# Patient Record
Sex: MALE | Race: WHITE | Employment: FULL TIME | URBAN - METROPOLITAN AREA
[De-identification: names, ages, dates, MRNs, and addresses within clinical notes are randomized per-mention and may not be internally consistent; named-entity substitution may affect disease eponyms.]

---

## 2018-02-11 PROBLEM — R19.7 DIARRHEA: Status: ACTIVE | Noted: 2018-02-11

## 2018-02-11 PROBLEM — E86.0 DEHYDRATION: Status: ACTIVE | Noted: 2018-02-11

## 2018-02-11 PROBLEM — K29.70 VIRAL GASTRITIS: Status: ACTIVE | Noted: 2018-02-11

## 2018-02-11 PROBLEM — R11.10 VOMITING: Status: ACTIVE | Noted: 2018-02-11

## 2018-02-11 PROBLEM — N17.9 ACUTE KIDNEY INJURY (HCC): Status: ACTIVE | Noted: 2018-02-11

## 2018-06-27 ENCOUNTER — HOSPITAL ENCOUNTER (INPATIENT)
Age: 58
LOS: 12 days | Discharge: HOME | DRG: 215 | End: 2018-07-09
Attending: INTERNAL MEDICINE | Admitting: INTERNAL MEDICINE
Payer: COMMERCIAL

## 2018-06-27 ENCOUNTER — APPOINTMENT (OUTPATIENT)
Dept: GENERAL RADIOLOGY | Age: 58
DRG: 215 | End: 2018-06-27
Attending: INTERNAL MEDICINE
Payer: COMMERCIAL

## 2018-06-27 DIAGNOSIS — R19.7 DIARRHEA, UNSPECIFIED TYPE: ICD-10-CM

## 2018-06-27 PROBLEM — I21.3 ST ELEVATION (STEMI) MYOCARDIAL INFARCTION (HCC): Status: ACTIVE | Noted: 2018-06-27

## 2018-06-27 PROBLEM — I21.21 STEMI INVOLVING LEFT CIRCUMFLEX CORONARY ARTERY (HCC): Status: ACTIVE | Noted: 2018-06-27

## 2018-06-27 LAB
ACT BLD: 175 SECS (ref 79–138)
ACT BLD: 208 SECS (ref 79–138)
ARTERIAL PATENCY WRIST A: ABNORMAL
BASE DEFICIT BLDA-SCNC: 4.1 MMOL/L
BDY SITE: ABNORMAL
EPAP/CPAP/PEEP, PAPEEP: 14
FIO2 ON VENT: 100 %
GAS FLOW.O2 SETTING OXYMISER: 18 L/MIN
GLUCOSE BLD STRIP.AUTO-MCNC: 333 MG/DL (ref 65–100)
HCO3 BLDA-SCNC: 23 MMOL/L (ref 22–26)
PCO2 BLDA: 47 MMHG (ref 35–45)
PH BLDA: 7.3 [PH] (ref 7.35–7.45)
PO2 BLDA: 198 MMHG (ref 80–100)
SAO2 % BLD: 99 % (ref 92–97)
SAO2% DEVICE SAO2% SENSOR NAME: ABNORMAL
SERVICE CMNT-IMP: ABNORMAL
SPECIMEN SITE: ABNORMAL
VENTILATION MODE VENT: ABNORMAL
VT SETTING VENT: 500 ML

## 2018-06-27 PROCEDURE — B4101ZZ FLUOROSCOPY OF ABDOMINAL AORTA USING LOW OSMOLAR CONTRAST: ICD-10-PCS | Performed by: INTERNAL MEDICINE

## 2018-06-27 PROCEDURE — 71045 X-RAY EXAM CHEST 1 VIEW: CPT

## 2018-06-27 PROCEDURE — C1757 CATH, THROMBECTOMY/EMBOLECT: HCPCS

## 2018-06-27 PROCEDURE — 0BH17EZ INSERTION OF ENDOTRACHEAL AIRWAY INTO TRACHEA, VIA NATURAL OR ARTIFICIAL OPENING: ICD-10-PCS | Performed by: ANESTHESIOLOGY

## 2018-06-27 PROCEDURE — C1769 GUIDE WIRE: HCPCS

## 2018-06-27 PROCEDURE — 77030002996 HC SUT SLK J&J -A

## 2018-06-27 PROCEDURE — 4A023N7 MEASUREMENT OF CARDIAC SAMPLING AND PRESSURE, LEFT HEART, PERCUTANEOUS APPROACH: ICD-10-PCS | Performed by: INTERNAL MEDICINE

## 2018-06-27 PROCEDURE — 74011000250 HC RX REV CODE- 250: Performed by: INTERNAL MEDICINE

## 2018-06-27 PROCEDURE — 74011000250 HC RX REV CODE- 250

## 2018-06-27 PROCEDURE — 94003 VENT MGMT INPAT SUBQ DAY: CPT

## 2018-06-27 PROCEDURE — 85347 COAGULATION TIME ACTIVATED: CPT

## 2018-06-27 PROCEDURE — 77030028837 HC SYR ANGI PWR INJ COEU -A

## 2018-06-27 PROCEDURE — 94002 VENT MGMT INPAT INIT DAY: CPT

## 2018-06-27 PROCEDURE — 5A1945Z RESPIRATORY VENTILATION, 24-96 CONSECUTIVE HOURS: ICD-10-PCS | Performed by: ANESTHESIOLOGY

## 2018-06-27 PROCEDURE — B2111ZZ FLUOROSCOPY OF MULTIPLE CORONARY ARTERIES USING LOW OSMOLAR CONTRAST: ICD-10-PCS | Performed by: INTERNAL MEDICINE

## 2018-06-27 PROCEDURE — C1887 CATHETER, GUIDING: HCPCS

## 2018-06-27 PROCEDURE — 82375 ASSAY CARBOXYHB QUANT: CPT | Performed by: INTERNAL MEDICINE

## 2018-06-27 PROCEDURE — 77030019697 HC SYR ANGI INFL MRTM -B

## 2018-06-27 PROCEDURE — C1894 INTRO/SHEATH, NON-LASER: HCPCS

## 2018-06-27 PROCEDURE — 75810000275 HC EMERGENCY DEPT VISIT NO LEVEL OF CARE

## 2018-06-27 PROCEDURE — 74011636637 HC RX REV CODE- 636/637: Performed by: INTERNAL MEDICINE

## 2018-06-27 PROCEDURE — 74011250636 HC RX REV CODE- 250/636

## 2018-06-27 PROCEDURE — 77030029641 HC PMP CARD PERC IMPELLA CP ABIM -L

## 2018-06-27 PROCEDURE — 3E03317 INTRODUCTION OF OTHER THROMBOLYTIC INTO PERIPHERAL VEIN, PERCUTANEOUS APPROACH: ICD-10-PCS | Performed by: INTERNAL MEDICINE

## 2018-06-27 PROCEDURE — 65620000000 HC RM CCU GENERAL

## 2018-06-27 PROCEDURE — 93458 L HRT ARTERY/VENTRICLE ANGIO: CPT

## 2018-06-27 PROCEDURE — 82962 GLUCOSE BLOOD TEST: CPT

## 2018-06-27 PROCEDURE — 74011250636 HC RX REV CODE- 250/636: Performed by: INTERNAL MEDICINE

## 2018-06-27 PROCEDURE — 82803 BLOOD GASES ANY COMBINATION: CPT | Performed by: INTERNAL MEDICINE

## 2018-06-27 PROCEDURE — C1725 CATH, TRANSLUMIN NON-LASER: HCPCS

## 2018-06-27 PROCEDURE — 74011636320 HC RX REV CODE- 636/320

## 2018-06-27 PROCEDURE — 74011000258 HC RX REV CODE- 258: Performed by: INTERNAL MEDICINE

## 2018-06-27 PROCEDURE — 77030034850

## 2018-06-27 PROCEDURE — B2151ZZ FLUOROSCOPY OF LEFT HEART USING LOW OSMOLAR CONTRAST: ICD-10-PCS | Performed by: INTERNAL MEDICINE

## 2018-06-27 PROCEDURE — 5A0221D ASSISTANCE WITH CARDIAC OUTPUT USING IMPELLER PUMP, CONTINUOUS: ICD-10-PCS | Performed by: INTERNAL MEDICINE

## 2018-06-27 PROCEDURE — 94640 AIRWAY INHALATION TREATMENT: CPT

## 2018-06-27 PROCEDURE — 74018 RADEX ABDOMEN 1 VIEW: CPT

## 2018-06-27 PROCEDURE — 0272366 DILATION OF CORONARY ARTERY, THREE ARTERIES, BIFURCATION, WITH THREE DRUG-ELUTING INTRALUMINAL DEVICES, PERCUTANEOUS APPROACH: ICD-10-PCS | Performed by: INTERNAL MEDICINE

## 2018-06-27 PROCEDURE — 74011250636 HC RX REV CODE- 250/636: Performed by: ANESTHESIOLOGY

## 2018-06-27 PROCEDURE — 02HA3RZ INSERTION OF SHORT-TERM EXTERNAL HEART ASSIST SYSTEM INTO HEART, PERCUTANEOUS APPROACH: ICD-10-PCS | Performed by: INTERNAL MEDICINE

## 2018-06-27 PROCEDURE — 74011250637 HC RX REV CODE- 250/637: Performed by: INTERNAL MEDICINE

## 2018-06-27 PROCEDURE — 74011636320 HC RX REV CODE- 636/320: Performed by: INTERNAL MEDICINE

## 2018-06-27 PROCEDURE — C1874 STENT, COATED/COV W/DEL SYS: HCPCS

## 2018-06-27 PROCEDURE — 77030004550 HC CATH ANGI DX PRF MRTM -B

## 2018-06-27 RX ORDER — FENTANYL CITRATE 50 UG/ML
INJECTION, SOLUTION INTRAMUSCULAR; INTRAVENOUS
Status: DISCONTINUED
Start: 2018-06-27 | End: 2018-06-28 | Stop reason: ALTCHOICE

## 2018-06-27 RX ORDER — FUROSEMIDE 10 MG/ML
20 INJECTION INTRAMUSCULAR; INTRAVENOUS ONCE
Status: COMPLETED | OUTPATIENT
Start: 2018-06-27 | End: 2018-06-27

## 2018-06-27 RX ORDER — IODIXANOL 320 MG/ML
0-100 INJECTION, SOLUTION INTRAVASCULAR
Status: DISCONTINUED | OUTPATIENT
Start: 2018-06-27 | End: 2018-06-29

## 2018-06-27 RX ORDER — IODIXANOL 320 MG/ML
INJECTION, SOLUTION INTRAVASCULAR
Status: DISCONTINUED
Start: 2018-06-27 | End: 2018-06-28 | Stop reason: ALTCHOICE

## 2018-06-27 RX ORDER — LIDOCAINE HYDROCHLORIDE 10 MG/ML
1-30 INJECTION, SOLUTION EPIDURAL; INFILTRATION; INTRACAUDAL; PERINEURAL
Status: DISCONTINUED | OUTPATIENT
Start: 2018-06-27 | End: 2018-06-27 | Stop reason: HOSPADM

## 2018-06-27 RX ORDER — FUROSEMIDE 10 MG/ML
INJECTION INTRAMUSCULAR; INTRAVENOUS
Status: COMPLETED
Start: 2018-06-27 | End: 2018-06-27

## 2018-06-27 RX ORDER — IPRATROPIUM BROMIDE AND ALBUTEROL SULFATE 2.5; .5 MG/3ML; MG/3ML
SOLUTION RESPIRATORY (INHALATION)
Status: COMPLETED
Start: 2018-06-27 | End: 2018-06-27

## 2018-06-27 RX ORDER — NOREPINEPHRINE BITARTRATE/D5W 8 MG/250ML
2-30 PLASTIC BAG, INJECTION (ML) INTRAVENOUS
Status: DISCONTINUED | OUTPATIENT
Start: 2018-06-27 | End: 2018-06-29

## 2018-06-27 RX ORDER — LIDOCAINE HYDROCHLORIDE 10 MG/ML
INJECTION, SOLUTION EPIDURAL; INFILTRATION; INTRACAUDAL; PERINEURAL
Status: COMPLETED
Start: 2018-06-27 | End: 2018-06-27

## 2018-06-27 RX ORDER — FENTANYL CITRATE 50 UG/ML
25 INJECTION, SOLUTION INTRAMUSCULAR; INTRAVENOUS
Status: DISPENSED | OUTPATIENT
Start: 2018-06-27 | End: 2018-06-28

## 2018-06-27 RX ORDER — EPTIFIBATIDE 0.75 MG/ML
2 INJECTION, SOLUTION INTRAVENOUS CONTINUOUS
Status: DISCONTINUED | OUTPATIENT
Start: 2018-06-27 | End: 2018-06-27

## 2018-06-27 RX ORDER — SODIUM CHLORIDE 900 MG/100ML
INJECTION INTRAVENOUS
Status: DISCONTINUED
Start: 2018-06-27 | End: 2018-06-28 | Stop reason: ALTCHOICE

## 2018-06-27 RX ORDER — MIDAZOLAM HYDROCHLORIDE 1 MG/ML
1 INJECTION, SOLUTION INTRAMUSCULAR; INTRAVENOUS
Status: DISCONTINUED | OUTPATIENT
Start: 2018-06-27 | End: 2018-06-28

## 2018-06-27 RX ORDER — FUROSEMIDE 10 MG/ML
40 INJECTION INTRAMUSCULAR; INTRAVENOUS DAILY
Status: DISCONTINUED | OUTPATIENT
Start: 2018-06-28 | End: 2018-06-29

## 2018-06-27 RX ORDER — BIVALIRUDIN 250 MG/5ML
INJECTION, POWDER, LYOPHILIZED, FOR SOLUTION INTRAVENOUS
Status: DISCONTINUED
Start: 2018-06-27 | End: 2018-06-28 | Stop reason: ALTCHOICE

## 2018-06-27 RX ORDER — PROPOFOL 10 MG/ML
200 INJECTION, EMULSION INTRAVENOUS
Status: COMPLETED | OUTPATIENT
Start: 2018-06-27 | End: 2018-06-27

## 2018-06-27 RX ORDER — MIDAZOLAM HYDROCHLORIDE 1 MG/ML
.5-2 INJECTION, SOLUTION INTRAMUSCULAR; INTRAVENOUS
Status: DISCONTINUED | OUTPATIENT
Start: 2018-06-27 | End: 2018-06-27 | Stop reason: HOSPADM

## 2018-06-27 RX ORDER — NOREPINEPHRINE BITARTRATE/D5W 4MG/250ML
2-30 PLASTIC BAG, INJECTION (ML) INTRAVENOUS
Status: DISCONTINUED | OUTPATIENT
Start: 2018-06-27 | End: 2018-06-27

## 2018-06-27 RX ORDER — CHLORHEXIDINE GLUCONATE 1.2 MG/ML
15 RINSE ORAL EVERY 12 HOURS
Status: DISCONTINUED | OUTPATIENT
Start: 2018-06-27 | End: 2018-07-09

## 2018-06-27 RX ORDER — HEPARIN SODIUM 10000 [USP'U]/100ML
500-2000 INJECTION, SOLUTION INTRAVENOUS
Status: DISCONTINUED | OUTPATIENT
Start: 2018-06-27 | End: 2018-06-28

## 2018-06-27 RX ORDER — HEPARIN SODIUM 200 [USP'U]/100ML
500 INJECTION, SOLUTION INTRAVENOUS ONCE
Status: COMPLETED | OUTPATIENT
Start: 2018-06-27 | End: 2018-06-27

## 2018-06-27 RX ORDER — MAGNESIUM SULFATE 100 %
4 CRYSTALS MISCELLANEOUS AS NEEDED
Status: DISCONTINUED | OUTPATIENT
Start: 2018-06-27 | End: 2018-07-09 | Stop reason: HOSPADM

## 2018-06-27 RX ORDER — PROPOFOL 10 MG/ML
INJECTION, EMULSION INTRAVENOUS
Status: DISPENSED
Start: 2018-06-27 | End: 2018-06-28

## 2018-06-27 RX ORDER — MIDAZOLAM HYDROCHLORIDE 1 MG/ML
INJECTION, SOLUTION INTRAMUSCULAR; INTRAVENOUS
Status: DISCONTINUED
Start: 2018-06-27 | End: 2018-06-28 | Stop reason: ALTCHOICE

## 2018-06-27 RX ORDER — HEPARIN SODIUM 200 [USP'U]/100ML
INJECTION, SOLUTION INTRAVENOUS
Status: COMPLETED
Start: 2018-06-27 | End: 2018-06-27

## 2018-06-27 RX ORDER — FENTANYL CITRATE 50 UG/ML
25-50 INJECTION, SOLUTION INTRAMUSCULAR; INTRAVENOUS
Status: DISCONTINUED | OUTPATIENT
Start: 2018-06-27 | End: 2018-06-27 | Stop reason: HOSPADM

## 2018-06-27 RX ORDER — PROPOFOL 10 MG/ML
0-50 VIAL (ML) INTRAVENOUS
Status: DISCONTINUED | OUTPATIENT
Start: 2018-06-27 | End: 2018-06-27 | Stop reason: HOSPADM

## 2018-06-27 RX ORDER — FUROSEMIDE 10 MG/ML
40 INJECTION INTRAMUSCULAR; INTRAVENOUS ONCE
Status: COMPLETED | OUTPATIENT
Start: 2018-06-27 | End: 2018-06-27

## 2018-06-27 RX ORDER — MUPIROCIN 20 MG/G
OINTMENT TOPICAL 2 TIMES DAILY
Status: COMPLETED | OUTPATIENT
Start: 2018-06-27 | End: 2018-07-02

## 2018-06-27 RX ORDER — SUCCINYLCHOLINE CHLORIDE 20 MG/ML
0-200 INJECTION INTRAMUSCULAR; INTRAVENOUS
Status: COMPLETED | OUTPATIENT
Start: 2018-06-27 | End: 2018-06-27

## 2018-06-27 RX ORDER — EPTIFIBATIDE 0.75 MG/ML
INJECTION, SOLUTION INTRAVENOUS
Status: DISCONTINUED
Start: 2018-06-27 | End: 2018-06-28 | Stop reason: ALTCHOICE

## 2018-06-27 RX ORDER — INSULIN LISPRO 100 [IU]/ML
INJECTION, SOLUTION INTRAVENOUS; SUBCUTANEOUS EVERY 6 HOURS
Status: DISCONTINUED | OUTPATIENT
Start: 2018-06-27 | End: 2018-07-01

## 2018-06-27 RX ORDER — EPTIFIBATIDE 0.75 MG/ML
INJECTION, SOLUTION INTRAVENOUS
Status: COMPLETED
Start: 2018-06-27 | End: 2018-06-27

## 2018-06-27 RX ORDER — ASPIRIN 300 MG/1
300 SUPPOSITORY RECTAL DAILY
Status: DISCONTINUED | OUTPATIENT
Start: 2018-06-27 | End: 2018-06-29

## 2018-06-27 RX ORDER — IPRATROPIUM BROMIDE AND ALBUTEROL SULFATE 2.5; .5 MG/3ML; MG/3ML
3 SOLUTION RESPIRATORY (INHALATION)
Status: DISCONTINUED | OUTPATIENT
Start: 2018-06-27 | End: 2018-07-02

## 2018-06-27 RX ORDER — DEXTROSE 50 % IN WATER (D50W) INTRAVENOUS SYRINGE
12.5-25 AS NEEDED
Status: DISCONTINUED | OUTPATIENT
Start: 2018-06-27 | End: 2018-07-09 | Stop reason: HOSPADM

## 2018-06-27 RX ORDER — SODIUM CHLORIDE 9 MG/ML
25 INJECTION, SOLUTION INTRAVENOUS CONTINUOUS
Status: DISCONTINUED | OUTPATIENT
Start: 2018-06-27 | End: 2018-06-29

## 2018-06-27 RX ADMIN — TICAGRELOR 180 MG: 90 TABLET ORAL at 20:29

## 2018-06-27 RX ADMIN — PROPOFOL 50 MCG/KG/MIN: 10 INJECTION, EMULSION INTRAVENOUS at 19:26

## 2018-06-27 RX ADMIN — HEPARIN SODIUM 500 UNITS/HR: 10000 INJECTION, SOLUTION INTRAVENOUS at 23:10

## 2018-06-27 RX ADMIN — FENTANYL CITRATE 50 MCG: 50 INJECTION, SOLUTION INTRAMUSCULAR; INTRAVENOUS at 17:40

## 2018-06-27 RX ADMIN — SODIUM CHLORIDE 0.4 MCG/KG/HR: 900 INJECTION, SOLUTION INTRAVENOUS at 22:18

## 2018-06-27 RX ADMIN — PROPOFOL 25 MCG/KG/MIN: 10 INJECTION, EMULSION INTRAVENOUS at 17:30

## 2018-06-27 RX ADMIN — BIVALIRUDIN 1.75 MG/KG/HR: 250 INJECTION, POWDER, LYOPHILIZED, FOR SOLUTION INTRAVENOUS at 18:04

## 2018-06-27 RX ADMIN — MIDAZOLAM HYDROCHLORIDE 1 MG: 1 INJECTION, SOLUTION INTRAMUSCULAR; INTRAVENOUS at 19:57

## 2018-06-27 RX ADMIN — BIVALIRUDIN 1.75 MG/KG/HR: 250 INJECTION, POWDER, LYOPHILIZED, FOR SOLUTION INTRAVENOUS at 17:04

## 2018-06-27 RX ADMIN — Medication 15 MCG/MIN: at 16:00

## 2018-06-27 RX ADMIN — PROPOFOL 200 MG: 10 INJECTION, EMULSION INTRAVENOUS at 17:25

## 2018-06-27 RX ADMIN — MIDAZOLAM HYDROCHLORIDE 1 MG: 1 INJECTION, SOLUTION INTRAMUSCULAR; INTRAVENOUS at 20:32

## 2018-06-27 RX ADMIN — LIDOCAINE HYDROCHLORIDE 5 ML: 10 INJECTION, SOLUTION EPIDURAL; INFILTRATION; INTRACAUDAL; PERINEURAL at 16:25

## 2018-06-27 RX ADMIN — IODIXANOL 80 ML: 320 INJECTION, SOLUTION INTRAVASCULAR at 18:39

## 2018-06-27 RX ADMIN — ASPIRIN 300 MG: 300 SUPPOSITORY RECTAL at 21:11

## 2018-06-27 RX ADMIN — HEPARIN SODIUM 7 ML/HR: 5000 INJECTION, SOLUTION INTRAVENOUS; SUBCUTANEOUS at 21:43

## 2018-06-27 RX ADMIN — INSULIN LISPRO 7 UNITS: 100 INJECTION, SOLUTION INTRAVENOUS; SUBCUTANEOUS at 21:21

## 2018-06-27 RX ADMIN — FENTANYL CITRATE 50 MCG: 50 INJECTION, SOLUTION INTRAMUSCULAR; INTRAVENOUS at 17:36

## 2018-06-27 RX ADMIN — SUCCINYLCHOLINE CHLORIDE 180 MG: 20 INJECTION, SOLUTION INTRAMUSCULAR; INTRAVENOUS at 17:25

## 2018-06-27 RX ADMIN — MUPIROCIN: 20 OINTMENT TOPICAL at 22:15

## 2018-06-27 RX ADMIN — SODIUM CHLORIDE 0.2 MCG/KG/HR: 900 INJECTION, SOLUTION INTRAVENOUS at 21:18

## 2018-06-27 RX ADMIN — CHLORHEXIDINE GLUCONATE 15 ML: 1.2 RINSE ORAL at 22:15

## 2018-06-27 RX ADMIN — IODIXANOL 20 ML: 320 INJECTION, SOLUTION INTRAVASCULAR at 18:14

## 2018-06-27 RX ADMIN — HEPARIN SODIUM 1000 UNITS: 200 INJECTION, SOLUTION INTRAVENOUS at 16:25

## 2018-06-27 RX ADMIN — IODIXANOL 100 ML: 320 INJECTION, SOLUTION INTRAVASCULAR at 17:44

## 2018-06-27 RX ADMIN — EPTIFIBATIDE 2 MCG/KG/MIN: 0.75 INJECTION, SOLUTION INTRAVENOUS at 18:05

## 2018-06-27 RX ADMIN — BIVALIRUDIN 1.75 MG/KG/HR: 250 INJECTION, POWDER, LYOPHILIZED, FOR SOLUTION INTRAVENOUS at 16:41

## 2018-06-27 RX ADMIN — FUROSEMIDE 20 MG: 10 INJECTION INTRAVENOUS at 16:46

## 2018-06-27 RX ADMIN — HEPARIN SODIUM 1000 UNITS: 200 INJECTION, SOLUTION INTRAVENOUS at 16:23

## 2018-06-27 RX ADMIN — EPTIFIBATIDE 19.35 MG: 0.75 INJECTION, SOLUTION INTRAVENOUS at 16:56

## 2018-06-27 RX ADMIN — NITROGLYCERIN 200 MCG: 5 INJECTION, SOLUTION INTRAVENOUS at 17:08

## 2018-06-27 RX ADMIN — IODIXANOL 24 ML: 320 INJECTION, SOLUTION INTRAVASCULAR at 18:12

## 2018-06-27 RX ADMIN — MIDAZOLAM HYDROCHLORIDE 1 MG: 1 INJECTION, SOLUTION INTRAMUSCULAR; INTRAVENOUS at 17:35

## 2018-06-27 RX ADMIN — FENTANYL CITRATE 25 MCG: 50 INJECTION, SOLUTION INTRAMUSCULAR; INTRAVENOUS at 23:05

## 2018-06-27 RX ADMIN — PROPOFOL 40 MCG/KG/MIN: 10 INJECTION, EMULSION INTRAVENOUS at 17:35

## 2018-06-27 RX ADMIN — FUROSEMIDE 20 MG: 10 INJECTION INTRAMUSCULAR; INTRAVENOUS at 16:46

## 2018-06-27 RX ADMIN — FUROSEMIDE 40 MG: 10 INJECTION, SOLUTION INTRAVENOUS at 17:14

## 2018-06-27 RX ADMIN — IOPAMIDOL 115 ML: 755 INJECTION, SOLUTION INTRAVENOUS at 17:17

## 2018-06-27 RX ADMIN — IPRATROPIUM BROMIDE AND ALBUTEROL SULFATE 3 ML: .5; 3 SOLUTION RESPIRATORY (INHALATION) at 19:40

## 2018-06-27 RX ADMIN — EPTIFIBATIDE 2 MCG/KG/MIN: 0.75 INJECTION, SOLUTION INTRAVENOUS at 17:10

## 2018-06-27 RX ADMIN — Medication 8 MCG/MIN: at 16:24

## 2018-06-27 RX ADMIN — FAMOTIDINE 20 MG: 10 INJECTION, SOLUTION INTRAVENOUS at 20:57

## 2018-06-27 NOTE — ED PROVIDER NOTES
HPI     Past Medical History:   Diagnosis Date    Actinic keratosis     Asthma     Cellulitis     Diabetes ([address]     Endocrine disease     Essential hypertension     GERD (gastroesophageal reflux disease)     Hypertension     Sun-damaged skin     Sunburn, blistering        Past Surgical History:   Procedure Laterality Date    ABDOMEN SURGERY PROC UNLISTED      HX HERNIA REPAIR      HX TONSILLECTOMY           Family History:   Problem Relation Age of Onset    Cancer Mother     Cancer Father        Social History     Social History    Marital status:      Spouse name: N/A    Number of children: 2    Years of education: N/A     Occupational History     fish boat      Social History Main Topics    Smoking status: Former Smoker     Packs/day: 2.00     Quit date: 5/17/1994    Smokeless tobacco: Never Used    Alcohol use Yes      Comment: occassionally    Drug use: No    Sexual activity: Yes     Other Topics Concern    Not on file     Social History Narrative         ALLERGIES: Pcn [penicillins]    Review of Systems    There were no vitals filed for this visit.          Physical Exam     MDM      ED Course       Procedures

## 2018-06-27 NOTE — H&P
Admission    NAME: Kenrick Murphy   :  1960   MRN:  [id number]     Date/Time:  2018 4:08 PM    Patient PCP: Toyin Hua MD  ________________________________________________________________________     Assessment:     1. Chest pain initially NSTEMI, while at ER at [facility] developed complete heart block, hypotension, with repeat ECG showing inferior STEMI, cardiogenic shock, acute systolic chf  2. Cath 2018 with 90% prox LAD, 100% prox dominant LCx, Small disease non-dominant RCA. PCI with stent to OM, Bifurcation stent to prox LCx and OM, PCI with stent to prox LAD  3. Ischemic cardiomyopathy EF 35%, inferior hypokinesis  4. Sinus with transient complete heart block  5. HTN  6. DM  7. Dyslipidemia  8. Recent colitis with GI bleed s/p endoscopy with Dr. Alverto Hua  9. Hx of tonsillectomy  10. Hx of hernia repair  11. Hx of basal cell ca removal  12. Quit tobacco in   13. , wife is  for medical practice in [address] [place] [address] neck, he works on boat, active          Plan:     Not feeling well for a few months. Chest pain two days ago lasting several hours. Today with more significant chest pain and came to ER at [facility]. There ECG with ST depression anteriorly with cardiac enzymes showing NSTEMI. While awaiting transportation, became hypotensive and nauseated. ECG with complete heart block and now inferior ST elevation. Transferred to [facility] by helicopter. Placed on levophed for hypotension. Here urgent cath, all risk/benefits/alternatives discussed with patient. Cath with occluded dominant LCx and high grade LAD disease. LCx opened with PTCA. Mcgraw placed and initiated on lasix. Despite this progressive acute systolic chf. Intubated for hypoxemic resp failure. OM stented, bifurcation LCx stented, LAD stented. Impella left ventricular assist device placed for cardiogenic shock. Angiomax and integrelin until brilinta can be given.     1. Heparin while impella in  2. Add aspirin  3. Add brilinta  4. Holding further beta blocker due to hypotension  5. Holding losartan HCT for now  6. Diurese with IV Lasix  7. Eventually add statin  8. Insulin sliding scale    Goal will be to diurese tonight and improve oxygenation. Then will start to wean impella. Then will move toward extubation. Critically ill, guarded prognosis discussed with wife and her family. [x]        High complexity decision making was performed        Subjective:   CHIEF COMPLAINT: Chest pain    HISTORY OF PRESENT ILLNESS:       Pt with upper chest pains that started 2 days ago. Lasted for 3-4 hours. Dull, no nausea, no SOB, b  When he had some belching the pain resolved. The pain returned yestereday at 8 or 9 pm.  He took some Pepto Bismol and the pain resolved. No heart trouble in the past. This am at 0400 the pain returned, and it persisted. The pepto bismol did not help this am.      Patient is a 59 y.o. male presenting with chest pain. The history is provided by the patient. Chest Pain (Angina)    Associated symptoms include nausea. Pertinent negatives include no abdominal pain, no back pain, no cough, no dizziness, no fever, no headaches, no shortness of breath, no vomiting and no weakness. Arrived in cath lab, dyspnic, still with chest pain, edema on exam.    We were asked to admit for work up and evaluation of the above problems.      Past Medical History:   Diagnosis Date    Actinic keratosis     Asthma     Cellulitis     Diabetes ([facility] [address]     Endocrine disease     Essential hypertension     GERD (gastroesophageal reflux disease)     Hypertension     Sun-damaged skin     Sunburn, blistering       Past Surgical History:   Procedure Laterality Date    ABDOMEN SURGERY PROC UNLISTED      HX HERNIA REPAIR      HX TONSILLECTOMY       Allergies   Allergen Reactions    Pcn [Penicillins] Nausea and Vomiting      Meds:  See below  Social History   Substance Use Topics    Smoking status: Former Smoker     Packs/day: 2.00     Quit date: 5/17/1994    Smokeless tobacco: Never Used    Alcohol use Yes      Comment: occassionally      Family History   Problem Relation Age of Onset    Cancer Mother     Cancer Father        REVIEW OF SYSTEMS:     []         Unable to obtain  ROS due to ---   [x]         Total of 12 systems reviewed as follows: Total of 12 systems reviewed as follows:       POSITIVE= Bold text  Negative = normal text  General:  fever, chills, sweats, generalized weakness, weight loss/gain,      loss of appetite   Eyes:    blurred vision, eye pain, loss of vision, double vision  ENT:    rhinorrhea, pharyngitis   Respiratory:   cough, sputum production, SOB, MAGALLANES, wheezing, pleuritic pain   Cardiology:   chest pain, palpitations, orthopnea, PND, edema, syncope   Gastrointestinal:  abdominal pain , N/V, diarrhea, dysphagia, constipation, bleeding   Genitourinary:  frequency, urgency, dysuria, hematuria, incontinence   Muskuloskeletal :  arthralgia, myalgia, back pain  Hematology:  easy bruising, nose or gum bleeding, lymphadenopathy   Dermatological: rash, ulceration, pruritis, color change / jaundice  Endocrine:   hot flashes or polydipsia   Neurological:  headache, dizziness, confusion, focal weakness, paresthesia,     Speech difficulties, memory loss, gait difficulty  Psychological: Feelings of anxiety, depression, agitation    Objective:      Physical Exam:    Last 24hrs VS reviewed since prior progress note. Most recent are: There were no vitals taken for this visit. No intake or output data in the 24 hours ending 06/27/18 1608     General Appearance: Well developed, well nourished, alert & oriented x 3,    In severe distress  Ears/Nose/Mouth/Throat: Pupils equal and round, Hearing grossly normal.  Neck: Supple. JVP elevated. Carotids good upstrokes, with no bruit. Chest: Lungs ronchi to auscultation bilaterally.   Cardiovascular: Regular rate and rhythm, S1S2 normal, no murmur, rubs, gallops. Abdomen: Soft, non-tender, bowel sounds are active. No organomegaly. Extremities: +1 edema bilaterally. Femoral pulses +2, Distal Pulses +1. Skin: Cool and dry. Neuro: CN II-XII grossly intact, Strength and sensation grossly intact. Data:      Prior to Admission medications    Medication Sig Start Date End Date Taking? Authorizing Provider   cholestyramine-aspartame (QUESTRAN LIGHT) 4 gram packet Take 1 Packet by mouth three (3) times daily (with meals). Patient taking differently: Take 4 g by mouth daily. 2/16/18   Auberry Ariane., NP   lactobacillus rhamnosus gg 10 billion cell (CULTURELLE) 10 billion cell capsule Take 1 Cap by mouth daily. 2/17/18   Auberry Ariane., NP   potassium chloride (K-DUR, KLOR-CON) 20 mEq tablet Take 1 Tab by mouth two (2) times a day. 2/16/18   Auberry Ariane., NP   glimepiride (AMARYL) 1 mg tablet Take 1 mg by mouth two (2) times a day. Historical Provider   metFORMIN (GLUCOPHAGE) 500 mg tablet Take  by mouth daily. Historical Provider   losartan-hydroCHLOROthiazide (HYZAAR) 100-12.5 mg per tablet Take 1 Tab by mouth daily. Historical Provider   fish oil-omega-3 fatty acids 340-1,000 mg capsule Take 1 Cap by mouth daily. Historical Provider   cyanocobalamin (VITAMIN B-12) 500 mcg tablet Take 500 mcg by mouth daily. Historical Provider   aspirin delayed-release 81 mg tablet Take  by mouth daily. Historical Provider   dulaglutide (TRULICITY) 1.5 NG/7.1 mL sub-q pen 1.5 mg by SubCUTAneous route every seven (7) days.     Historical Provider       Recent Results (from the past 24 hour(s))   EKG, 12 LEAD, INITIAL    Collection Time: 06/27/18 11:42 AM   Result Value Ref Range    Ventricular Rate 95 BPM    Atrial Rate 95 BPM    P-R Interval 146 ms    QRS Duration 102 ms    Q-T Interval 348 ms    QTC Calculation (Bezet) 437 ms    Calculated P Axis 65 degrees    Calculated R Axis -51 degrees Calculated T Axis 89 degrees    Diagnosis       Normal sinus rhythm  Left axis deviation  Inferior-posterior infarct , age undetermined  Abnormal ECG  When compared with ECG of 11-FEB-2018 13:47,  premature atrial complexes are no longer present  ST now depressed in Anterior leads  Nonspecific T wave abnormality no longer evident in Inferior leads     CBC WITH AUTOMATED DIFF    Collection Time: 06/27/18 12:15 PM   Result Value Ref Range    WBC 10.6 4.1 - 11.1 K/uL    RBC 5.56 4.10 - 5.70 M/uL    HGB 15.9 12.1 - 17.0 g/dL    HCT 46.3 36.6 - 50.3 %    MCV 83.3 80.0 - 99.0 FL    MCH 28.6 26.0 - 34.0 PG    MCHC 34.3 30.0 - 36.5 g/dL    RDW 12.6 11.5 - 14.5 %    PLATELET 074 810 - 083 K/uL    MPV 9.8 8.9 - 12.9 FL    NRBC 0.0 0  WBC    ABSOLUTE NRBC 0.00 0.00 - 0.01 K/uL    NEUTROPHILS 72 32 - 75 %    LYMPHOCYTES 18 12 - 49 %    MONOCYTES 8 5 - 13 %    EOSINOPHILS 2 0 - 7 %    BASOPHILS 1 0 - 1 %    IMMATURE GRANULOCYTES 0 0.0 - 0.5 %    ABS. NEUTROPHILS 7.6 1.8 - 8.0 K/UL    ABS. LYMPHOCYTES 1.9 0.8 - 3.5 K/UL    ABS. MONOCYTES 0.9 0.0 - 1.0 K/UL    ABS. EOSINOPHILS 0.2 0.0 - 0.4 K/UL    ABS. BASOPHILS 0.1 0.0 - 0.1 K/UL    ABS. IMM. GRANS. 0.0 0.00 - 0.04 K/UL    DF AUTOMATED     METABOLIC PANEL, COMPREHENSIVE    Collection Time: 06/27/18 12:15 PM   Result Value Ref Range    Sodium 135 (L) 136 - 145 mmol/L    Potassium 3.5 3.5 - 5.1 mmol/L    Chloride 99 97 - 108 mmol/L    CO2 26 21 - 32 mmol/L    Anion gap 10 5 - 15 mmol/L    Glucose 250 (H) 65 - 100 mg/dL    BUN 20 6 - 20 MG/DL    Creatinine 1.09 0.70 - 1.30 MG/DL    BUN/Creatinine ratio 18 12 - 20      GFR est AA >60 >60 ml/min/1.73m2    GFR est non-AA >60 >60 ml/min/1.73m2    Calcium 9.3 8.5 - 10.1 MG/DL    Bilirubin, total 0.7 0.2 - 1.0 MG/DL    ALT (SGPT) 49 12 - 78 U/L    AST (SGOT) 95 (H) 15 - 37 U/L    Alk.  phosphatase 76 45 - 117 U/L    Protein, total 7.3 6.4 - 8.2 g/dL    Albumin 3.8 3.5 - 5.0 g/dL    Globulin 3.5 2.0 - 4.0 g/dL    A-G Ratio 1.1 1.1 - 2.2     CK W/ CKMB & INDEX    Collection Time: 06/27/18 12:15 PM   Result Value Ref Range     (H) 39 - 308 U/L    CK - MB 44.6 (H) <3.6 NG/ML    CK-MB Index 5.8 (H) 0.0 - 2.5     TROPONIN I    Collection Time: 06/27/18 12:15 PM   Result Value Ref Range    Troponin-I, Qt. 5.86 (H) <0.05 ng/mL   PROTHROMBIN TIME + INR    Collection Time: 06/27/18 12:15 PM   Result Value Ref Range    INR 1.2 (H) 0.9 - 1.1      Prothrombin time 11.7 (H) 9.0 - 11.1 sec   PTT    Collection Time: 06/27/18 12:15 PM   Result Value Ref Range    aPTT 25.6 22.1 - 32.0 sec    aPTT, therapeutic range     58.0 - 77.0 SECS   EKG, 12 LEAD, SUBSEQUENT    Collection Time: 06/27/18  3:06 PM   Result Value Ref Range    Ventricular Rate 52 BPM    Atrial Rate 52 BPM    P-R Interval 146 ms    QRS Duration 86 ms    Q-T Interval 500 ms    QTC Calculation (Bezet) 465 ms    Calculated R Axis -132 degrees    Calculated T Axis 130 degrees    Diagnosis       Sinus bradycardia  Right superior axis deviation  Inferior-posterior infarct , possibly acute  Marked ST abnormality, possible lateral subendocardial injury  ** ** ACUTE MI / STEMI ** **  Consider right ventricular involvement in acute inferior infarct  Abnormal ECG  When compared with ECG of 27-JUN-2018 14:38,  MANUAL COMPARISON REQUIRED, DATA IS UNCONFIRMED

## 2018-06-27 NOTE — PROCEDURES
BRIEF OPERATIVE NOTE    Date of Procedure: 6/27/2018   Procedure: Cardiac catheterization    Preoperative Diagnosis: Coronary artery disease  Postoperative Diagnosis: Coronary artery disease     Surgeon/assistant: Whitley Grant MD    Anesthesia: Conscious sedation  Estimated Blood Loss: <50cc  Specimens: None    Findings:   LVEDP: 22   Left ventricular angiography: 35%, severe inferior HK   Coronary angiography: 90% pLAD, 100 % prox LCx (dominant), 70 % non dominant RCA   Intervention: PCI of OM with SHAGUFTA, PCI of LCX/OM bifurcation with SHAGUFTA, PCI of pLaD with SHAGUFTA    Complications: None  Non-coronary Implants: None    Placement of Impella

## 2018-06-27 NOTE — IP AVS SNAPSHOT
[address] [facility] 
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 Please do a BMP that day and fax results to Dr. Brittany Armenta, Cardiologist. [place] 
[phone] Sathish Hurley MD On 8/8/2018 at 1:30 PM with your NP cardiologist, Helio Gallagher,  for post hospital follow up appointment. Please arrive at 1 PM to complete paperwork. [id number] [facility] 
[phone] Discharge Orders None A check benjamín indicates which time of day the medication should be taken. My Medications START taking these medications Instructions Each Dose to Equal  
 Morning Noon Evening Bedtime  
 amiodarone 200 mg tablet Commonly known as:  CORDARONE Your next dose is:  7/10 Take 1 Tab by mouth daily. 200 mg  
    
   
   
   
  
 apixaban 2.5 mg tablet Commonly known as:  Jodelle Patient Your next dose is:  7/9 PM  
   
 Take 1 Tab by mouth two (2) times a day. 2.5 mg  
    
   
   
   
  
 atorvastatin 40 mg tablet Commonly known as:  LIPITOR Your next dose is:  7/10 Take 1 Tab by mouth daily. 40 mg  
    
   
   
   
  
 carvedilol 3.125 mg tablet Commonly known as:  Roscoe Matt Your next dose is:  7/9 PM  
   
 Take 1 Tab by mouth two (2) times daily (with meals). 3.125 mg  
    
   
   
   
  
 clopidogrel 75 mg Tab Commonly known as:  PLAVIX Your next dose is:  7/10 Take 1 Tab by mouth daily. 75 mg  
    
   
   
   
  
 furosemide 40 mg tablet Commonly known as:  LASIX Your next dose is:  7/9 PM  
   
 Take 2 Tabs by mouth two (2) times a day. 80 mg  
    
   
   
   
  
 pantoprazole 40 mg tablet Commonly known as:  PROTONIX Your next dose is:  7/10 Take 1 Tab by mouth Daily (before breakfast). 40 mg  
    
   
   
   
  
 spironolactone 25 mg tablet Commonly known as:  ALDACTONE Your next dose is:  7/10 Take 1 Tab by mouth daily. 25 mg CHANGE how you take these medications Instructions Each Dose to Equal  
 Morning Noon Evening Bedtime  
 potassium chloride 10 mEq tablet Commonly known as:  KLOR-CON What changed:   
- medication strength 
- how much to take Your next dose is:  7/9 PM  
   
 Take 1 Tab by mouth two (2) times a day. 10 mEq CONTINUE taking these medications Instructions Each Dose to Equal  
 Morning Noon Evening Bedtime  
 aspirin delayed-release 81 mg tablet Your next dose is:  7/10 Take  by mouth daily. dulaglutide 1.5 mg/0.5 mL sub-q pen Commonly known as:  TRULICITY Your next dose is:  Every 7 days 1.5 mg by SubCUTAneous route every seven (7) days. 1.5 mg  
    
   
   
   
  
 fish oil-omega-3 fatty acids 340-1,000 mg capsule Your next dose is:  7/10 Take 1 Cap by mouth daily. 1 Cap  
    
   
   
   
  
 glimepiride 1 mg tablet Commonly known as:  AMARYL Your next dose is:  7/9 PM  
   
 Take 1 mg by mouth two (2) times a day. 1 mg  
    
   
   
   
  
 lactobacillus rhamnosus gg 10 billion cell 10 billion cell capsule Commonly known as:  Ruiz Hoot Your next dose is:  7/10 Take 1 Cap by mouth daily. 1 Cap VITAMIN B-12 500 mcg tablet Generic drug:  cyanocobalamin Your next dose is:  7/10 Take 500 mcg by mouth daily. 500 mcg STOP taking these medications   
 cholestyramine-aspartame 4 gram packet Commonly known as:  QUESTRAN LIGHT  
   
  
 losartan-hydroCHLOROthiazide 100-12.5 mg per tablet Commonly known as:  HYZAAR  
   
  
 metFORMIN 500 mg tablet Commonly known as:  GLUCOPHAGE Where to Get Your Medications Information on where to get these meds will be given to you by the nurse or doctor. ! Ask your nurse or doctor about these medications  
  amiodarone 200 mg tablet  
 apixaban 2.5 mg tablet  
 atorvastatin 40 mg tablet  
 carvedilol 3.125 mg tablet  
 clopidogrel 75 mg Tab  
 furosemide 40 mg tablet  
 pantoprazole 40 mg tablet  
 potassium chloride 10 mEq tablet  
 spironolactone 25 mg tablet Discharge Instructions [address] [facility], [address]    [phone] [address]    www.[email or web address] Patient Discharge Instructions Sulma Montgomery / [id number] : 1960 Admitted 2018 Discharged: 2018 · It is important that you take the medication exactly as they are prescribed. · Keep your medication in the bottles provided by the pharmacist and keep a list of the medication names, dosages, and times to be taken in your wallet. · Do not take other medications without consulting your doctor. BRING ALL OF YOUR MEDICINES TO YOUR OFFICE VISIT. Follow-up with William Gutierrez MD in 2 weeks. Follow-up with your primary care physician in one week. Heart Attack: After Your Hospitalization Your Care Instructions A heart attack (myocardial infarction, or MI) occurs when one or more of the coronary arteries, which supply the heart with oxygen-rich blood, is blocked. A blockage usually occurs when plaque inside the artery breaks open and a blood clot forms in the artery. After a heart attack, you may be worried about your future. Over the next several weeks, your heart will start to heal. Although it is sometimes hard to break old habits, you can prevent another heart attack by making some lifestyle changes and by taking medicines. You may use the following information for ideas about what to do at home to speed your recovery. Follow-up care is a key part of your treatment and safety. Be sure to make and go to all appointments, and call your doctor if you are having problems. It is also a good idea to keep a list of the medicines you take, including dose. How can you care for yourself at home? Activity Increase your activities slowly. Take short rest breaks when you get tired. As you are able, get more exercise. Walking is a good choice. Bit by bit, increase the amount you walk every day. Try for at least 30 minutes on most days of the week. You also may want to swim, bike, or do other activities. Cardiac rehabilitation (rehab) program is strongly recommended. Cardiac rehab includes supervised exercise, help with diet and lifestyle changes, and emotional support. It may reduce your risk of future heart problems. Do not drive until your doctor says you can. You can have sex when you are strong enough. This usually means when you can easily walk around or climb stairs. Talk with your doctor if you have any concerns. Do not take sildenafil citrate (Viagra), tadalafil (Cialis), or vardenafil (Levitra) if you are taking nitroglycerin. Lifestyle changes Do not smoke. Smoking increases your risk of another heart attack. If you need help quitting, talk to your doctor about stop-smoking programs and medicines. These can increase your chances of quitting for good. Eat a heart-healthy diet that is low in cholesterol, saturated fat, and salt, and is full of fruits, vegetables and whole-grains.  Eat at least two servings of fish each week. You may get more details about how to eat healthy, but these tips can help you get started. Our website has more information:  www.[employer]. [employer] Avoid colds and flu. Get a pneumococcal vaccine shot. If you have had one before, ask your primary care doctor whether you need a second dose. Get a flu shot every fall. If you must be around people with colds or flu, wash your hands often. Medicines Take your medicines exactly as prescribed. Call your doctor if you think you are having a problem with your medicine. You may need several medicines. Angiotensin-converting enzyme (ACE) inhibitors, beta-blockers, and statins can help prevent another heart attack. ACE inhibitors control your blood pressure, and statins help lower cholesterol. Aspirin and other blood thinners help prevent blood clots. Blood clots can cause a stroke or heart attack. If Plavix is prescribed, you must take it to prevent your stent from clotting. You will likely need the plavix for at least 1 to 2 years. If your doctor has given you nitroglycerin, keep it with you at all times. If you have chest pain, sit down and rest, and take the first dose of nitroglycerin if the discomfort does not resolve. If chest pain gets worse or is not getting better within 5 minutes, call 911 immediately. Stay on the phone with the emergency ; he or she will give you further instructions. Be sure to tell your doctor about any chest pain you have had, even if it went away. Do not take any over-the-counter medicines, vitamins, or herbal products without talking to your doctor first. 
 
Mental health Talk to your family, friends, or a counselor about your feelings. It is normal to feel frightened, angry, hopeless, helpless, and even guilty. Talking openly about bad feelings can help you cope. If the blues last, talk to your primary care doctor. When should you call for help? Call 911 anytime you think you may need emergency care. For example, call if: 
You have signs of a heart attack. These may include: 
Chest pain or pressure. Sweating. Shortness of breath. Nausea or vomiting. Pain that spreads from the chest to the neck, jaw, or one or both shoulders or arms. Dizziness or lightheadedness. A fast or irregular pulse. After calling 911, chew 1 adult-strength aspirin. Wait for an ambulance. Do not try to drive yourself. You passed out (lost consciousness). You feel like you are having another heart attack. Call your doctor now or seek immediate medical care if: 
You have had any chest pain, even if it has gone away. You have new or increased shortness of breath. You are dizzy or lightheaded, or you feel like you may faint. Watch closely for changes in your health, and be sure to contact your doctor if you have any problems, including gaining 5 pounds or more. Cardiac Catheterization  Discharge Instructions ? You may take a shower. Be sure to get the dressing wet and then remove it; gently wash the area with warm soapy water. Pat dry and leave open to air. To help prevent infections, be sure to keep the cath site clean and dry. No lotions, creams, powders, ointments, etc. in the cath site for approximately 1 week. ? Do not take a tub bath, get in a hot tub or swimming pool for approximately 5 days or until the cath site is completely healed. ? No strenuous activity or heavy lifting over 10 lbs. for 7 days. ? After your cath, some bruising or discomfort is common during the healing process. Tylenol, 1-2 tablets every 6 hours as needed, is recommended if you experience any discomfort.   If you experience any signs or symptoms of infection such as fever, chills, or poorly healing incision, persistent tenderness or swelling in the groin, redness and/or warmth to the touch, numbness, significant tingling or pain at the groin site or affected extremity, rash, drainage from the cath site, or if the leg feels tight or swollen, call your physician right away. ? If bleeding at the cath site occurs, take a clean gauze pad and apply direct pressure to the groin just above the puncture site. Call 911 immediately, and continue to apply direct pressure until an ambulance gets to your location. Information obtained by : 
I understand that if any problems occur once I am at home I am to contact my physician. I understand and acknowledge receipt of the instructions indicated above. R.N.'s Signature                                                                  Date/Time Patient or Representative Signature                                                          Date/Time Luis E Lewis MD 
 
 
Where can you learn more? Go to [email or web address] [address] [facility], [address] [phone] [address]    www.[employer] Announcement We are excited to announce that we are making your provider's discharge notes available to you in WOWash. You will see these notes when they are completed and signed by the physician that discharged you from your recent hospital stay. If you have any questions or concerns about any information you see in WOWash, please call the Health Information Department where you were seen or reach out to your Primary Care Provider for more information about your plan of care. Introducing [facility]! Dear Vitaliy Ortiz: 
Thank you for requesting a WOWash account. Our records indicate that you already have an active WOWash account.   You can access your account anytime at [email or web address] Did you know that you can access your hospital and ER discharge instructions at any time in SurePeak? You can also review all of your test results from your hospital stay or ER visit. Additional Information If you have questions, please visit the Frequently Asked Questions section of the SurePeak website at [email or web address] Remember, SurePeak is NOT to be used for urgent needs. For medical emergencies, dial 911. Now available from your iPhone and Android! Introducing Quentin Doan As a [facility] patient, I wanted to make you aware of our electronic visit tool called Quentin Doan. [employer] allows you to connect within minutes with a medical provider 24 hours a day, seven days a week via a mobile device or tablet or logging into a secure website from your computer. You can access Quentin Doan from anywhere in the [place]. A virtual visit might be right for you when you have a simple condition and feel like you just dont want to get out of bed, or cant get away from work for an appointment, when your regular [facility] provider is not available (evenings, weekends or holidays), or when youre out of town and need minor care. Electronic visits cost only $49 and if the cinvolve/SimplyCast provider determines a prescription is needed to treat your condition, one can be electronically transmitted to a nearby pharmacy*. Please take a moment to enroll today if you have not already done so. The enrollment process is free and takes just a few minutes. To enroll, please download the cinvolve/SimplyCast janell to your tablet or phone, or visit [email or web address] to enroll on your computer.    
And, as an [address] [place] [address] patient with a Fosubo account, the results of your visits will be scanned into your electronic medical record and your primary care provider will be able to view the scanned results. We urge you to continue to see your regular [facility] provider for your ongoing medical care. And while your primary care provider may not be the one available when you seek a ProtoGeo virtual visit, the peace of mind you get from getting a real diagnosis real time can be priceless. For more information on ProtoGeo, view our Frequently Asked Questions (FAQs) at [email or web address]. Sincerely, 
 
Kayli Carrasquillo MD 
Chief Medical Officer [address] *:  certain medications cannot be prescribed via ProtoGeo Unresulted Labs-Please follow up with your PCP about these lab tests Order Current Status CARBOXY HEMOGLOBIN In process Providers Seen During Your Hospitalization Provider Specialty Primary office phone Maryuri Hernandez MD Cardiology [phone] Your Primary Care Physician (PCP) Primary Care Physician Office Phone Office Fax [address] [phone] You are allergic to the following Allergen Reactions Pcn (Penicillins) Nausea and Vomiting Recent Documentation Height Weight BMI Smoking Status 1.778 m 106 kg 33.53 kg/m2 Former Smoker Emergency Contacts Name Discharge Info Relation Home Work Mobile Grace [place] DISCHARGE CAREGIVER [3] Spouse [3] [phone] Patient Belongings The following personal items are in your possession at time of discharge: 
  Dental Appliances: With patient  Visual Aid: None      Home Medications: None   Jewelry: None  Clothing: Shorts    Other Valuables: None Please provide this summary of care documentation to your next provider. Signatures-by signing, you are acknowledging that this After Visit Summary has been reviewed with you and you have received a copy.   
  
 
  
    
    
 Patient Signature: ____________________________________________________________ Date:  ____________________________________________________________  
  
Aloma Snellen Provider Signature:  ____________________________________________________________ Date:  ____________________________________________________________

## 2018-06-27 NOTE — IP AVS SNAPSHOT
Summary of Care Report The Summary of Care report has been created to help improve care coordination. Users with access to [employer] or [address] (Web-based application) may access additional patient information including the Discharge Summary. If you are not currently a [address] user and need more information, please call the number listed below in the [place] [address] section and ask to be connected with Medical Records. Facility Information Name Address Phone [address] [address] [phone] Patient Information Patient Name Sex ANYI Fenton ([id number]) Male 1960 Discharge Information Admitting Provider Service Area Unit Shivam Hartman MD / [phone] [address] [facility] / [phone] Discharge Provider Discharge Date/Time Discharge Disposition Destination (none) 2018 Morning (Pending) AHR (none) Patient Language Language ENGLISH [13] Hospital Problems as of 2018  Reviewed: 2018  8:14 AM by Noemi Coffey MD  
  
  
  
 Class Noted - Resolved Last Modified POA Active Problems ST elevation (STEMI) myocardial infarction ([facility] [address]  2018 - Present 2018 by Shivam Hartman MD Unknown Entered by Shivam Hartman MD  
  STEMI involving left circumflex coronary artery ([facility] [address]  2018 - Present 2018 by Shivam Hartman MD Unknown Entered by Shivam Hartman MD  
  
Non-Hospital Problems as of 2018  Reviewed: 2018  8:14 AM by Zaki Grady MD  
  
  
  
 Class Noted - Resolved Last Modified Active Problems Dehydration  2018 - Present 2018 by Griffin Rubi, NP   Entered by Zaki Grady MD  
  Diarrhea  2018 - Present 2018 by Zaki Grady MD  
  Entered by Zaki Grady MD  
 Vomiting  2/11/2018 - Present 2/11/2018 by Lizbeth Cantu MD  
  Entered by Noemi Coffey MD  
  Viral gastritis  2/11/2018 - Present 2/11/2018 by Noemi Coffey MD  
  Entered by Lizbeth Cantu MD  
  Acute kidney injury [facility])  2/11/2018 - Present 2/11/2018 by Lizbeth Cantu MD  
  Entered by Noemi Smalls MD  
  
You are allergic to the following Allergen Reactions Pcn (Penicillins) Nausea and Vomiting Current Discharge Medication List  
  
START taking these medications Dose & Instructions Dispensing Information Comments  
 amiodarone 200 mg tablet Commonly known as:  CORDARONE Dose:  200 mg Take 1 Tab by mouth daily. Quantity:  90 Tab Refills:  0  
   
 apixaban 2.5 mg tablet Commonly known as:  Ariana Loll Dose:  2.5 mg Take 1 Tab by mouth two (2) times a day. Quantity:  60 Tab Refills:  0  
   
 atorvastatin 40 mg tablet Commonly known as:  LIPITOR Dose:  40 mg Take 1 Tab by mouth daily. Quantity:  90 Tab Refills:  3  
   
 carvedilol 3.125 mg tablet Commonly known as:  Cori [place] Dose:  3.125 mg Take 1 Tab by mouth two (2) times daily (with meals). Quantity:  180 Tab Refills:  3  
   
 clopidogrel 75 mg Tab Commonly known as:  PLAVIX Dose:  75 mg Take 1 Tab by mouth daily. Quantity:  90 Tab Refills:  3  
   
 furosemide 40 mg tablet Commonly known as:  LASIX Dose:  80 mg Take 2 Tabs by mouth two (2) times a day. Quantity:  360 Tab Refills:  1  
   
 pantoprazole 40 mg tablet Commonly known as:  PROTONIX Dose:  40 mg Take 1 Tab by mouth Daily (before breakfast). Quantity:  90 Tab Refills:  4  
   
 spironolactone 25 mg tablet Commonly known as:  ALDACTONE Dose:  25 mg Take 1 Tab by mouth daily. Quantity:  90 Tab Refills:  3 CONTINUE these medications which have CHANGED Dose & Instructions Dispensing Information Comments  
 potassium chloride 10 mEq tablet Commonly known as:  KLOR-CON What changed:   
- medication strength 
- how much to take Dose:  10 mEq Take 1 Tab by mouth two (2) times a day. Quantity:  180 Tab Refills:  3 CONTINUE these medications which have NOT CHANGED Dose & Instructions Dispensing Information Comments  
 aspirin delayed-release 81 mg tablet Take  by mouth daily. Refills:  0  
   
 dulaglutide 1.5 mg/0.5 mL sub-q pen Commonly known as:  TRULICITY Dose:  1.5 mg  
1.5 mg by SubCUTAneous route every seven (7) days. Refills:  0  
   
 fish oil-omega-3 fatty acids 340-1,000 mg capsule Dose:  1 Cap Take 1 Cap by mouth daily. Refills:  0  
   
 glimepiride 1 mg tablet Commonly known as:  AMARYL Dose:  1 mg Take 1 mg by mouth two (2) times a day. Refills:  0  
   
 lactobacillus rhamnosus gg 10 billion cell 10 billion cell capsule Commonly known as:  Erlene [place] Dose:  1 Cap Take 1 Cap by mouth daily. Quantity:  30 Cap Refills:  0  
   
 VITAMIN B-12 500 mcg tablet Generic drug:  cyanocobalamin Dose:  500 mcg Take 500 mcg by mouth daily. Refills:  0 STOP taking these medications Comments  
 cholestyramine-aspartame 4 gram packet Commonly known as:  QUESTRAN LIGHT  
   
   
 losartan-hydroCHLOROthiazide 100-12.5 mg per tablet Commonly known as:  HYZAAR  
   
   
 metFORMIN 500 mg tablet Commonly known as:  GLUCOPHAGE Follow-up Information Follow up With Details Comments Contact Info Jessica Winkler MD On 7/23/2018 at 3 PM for a post hospital follow up appointment with your PCP. Please do a BMP that day and fax results to Dr. Xiomara Ortega, Cardiologist. [phone] Sherry Amaya MD On 8/8/2018 at 1:30 PM with your NP cardiologist, Malcolm Ahuja,  for post hospital follow up appointment. Please arrive at 1 PM to complete paperwork. [id number] J[facility]ie [facility]. 
[phone] Discharge Instructions [address] [facility], [address]    [phone] [place], [address] [facility]    [email or web address] Patient Discharge Instructions Gabi Stuart / [id number] : 1960 Admitted 2018 Discharged: 2018 · It is important that you take the medication exactly as they are prescribed. · Keep your medication in the bottles provided by the pharmacist and keep a list of the medication names, dosages, and times to be taken in your wallet. · Do not take other medications without consulting your doctor. BRING ALL OF YOUR MEDICINES TO YOUR OFFICE VISIT. Follow-up with Baron Perez MD in 2 weeks. Follow-up with your primary care physician in one week. Heart Attack: After Your Hospitalization Your Care Instructions A heart attack (myocardial infarction, or MI) occurs when one or more of the coronary arteries, which supply the heart with oxygen-rich blood, is blocked. A blockage usually occurs when plaque inside the artery breaks open and a blood clot forms in the artery. After a heart attack, you may be worried about your future. Over the next several weeks, your heart will start to heal. Although it is sometimes hard to break old habits, you can prevent another heart attack by making some lifestyle changes and by taking medicines. You may use the following information for ideas about what to do at home to speed your recovery. Follow-up care is a key part of your treatment and safety. Be sure to make and go to all appointments, and call your doctor if you are having problems. It is also a good idea to keep a list of the medicines you take, including dose. How can you care for yourself at home? Activity Increase your activities slowly. Take short rest breaks when you get tired. As you are able, get more exercise. Walking is a good choice.  Bit by bit, increase the amount you walk every day. Try for at least 30 minutes on most days of the week. You also may want to swim, bike, or do other activities. Cardiac rehabilitation (rehab) program is strongly recommended. Cardiac rehab includes supervised exercise, help with diet and lifestyle changes, and emotional support. It may reduce your risk of future heart problems. Do not drive until your doctor says you can. You can have sex when you are strong enough. This usually means when you can easily walk around or climb stairs. Talk with your doctor if you have any concerns. Do not take sildenafil citrate (Viagra), tadalafil (Cialis), or vardenafil (Levitra) if you are taking nitroglycerin. Lifestyle changes Do not smoke. Smoking increases your risk of another heart attack. If you need help quitting, talk to your doctor about stop-smoking programs and medicines. These can increase your chances of quitting for good. Eat a heart-healthy diet that is low in cholesterol, saturated fat, and salt, and is full of fruits, vegetables and whole-grains. Eat at least two servings of fish each week. You may get more details about how to eat healthy, but these tips can help you get started. Our website has more information:  www.[employer]. [employer] Avoid colds and flu. Get a pneumococcal vaccine shot. If you have had one before, ask your primary care doctor whether you need a second dose. Get a flu shot every fall. If you must be around people with colds or flu, wash your hands often. Medicines Take your medicines exactly as prescribed. Call your doctor if you think you are having a problem with your medicine. You may need several medicines. Angiotensin-converting enzyme (ACE) inhibitors, beta-blockers, and statins can help prevent another heart attack. ACE inhibitors control your blood pressure, and statins help lower cholesterol. Aspirin and other blood thinners help prevent blood clots.  Blood clots can cause a stroke or heart attack. If Plavix is prescribed, you must take it to prevent your stent from clotting. You will likely need the plavix for at least 1 to 2 years. If your doctor has given you nitroglycerin, keep it with you at all times. If you have chest pain, sit down and rest, and take the first dose of nitroglycerin if the discomfort does not resolve. If chest pain gets worse or is not getting better within 5 minutes, call 911 immediately. Stay on the phone with the emergency ; he or she will give you further instructions. Be sure to tell your doctor about any chest pain you have had, even if it went away. Do not take any over-the-counter medicines, vitamins, or herbal products without talking to your doctor first. 
 
Mental health Talk to your family, friends, or a counselor about your feelings. It is normal to feel frightened, angry, hopeless, helpless, and even guilty. Talking openly about bad feelings can help you cope. If the blues last, talk to your primary care doctor. When should you call for help? Call 911 anytime you think you may need emergency care. For example, call if: 
You have signs of a heart attack. These may include: 
Chest pain or pressure. Sweating. Shortness of breath. Nausea or vomiting. Pain that spreads from the chest to the neck, jaw, or one or both shoulders or arms. Dizziness or lightheadedness. A fast or irregular pulse. After calling 911, chew 1 adult-strength aspirin. Wait for an ambulance. Do not try to drive yourself. You passed out (lost consciousness). You feel like you are having another heart attack. Call your doctor now or seek immediate medical care if: 
You have had any chest pain, even if it has gone away. You have new or increased shortness of breath. You are dizzy or lightheaded, or you feel like you may faint.  
 
Watch closely for changes in your health, and be sure to contact your doctor if you have any problems, including gaining 5 pounds or more. Cardiac Catheterization  Discharge Instructions ? You may take a shower. Be sure to get the dressing wet and then remove it; gently wash the area with warm soapy water. Pat dry and leave open to air. To help prevent infections, be sure to keep the cath site clean and dry. No lotions, creams, powders, ointments, etc. in the cath site for approximately 1 week. ? Do not take a tub bath, get in a hot tub or swimming pool for approximately 5 days or until the cath site is completely healed. ? No strenuous activity or heavy lifting over 10 lbs. for 7 days. ? After your cath, some bruising or discomfort is common during the healing process. Tylenol, 1-2 tablets every 6 hours as needed, is recommended if you experience any discomfort. If you experience any signs or symptoms of infection such as fever, chills, or poorly healing incision, persistent tenderness or swelling in the groin, redness and/or warmth to the touch, numbness, significant tingling or pain at the groin site or affected extremity, rash, drainage from the cath site, or if the leg feels tight or swollen, call your physician right away. ? If bleeding at the cath site occurs, take a clean gauze pad and apply direct pressure to the groin just above the puncture site. Call 911 immediately, and continue to apply direct pressure until an ambulance gets to your location. Information obtained by : 
I understand that if any problems occur once I am at home I am to contact my physician. I understand and acknowledge receipt of the instructions indicated above. R.N.'s Signature                                                                  Date/Time Patient or Representative Signature                                                          Date/Time Wendy Gaines MD 
 
 
Where can you learn more? Go to [email or web address] [address] [facility], [address]   [phone] [address]    [email or web address] Chart Review Routing History Recipient Method Report Sent By Teri Wood MD  
Fax: [phone] Phone: [phone] Fax Leanne Martinez MD NOTES AUTO ROUTING REPORT Matthew Caruso MD [[id number]] 2/11/2018  8:51 AM 02/11/2018 Yoko Wood MD  
Fax: [phone] Phone: [phone] Fax Leanne Martinez MD NOTES AUTO ROUTING REPORT Hollie Baron MD [[id number]] 2/19/2018  2:05 PM 02/19/2018 Yoko Wood MD  
Fax: [phone] Phone: [phone] Fax Leanne Martinez MD NOTES AUTO ROUTING REPORT Wendy Gaines MD [[id number]] 6/27/2018  9:50 PM 06/27/2018 Yoko Wood MD  
Fax: [phone] Phone: [phone] Fax Leanne Martinez MD NOTES AUTO ROUTING REPORT Wendy Gaines MD [[id number]] 7/9/2018  8:36 AM 07/09/2018 Yoko Wood MD  
Fax: [phone] Phone: [phone] Fax Leanne Martinez MD NOTES AUTO ROUTING REPORT Wendy Gaines MD [[id number]] 7/9/2018  8:36 AM 07/09/2018

## 2018-06-27 NOTE — IP AVS SNAPSHOT
[address] [facility] 
[phone] Patient: Jaime Jean MRN: [id number] QWQ:[id number]/3309 A check benjamín indicates which time of day the medication should be taken. My Medications START taking these medications Instructions Each Dose to Equal  
 Morning Noon Evening Bedtime  
 amiodarone 200 mg tablet Commonly known as:  CORDARONE Your next dose is:  7/10 Take 1 Tab by mouth daily. 200 mg  
    
   
   
   
  
 apixaban 2.5 mg tablet Commonly known as:  [place] Your next dose is:  7/9 PM  
   
 Take 1 Tab by mouth two (2) times a day. 2.5 mg  
    
   
   
   
  
 atorvastatin 40 mg tablet Commonly known as:  LIPITOR Your next dose is:  7/10 Take 1 Tab by mouth daily. 40 mg  
    
   
   
   
  
 carvedilol 3.125 mg tablet Commonly known as:  Vergia Stefan Your next dose is:  7/9 PM  
   
 Take 1 Tab by mouth two (2) times daily (with meals). 3.125 mg  
    
   
   
   
  
 clopidogrel 75 mg Tab Commonly known as:  PLAVIX Your next dose is:  7/10 Take 1 Tab by mouth daily. 75 mg  
    
   
   
   
  
 furosemide 40 mg tablet Commonly known as:  LASIX Your next dose is:  7/9 PM  
   
 Take 2 Tabs by mouth two (2) times a day. 80 mg  
    
   
   
   
  
 pantoprazole 40 mg tablet Commonly known as:  PROTONIX Your next dose is:  7/10 Take 1 Tab by mouth Daily (before breakfast). 40 mg  
    
   
   
   
  
 spironolactone 25 mg tablet Commonly known as:  ALDACTONE Your next dose is:  7/10 Take 1 Tab by mouth daily. 25 mg CHANGE how you take these medications Instructions Each Dose to Equal  
 Morning Noon Evening Bedtime  
 potassium chloride 10 mEq tablet Commonly known as:  KLOR-CON What changed:   
- medication strength 
- how much to take Your next dose is:  7/9 PM  
   
 Take 1 Tab by mouth two (2) times a day. 10 mEq CONTINUE taking these medications Instructions Each Dose to Equal  
 Morning Noon Evening Bedtime  
 aspirin delayed-release 81 mg tablet Your next dose is:  7/10 Take  by mouth daily. dulaglutide 1.5 mg/0.5 mL sub-q pen Commonly known as:  TRULICITY Your next dose is:  Every 7 days 1.5 mg by SubCUTAneous route every seven (7) days. 1.5 mg  
    
   
   
   
  
 fish oil-omega-3 fatty acids 340-1,000 mg capsule Your next dose is:  7/10 Take 1 Cap by mouth daily. 1 Cap  
    
   
   
   
  
 glimepiride 1 mg tablet Commonly known as:  AMARYL Your next dose is:  7/9 PM  
   
 Take 1 mg by mouth two (2) times a day. 1 mg  
    
   
   
   
  
 lactobacillus rhamnosus gg 10 billion cell 10 billion cell capsule Commonly known as:  Hope Poser Your next dose is:  7/10 Take 1 Cap by mouth daily. 1 Cap VITAMIN B-12 500 mcg tablet Generic drug:  cyanocobalamin Your next dose is:  7/10 Take 500 mcg by mouth daily. 500 mcg STOP taking these medications   
 cholestyramine-aspartame 4 gram packet Commonly known as:  QUESTRAN LIGHT  
   
  
 losartan-hydroCHLOROthiazide 100-12.5 mg per tablet Commonly known as:  HYZAAR  
   
  
 metFORMIN 500 mg tablet Commonly known as:  GLUCOPHAGE Where to Get Your Medications Information on where to get these meds will be given to you by the nurse or doctor. ! Ask your nurse or doctor about these medications  
  amiodarone 200 mg tablet  
 apixaban 2.5 mg tablet  
 atorvastatin 40 mg tablet  
 carvedilol 3.125 mg tablet  
 clopidogrel 75 mg Tab  
 furosemide 40 mg tablet  
 pantoprazole 40 mg tablet  
 potassium chloride 10 mEq tablet  
 spironolactone 25 mg tablet

## 2018-06-27 NOTE — PROGRESS NOTES
[facility]  Pulmonary, Critical Care, and Sleep Medicine    Name: Latrell Shields MRN: [id number]   : 1960 Hospital: [address]   Date: 2018        IMPRESSION:   · Acute hypoxic/hypercapnic respiratory failure (ABG not yet in EMR)  · Acute inferior STEMI  · Acute pulmonary edema  · Cardiogenic shock  · DM  · Asthma, no details available, without exacerbation  · H/O tobacco use      PLAN:   · Ventilator support - adjust for ABG  · Follow up post-intubation chest X-ray   · Bronchodilators  · Efforts at revascularization ongoing  · Pressors/Impella  · Diuresis   · Insulin/glycemic control  · Sedation/pain control  · DVT/GI prophylaxis  · Critically ill      Subjective/Interval History:   I have reviewed the flowsheet and previous days notes. The patient is unable to give any meaningful history or review of systems because the patient is: Intubated/sedated - transferred from [facility] with inferior STEMI - he has developed cardiogenic shock and hypoxia requiring intubation - exam limited as he is being actively managed in the cath lab     The patient is critically ill on:      Mechanical ventilation/pressors, probably going to start Impella     Review of Systems   Unable to perform ROS: Intubated     Objective:   Vital Signs: There were no vitals taken for this visit. Temp (24hrs), Av.2 °F (36.8 °C), Min:98.2 °F (36.8 °C), Max:98.2 °F (36.8 °C)       Intake/Output:   Last shift:         Last 3 shifts:    No intake or output data in the 24 hours ending 18 1750  Hemodynamics:   PAP:   CO:     Wedge:   CI:     CVP:    SVR:       PVR:       Ventilator Settings:  Mode Rate Tidal Volume Pressure FiO2 PEEP                    Peak airway pressure:      Minute ventilation:         Physical Exam   Constitutional: He is sedated and intubated. HENT:   Head: Normocephalic and atraumatic. Mouth/Throat: No oropharyngeal exudate. Eyes: No scleral icterus. Cardiovascular: Regular rhythm. Tachycardia present. No murmur heard. Pulmonary/Chest: He is intubated. He has no wheezes. He has rales. Abdominal: Soft. Bowel sounds are normal. He exhibits no distension. There is no tenderness. Musculoskeletal: He exhibits no edema. Skin: Skin is warm and dry. Data:     Current Facility-Administered Medications   Medication Dose Route Frequency    midazolam (VERSED) 1 mg/mL injection        bivalirudin (ANGIOMAX) 250 mg injection        fentaNYL citrate (PF) 50 mcg/mL injection        0.9% sodium chloride (MBP/ADV) infusion        ADDaptor        iopamidol (ISOVUE-370) 76 % injection        NOREPINephrine (LEVOPHED) 8 mg in 5% dextrose 250mL infusion  2-30 mcg/min IntraVENous TITRATE    bivalirudin (ANGIOMAX) 250 mg in 0.9% sodium chloride (MBP/ADV) 50 mL infusion  1.75 mg/kg/hr IntraVENous CONTINUOUS    bivalirudin (ANGIOMAX) 250 mg injection        0.9% sodium chloride (MBP/ADV) infusion        ADDaptor        iodixanol (VISIPAQUE) 320 mg iodine/mL contrast injection        eptifibatide (INTEGRILIN) 0.75 mg/mL infusion  2 mcg/kg/min IntraVENous CONTINUOUS    eptifibatide (INTEGRILIN) 0.75 mg/mL infusion        propofol (DIPRIVAN) infusion  0-50 mcg/kg/min IntraVENous TITRATE    propofol (DIPRIVAN) 10 mg/mL injection                    Labs:  Recent Labs      06/27/18   1215   WBC  10.6   HGB  15.9   HCT  46.3   PLT  187     Recent Labs      06/27/18   1215   NA  135*   K  3.5   CL  99   CO2  26   GLU  250*   BUN  20   CREA  1.09   CA  9.3   ALB  3.8   TBILI  0.7   SGOT  95*   ALT  49   INR  1.2*     No results for input(s): PH, PCO2, PO2, HCO3, FIO2 in the last 72 hours.     Imaging:  I have personally reviewed the patients radiographs and have reviewed the reports:  No acute process (at \[facility]\""), post-intubation pending        Total critical care time exclusive of procedures: 25 minutes  Rossy Somers MD

## 2018-06-28 ENCOUNTER — APPOINTMENT (OUTPATIENT)
Dept: GENERAL RADIOLOGY | Age: 58
DRG: 215 | End: 2018-06-28
Attending: INTERNAL MEDICINE
Payer: COMMERCIAL

## 2018-06-28 LAB
ABO + RH BLD: NORMAL
ACT BLD: 147 SECS (ref 79–138)
ACT BLD: 153 SECS (ref 79–138)
ACT BLD: 158 SECS (ref 79–138)
ACT BLD: 158 SECS (ref 79–138)
ACT BLD: 164 SECS (ref 79–138)
ACT BLD: 169 SECS (ref 79–138)
ACT BLD: 334 SECS (ref 79–138)
ALBUMIN SERPL-MCNC: 3.2 G/DL (ref 3.5–5)
ALBUMIN/GLOB SERPL: 1 {RATIO} (ref 1.1–2.2)
ALP SERPL-CCNC: 65 U/L (ref 45–117)
ALT SERPL-CCNC: 96 U/L (ref 12–78)
ANION GAP SERPL CALC-SCNC: 12 MMOL/L (ref 5–15)
ARTERIAL PATENCY WRIST A: ABNORMAL
AST SERPL-CCNC: 1152 U/L (ref 15–37)
ATRIAL RATE: 88 BPM
BASE DEFICIT BLDA-SCNC: 3.9 MMOL/L
BASOPHILS # BLD: 0 K/UL (ref 0–0.1)
BASOPHILS NFR BLD: 0 % (ref 0–1)
BDY SITE: ABNORMAL
BILIRUB SERPL-MCNC: 1.1 MG/DL (ref 0.2–1)
BLOOD GROUP ANTIBODIES SERPL: NORMAL
BUN SERPL-MCNC: 24 MG/DL (ref 6–20)
BUN/CREAT SERPL: 17 (ref 12–20)
CALCIUM SERPL-MCNC: 8 MG/DL (ref 8.5–10.1)
CALCULATED P AXIS, ECG09: 67 DEGREES
CALCULATED R AXIS, ECG10: -111 DEGREES
CALCULATED T AXIS, ECG11: 85 DEGREES
CHLORIDE SERPL-SCNC: 104 MMOL/L (ref 97–108)
CHOLEST SERPL-MCNC: 135 MG/DL
CK SERPL-CCNC: 6533 U/L (ref 39–308)
CO2 SERPL-SCNC: 23 MMOL/L (ref 21–32)
CREAT SERPL-MCNC: 1.44 MG/DL (ref 0.7–1.3)
DIAGNOSIS, 93000: NORMAL
DIFFERENTIAL METHOD BLD: ABNORMAL
EOSINOPHIL # BLD: 0 K/UL (ref 0–0.4)
EOSINOPHIL NFR BLD: 0 % (ref 0–7)
EPAP/CPAP/PEEP, PAPEEP: 12
ERYTHROCYTE [DISTWIDTH] IN BLOOD BY AUTOMATED COUNT: 13.2 % (ref 11.5–14.5)
EST. AVERAGE GLUCOSE BLD GHB EST-MCNC: 194 MG/DL
FIO2 ON VENT: 80 %
GAS FLOW.O2 SETTING OXYMISER: 20 L/MIN
GLOBULIN SER CALC-MCNC: 3.3 G/DL (ref 2–4)
GLUCOSE BLD STRIP.AUTO-MCNC: 242 MG/DL (ref 65–100)
GLUCOSE BLD STRIP.AUTO-MCNC: 306 MG/DL (ref 65–100)
GLUCOSE BLD STRIP.AUTO-MCNC: 311 MG/DL (ref 65–100)
GLUCOSE BLD STRIP.AUTO-MCNC: 314 MG/DL (ref 65–100)
GLUCOSE SERPL-MCNC: 313 MG/DL (ref 65–100)
HBA1C MFR BLD: 8.4 % (ref 4.2–6.3)
HCO3 BLDA-SCNC: 19 MMOL/L (ref 22–26)
HCT VFR BLD AUTO: 42.2 % (ref 36.6–50.3)
HDLC SERPL-MCNC: 34 MG/DL
HDLC SERPL: 4 {RATIO} (ref 0–5)
HGB BLD-MCNC: 14.3 G/DL (ref 12.1–17)
IMM GRANULOCYTES # BLD: 0.1 K/UL (ref 0–0.04)
IMM GRANULOCYTES NFR BLD AUTO: 0 % (ref 0–0.5)
LDLC SERPL CALC-MCNC: 64.4 MG/DL (ref 0–100)
LIPID PROFILE,FLP: ABNORMAL
LYMPHOCYTES # BLD: 1.3 K/UL (ref 0.8–3.5)
LYMPHOCYTES NFR BLD: 7 % (ref 12–49)
MAGNESIUM SERPL-MCNC: 1.7 MG/DL (ref 1.6–2.4)
MCH RBC QN AUTO: 28.7 PG (ref 26–34)
MCHC RBC AUTO-ENTMCNC: 33.9 G/DL (ref 30–36.5)
MCV RBC AUTO: 84.7 FL (ref 80–99)
MONOCYTES # BLD: 1.8 K/UL (ref 0–1)
MONOCYTES NFR BLD: 10 % (ref 5–13)
NEUTS SEG # BLD: 15.7 K/UL (ref 1.8–8)
NEUTS SEG NFR BLD: 83 % (ref 32–75)
NRBC # BLD: 0 K/UL (ref 0–0.01)
NRBC BLD-RTO: 0 PER 100 WBC
P-R INTERVAL, ECG05: 154 MS
PCO2 BLDA: 30 MMHG (ref 35–45)
PH BLDA: 7.42 [PH] (ref 7.35–7.45)
PHOSPHATE SERPL-MCNC: 3.9 MG/DL (ref 2.6–4.7)
PLATELET # BLD AUTO: 201 K/UL (ref 150–400)
PMV BLD AUTO: 10 FL (ref 8.9–12.9)
PO2 BLDA: 233 MMHG (ref 80–100)
POTASSIUM SERPL-SCNC: 3.9 MMOL/L (ref 3.5–5.1)
PROT SERPL-MCNC: 6.5 G/DL (ref 6.4–8.2)
Q-T INTERVAL, ECG07: 358 MS
QRS DURATION, ECG06: 98 MS
QTC CALCULATION (BEZET), ECG08: 433 MS
RBC # BLD AUTO: 4.98 M/UL (ref 4.1–5.7)
SAO2 % BLD: 100 % (ref 92–97)
SAO2% DEVICE SAO2% SENSOR NAME: ABNORMAL
SERVICE CMNT-IMP: ABNORMAL
SODIUM SERPL-SCNC: 139 MMOL/L (ref 136–145)
SPECIMEN EXP DATE BLD: NORMAL
SPECIMEN SITE: ABNORMAL
TRIGL SERPL-MCNC: 183 MG/DL (ref ?–150)
TROPONIN I SERPL-MCNC: >100 NG/ML
TSH SERPL DL<=0.05 MIU/L-ACNC: 1.94 UIU/ML (ref 0.36–3.74)
VENTILATION MODE VENT: ABNORMAL
VENTRICULAR RATE, ECG03: 88 BPM
VLDLC SERPL CALC-MCNC: 36.6 MG/DL
VT SETTING VENT: 500 ML
WBC # BLD AUTO: 18.9 K/UL (ref 4.1–11.1)

## 2018-06-28 PROCEDURE — 74011250637 HC RX REV CODE- 250/637: Performed by: INTERNAL MEDICINE

## 2018-06-28 PROCEDURE — 85347 COAGULATION TIME ACTIVATED: CPT

## 2018-06-28 PROCEDURE — 82962 GLUCOSE BLOOD TEST: CPT

## 2018-06-28 PROCEDURE — 74011000258 HC RX REV CODE- 258: Performed by: INTERNAL MEDICINE

## 2018-06-28 PROCEDURE — 94640 AIRWAY INHALATION TREATMENT: CPT

## 2018-06-28 PROCEDURE — 82550 ASSAY OF CK (CPK): CPT | Performed by: INTERNAL MEDICINE

## 2018-06-28 PROCEDURE — 94003 VENT MGMT INPAT SUBQ DAY: CPT

## 2018-06-28 PROCEDURE — 80053 COMPREHEN METABOLIC PANEL: CPT | Performed by: INTERNAL MEDICINE

## 2018-06-28 PROCEDURE — 74011250636 HC RX REV CODE- 250/636: Performed by: INTERNAL MEDICINE

## 2018-06-28 PROCEDURE — 36415 COLL VENOUS BLD VENIPUNCTURE: CPT | Performed by: INTERNAL MEDICINE

## 2018-06-28 PROCEDURE — 82803 BLOOD GASES ANY COMBINATION: CPT | Performed by: INTERNAL MEDICINE

## 2018-06-28 PROCEDURE — 85025 COMPLETE CBC W/AUTO DIFF WBC: CPT | Performed by: INTERNAL MEDICINE

## 2018-06-28 PROCEDURE — 65620000000 HC RM CCU GENERAL

## 2018-06-28 PROCEDURE — 83735 ASSAY OF MAGNESIUM: CPT | Performed by: INTERNAL MEDICINE

## 2018-06-28 PROCEDURE — 74011636637 HC RX REV CODE- 636/637: Performed by: INTERNAL MEDICINE

## 2018-06-28 PROCEDURE — 71045 X-RAY EXAM CHEST 1 VIEW: CPT

## 2018-06-28 PROCEDURE — 80061 LIPID PANEL: CPT | Performed by: INTERNAL MEDICINE

## 2018-06-28 PROCEDURE — 74011000250 HC RX REV CODE- 250: Performed by: INTERNAL MEDICINE

## 2018-06-28 PROCEDURE — 84443 ASSAY THYROID STIM HORMONE: CPT | Performed by: INTERNAL MEDICINE

## 2018-06-28 PROCEDURE — 83036 HEMOGLOBIN GLYCOSYLATED A1C: CPT | Performed by: INTERNAL MEDICINE

## 2018-06-28 PROCEDURE — 84100 ASSAY OF PHOSPHORUS: CPT | Performed by: INTERNAL MEDICINE

## 2018-06-28 PROCEDURE — 86900 BLOOD TYPING SEROLOGIC ABO: CPT | Performed by: INTERNAL MEDICINE

## 2018-06-28 PROCEDURE — 77030018719 HC DRSG PTCH ANTIMIC J&J -A

## 2018-06-28 PROCEDURE — 84484 ASSAY OF TROPONIN QUANT: CPT | Performed by: INTERNAL MEDICINE

## 2018-06-28 PROCEDURE — 93005 ELECTROCARDIOGRAM TRACING: CPT

## 2018-06-28 RX ORDER — FENTANYL CITRATE 50 UG/ML
50 INJECTION, SOLUTION INTRAMUSCULAR; INTRAVENOUS ONCE
Status: COMPLETED | OUTPATIENT
Start: 2018-06-28 | End: 2018-06-28

## 2018-06-28 RX ORDER — PROPOFOL 10 MG/ML
0-50 VIAL (ML) INTRAVENOUS
Status: DISCONTINUED | OUTPATIENT
Start: 2018-06-28 | End: 2018-06-29

## 2018-06-28 RX ORDER — SODIUM CHLORIDE 0.9 % (FLUSH) 0.9 %
10 SYRINGE (ML) INJECTION 3 TIMES DAILY
Status: DISCONTINUED | OUTPATIENT
Start: 2018-06-28 | End: 2018-07-09 | Stop reason: HOSPADM

## 2018-06-28 RX ORDER — INSULIN GLARGINE 100 [IU]/ML
32 INJECTION, SOLUTION SUBCUTANEOUS
Status: DISCONTINUED | OUTPATIENT
Start: 2018-06-28 | End: 2018-06-29

## 2018-06-28 RX ORDER — MAGNESIUM SULFATE HEPTAHYDRATE 40 MG/ML
2 INJECTION, SOLUTION INTRAVENOUS ONCE
Status: COMPLETED | OUTPATIENT
Start: 2018-06-28 | End: 2018-06-28

## 2018-06-28 RX ORDER — MIDAZOLAM HYDROCHLORIDE 1 MG/ML
1 INJECTION, SOLUTION INTRAMUSCULAR; INTRAVENOUS ONCE
Status: COMPLETED | OUTPATIENT
Start: 2018-06-28 | End: 2018-06-28

## 2018-06-28 RX ORDER — SODIUM CHLORIDE 0.9 % (FLUSH) 0.9 %
5 SYRINGE (ML) INJECTION AS NEEDED
Status: DISCONTINUED | OUTPATIENT
Start: 2018-06-28 | End: 2018-07-09 | Stop reason: HOSPADM

## 2018-06-28 RX ADMIN — CHLORHEXIDINE GLUCONATE 15 ML: 1.2 RINSE ORAL at 21:55

## 2018-06-28 RX ADMIN — FENTANYL CITRATE 50 MCG: 50 INJECTION, SOLUTION INTRAMUSCULAR; INTRAVENOUS at 14:26

## 2018-06-28 RX ADMIN — CHLORHEXIDINE GLUCONATE 15 ML: 1.2 RINSE ORAL at 08:33

## 2018-06-28 RX ADMIN — HEPARIN SODIUM 900 UNITS/HR: 10000 INJECTION, SOLUTION INTRAVENOUS at 06:56

## 2018-06-28 RX ADMIN — Medication 25 MCG/HR: at 09:50

## 2018-06-28 RX ADMIN — IPRATROPIUM BROMIDE AND ALBUTEROL SULFATE 3 ML: .5; 3 SOLUTION RESPIRATORY (INHALATION) at 01:06

## 2018-06-28 RX ADMIN — SODIUM CHLORIDE 25 ML/HR: 900 INJECTION, SOLUTION INTRAVENOUS at 11:15

## 2018-06-28 RX ADMIN — MAGNESIUM SULFATE HEPTAHYDRATE 2 G: 40 INJECTION, SOLUTION INTRAVENOUS at 08:46

## 2018-06-28 RX ADMIN — SODIUM CHLORIDE 0.7 MCG/KG/HR: 900 INJECTION, SOLUTION INTRAVENOUS at 02:17

## 2018-06-28 RX ADMIN — INSULIN LISPRO 3 UNITS: 100 INJECTION, SOLUTION INTRAVENOUS; SUBCUTANEOUS at 18:17

## 2018-06-28 RX ADMIN — FAMOTIDINE 20 MG: 10 INJECTION, SOLUTION INTRAVENOUS at 08:15

## 2018-06-28 RX ADMIN — INSULIN LISPRO 7 UNITS: 100 INJECTION, SOLUTION INTRAVENOUS; SUBCUTANEOUS at 01:20

## 2018-06-28 RX ADMIN — Medication 10 ML: at 21:55

## 2018-06-28 RX ADMIN — Medication 4 MCG/MIN: at 05:38

## 2018-06-28 RX ADMIN — FENTANYL CITRATE 25 MCG: 50 INJECTION, SOLUTION INTRAMUSCULAR; INTRAVENOUS at 00:31

## 2018-06-28 RX ADMIN — Medication 75 MCG/HR: at 22:54

## 2018-06-28 RX ADMIN — TICAGRELOR 90 MG: 90 TABLET ORAL at 18:21

## 2018-06-28 RX ADMIN — Medication 10 ML: at 08:40

## 2018-06-28 RX ADMIN — INSULIN LISPRO 7 UNITS: 100 INJECTION, SOLUTION INTRAVENOUS; SUBCUTANEOUS at 12:37

## 2018-06-28 RX ADMIN — TICAGRELOR 90 MG: 90 TABLET ORAL at 08:36

## 2018-06-28 RX ADMIN — ASPIRIN 300 MG: 300 SUPPOSITORY RECTAL at 08:22

## 2018-06-28 RX ADMIN — IPRATROPIUM BROMIDE AND ALBUTEROL SULFATE 3 ML: .5; 3 SOLUTION RESPIRATORY (INHALATION) at 14:51

## 2018-06-28 RX ADMIN — MUPIROCIN: 20 OINTMENT TOPICAL at 08:35

## 2018-06-28 RX ADMIN — MIDAZOLAM HYDROCHLORIDE 1 MG: 1 INJECTION, SOLUTION INTRAMUSCULAR; INTRAVENOUS at 01:59

## 2018-06-28 RX ADMIN — Medication 10 ML: at 15:55

## 2018-06-28 RX ADMIN — MUPIROCIN: 20 OINTMENT TOPICAL at 17:10

## 2018-06-28 RX ADMIN — FUROSEMIDE 40 MG: 10 INJECTION, SOLUTION INTRAMUSCULAR; INTRAVENOUS at 08:15

## 2018-06-28 RX ADMIN — IPRATROPIUM BROMIDE AND ALBUTEROL SULFATE 3 ML: .5; 3 SOLUTION RESPIRATORY (INHALATION) at 20:20

## 2018-06-28 RX ADMIN — INSULIN GLARGINE 32 UNITS: 100 INJECTION, SOLUTION SUBCUTANEOUS at 11:23

## 2018-06-28 RX ADMIN — IPRATROPIUM BROMIDE AND ALBUTEROL SULFATE 3 ML: .5; 3 SOLUTION RESPIRATORY (INHALATION) at 07:59

## 2018-06-28 RX ADMIN — MIDAZOLAM HYDROCHLORIDE 1 MG: 1 INJECTION, SOLUTION INTRAMUSCULAR; INTRAVENOUS at 14:27

## 2018-06-28 RX ADMIN — FAMOTIDINE 20 MG: 10 INJECTION, SOLUTION INTRAVENOUS at 21:55

## 2018-06-28 RX ADMIN — INSULIN LISPRO 7 UNITS: 100 INJECTION, SOLUTION INTRAVENOUS; SUBCUTANEOUS at 05:50

## 2018-06-28 NOTE — PROGRESS NOTES
2000, Bedside and Verbal admission report given to PARTH Castillo (oncoming nurse) by PARTH Zaragoza (offgoing nurse). Report included the following information SBAR, Kardex, Intake/Output, MAR, Accordion, Recent Results, Med Rec Status and Cardiac Rhythm NSR. Temp;  Afebrile > patient feel hot asked for fan   Neuro; confused and slightly agitated, slight drowsy, follow command,  eyes contact, GCS;  Eye; 4, verbal; 1 for ETT, motor; 6. Sedated on Propofol 50 melonie/kg/min > changed to Precedex 0.7 melonie/kg/hr with Versed & Fentanyl PRN  Reassessment;  Patient more awake and cooperative, Precedex at 0.3 melonie/kg/hr at am, 2 dose fentanyl of 25 melonie, total of 3 mg Versed. Soft restraints for both hands for patient safety for 24 hours,   Respiratory; Sat 98% on Ventilator A/C mode, , RR 18, Peep 14, FiO2 100% changed ti AC 20 500 12 80% according to ABGs results, ETT 23 cm at lips, secretion; nil, coarse diminished lung sounds. Reassessment;  Vent setting changed to AC 16,  500, peep 8, FiO2 60%, no secretion, still coarse diminished  Cardiac; NSR, 84 B/min, NIBP 123/85 mmHg, on Levophed at 15 melonie/min. Impella P-7 via right Fem slight hematoma felt as patient been restless,   Reassessment;  BP been fluctuated around 90s, Levophed titrated down to 3 melonie/min, telephonic order by Dr Rajendra Mcclure to Reduce to P-6, site soft with discoloration small to right > new dressing applied,   GI; NPO with OG tube ( 65 cm at lips) connected to suction, bloody ( seems due to oral blood been swallowed), obese Abd semi soft with active bowel sound, on NS at 25 ml/hr. Reassessment; OGT drainage clear at am,   Renal; phillips cath with adequate urine out put. Reassessment; More james and concentrated, may be occult blood, UOP about 30 ml/hr  Skin; blanchable redness at all back, right groin site as above. .   Endo; SSI every 6 hours, Glucose 333 > 314 > 311 > MD updated > insulin according to scale  Lines; ETT, PIV X 3, OGT, Phillips.   Code; Full.  Lab; Trop I > 100, KFT & LFT increased   DVT; Heparin according to ACT to maintain 160-180> heparin to be started after angioMax and Integrilin infusion completed at 2040  Plan; ventilator management, titrate sedation as tolerated, titrate Levophed at tolerated, Impella management, monitor for bleeding, heparin infusion according to -180, pain and agitation management, follow lab tomorrow,    0130, patient complained from chest pain 6/10 sweating > fentanyl 25 melonie PRN IV given > EKG done. 0200, patient not complain from any pain to follow up  0615, Dr Kari Miller updated, asked to reduced Impella to P-6,   0700, Bedside and Verbal shift change report given to Luciana Carranza RN (oncoming nurse) by Alan Land RN (offgoing nurse). Report included the following information SBAR, Kardex, Intake/Output, MAR, Accordion, Recent Results, Med Rec Status and Cardiac Rhythm NSR.

## 2018-06-28 NOTE — DIABETES MGMT
DTC Progress Note    Recommendations/ Comments: Pt discussed with rounding team and Dr. Bradley Huston. Plan to begin lantus 32 units daily per weight based calc using 0.3 unit/kg. BG currently 300s and pt has received 21 units correction since admission. Pt is on amaryl, metformin and trulicity at home. Trulicity is non-formulary and unavailable unless pt's family brings from home. Chart reviewed on Dustin Cano during Multidisciplinary Rounds. A1c:   Lab Results   Component Value Date/Time    Hemoglobin A1c 8.4 (H) 06/28/2018 03:23 AM           Recent Glucose Results:   Lab Results   Component Value Date/Time     (H) 06/28/2018 03:23 AM    GLUCPOC 311 (H) 06/28/2018 05:43 AM    GLUCPOC 314 (H) 06/28/2018 12:27 AM    GLUCPOC 333 (H) 06/27/2018 09:12 PM        Lab Results   Component Value Date/Time    Creatinine 1.44 (H) 06/28/2018 03:23 AM     Estimated Creatinine Clearance: 67 mL/min (based on Cr of 1.44). Active Orders   There are no active orders of the following type(s): Diet. PO intake: No data found. Will continue to follow as needed. Thank you.   VIRGIL ChenN, RN, [address]

## 2018-06-28 NOTE — PROGRESS NOTES
0700: bedside and verbal report received from Agapito Esqueda RN  0800: assessment completed, patient alert, follows commands, GONZALEZ, diaphoretic with fan and cold washcloths. 0815: spoke with Dr. Ricky Johnson, will wean impella to P4.  1000: Dr. Ricky Johnson in to see patient, impella on P2, levo increased to 4mcg. Sedation changed from Precedex to Fentanyl.   1040: Fentanyl increased to 50mcg per patient request.  1100: bedside and verbal report given to Lissett Young

## 2018-06-28 NOTE — PROGRESS NOTES
Progress Note      6/28/2018 8:20 AM  NAME: Issac Moritz   MRN:  [id number]   Admit Diagnosis: ST elevation (STEMI) myocardial infarction [facility])  STEMI involving left circumflex coronary artery [facility])                          Assessment:                 1. Chest pain initially NSTEMI, while at ER at [facility] developed complete heart block, hypotension, with repeat ECG showing inferior STEMI, cardiogenic shock, acute systolic chf, acute kidney injury, acute liver failure  2. Cath 6/2018 with 90% prox LAD, 100% prox dominant LCx, Small disease non-dominant RCA. PCI with stent to OM, Bifurcation stent to prox LCx and OM, PCI with stent to prox LAD  3. Ischemic cardiomyopathy EF 35%, inferior hypokinesis  4. Sinus with transient complete heart block  5. HTN  6. DM  7. Dyslipidemia  8. Recent colitis with GI bleed s/p endoscopy with Dr. Bernardo Cedeño  9. Hx of tonsillectomy  10. Hx of hernia repair  11. Hx of basal cell ca removal  12. Quit tobacco in '94  13. , wife is  for medical practice in [address] [place] [address], he works on boat, active                               Plan:                  Inferior STEMI secondary to LCx. OM, LCx bifurcation, LAD stented SHAGUFTA. Acute systolic chf EF 83%, improving with diuresis  Transient complete heart block, currently in sinus. Cardiogenic shock, impela being weaned, still on low dose levophed  Hypoxemic resp failure, will keep intubated today. Acute kidney injury will follow cr  Acute liver failure will follow lft     1. Heparin while impella in  2. Cont Added aspirin  3. Cont Added brilinta  4. No beta blocker until off pressors   5. Holding losartan HCT for now  6. Cont diurese with IV Lasix  7. Eventually add statin, once LFT start to improve  8. Insulin sliding scale     Goal for impella removal today. Keep intubated today. Gentle diuresis. Supportive care.   Appreciate ICU help.     Critically ill, guarded prognosis discussed with wife and her family.       [x] High complexity decision making was performed         Subjective:     Hans Crawford intubed, does note chest pain, sweaty. Discussed with RN events overnight. Review of Systems:    Symptom Y/N Comments  Symptom Y/N Comments   Fever/Chills N   Chest Pain     Poor Appetite N   Edema N    Cough N   Abdominal Pain N    Sputum N   Joint Pain N    SOB/MAGALLANES N   Pruritis/Rash N    Nausea/vomit N   Tolerating PT/OT     Diarrhea N   Tolerating Diet     Constipation N   Other       Could NOT obtain due to:      Objective:      Physical Exam:    Last 24hrs VS reviewed since prior progress note. Most recent are:    Visit Vitals    /74 (BP 1 Location: Left arm, BP Patient Position: At rest)    Pulse 81    Temp 99.7 °F (37.6 °C)    Resp 10    SpO2 98%       Intake/Output Summary (Last 24 hours) at 06/28/18 0820  Last data filed at 06/28/18 0800   Gross per 24 hour   Intake          1128.69 ml   Output             2765 ml   Net         -1636.31 ml        General Appearance: Well developed, well nourished, able to follow commands,    no acute distress. Ears/Nose/Mouth/Throat: Hearing grossly normal.  Neck: Supple. Chest: Lungs clear to auscultation bilaterally. Cardiovascular: Regular rate and rhythm, S1S2 normal, no murmur. Abdomen: Soft, non-tender, bowel sounds are active. Extremities: No edema bilaterally. Skin: Warm and dry.     PMH/SH reviewed - no change compared to H&P    Data Review    Telemetry: normal sinus rhythm     Lab Data Personally Reviewed:    Recent Labs      06/28/18   0323  06/27/18   1215   WBC  18.9*  10.6   HGB  14.3  15.9   HCT  42.2  46.3   PLT  201  187     Recent Labs      06/27/18   1215   INR  1.2*   PTP  11.7*   APTT  25.6      Recent Labs      06/28/18   0323  06/27/18   1215   NA  139  135*   K  3.9  3.5   CL  104  99   CO2  23  26   BUN  24*  20   CREA  1.44*  1.09   GLU  313*  250*   CA  8.0*  9.3   MG  1.7   --      Recent Labs      06/28/18   0323  06/27/18   1215   CPK --   773*   CKNDX   --   5.8*   TROIQ  >100.00*  5.86*     Lab Results   Component Value Date/Time    Cholesterol, total 135 06/28/2018 03:23 AM    HDL Cholesterol 34 06/28/2018 03:23 AM    LDL, calculated 64.4 06/28/2018 03:23 AM    Triglyceride 183 (H) 06/28/2018 03:23 AM    CHOL/HDL Ratio 4.0 06/28/2018 03:23 AM       Recent Labs      06/28/18   0323  06/27/18   1215   SGOT  1152*  95*   AP  65  76   TP  6.5  7.3   ALB  3.2*  3.8   GLOB  3.3  3.5     Recent Labs      06/28/18   0525  06/27/18   1946   PH  7.42  7.30*   PCO2  30*  47*   PO2  233*  198*       Medications Personally Reviewed:    Current Facility-Administered Medications   Medication Dose Route Frequency    saline peripheral flush soln 10 mL  10 mL InterCATHeter TID    saline peripheral flush soln 5 mL  5 mL InterCATHeter PRN    fentaNYL (PF) 900 mcg/30 ml infusion soln  0-200 mcg/hr IntraVENous TITRATE    propofol (DIPRIVAN) infusion  0-50 mcg/kg/min IntraVENous TITRATE    magnesium sulfate 2 g/50 ml IVPB (premix or compounded)  2 g IntraVENous ONCE    midazolam (VERSED) 1 mg/mL injection        bivalirudin (ANGIOMAX) 250 mg injection        fentaNYL citrate (PF) 50 mcg/mL injection        0.9% sodium chloride (MBP/ADV) infusion        ADDaptor        iopamidol (ISOVUE-370) 76 % injection        NOREPINephrine (LEVOPHED) 8 mg in 5% dextrose 250mL infusion  2-30 mcg/min IntraVENous TITRATE    bivalirudin (ANGIOMAX) 250 mg injection        0.9% sodium chloride (MBP/ADV) infusion        iodixanol (VISIPAQUE) 320 mg iodine/mL contrast injection 0-100 mL  0-100 mL IntraarTERial Multiple    iodixanol (VISIPAQUE) 320 mg iodine/mL contrast injection        eptifibatide (INTEGRILIN) 0.75 mg/mL infusion        albuterol-ipratropium (DUO-NEB) 2.5 MG-0.5 MG/3 ML  3 mL Nebulization Q6H RT    famotidine (PF) (PEPCID) 20 mg in sodium chloride 0.9% 10 mL injection  20 mg IntraVENous Q12H    insulin lispro (HUMALOG) injection   SubCUTAneous Q6H  glucose chewable tablet 16 g  4 Tab Oral PRN    dextrose (D50W) injection syrg 12.5-25 g  12.5-25 g IntraVENous PRN    glucagon (GLUCAGEN) injection 1 mg  1 mg IntraMUSCular PRN    bivalirudin (ANGIOMAX) 250 mg injection        0.9% sodium chloride (MBP/ADV) infusion        ADDaptor        0.9% sodium chloride infusion  25 mL/hr IntraVENous CONTINUOUS    heparin (porcine) 25,000 Units in dextrose 5% 500 mL infusion  4-20 mL/hr Other TITRATE    heparin 25,000 units in D5W 250 ml infusion  500-2,000 Units/hr IntraVENous TITRATE    aspirin (ASA) suppository 300 mg  300 mg Rectal DAILY    furosemide (LASIX) injection 40 mg  40 mg IntraVENous DAILY    ticagrelor (BRILINTA) 90 mg tablet        fentaNYL citrate (PF) injection 25 mcg  25 mcg IntraVENous Q1H PRN    ticagrelor (BRILINTA) tablet 90 mg  90 mg Oral BID    chlorhexidine (PERIDEX) 0.12 % mouthwash 15 mL  15 mL Oral Q12H    mupirocin (BACTROBAN) 2 % ointment   Both Nostrils BID         Paolo Walker MD

## 2018-06-28 NOTE — PROGRESS NOTES
Interdisciplinary team rounds were held 6/28/2018 with the following team members:Care Management, Diabetes Treatment Specialist, Nursing, Nutrition, Pharmacy, Physical Therapy, Physician and Respiratory Therapy. Plan of care discussed. Goal: Adjust medications, continue to monitor and support. See MD orders and progress notes for further  interventions and desired outcomes.

## 2018-06-28 NOTE — PROGRESS NOTES
Order per Dr. Mike Bright to turn off heparin drip and to check ACT in 45 minutes. Heparin drip stopped at this time. 1350 . Dejuan Waterman from cath lab notified and Dr. Mike Bright paged. 1 Dr. Mike Bright and Dejuan Waterman from cath lab in to discontinue impella. Fentanyl 50mcg IV given at this time. 1427 Versed 1mg IV given. 1440 Impella and sheath discontinued from right groin. Hand pressure being held by Steph Norwood from cath lab.   1505 Hand pressure to right groin site done at this time and fem-stop placed on right groin site at 40mm HG. DP and PT present with doppler in right foot. Patient tolerating well. VSS. Patient sleepy, easy to arouse. 1 Dr. Mike Bright back in to check on patient. 1600 Femstop removed. Clotting patch and opsite over right groin site. Site without bleeding. Ecchymosis noted, no hematoma. 1900 No changes. Right groin site clean, dry and intact, no hematoma noted, site with ecchymosis. Pulses doppler R PT and R DP. Patient resting and family at bedside. 1910 Bedside report to [facility] RN.

## 2018-06-28 NOTE — PROGRESS NOTES
06/28/18 0603   ABCDEF Bundle   SBT Safety Screen Passed No   SBT Screen Reason for Failure FiO2 > 50%;PEEP > 7.5

## 2018-06-28 NOTE — PROGRESS NOTES
14fr impella removed from the right femoral artery,manual pressure and quickclot held for 30 min. Upon release no oozing or hematoma noted. femostop on at 40mmhg. groin inspected and care taken over by DAVID Brenner R.N,BSN

## 2018-06-28 NOTE — PROCEDURES
[id number] [facility]    Andrew Espinoza  MR#: [id number]  : 1960  ACCOUNT #: [de-identified]   DATE OF SERVICE: 2018    STUDY NUMBER:      PROCEDURES PERFORMED:  1. Left heart cardiac catheterization. 2.  Left ventricular angiography. 3.  Abdominal aortography with pelvic runoff. 4.  Selective coronary angiography. 5.  Percutaneous transluminal coronary angioplasty and stenting of the obtuse marginal with a drug-eluting stent. 6.  Percutaneous transluminal coronary angioplasty and stenting of the proximal left circumflex OM bifurcation with a drug-eluting stent. 7.  Percutaneous transluminal coronary angioplasty and stenting of the proximal left anterior descending with a drug-eluting stent. 8.  Insertion of an Impella left ventricular assist device. 9.  Sedation. Sedation was administered by cath lab staff and I supervised them as they monitored the patient for the duration of the procedure. DURATION:  134 minutes. SEDATION:  Versed and fentanyl. INDICATIONS:  Inferior ST elevation myocardial infarction with cardiogenic shock and acute systolic congestive heart failure. ESTIMATED BLOOD LOSS:  Less than 50 mL. SPECIMENS REMOVED:  None. COMPLICATIONS:  None. TECHNIQUE:  6-[place] right common femoral artery. RESULTS:  1. Left heart cardiac catheterization:  Arterial pressure of 96/66 with a mean of 80. Left ventricular end diastolic pressure of 20. COMMENTS:  Measurements were taken on Levophed pressor support. Systolic pressures are low. Left ventricular end diastolic filling pressure is elevated. 2.  Left ventricular angiography in the FLOOD projection:  There is moderately decreased left ventricular function with an estimated ejection fraction of 35%. There is no significant mitral regurgitation. There is severe inferior wall hypokinesis. 3.  Abdominal aortography with pelvic runoff:   The abdominal aorta and iliac vessels appeared to be within normal limits with no significant aneurysmal dilatation, dissection or severe stenoses. 4.  Selective coronary angiography:      Left main:  The left main is normal in caliber with mild luminal irregularities, but no significant obstruction. Left anterior descending: The left anterior descending is a normal caliber vessel. There is a focal proximal left anterior descending stenosis of 90%. Left circumflex: The left circumflex is dominant. The left circumflex is 100% occluded proximally. After PTCA, there is a large obtuse marginal at the site of the occlusion. The left circumflex then goes on to supply left posterolateral branches as well as a left PDA. Right coronary artery:  The right coronary artery is nondominant. The right coronary artery is a small vessel that supplies some acute marginals. The proximal portion of the right coronary artery has a 70% stenosis. 5.  PTCA and stenting of the left circumflex and OM bifurcation as well as the obtuse marginal itself: The patient was anticoagulated with intravenous bivalirudin. A 6-[place] EBU 3.5 guiding catheter was advanced to the ostium of the left main. A 0.014 inch [place] Avi was advanced distally. The lesion was initially predilated with a 2.0 balloon. After predilatation, revealed that the left circumflex stenosis involved a bifurcation with a large obtuse marginal.  There were large amounts of visible thrombus. An [place] catheter as well as a [place] catheter were attempted to be delivered; however, with 2 wires, one in the obtuse marginal and one in the circumflex, the thrombectomy catheters would not fit through a 6-[place] sheath. Predilatation was then performed with a 2.5 mm balloon. At this point, the patient was having progressive hypotension and dyspnea with evidence of acute systolic congestive heart failure. A Mcgraw catheter was placed and the patient was given intravenous Lasix. Despite this, the patient had progressive symptoms and was therefore electively intubated by Anesthesia. Once the patient was stabilized, further angiography revealed significant slow flow and thrombus. The patient had already been on Angiomax as well as Integrilin for some time. Further predilatation of the obtuse marginal was performed with a 3-0 balloon. Decision at this point was made to stent the obtuse marginal with a 2.75 x 38 [place] Synergy drug-eluting stent. The left circumflex was then stented with a 3.5 x 20 going into the distal vessel, crossing the obtuse marginal.  The obtuse marginal was rewired and the sidehole of this stent was dilated to 3.0 mm. At this point, there was good flow into the distal left circumflex and left PDA. There was CHAO 2 flow in the obtuse marginal.      6.  PTCA and stenting of the proximal left anterior descending with a drug-eluting stent:  The OM wire was redirected down the left anterior descending. The lesion was predilated with a 2.5 mm balloon. The lesion was then stented with a 3.5 x 16 [place] Synergy drug-eluting stent. At this point, there was 0% residual stenosis with prompt CHAO 3 opacification of the distal vessel. 7.  Due to cardiogenic shock, an Impella was placed in the left ventricular apex with good flow. The Impella was securely sutured in place and the patient was transferred to the intensive care unit for further management. CONCLUSION:  1. Cardiogenic shock. Found to have an occluded left dominant circumflex as well as a high-grade LAD stenosis. The patient was intubated in the cath lab. PTCA and stenting of the obtuse marginal of the left circumflex bifurcation and proximal LAD were performed and an Impella was placed for further pressure support. 2.  Elevated left ventricular filling pressures. No aortic stenosis. Systemic arterial pressures are decreased.   3.  Moderate to severely decreased left ventricular function with an estimated ejection fraction of 35% and no significant mitral regurgitation. 4.  Abdominal aorta and pelvic vessels are within normal limits. 5.  Left dominant coronary artery system with 100% occluded proximal left circumflex and 90% proximal LAD stenosis. A nondominant right coronary artery had a 70% proximal stenosis. 6.  Successful PTCA and stenting of the obtuse marginal from initially 100% to 0% utilizing a 2.75 x 38 [place] Synergy drug-eluting stent. Initial CHAO flow was zero. Final CHAO flow was 2. There was some slow flow down the distal vessel, which will hopefully improve with further anticoagulation. 7.  Successful PTCA and stenting of the proximal left circumflex OM bifurcation with a 3.5 x 20 [place] Synergy drug-eluting stent. The sidehole into the obtuse marginal was dilated to 3.0 mm.  8.  Successful PTCA and stenting of the proximal left anterior descending from 90% to 0% utilizing a 3.5 x 16 [place] Synergy drug-eluting stent. 9.  Successful placement of an Impella. 10.  Bivalirudin and Integrilin were used during the time of the procedure. Heparin will be initiated for Impella therapy. The patient will continue aspirin therapy indefinitely, Brilinta for a minimum of 1 year.       Jamie Lindsey MD SR / KAMLA  D: 06/27/2018 22:23     T: 06/28/2018 08:50  JOB #: [id number]

## 2018-06-28 NOTE — PROGRESS NOTES
[facility]  Pulmonary, Critical Care, and Sleep Medicine    Name: Hayden Thakkar MRN: [id number]   : 1960 Hospital: [facility]   Date: 2018        IMPRESSION:   · Acute hypoxic/hypercapnic respiratory failure (ABG not yet in EMR)  · Acute inferior STEMI  · Acute pulmonary edema  · Cardiogenic shock  · DM  · Asthma, no details available, without exacerbation  · H/O tobacco use  · Acute renal failure      PLAN:   · Ventilator support - adjust for ABG   · Bronchodilators  · Pressors/Impella  · Diuresis   · Creatinine worse  · Insulin/glycemic control  · Sedation/pain control  · DVT/GI prophylaxis  · Critically ill      Subjective/Interval History:   I have reviewed the flowsheet and previous days notes. The patient is unable to give any meaningful history or review of systems because the patient is:   Intubated/sedated      The patient is critically ill on:      Mechanical ventilation/pressors, Impella     Review of Systems   Unable to perform ROS: Intubated     Objective:   Vital Signs:    Visit Vitals    BP 97/77    Pulse 83    Temp 99.4 °F (37.4 °C)    Resp 19    SpO2 97%       O2 Device: Ventilator       Temp (24hrs), Av.2 °F (36.8 °C), Min:96.4 °F (35.8 °C), Max:99.4 °F (37.4 °C)       Intake/Output:   Last shift:         Last 3 shifts: 1901 -  07  In: 1099.9 [I.V.:949.9]  Out: 3830 [Urine:2440]    Intake/Output Summary (Last 24 hours) at 18 0743  Last data filed at 18 0700   Gross per 24 hour   Intake          1099.89 ml   Output             2690 ml   Net         -1590.11 ml     Hemodynamics:   PAP:   CO:     Wedge:   CI:     CVP:    SVR:       PVR:       Ventilator Settings:  Mode Rate Tidal Volume Pressure FiO2 PEEP   Assist control   500 ml    60 % (FIO2 lowered to 60%) 8 cm H20 (Peep lowered to 8)     Peak airway pressure: 29 cm H2O    Minute ventilation: 15.4 l/min       Physical Exam   Constitutional: He is sedated and intubated. HENT:   Head: Normocephalic and atraumatic. Mouth/Throat: No oropharyngeal exudate. Eyes: No scleral icterus. Cardiovascular: Regular rhythm. Tachycardia present. No murmur heard. Pulmonary/Chest: He is intubated. He has no wheezes. He has rales. Abdominal: Soft. Bowel sounds are normal. He exhibits no distension. There is no tenderness. Musculoskeletal: He exhibits no edema. Skin: Skin is warm and dry.      Data:     Current Facility-Administered Medications   Medication Dose Route Frequency    saline peripheral flush soln 10 mL  10 mL InterCATHeter TID    midazolam (VERSED) 1 mg/mL injection        bivalirudin (ANGIOMAX) 250 mg injection        fentaNYL citrate (PF) 50 mcg/mL injection        0.9% sodium chloride (MBP/ADV) infusion        ADDaptor        iopamidol (ISOVUE-370) 76 % injection        NOREPINephrine (LEVOPHED) 8 mg in 5% dextrose 250mL infusion  2-30 mcg/min IntraVENous TITRATE    bivalirudin (ANGIOMAX) 250 mg injection        0.9% sodium chloride (MBP/ADV) infusion        iodixanol (VISIPAQUE) 320 mg iodine/mL contrast injection        eptifibatide (INTEGRILIN) 0.75 mg/mL infusion        albuterol-ipratropium (DUO-NEB) 2.5 MG-0.5 MG/3 ML  3 mL Nebulization Q6H RT    famotidine (PF) (PEPCID) 20 mg in sodium chloride 0.9% 10 mL injection  20 mg IntraVENous Q12H    insulin lispro (HUMALOG) injection   SubCUTAneous Q6H    bivalirudin (ANGIOMAX) 250 mg injection        0.9% sodium chloride (MBP/ADV) infusion        ADDaptor        0.9% sodium chloride infusion  25 mL/hr IntraVENous CONTINUOUS    heparin (porcine) 25,000 Units in dextrose 5% 500 mL infusion  4-20 mL/hr Other TITRATE    heparin 25,000 units in D5W 250 ml infusion  500-2,000 Units/hr IntraVENous TITRATE    aspirin (ASA) suppository 300 mg  300 mg Rectal DAILY    furosemide (LASIX) injection 40 mg  40 mg IntraVENous DAILY    ticagrelor (BRILINTA) 90 mg tablet        dexmedeTOMidine (PRECEDEX) 400 mcg in 0.9% sodium chloride 100 mL infusion  0.2-0.7 mcg/kg/hr IntraVENous TITRATE    ticagrelor (BRILINTA) tablet 90 mg  90 mg Oral BID    chlorhexidine (PERIDEX) 0.12 % mouthwash 15 mL  15 mL Oral Q12H    mupirocin (BACTROBAN) 2 % ointment   Both Nostrils BID                Labs:  Recent Labs      06/28/18   0323  06/27/18   1215   WBC  18.9*  10.6   HGB  14.3  15.9   HCT  42.2  46.3   PLT  201  187     Recent Labs      06/28/18   0323  06/27/18   1215   NA  139  135*   K  3.9  3.5   CL  104  99   CO2  23  26   GLU  313*  250*   BUN  24*  20   CREA  1.44*  1.09   CA  8.0*  9.3   MG  1.7   --    PHOS  3.9   --    ALB  3.2*  3.8   TBILI  1.1*  0.7   SGOT  1152*  95*   ALT  96*  49   INR   --   1.2*     Recent Labs      06/28/18   0525  06/27/18   1946   PH  7.42  7.30*   PCO2  30*  47*   PO2  233*  198*   HCO3  19*  23   FIO2  80  100       Imaging:  I have personally reviewed the patients radiographs and have reviewed the reports:  CXR: chronic elevation right hemidiaphragm, edema better        Total critical care time exclusive of procedures: 35 minutes  Hermelinda Trinidad MD

## 2018-06-29 ENCOUNTER — APPOINTMENT (OUTPATIENT)
Dept: GENERAL RADIOLOGY | Age: 58
DRG: 215 | End: 2018-06-29
Attending: INTERNAL MEDICINE
Payer: COMMERCIAL

## 2018-06-29 LAB
ALBUMIN SERPL-MCNC: 3.1 G/DL (ref 3.5–5)
ALBUMIN/GLOB SERPL: 0.8 {RATIO} (ref 1.1–2.2)
ALP SERPL-CCNC: 64 U/L (ref 45–117)
ALT SERPL-CCNC: 68 U/L (ref 12–78)
ANION GAP SERPL CALC-SCNC: 12 MMOL/L (ref 5–15)
ARTERIAL PATENCY WRIST A: YES
AST SERPL-CCNC: 247 U/L (ref 15–37)
BASE EXCESS BLDA CALC-SCNC: 0.3 MMOL/L
BDY SITE: ABNORMAL
BILIRUB SERPL-MCNC: 0.8 MG/DL (ref 0.2–1)
BUN SERPL-MCNC: 27 MG/DL (ref 6–20)
BUN/CREAT SERPL: 20 (ref 12–20)
CALCIUM SERPL-MCNC: 8.9 MG/DL (ref 8.5–10.1)
CHLORIDE SERPL-SCNC: 102 MMOL/L (ref 97–108)
CO2 SERPL-SCNC: 27 MMOL/L (ref 21–32)
CREAT SERPL-MCNC: 1.36 MG/DL (ref 0.7–1.3)
EPAP/CPAP/PEEP, PAPEEP: 6
ERYTHROCYTE [DISTWIDTH] IN BLOOD BY AUTOMATED COUNT: 13.5 % (ref 11.5–14.5)
FIO2 ON VENT: 50 %
GAS FLOW.O2 SETTING OXYMISER: 16 L/MIN
GLOBULIN SER CALC-MCNC: 3.9 G/DL (ref 2–4)
GLUCOSE BLD STRIP.AUTO-MCNC: 245 MG/DL (ref 65–100)
GLUCOSE BLD STRIP.AUTO-MCNC: 265 MG/DL (ref 65–100)
GLUCOSE BLD STRIP.AUTO-MCNC: 266 MG/DL (ref 65–100)
GLUCOSE BLD STRIP.AUTO-MCNC: 304 MG/DL (ref 65–100)
GLUCOSE BLD STRIP.AUTO-MCNC: 323 MG/DL (ref 65–100)
GLUCOSE SERPL-MCNC: 276 MG/DL (ref 65–100)
HCO3 BLDA-SCNC: 25 MMOL/L (ref 22–26)
HCT VFR BLD AUTO: 40.9 % (ref 36.6–50.3)
HGB BLD-MCNC: 13.3 G/DL (ref 12.1–17)
MCH RBC QN AUTO: 28.7 PG (ref 26–34)
MCHC RBC AUTO-ENTMCNC: 32.5 G/DL (ref 30–36.5)
MCV RBC AUTO: 88.3 FL (ref 80–99)
NRBC # BLD: 0 K/UL (ref 0–0.01)
NRBC BLD-RTO: 0 PER 100 WBC
PCO2 BLDA: 39 MMHG (ref 35–45)
PH BLDA: 7.42 [PH] (ref 7.35–7.45)
PLATELET # BLD AUTO: 157 K/UL (ref 150–400)
PMV BLD AUTO: 9.9 FL (ref 8.9–12.9)
PO2 BLDA: 60 MMHG (ref 80–100)
POTASSIUM SERPL-SCNC: 3.7 MMOL/L (ref 3.5–5.1)
PROT SERPL-MCNC: 7 G/DL (ref 6.4–8.2)
RBC # BLD AUTO: 4.63 M/UL (ref 4.1–5.7)
SAO2 % BLD: 92 % (ref 92–97)
SAO2% DEVICE SAO2% SENSOR NAME: ABNORMAL
SERVICE CMNT-IMP: ABNORMAL
SODIUM SERPL-SCNC: 141 MMOL/L (ref 136–145)
SPECIMEN SITE: ABNORMAL
VENTILATION MODE VENT: ABNORMAL
VT SETTING VENT: 500 ML
WBC # BLD AUTO: 16.1 K/UL (ref 4.1–11.1)

## 2018-06-29 PROCEDURE — 74011000250 HC RX REV CODE- 250: Performed by: INTERNAL MEDICINE

## 2018-06-29 PROCEDURE — 74011250636 HC RX REV CODE- 250/636: Performed by: INTERNAL MEDICINE

## 2018-06-29 PROCEDURE — 36415 COLL VENOUS BLD VENIPUNCTURE: CPT | Performed by: INTERNAL MEDICINE

## 2018-06-29 PROCEDURE — G8978 MOBILITY CURRENT STATUS: HCPCS

## 2018-06-29 PROCEDURE — 36600 WITHDRAWAL OF ARTERIAL BLOOD: CPT

## 2018-06-29 PROCEDURE — 74011636637 HC RX REV CODE- 636/637: Performed by: INTERNAL MEDICINE

## 2018-06-29 PROCEDURE — 74011250637 HC RX REV CODE- 250/637: Performed by: INTERNAL MEDICINE

## 2018-06-29 PROCEDURE — 97165 OT EVAL LOW COMPLEX 30 MIN: CPT | Performed by: OCCUPATIONAL THERAPIST

## 2018-06-29 PROCEDURE — 85027 COMPLETE CBC AUTOMATED: CPT | Performed by: INTERNAL MEDICINE

## 2018-06-29 PROCEDURE — 94640 AIRWAY INHALATION TREATMENT: CPT

## 2018-06-29 PROCEDURE — 97162 PT EVAL MOD COMPLEX 30 MIN: CPT

## 2018-06-29 PROCEDURE — 82803 BLOOD GASES ANY COMBINATION: CPT | Performed by: INTERNAL MEDICINE

## 2018-06-29 PROCEDURE — 97116 GAIT TRAINING THERAPY: CPT

## 2018-06-29 PROCEDURE — 80053 COMPREHEN METABOLIC PANEL: CPT | Performed by: INTERNAL MEDICINE

## 2018-06-29 PROCEDURE — G8988 SELF CARE GOAL STATUS: HCPCS | Performed by: OCCUPATIONAL THERAPIST

## 2018-06-29 PROCEDURE — 71045 X-RAY EXAM CHEST 1 VIEW: CPT

## 2018-06-29 PROCEDURE — 82962 GLUCOSE BLOOD TEST: CPT

## 2018-06-29 PROCEDURE — 74011000258 HC RX REV CODE- 258: Performed by: INTERNAL MEDICINE

## 2018-06-29 PROCEDURE — G8979 MOBILITY GOAL STATUS: HCPCS

## 2018-06-29 PROCEDURE — G8987 SELF CARE CURRENT STATUS: HCPCS | Performed by: OCCUPATIONAL THERAPIST

## 2018-06-29 PROCEDURE — 94003 VENT MGMT INPAT SUBQ DAY: CPT

## 2018-06-29 PROCEDURE — 97535 SELF CARE MNGMENT TRAINING: CPT | Performed by: OCCUPATIONAL THERAPIST

## 2018-06-29 PROCEDURE — 65620000000 HC RM CCU GENERAL

## 2018-06-29 PROCEDURE — 36600 WITHDRAWAL OF ARTERIAL BLOOD: CPT | Performed by: INTERNAL MEDICINE

## 2018-06-29 RX ORDER — DILTIAZEM HYDROCHLORIDE 5 MG/ML
10 INJECTION INTRAVENOUS ONCE
Status: COMPLETED | OUTPATIENT
Start: 2018-06-29 | End: 2018-06-29

## 2018-06-29 RX ORDER — INSULIN GLARGINE 100 [IU]/ML
45 INJECTION, SOLUTION SUBCUTANEOUS
Status: DISCONTINUED | OUTPATIENT
Start: 2018-06-29 | End: 2018-06-29

## 2018-06-29 RX ORDER — GUAIFENESIN 100 MG/5ML
81 LIQUID (ML) ORAL DAILY
Status: DISCONTINUED | OUTPATIENT
Start: 2018-06-29 | End: 2018-07-09 | Stop reason: HOSPADM

## 2018-06-29 RX ORDER — OXYCODONE HYDROCHLORIDE 5 MG/1
5 TABLET ORAL
Status: DISCONTINUED | OUTPATIENT
Start: 2018-06-29 | End: 2018-07-09 | Stop reason: HOSPADM

## 2018-06-29 RX ORDER — FUROSEMIDE 40 MG/1
40 TABLET ORAL DAILY
Status: DISCONTINUED | OUTPATIENT
Start: 2018-06-30 | End: 2018-06-30

## 2018-06-29 RX ORDER — ENOXAPARIN SODIUM 100 MG/ML
40 INJECTION SUBCUTANEOUS EVERY 24 HOURS
Status: DISCONTINUED | OUTPATIENT
Start: 2018-06-29 | End: 2018-07-04

## 2018-06-29 RX ORDER — ATORVASTATIN CALCIUM 40 MG/1
40 TABLET, FILM COATED ORAL DAILY
Status: DISCONTINUED | OUTPATIENT
Start: 2018-06-29 | End: 2018-07-09 | Stop reason: HOSPADM

## 2018-06-29 RX ORDER — INSULIN GLARGINE 100 [IU]/ML
45 INJECTION, SOLUTION SUBCUTANEOUS
Status: DISCONTINUED | OUTPATIENT
Start: 2018-06-29 | End: 2018-07-02

## 2018-06-29 RX ADMIN — MUPIROCIN: 20 OINTMENT TOPICAL at 17:16

## 2018-06-29 RX ADMIN — ASPIRIN 81 MG 81 MG: 81 TABLET ORAL at 09:08

## 2018-06-29 RX ADMIN — IPRATROPIUM BROMIDE AND ALBUTEROL SULFATE 3 ML: .5; 3 SOLUTION RESPIRATORY (INHALATION) at 07:34

## 2018-06-29 RX ADMIN — Medication 10 ML: at 09:12

## 2018-06-29 RX ADMIN — ATORVASTATIN CALCIUM 40 MG: 40 TABLET, FILM COATED ORAL at 10:17

## 2018-06-29 RX ADMIN — INSULIN LISPRO 3 UNITS: 100 INJECTION, SOLUTION INTRAVENOUS; SUBCUTANEOUS at 23:37

## 2018-06-29 RX ADMIN — TICAGRELOR 90 MG: 90 TABLET ORAL at 17:16

## 2018-06-29 RX ADMIN — ENOXAPARIN SODIUM 40 MG: 100 INJECTION SUBCUTANEOUS at 10:17

## 2018-06-29 RX ADMIN — IPRATROPIUM BROMIDE AND ALBUTEROL SULFATE 3 ML: .5; 3 SOLUTION RESPIRATORY (INHALATION) at 19:37

## 2018-06-29 RX ADMIN — MUPIROCIN: 20 OINTMENT TOPICAL at 09:12

## 2018-06-29 RX ADMIN — OXYCODONE HYDROCHLORIDE 5 MG: 5 TABLET ORAL at 23:31

## 2018-06-29 RX ADMIN — INSULIN LISPRO 3 UNITS: 100 INJECTION, SOLUTION INTRAVENOUS; SUBCUTANEOUS at 17:15

## 2018-06-29 RX ADMIN — INSULIN LISPRO 7 UNITS: 100 INJECTION, SOLUTION INTRAVENOUS; SUBCUTANEOUS at 05:48

## 2018-06-29 RX ADMIN — INSULIN LISPRO 4 UNITS: 100 INJECTION, SOLUTION INTRAVENOUS; SUBCUTANEOUS at 11:18

## 2018-06-29 RX ADMIN — IPRATROPIUM BROMIDE AND ALBUTEROL SULFATE 3 ML: .5; 3 SOLUTION RESPIRATORY (INHALATION) at 01:11

## 2018-06-29 RX ADMIN — OXYCODONE HYDROCHLORIDE 5 MG: 5 TABLET ORAL at 12:56

## 2018-06-29 RX ADMIN — CHLORHEXIDINE GLUCONATE 15 ML: 1.2 RINSE ORAL at 09:08

## 2018-06-29 RX ADMIN — DILTIAZEM HYDROCHLORIDE 10 MG: 5 INJECTION INTRAVENOUS at 21:27

## 2018-06-29 RX ADMIN — INSULIN LISPRO 5 UNITS: 100 INJECTION, SOLUTION INTRAVENOUS; SUBCUTANEOUS at 00:02

## 2018-06-29 RX ADMIN — SODIUM CHLORIDE 10 MG/HR: 900 INJECTION, SOLUTION INTRAVENOUS at 21:27

## 2018-06-29 RX ADMIN — Medication 10 ML: at 15:05

## 2018-06-29 RX ADMIN — TICAGRELOR 90 MG: 90 TABLET ORAL at 09:08

## 2018-06-29 RX ADMIN — OXYCODONE HYDROCHLORIDE 5 MG: 5 TABLET ORAL at 17:16

## 2018-06-29 RX ADMIN — FUROSEMIDE 40 MG: 10 INJECTION, SOLUTION INTRAMUSCULAR; INTRAVENOUS at 07:23

## 2018-06-29 RX ADMIN — INSULIN GLARGINE 45 UNITS: 100 INJECTION, SOLUTION SUBCUTANEOUS at 11:13

## 2018-06-29 RX ADMIN — CHLORHEXIDINE GLUCONATE 15 ML: 1.2 RINSE ORAL at 20:32

## 2018-06-29 RX ADMIN — Medication 10 ML: at 22:35

## 2018-06-29 NOTE — PROGRESS NOTES
0700: Change of shift report received from Collins Diallo RN. Vent settings: AC 12, Vt 500, FiO2 50%, and PEEP 6.0. Drips verified: fentanyl at 75cc/hr, levo off since MN per night nurse. Patient heart rhythm: NSR. Mcgraw present, reasons: accurate measurement of urinary output. 0800: Initial assessment completed; patient alert, follows commands, nods appropriately, and attempts to mouth words. Lung sounds coarse upper, diminished bilaterally in both lung bases; abdomen rotund, semi-soft, intact, with active bowel sounds; pedal and post-tibial pulses     0815: Patient great adequate tidal volumes (>500) and MD Mariposa Obrien would like patient extubated     0825: Patient extubated by RRT and placed on 4L NC. Patient O2 saturation after extubation %. OGT also removed with extubation - will need to adjust morning medications that are PO if patient unable to swallow    0850: Nurse gives patients sips of water, no coughing or difficulty noted. MD Mariposa Obrien notified - orders that patient's morning IV medications may be PO.     0900: STAND tool performed to assess swallowing; patient has no difficulty     0934: IDR completed with MD Gunn, orders received to d/c fentanyl drip; dietary orders also received (diabetic cardiac diet); MD also orders PT/OT for patient. 1020: Mcgraw removed per MD Grove's order. Patient voids 25cc after removal     1200: Reassessment performed; no changes noted at this time    1256: Pain c/o chest pain, PRN roxicodone given; EKG done, read by MD Ria Hauser in the ER - no orders received; EKG showed no changes. Attempted to call MD [facility], but phone lines at office down. 1445: PT/OT in with patient, walk patient to the door and return. VSS during session. 32 61 16: Patient assisted by nurse (standby assist) to in-room bathroom. Patient voids 250cc of james urine; passing flatus but no bowel movement.      1600: Reassessment performed; no changes noted at this time; patient in recliner; no c/o chest pain. 1900: Change of shift report given to Von Villanueva RN.  Patient on 3L NC.

## 2018-06-29 NOTE — PROGRESS NOTES
Problem: Self Care Deficits Care Plan (Adult)  Goal: *Acute Goals and Plan of Care (Insert Text)  Occupational Therapy Goals:  Initiated 6/29/2018  1. Patient will perform grooming standing with independence within 7 days. 2. Patient will perform toileting with independence within 7 days. 3. Patient will perform dressing with independence within 7 days. 4. Patient will independent with energy conservation techniques within 7 days. 5. Patient will transfer from toilet with independence within 7 days. 6. Patient will perform one IADL task in standing with independence within 7 days. Occupational Therapy EVALUATION  Patient: Kenrick Murphy (61 y.o. male)  Date: 6/29/2018  Primary Diagnosis: ST elevation (STEMI) myocardial infarction [facility])  STEMI involving left circumflex coronary artery ([facility] [address]        Precautions: none       ASSESSMENT :  Based on the objective data described below, the patient presents with decreased endurance, decreased dynamic standing balance which limits his independence with ADLS. Pt was seen in PM after being extubated. Pt was educated on the importance of pacing himself due to significant MI. Pt was able to perform bed mobility with supervision and had good dynamic seated balance. HR at rest was 115-120 and with activity (donning socks seated crossed leg) up to 138. O2 sat was 90-94% on 3 liters NC. Pt is CGA to min assist for balance with standing tasks this session. Pt was able to don socks seated crossed leg with CGA. Two losses of balance during ADL mobility standing with min assist to correct. This improved over time. Pt is performing lower body ADLS at a min assist level and UB ADLS at a CGA level. Patient will benefit from skilled intervention to address the above impairments.   Patients rehabilitation potential is considered to be Good  Factors which may influence rehabilitation potential include:   [x]             None noted  []             Mental ability/status  []             Medical condition  []             Home/family situation and support systems  []             Safety awareness  []             Pain tolerance/management  []             Other:      PLAN :  Recommendations and Planned Interventions:  [x]               Self Care Training                  [x]        Therapeutic Activities  [x]               Functional Mobility Training    []        Cognitive Retraining  [x]               Therapeutic Exercises           []        Endurance Activities  [x]               Balance Training                   []        Neuromuscular Re-Education  []               Visual/Perceptual Training     [x]   Home Safety Training  [x]               Patient Education                 [x]        Family Training/Education  []               Other (comment):    Frequency/Duration: Patient will be followed by occupational therapy 4 times a week to address goals. Discharge Recommendations: Outpatient Cardiac Rehab vs. Home health with transition to cardiac rehab  Further Equipment Recommendations for Discharge: none     SUBJECTIVE:   Patient stated I feel a little wobbly.     OBJECTIVE DATA SUMMARY:   HISTORY:   Past Medical History:   Diagnosis Date    Actinic keratosis     Asthma     Cellulitis     Diabetes ([facility] [address]     Endocrine disease     Essential hypertension     GERD (gastroesophageal reflux disease)     Hypertension     Sun-damaged skin     Sunburn, blistering      Past Surgical History:   Procedure Laterality Date    ABDOMEN SURGERY PROC UNLISTED      HX HERNIA REPAIR      HX TONSILLECTOMY         Prior Level of Function/Environment/Context: works as a catfish fisherman on a boat; performed ADLs and IADLs without assist  Expanded or extensive additional review of patient history:     Home Situation  Home Environment: Private residence  # Steps to Enter: 5  Rails to Enter: No  One/Two Story Residence: One story  Living Alone: No  Support Systems: Spouse/Significant Other/Partner, Family member(s)  Patient Expects to be Discharged to[de-identified] Private residence  Current DME Used/Available at Home: Walker, rolling, Glucometer  Tub or Shower Type: Tub/Shower combination    Hand dominance: Right    EXAMINATION OF PERFORMANCE DEFICITS:  Cognitive/Behavioral Status:  Neurologic State: Alert  Orientation Level: Oriented X4  Cognition: Follows commands  Perception: Appears intact  Perseveration: No perseveration noted  Safety/Judgement: Awareness of environment; Fall prevention;Home safety    Hearing: Auditory  Auditory Impairment: None    Vision/Perceptual:                           Acuity: Within Defined Limits         Range of Motion:  AROM: Within functional limits  PROM: Within functional limits                      Strength:    Strength: Generally decreased, functional                Coordination:  Coordination: Within functional limits  Fine Motor Skills-Upper: Left Intact; Right Intact    Gross Motor Skills-Upper: Left Intact; Right Intact    Tone & Sensation:    Tone: Normal  Sensation: Intact                      Balance:  Sitting: Intact  Standing: Impaired  Standing - Static: Fair  Standing - Dynamic : Fair    Functional Mobility and Transfers for ADLs:  Bed Mobility:  Rolling: Supervision  Supine to Sit: Supervision  Scooting: Supervision    Transfers:  Sit to Stand: Contact guard assistance  Stand to Sit: Contact guard assistance  Bed to Chair: Minimum assistance  Toilet Transfer : Minimum assistance    ADL Assessment:  Feeding: Independent (once provided)    Oral Facial Hygiene/Grooming: Contact guard assistance (occasional min for balance standign)    Bathing: Minimum assistance    Upper Body Dressing: Setup    Lower Body Dressing: Minimum assistance (balance)    Toileting: Minimum assistance (balance)                ADL Intervention and task modifications:  Educated on pacing, PLB and energy conservation.     Donned socks seated EOB crossed leg technique with SBA for left sock;  with this task  Cognitive Retraining  Safety/Judgement: Awareness of environment; Fall prevention;Home safety       Functional Measure:  Barthel Index:    Bathin  Bladder: 10  Bowels: 10  Groomin  Dressin  Feeding: 10  Mobility: 10  Stairs: 0  Toilet Use: 5  Transfer (Bed to Chair and Back): 10  Total: 65       Barthel and G-code impairment scale:  Percentage of impairment CH  0% CI  1-19% CJ  20-39% CK  40-59% CL  60-79% CM  80-99% CN  100%   Barthel Score 0-100 100 99-80 79-60 59-40 20-39 1-19   0   Barthel Score 0-20 20 17-19 13-16 9-12 5-8 1-4 0      The Barthel ADL Index: Guidelines  1. The index should be used as a record of what a patient does, not as a record of what a patient could do. 2. The main aim is to establish degree of independence from any help, physical or verbal, however minor and for whatever reason. 3. The need for supervision renders the patient not independent. 4. A patient's performance should be established using the best available evidence. Asking the patient, friends/relatives and nurses are the usual sources, but direct observation and common sense are also important. However direct testing is not needed. 5. Usually the patient's performance over the preceding 24-48 hours is important, but occasionally longer periods will be relevant. 6. Middle categories imply that the patient supplies over 50 per cent of the effort. 7. Use of aids to be independent is allowed. Nel Ren., Barthel, D.W. (8809). Functional evaluation: the Barthel Index. [address] [facility] (14)2. JOSE Jon, Shirley Macias, Victoriano Galarza.[facility], [address] [facility] (). Measuring the change indisability after inpatient rehabilitation; comparison of the responsiveness of the Barthel Index and Functional [place] Measure. Journal of Neurology, Neurosurgery, and Psychiatry, 66(4), 309-400.   MARLENY Myers, GISELA Head, Thom Rhodes, M.A. (2004.) Assessment of post-stroke quality of life in cost-effectiveness studies: The usefulness of the Barthel Index and the EuroQoL-5D. Quality of Life Research, 13, 180-90         G codes: In compliance with CMSs Claims Based Outcome Reporting, the following G-code set was chosen for this patient based on their primary functional limitation being treated: The outcome measure chosen to determine the severity of the functional limitation was the barthel with a score of 65/100 which was correlated with the impairment scale. ? Self Care:     - CURRENT STATUS: CJ - 20%-39% impaired, limited or restricted    - GOAL STATUS: CI - 1%-19% impaired, limited or restricted    - D/C STATUS:  ---------------To be determined---------------     Occupational Therapy Evaluation Charge Determination   History Examination Decision-Making   LOW Complexity : Brief history review  LOW Complexity : 1-3 performance deficits relating to physical, cognitive , or psychosocial skils that result in activity limitations and / or participation restrictions  LOW Complexity : No comorbidities that affect functional and no verbal or physical assistance needed to complete eval tasks       Based on the above components, the patient evaluation is determined to be of the following complexity level: LOW   Pain:  Pain Scale 1: Numeric (0 - 10)  Pain Intensity 1: 2  Pain Location 1: Chest           Activity Tolerance:     Please refer to the flowsheet for vital signs taken during this treatment. After treatment:   [x] Patient left in no apparent distress sitting up in recliner chair  [] Patient left in no apparent distress in bed  [x] Call bell left within reach  [x] Nursing notified  [] Caregiver present  [] Bed alarm activated    COMMUNICATION/EDUCATION:   The patients plan of care was discussed with: Physical Therapist, Registered Nurse and patient. [x] Home safety education was provided and the patient/caregiver indicated understanding.   [x] Patient/family have participated as able in goal setting and plan of care. [x] Patient/family agree to work toward stated goals and plan of care. [] Patient understands intent and goals of therapy, but is neutral about his/her participation. [] Patient is unable to participate in goal setting and plan of care. This patients plan of care is appropriate for delegation to MAURICIO.     Thank you for this referral.  Abdirizak Desai OTR/L  Time Calculation: 16 mins

## 2018-06-29 NOTE — PROGRESS NOTES
[facility] [place]  Pulmonary, Critical Care, and Sleep Medicine    Name: Rodger Hillman MRN: [id number]   : 1960 Hospital: [facility]   Date: 2018        IMPRESSION:   · Acute hypoxic/hypercapnic respiratory failure (ABG not yet in EMR)  · Acute inferior STEMI  · Acute pulmonary edema  · Cardiogenic shock  · DM  · Asthma, no details available, without exacerbation  · H/O tobacco use  · Acute renal failure      PLAN:   · Ventilator support - wean and extubate this am   · Bronchodilators  · Off Pressors/Impella DC'd  · Diuresis   · Creatinine better  · Insulin/glycemic control  · DC Sedation  · Pain control  · DVT/GI prophylaxis      Subjective/Interval History:   I have reviewed the flowsheet and previous days notes. The patient is unable to give any meaningful history or review of systems because the patient is: Intubated     The patient is critically ill on:      Mechanical ventilation     Review of Systems   Unable to perform ROS: Intubated     Objective:   Vital Signs:    Visit Vitals    /70    Pulse 86    Temp 99.4 °F (37.4 °C)    Resp 12    Ht 5' 10\" (1.778 m)    SpO2 97%    BMI 34.01 kg/m2       O2 Device: Ventilator       Temp (24hrs), Av.9 °F (37.2 °C), Min:97.6 °F (36.4 °C), Max:99.8 °F (37.7 °C)       Intake/Output:   Last shift:         Last 3 shifts:  1901 -  0700  In: 1899.1 [I.V.:1689.1]  Out: 4665 [Urine:4365]    Intake/Output Summary (Last 24 hours) at 18 0734  Last data filed at 18 0600   Gross per 24 hour   Intake           799.16 ml   Output             1975 ml   Net         -1175.84 ml     Hemodynamics:   PAP:   CO:     Wedge:   CI:     CVP:    SVR:       PVR:       Ventilator Settings:  Mode Rate Tidal Volume Pressure FiO2 PEEP   Assist control   500 ml    50 % 6 cm H20     Peak airway pressure: 20 cm H2O    Minute ventilation: 8.35 l/min       Physical Exam   Constitutional: He is intubated.    HENT: Head: Normocephalic and atraumatic. Mouth/Throat: No oropharyngeal exudate. Eyes: No scleral icterus. Cardiovascular: Regular rhythm. Tachycardia present. No murmur heard. Pulmonary/Chest: He is intubated. He has no wheezes. He has rales. Abdominal: Soft. Bowel sounds are normal. He exhibits no distension. There is no tenderness. Musculoskeletal: He exhibits no edema. Neurological: He is alert. Skin: Skin is warm and dry.      Data:     Current Facility-Administered Medications   Medication Dose Route Frequency    saline peripheral flush soln 10 mL  10 mL InterCATHeter TID    fentaNYL (PF) 900 mcg/30 ml infusion soln  0-200 mcg/hr IntraVENous TITRATE    propofol (DIPRIVAN) infusion  0-50 mcg/kg/min IntraVENous TITRATE    insulin glargine (LANTUS) injection 32 Units  32 Units SubCUTAneous QHS    NOREPINephrine (LEVOPHED) 8 mg in 5% dextrose 250mL infusion  2-30 mcg/min IntraVENous TITRATE    albuterol-ipratropium (DUO-NEB) 2.5 MG-0.5 MG/3 ML  3 mL Nebulization Q6H RT    famotidine (PF) (PEPCID) 20 mg in sodium chloride 0.9% 10 mL injection  20 mg IntraVENous Q12H    insulin lispro (HUMALOG) injection   SubCUTAneous Q6H    0.9% sodium chloride infusion  25 mL/hr IntraVENous CONTINUOUS    aspirin (ASA) suppository 300 mg  300 mg Rectal DAILY    furosemide (LASIX) injection 40 mg  40 mg IntraVENous DAILY    ticagrelor (BRILINTA) tablet 90 mg  90 mg Oral BID    chlorhexidine (PERIDEX) 0.12 % mouthwash 15 mL  15 mL Oral Q12H    mupirocin (BACTROBAN) 2 % ointment   Both Nostrils BID                Labs:  Recent Labs      06/29/18   0413  06/28/18   0323  06/27/18   1215   WBC  16.1*  18.9*  10.6   HGB  13.3  14.3  15.9   HCT  40.9  42.2  46.3   PLT  157  201  187     Recent Labs      06/29/18   0413  06/28/18   0323  06/27/18   1215   NA  141  139  135*   K  3.7  3.9  3.5   CL  102  104  99   CO2  27  23  26   GLU  276*  313*  250*   BUN  27*  24*  20   CREA  1.36*  1.44*  1.09   CA  8.9 8. 0*  9.3   MG   --   1.7   --    PHOS   --   3.9   --    ALB  3.1*  3.2*  3.8   TBILI  0.8  1.1*  0.7   SGOT  247*  1152*  95*   ALT  68  96*  49   INR   --    --   1.2*     Recent Labs      06/29/18   0505  06/28/18   0525  06/27/18   1946   PH  7.42  7.42  7.30*   PCO2  39  30*  47*   PO2  60*  233*  198*   HCO3  25  19*  23   FIO2  50  80  100       Imaging:  I have personally reviewed the patients radiographs and have reviewed the reports:  CXR: chronic elevation right hemidiaphragm, edema better        Total critical care time exclusive of procedures:  minutes  John Wolfe MD

## 2018-06-29 NOTE — PROGRESS NOTES
0430, sat drop to 83s during morning care, after rest and set the head of the bed up sat 90s, site still soft,   0700, Bedside and Verbal shift change report given to Mariam Lee RN (oncoming nurse) by Jane Miller RN (offgoing nurse). Report included the following information SBAR, Kardex, Intake/Output, MAR, Accordion, Recent Results, Med Rec Status and Cardiac Rhythm NSR.

## 2018-06-29 NOTE — CARDIO/PULMONARY
CP Rehab Note: chart review    Admit:  1. Chest pain initially NSTEMI, while at ER at [facility] [place] developed complete heart block, hypotension, with repeat ECG showing inferior STEMI, cardiogenic shock, acute systolic chf, acute kidney injury, acute liver failure  2. Cath 6/2018 with 90% prox LAD, 100% prox dominant LCx, Small disease non-dominant RCA.   PCI with stent to OM, Bifurcation stent to prox LCx and OM, PCI with stent to prox LAD      Patient working with PT/OT at this time. Provided print materials to (son) for patient's wife to review when she returns. CP Rehab will follow up.

## 2018-06-29 NOTE — DIABETES MGMT
DTC Progress Note    Recommendations/ Comments: Pt discussed with rounding team and Dr. Sophia Montgomery. Plan to increase lantus to 45 units daily. Pt is on amaryl, metformin and trulicity at home. Trulicity is non-formulary and unavailable unless pt's family brings from home. Chart reviewed on [facility] during Multidisciplinary Rounds. A1c:   Lab Results   Component Value Date/Time    Hemoglobin A1c 8.4 (H) 06/28/2018 03:23 AM           Recent Glucose Results:   Lab Results   Component Value Date/Time     (H) 06/29/2018 04:13 AM    GLUCPOC 323 (H) 06/29/2018 05:41 AM    GLUCPOC 266 (H) 06/28/2018 11:56 PM    GLUCPOC 242 (H) 06/28/2018 06:12 PM        Lab Results   Component Value Date/Time    Creatinine 1.36 (H) 06/29/2018 04:13 AM     Estimated Creatinine Clearance: 70.9 mL/min (based on Cr of 1.36). Active Orders   Diet    DIET DIABETIC CONSISTENT CARB Regular; AHA-LOW-CHOL FAT        PO intake: No data found. Will continue to follow as needed. Thank you.   KAMRAN Lala, RN, [address]

## 2018-06-29 NOTE — PROGRESS NOTES
Attended Interdisciplinary rounds in Critical Care Unit, where patient care was discussed. Visit by: Kimmie Marte. Heather Mir.  Jose Miguel Angel MA, [address]

## 2018-06-29 NOTE — PROGRESS NOTES
1900 Bedside and Verbal shift change report given to PARTH Roach and Von Villanueva RN (oncoming nurses) by PARTH Martin (offgoing nurse). Report included the following information SBAR, Procedure summary, MAR, ED Summary, Cardiac Rhythm Sinus Tach. 2045 Patient converted from Sinus Tach to Afib with RVR. HR 150s. Patient denies chest pain. Stat EKG performed. 2050 Dr. Gonzalez Never notified. Orders received for Cardizem bolus 10 mg, followed by Cardizem drip.     0000 Reassessment complete. Patient's Oxygen saturation 89% 3L NC. Oxygen saturation increased to 5L NC.     0130 Patient's Oxygen saturation high 80s. Increased to 6L NC.     0400 Reassessment performed. No changes noted. 0700 Shift report given to Daxa Thornton RN (oncoming nurse) by He Lock RN and Von Villanueva RN (offgoing nurses).

## 2018-06-29 NOTE — PROGRESS NOTES
Physical Therapy Goals  Initiated 6/29/2018  1. Patient will move from supine to sit and sit to supine , scoot up and down and roll side to side in bed with independence within 7 day(s). 2.  Patient will transfer from bed to chair and chair to bed with independence using the least restrictive device within 7 day(s). 3.  Patient will perform sit to stand with independence within 7 day(s). 4.  Patient will ambulate with independence for 200 feet with the least restrictive device within 7 day(s). 5.  Patient will ascend/descend 5 stairs with no handrail(s) with independence within 7 day(s). physical Therapy EVALUATION  Patient: Manohar Vazquez (74 y.o. male)  Date: 6/29/2018  Primary Diagnosis: ST elevation (STEMI) myocardial infarction [facility])  STEMI involving left circumflex coronary artery [facility])        Precautions:        ASSESSMENT :  Based on the objective data described below, the patient presents with decreased strength, decreased functional mobility, decreased endurance, and unsteady gait following admission for STEMI. PTA patient lives with his wife and family. He is independent with all aspects of functional mobility and ADLS. 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 Currently, patient received on 3L O2 and cleared for mobility. Patient required supervision-independent with bed mobility. Patient required CGA for sit <> stand without AD. Patient initially with fair standing balance, with increased trunk sway. VSS throughout session on 3L O2, HR elevated to 130 with activity. Patient ambulated 50 feet with CGA and cardiac cart. Patient with improved balance and gait with holding onto cart, mildly unsteady gait without AD. Patient left sitting in the chair at the conclusion of PT evaluation. [facility] with cardiac rehab when medically cleared.      Encouraged patient to be up in the chair for all meals and ambulate with nursing staff to maintain strength during hospital stay. Patient will benefit from skilled intervention to address the above impairments. Patients rehabilitation potential is considered to be Good  Factors which may influence rehabilitation potential include:   []         None noted  []         Mental ability/status  [x]         Medical condition  []         Home/family situation and support systems  []         Safety awareness  []         Pain tolerance/management  []         Other:      PLAN :  Recommendations and Planned Interventions:  [x]           Bed Mobility Training             []    Neuromuscular Re-Education  [x]           Transfer Training                   []    Orthotic/Prosthetic Training  [x]           Gait Training                         []    Modalities  [x]           Therapeutic Exercises           []    Edema Management/Control  [x]           Therapeutic Activities            [x]    Patient and Family Training/Education  []           Other (comment):    Frequency/Duration: Patient will be followed by physical therapy  5 times a week to address goals. Discharge Recommendations: Home Health  Further Equipment Recommendations for Discharge: none     SUBJECTIVE:   Patient stated I feel wobbly.     OBJECTIVE DATA SUMMARY:   HISTORY:    Past Medical History:   Diagnosis Date    Actinic keratosis     Asthma     Cellulitis     Diabetes ([address]     Endocrine disease     Essential hypertension     GERD (gastroesophageal reflux disease)     Hypertension     Sun-damaged skin     Sunburn, blistering      Past Surgical History:   Procedure Laterality Date    ABDOMEN SURGERY PROC UNLISTED      HX HERNIA REPAIR      HX TONSILLECTOMY       Prior Level of Function/Home Situation:  patient lives with his wife and family. He is independent with all aspects of functional mobility and ADLS. 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    Personal factors and/or comorbidities impacting plan of care: STEMI, recent extubation and impella    Home Situation  Home Environment: Private residence  # Steps to Enter: 5  Rails to Enter: No  One/Two Story Residence: One story  Living Alone: No  Support Systems: Spouse/Significant Other/Partner, Family member(s)  Patient Expects to be Discharged to[de-identified] Private residence  Current DME Used/Available at Home: Walker, rolling, Glucometer  Tub or Shower Type: Tub/Shower combination    EXAMINATION/PRESENTATION/DECISION MAKING:   Critical Behavior:  Neurologic State: Alert  Orientation Level: Oriented X4  Cognition: Follows commands  Safety/Judgement: Awareness of environment, Fall prevention, Home safety  Hearing: Auditory  Auditory Impairment: None  Skin:    Edema:   Range Of Motion:  AROM: Within functional limits           PROM: Within functional limits           Strength:    Strength: Generally decreased, functional                    Tone & Sensation:   Tone: Normal              Sensation: Intact               Coordination:  Coordination: Within functional limits  Vision:   Acuity: Within Defined Limits  Functional Mobility:  Bed Mobility:  Rolling: Supervision  Supine to Sit: Supervision     Scooting: Supervision  Transfers:  Sit to Stand: Contact guard assistance  Stand to Sit: Contact guard assistance        Bed to Chair: Minimum assistance              Balance:   Sitting: Intact  Standing: Impaired  Standing - Static: Fair  Standing - Dynamic : Fair  Ambulation/Gait Training:  Distance (ft): 50 Feet (ft)  Assistive Device: Gait belt (cardiac cart)  Ambulation - Level of Assistance: Contact guard assistance     Gait Description (WDL): Exceptions to WDL  Gait Abnormalities: Decreased step clearance;Trunk sway increased; Shuffling gait        Base of Support: Widened     Speed/Maria Antonia: Pace decreased (<100 feet/min)  Step Length: Right shortened;Left shortened                     Stairs:               Therapeutic Exercises:       Functional Measure:  Barthel Index:    Bathin  Bladder: 10  Bowels: 10  Groomin  Dressin  Feeding: 10  Mobility: 10  Stairs: 0  Toilet Use: 5  Transfer (Bed to Chair and Back): 10  Total: 65       Barthel and G-code impairment scale:  Percentage of impairment CH  0% CI  1-19% CJ  20-39% CK  40-59% CL  60-79% CM  80-99% CN  100%   Barthel Score 0-100 100 99-80 79-60 59-40 20-39 1-19   0   Barthel Score 0-20 20 17-19 13-16 9-12 5-8 1-4 0      The Barthel ADL Index: Guidelines  1. The index should be used as a record of what a patient does, not as a record of what a patient could do. 2. The main aim is to establish degree of independence from any help, physical or verbal, however minor and for whatever reason. 3. The need for supervision renders the patient not independent. 4. A patient's performance should be established using the best available evidence. Asking the patient, friends/relatives and nurses are the usual sources, but direct observation and common sense are also important. However direct testing is not needed. 5. Usually the patient's performance over the preceding 24-48 hours is important, but occasionally longer periods will be relevant. 6. Middle categories imply that the patient supplies over 50 per cent of the effort. 7. Use of aids to be independent is allowed. Giovanni Yun., Barthel, D.W. (4603). Functional evaluation: the Barthel Index. [address] [facility] (14)2. Ursula Mcconnell tata Jonathan, LUCINDAF, Neal Muniz., Grady Aguilar., [place], [address] [place] [address] (). Measuring the change indisability after inpatient rehabilitation; comparison of the responsiveness of the Barthel Index and Functional [place] Measure. Journal of Neurology, Neurosurgery, and Psychiatry, 66(4), 340-001. Casa Bennett, N.J.A, Germaine Torres,  W.J.M, & Sanna López M.A. (2004.) Assessment of post-stroke quality of life in cost-effectiveness studies: The usefulness of the Barthel Index and the EuroQoL-5D. Quality of Life Research, 13, 191-53         G codes:   In compliance with CMSs Claims Based Outcome Reporting, the following G-code set was chosen for this patient based on their primary functional limitation being treated: The outcome measure chosen to determine the severity of the functional limitation was the Barthel with a score of 65/100 which was correlated with the impairment scale. ? Mobility - Walking and Moving Around:     - CURRENT STATUS: CJ - 20%-39% impaired, limited or restricted    - GOAL STATUS: CI - 1%-19% impaired, limited or restricted    - D/C STATUS:  ---------------To be determined---------------      Physical Therapy Evaluation Charge Determination   History Examination Presentation Decision-Making   MEDIUM  Complexity : 1-2 comorbidities / personal factors will impact the outcome/ POC  MEDIUM Complexity : 3 Standardized tests and measures addressing body structure, function, activity limitation and / or participation in recreation  MEDIUM Complexity : Evolving with changing characteristics  Other outcome measures Barthel   MEDIUM      Based on the above components, the patient evaluation is determined to be of the following complexity level: MEDIUM    Pain:  Pain Scale 1: Numeric (0 - 10)  Pain Intensity 1: 2  Pain Location 1: Chest           Activity Tolerance:   Fair-good, HR elevated to 130 with activity. On 3L O2. Please refer to the flowsheet for vital signs taken during this treatment. After treatment:   [x]         Patient left in no apparent distress sitting up in chair  []         Patient left in no apparent distress in bed  [x]         Call bell left within reach  [x]         Nursing notified  [x]         Caregiver present  []         Bed alarm activated    COMMUNICATION/EDUCATION:   The patients plan of care was discussed with: Occupational Therapist, Registered Nurse and . [x]         Fall prevention education was provided and the patient/caregiver indicated understanding.   [x]         Patient/family have participated as able in goal setting and plan of care. [x]         Patient/family agree to work toward stated goals and plan of care. []         Patient understands intent and goals of therapy, but is neutral about his/her participation. []         Patient is unable to participate in goal setting and plan of care.     Thank you for this referral.  Osiel Crump, PT, DPT   Time Calculation: 24 mins

## 2018-06-29 NOTE — PROGRESS NOTES
Care Management:    Reason for Admission:   Mi, now extubated and able to communicate. Wife at bedside. Post cardiac cath with stenting. RRAT Score:     12                Plan for utilizing home health:  Undetermined at this time. Likelihood of Readmission:  Low. Transition of Care Plan:         Home with follow up with MD's is anticipated. He is active with PCP. He uses [facility]. Care Management Interventions  PCP Verified by CM: Yes  Mode of Transport at Discharge: Other (see comment) (wife)  Current Support Network: Lives with Spouse (Lives with wife and independent with ADL's. 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 )  Confirm Follow Up Transport: Family (wife or self.)  Plan discussed with Pt/Family/Caregiver:  Yes     Carolynn Ahumada [facility] ac [phone]

## 2018-06-29 NOTE — PROGRESS NOTES
Interdisciplinary team rounds were held with the following team members:Case Management, Diabetes Treatment Specialist, Nursing, Nutrition, Occupational Therapy, Physical Therapy,Pharmacy, Physician and Respiratory Therapy. Plan of care discussed. See clinical pathway and/or care plan for interventions and desired outcomes.     Goals: PT/OT,

## 2018-06-29 NOTE — PROGRESS NOTES
Progress Note      6/29/2018 8:20 AM  NAME: Irasema Becker   MRN:  [id number]   Admit Diagnosis: ST elevation (STEMI) myocardial infarction [facility])  STEMI involving left circumflex coronary artery [facility])                          Assessment:                 1. Chest pain initially NSTEMI, while at ER at [facility] developed complete heart block, hypotension, with repeat ECG showing inferior STEMI, cardiogenic shock, acute systolic chf, acute kidney injury, acute liver failure  2. Cath 6/2018 with 90% prox LAD, 100% prox dominant LCx, Small disease non-dominant RCA. PCI with stent to OM, Bifurcation stent to prox LCx and OM, PCI with stent to prox LAD  3. Ischemic cardiomyopathy EF 35%, inferior hypokinesis  4. Sinus with transient complete heart block  5. HTN  6. DM  7. Dyslipidemia  8. Recent colitis with GI bleed s/p endoscopy with Dr. Jose Landin  9. Hx of tonsillectomy  10. Hx of hernia repair  11. Hx of basal cell ca removal  12. Quit tobacco in '94  13. , wife is  for medical practice in [address] [place] [address], he works on boat, active                               Plan:                  Inferior STEMI secondary to LCx. OM, LCx bifurcation, LAD stented SHAGUFTA. Acute systolic chf EF 80%, improving with diuresis  Transient complete heart block, currently in sinus. Cardiogenic shock, impela and levophed weaned off. Hypoxemic resp failure, extubated today. Acute kidney injury will follow cr  Acute liver failure will follow lft     1. Cont Added aspirin  2. Cont Added brilinta  3. Levophed stopped Thursday night, consider toprol xl 12.5mg on Saturday AM   4. Holding losartan HCT for now, consider low dose losartan 12.5mg on sunday  5. Change lasix to 40mg daily  6. Add statin  7. Insulin sliding scale     Impella site healed. Extubated  Gentle diuresis. Supportive care.   Appreciate ICU help.     Transfer to second floor tele when ok with ICU.         [x]        High complexity decision making was performed         Subjective:     Erica Escobar no chest pain, no dyspnea, actually feels ok. Discussed with RN events overnight. Review of Systems:    Symptom Y/N Comments  Symptom Y/N Comments   Fever/Chills N   Chest Pain     Poor Appetite N   Edema N    Cough N   Abdominal Pain N    Sputum N   Joint Pain N    SOB/MAGALLANES N   Pruritis/Rash N    Nausea/vomit N   Tolerating PT/OT     Diarrhea N   Tolerating Diet     Constipation N   Other       Could NOT obtain due to:      Objective:      Physical Exam:    Last 24hrs VS reviewed since prior progress note. Most recent are:    Visit Vitals    /77    Pulse 98    Temp 96.9 °F (36.1 °C)    Resp 18    Ht 5' 10\" (1.778 m)    SpO2 94%    BMI 34.01 kg/m2       Intake/Output Summary (Last 24 hours) at 06/29/18 1128  Last data filed at 06/29/18 1033   Gross per 24 hour   Intake           785.89 ml   Output             1875 ml   Net         -1089.11 ml        General Appearance: Well developed, well nourished, able to follow commands, A + O x 3,    no acute distress. Ears/Nose/Mouth/Throat: Hearing grossly normal.  Neck: Supple. Chest: Lungs clear to auscultation bilaterally. Cardiovascular: Regular rate and rhythm, S1S2 normal, no murmur. Abdomen: Soft, non-tender, bowel sounds are active. Extremities: No edema bilaterally. Skin: Warm and dry.     PMH/SH reviewed - no change compared to H&P    Data Review    Telemetry: normal sinus rhythm     Lab Data Personally Reviewed:    Recent Labs      06/29/18   0413  06/28/18   0323   WBC  16.1*  18.9*   HGB  13.3  14.3   HCT  40.9  42.2   PLT  157  201     Recent Labs      06/27/18   1215   INR  1.2*   PTP  11.7*   APTT  25.6      Recent Labs      06/29/18   0413  06/28/18   0323  06/27/18   1215   NA  141  139  135*   K  3.7  3.9  3.5   CL  102  104  99   CO2  27  23  26   BUN  27*  24*  20   CREA  1.36*  1.44*  1.09   GLU  276*  313*  250*   CA  8.9  8.0*  9.3   MG   --   1.7   --      Recent Labs 06/28/18   0323  06/27/18   1215   CPK   --   773*   CKNDX   --   5.8*   TROIQ  >100.00*  5.86*     Lab Results   Component Value Date/Time    Cholesterol, total 135 06/28/2018 03:23 AM    HDL Cholesterol 34 06/28/2018 03:23 AM    LDL, calculated 64.4 06/28/2018 03:23 AM    Triglyceride 183 (H) 06/28/2018 03:23 AM    CHOL/HDL Ratio 4.0 06/28/2018 03:23 AM       Recent Labs      06/29/18   0413  06/28/18   0323  06/27/18   1215   SGOT  247*  1152*  95*   AP  64  65  76   TP  7.0  6.5  7.3   ALB  3.1*  3.2*  3.8   GLOB  3.9  3.3  3.5     Recent Labs      06/29/18   0505  06/28/18   0525   PH  7.42  7.42   PCO2  39  30*   PO2  60*  233*       Medications Personally Reviewed:    Current Facility-Administered Medications   Medication Dose Route Frequency    aspirin chewable tablet 81 mg  81 mg Oral DAILY    enoxaparin (LOVENOX) injection 40 mg  40 mg SubCUTAneous Q24H    oxyCODONE IR (ROXICODONE) tablet 5 mg  5 mg Oral Q4H PRN    atorvastatin (LIPITOR) tablet 40 mg  40 mg Oral DAILY    [START ON 6/30/2018] furosemide (LASIX) tablet 40 mg  40 mg Oral DAILY    insulin glargine (LANTUS) injection 45 Units  45 Units SubCUTAneous ACL    saline peripheral flush soln 10 mL  10 mL InterCATHeter TID    saline peripheral flush soln 5 mL  5 mL InterCATHeter PRN    albuterol-ipratropium (DUO-NEB) 2.5 MG-0.5 MG/3 ML  3 mL Nebulization Q6H RT    insulin lispro (HUMALOG) injection   SubCUTAneous Q6H    glucose chewable tablet 16 g  4 Tab Oral PRN    dextrose (D50W) injection syrg 12.5-25 g  12.5-25 g IntraVENous PRN    glucagon (GLUCAGEN) injection 1 mg  1 mg IntraMUSCular PRN    ticagrelor (BRILINTA) tablet 90 mg  90 mg Oral BID    chlorhexidine (PERIDEX) 0.12 % mouthwash 15 mL  15 mL Oral Q12H    mupirocin (BACTROBAN) 2 % ointment   Both Nostrils BID         Francheska Michelle MD

## 2018-06-30 LAB
ALBUMIN SERPL-MCNC: 2.8 G/DL (ref 3.5–5)
ALBUMIN/GLOB SERPL: 0.7 {RATIO} (ref 1.1–2.2)
ALP SERPL-CCNC: 61 U/L (ref 45–117)
ALT SERPL-CCNC: 47 U/L (ref 12–78)
ANION GAP SERPL CALC-SCNC: 10 MMOL/L (ref 5–15)
AST SERPL-CCNC: 75 U/L (ref 15–37)
BILIRUB SERPL-MCNC: 1.1 MG/DL (ref 0.2–1)
BUN SERPL-MCNC: 31 MG/DL (ref 6–20)
BUN/CREAT SERPL: 30 (ref 12–20)
CALCIUM SERPL-MCNC: 8.8 MG/DL (ref 8.5–10.1)
CHLORIDE SERPL-SCNC: 99 MMOL/L (ref 97–108)
CO2 SERPL-SCNC: 28 MMOL/L (ref 21–32)
CREAT SERPL-MCNC: 1.03 MG/DL (ref 0.7–1.3)
GLOBULIN SER CALC-MCNC: 4.1 G/DL (ref 2–4)
GLUCOSE BLD STRIP.AUTO-MCNC: 225 MG/DL (ref 65–100)
GLUCOSE BLD STRIP.AUTO-MCNC: 280 MG/DL (ref 65–100)
GLUCOSE BLD STRIP.AUTO-MCNC: 287 MG/DL (ref 65–100)
GLUCOSE BLD STRIP.AUTO-MCNC: 305 MG/DL (ref 65–100)
GLUCOSE SERPL-MCNC: 265 MG/DL (ref 65–100)
POTASSIUM SERPL-SCNC: 3.7 MMOL/L (ref 3.5–5.1)
PROT SERPL-MCNC: 6.9 G/DL (ref 6.4–8.2)
SERVICE CMNT-IMP: ABNORMAL
SODIUM SERPL-SCNC: 137 MMOL/L (ref 136–145)

## 2018-06-30 PROCEDURE — 74011250637 HC RX REV CODE- 250/637: Performed by: INTERNAL MEDICINE

## 2018-06-30 PROCEDURE — 80053 COMPREHEN METABOLIC PANEL: CPT | Performed by: INTERNAL MEDICINE

## 2018-06-30 PROCEDURE — 94640 AIRWAY INHALATION TREATMENT: CPT

## 2018-06-30 PROCEDURE — 74011000250 HC RX REV CODE- 250: Performed by: INTERNAL MEDICINE

## 2018-06-30 PROCEDURE — 74011250636 HC RX REV CODE- 250/636: Performed by: INTERNAL MEDICINE

## 2018-06-30 PROCEDURE — 65620000000 HC RM CCU GENERAL

## 2018-06-30 PROCEDURE — 74011636637 HC RX REV CODE- 636/637: Performed by: INTERNAL MEDICINE

## 2018-06-30 PROCEDURE — 77010033678 HC OXYGEN DAILY

## 2018-06-30 PROCEDURE — 36415 COLL VENOUS BLD VENIPUNCTURE: CPT | Performed by: INTERNAL MEDICINE

## 2018-06-30 PROCEDURE — 74011000258 HC RX REV CODE- 258: Performed by: INTERNAL MEDICINE

## 2018-06-30 PROCEDURE — 82962 GLUCOSE BLOOD TEST: CPT

## 2018-06-30 RX ORDER — FUROSEMIDE 10 MG/ML
80 INJECTION INTRAMUSCULAR; INTRAVENOUS 2 TIMES DAILY
Status: DISCONTINUED | OUTPATIENT
Start: 2018-06-30 | End: 2018-07-03

## 2018-06-30 RX ORDER — ONDANSETRON 2 MG/ML
4 INJECTION INTRAMUSCULAR; INTRAVENOUS
Status: DISCONTINUED | OUTPATIENT
Start: 2018-06-30 | End: 2018-07-09 | Stop reason: HOSPADM

## 2018-06-30 RX ORDER — CARVEDILOL 3.12 MG/1
3.12 TABLET ORAL 2 TIMES DAILY WITH MEALS
Status: DISCONTINUED | OUTPATIENT
Start: 2018-06-30 | End: 2018-07-09 | Stop reason: HOSPADM

## 2018-06-30 RX ADMIN — INSULIN GLARGINE 45 UNITS: 100 INJECTION, SOLUTION SUBCUTANEOUS at 12:26

## 2018-06-30 RX ADMIN — INSULIN LISPRO 5 UNITS: 100 INJECTION, SOLUTION INTRAVENOUS; SUBCUTANEOUS at 12:35

## 2018-06-30 RX ADMIN — INSULIN LISPRO 3 UNITS: 100 INJECTION, SOLUTION INTRAVENOUS; SUBCUTANEOUS at 23:30

## 2018-06-30 RX ADMIN — ATORVASTATIN CALCIUM 40 MG: 40 TABLET, FILM COATED ORAL at 08:57

## 2018-06-30 RX ADMIN — Medication 10 ML: at 17:44

## 2018-06-30 RX ADMIN — ENOXAPARIN SODIUM 40 MG: 100 INJECTION SUBCUTANEOUS at 11:24

## 2018-06-30 RX ADMIN — TICAGRELOR 90 MG: 90 TABLET ORAL at 17:44

## 2018-06-30 RX ADMIN — CARVEDILOL 3.12 MG: 3.12 TABLET, FILM COATED ORAL at 17:44

## 2018-06-30 RX ADMIN — TICAGRELOR 90 MG: 90 TABLET ORAL at 08:57

## 2018-06-30 RX ADMIN — INSULIN LISPRO 7 UNITS: 100 INJECTION, SOLUTION INTRAVENOUS; SUBCUTANEOUS at 18:32

## 2018-06-30 RX ADMIN — Medication 10 ML: at 22:00

## 2018-06-30 RX ADMIN — OXYCODONE HYDROCHLORIDE 5 MG: 5 TABLET ORAL at 08:57

## 2018-06-30 RX ADMIN — FUROSEMIDE 80 MG: 10 INJECTION, SOLUTION INTRAMUSCULAR; INTRAVENOUS at 15:38

## 2018-06-30 RX ADMIN — CHLORHEXIDINE GLUCONATE 15 ML: 1.2 RINSE ORAL at 21:51

## 2018-06-30 RX ADMIN — ONDANSETRON 4 MG: 2 INJECTION INTRAMUSCULAR; INTRAVENOUS at 11:21

## 2018-06-30 RX ADMIN — INSULIN LISPRO 5 UNITS: 100 INJECTION, SOLUTION INTRAVENOUS; SUBCUTANEOUS at 05:57

## 2018-06-30 RX ADMIN — IPRATROPIUM BROMIDE AND ALBUTEROL SULFATE 3 ML: .5; 3 SOLUTION RESPIRATORY (INHALATION) at 19:07

## 2018-06-30 RX ADMIN — MUPIROCIN: 20 OINTMENT TOPICAL at 08:59

## 2018-06-30 RX ADMIN — Medication 10 ML: at 10:17

## 2018-06-30 RX ADMIN — ASPIRIN 81 MG 81 MG: 81 TABLET ORAL at 08:57

## 2018-06-30 RX ADMIN — FUROSEMIDE 40 MG: 40 TABLET ORAL at 08:57

## 2018-06-30 RX ADMIN — MUPIROCIN: 20 OINTMENT TOPICAL at 21:51

## 2018-06-30 RX ADMIN — IPRATROPIUM BROMIDE AND ALBUTEROL SULFATE 3 ML: .5; 3 SOLUTION RESPIRATORY (INHALATION) at 07:29

## 2018-06-30 RX ADMIN — SODIUM CHLORIDE 15 MG/HR: 900 INJECTION, SOLUTION INTRAVENOUS at 03:17

## 2018-06-30 RX ADMIN — SODIUM CHLORIDE 15 MG/HR: 900 INJECTION, SOLUTION INTRAVENOUS at 09:24

## 2018-06-30 RX ADMIN — IPRATROPIUM BROMIDE AND ALBUTEROL SULFATE 3 ML: .5; 3 SOLUTION RESPIRATORY (INHALATION) at 14:17

## 2018-06-30 RX ADMIN — CHLORHEXIDINE GLUCONATE 15 ML: 1.2 RINSE ORAL at 08:59

## 2018-06-30 NOTE — PROGRESS NOTES
Progress Note      6/30/2018 8:20 AM  NAME: Davon Godoy   MRN:  [id number]   Admit Diagnosis: ST elevation (STEMI) myocardial infarction [facility])  STEMI involving left circumflex coronary artery [facility])                          Assessment:                 1. Chest pain initially NSTEMI, while at ER at [facility] [place] developed complete heart block, hypotension, with repeat ECG showing inferior STEMI, cardiogenic shock, acute systolic chf, acute kidney injury, acute liver failure  2. Cath 6/2018 with 90% prox LAD, 100% prox dominant LCx, Small disease non-dominant RCA. PCI with stent to OM, Bifurcation stent to prox LCx and OM, PCI with stent to prox LAD  3. Ischemic cardiomyopathy EF 35%, inferior hypokinesis  4. Sinus with transient complete heart block  5. HTN  6. DM  7. Dyslipidemia  8. Recent colitis with GI bleed s/p endoscopy with Dr. Lucy Yancey  9. Hx of tonsillectomy  10. Hx of hernia repair  11. Hx of basal cell ca removal  12. Quit tobacco in '94  13. , wife is  for medical practice in [address] [place] [address] [facility], he works on boat, active                               Plan:                  Inferior STEMI secondary to LCx. OM, LCx bifurcation, LAD stented SHAGUFTA. Acute systolic chf EF 69%, improving with diuresis  Transient complete heart block, currently in sinus. Cardiogenic shock, impela and levophed weaned off. Hypoxemic resp failure, extubated today. Acute kidney injury will follow cr  Acute liver failure will follow lft     1. Cont Added aspirin  2. Cont Added brilinta  3. Levophed stopped Thursday night, consider toprol xl 12.5mg on Saturday AM   4. Holding losartan HCT for now, consider low dose losartan 12.5mg on sunday  5. Change lasix to 40mg daily  6. Add statin  7. Insulin sliding scale     Impella site healed. Extubated  6/29   Supportive care. Appreciate ICU help. 6/30   He has worsening SOB today. Up to 6 l/m on O2. Has to sit up on side of bed.  He did urinate today , had dose of PO lasix. Lungs have decreased BS 1/2 way up bilateral.  Few rales. VSS  Nausea today . Will put him on IV Lasix and see if that helps   CXR tomorrow. He is in NSR . Will d/c IV Diltiazem . Start on low dose Coreg.               [x]        High complexity decision making was performed         Subjective:     Andree Luis no chest pain, no dyspnea, actually feels ok. Discussed with RN events overnight. Review of Systems:    Symptom Y/N Comments  Symptom Y/N Comments   Fever/Chills N   Chest Pain     Poor Appetite N   Edema N    Cough N   Abdominal Pain N    Sputum N   Joint Pain N    SOB/MAGALLANES N   Pruritis/Rash N    Nausea/vomit N   Tolerating PT/OT     Diarrhea N   Tolerating Diet     Constipation N   Other       Could NOT obtain due to:      Objective:      Physical Exam:    Last 24hrs VS reviewed since prior progress note. Most recent are:    Visit Vitals    /81    Pulse 78    Temp 98.3 °F (36.8 °C)    Resp 22    Ht 5' 10\" (1.778 m)    SpO2 96%    BMI 34.01 kg/m2       Intake/Output Summary (Last 24 hours) at 06/30/18 1445  Last data filed at 06/30/18 1400   Gross per 24 hour   Intake          1864.42 ml   Output             1575 ml   Net           289.42 ml        General Appearance: Well developed, well nourished, able to follow commands, A + O x 3,    no acute distress. Ears/Nose/Mouth/Throat: Hearing grossly normal.  Neck: Supple. Chest: Lungs clear to auscultation bilaterally. Cardiovascular: Regular rate and rhythm, S1S2 normal, no murmur. Abdomen: Soft, non-tender, bowel sounds are active. Extremities: No edema bilaterally. Skin: Warm and dry. PMH/SH reviewed - no change compared to H&P    Data Review    Telemetry: normal sinus rhythm     Lab Data Personally Reviewed:    Recent Labs      06/29/18   0413  06/28/18   0323   WBC  16.1*  18.9*   HGB  13.3  14.3   HCT  40.9  42.2   PLT  157  201     No results for input(s): INR, PTP, APTT in the last 72 hours.     No lab exists for component: Rik Damico   Recent Labs      06/30/18   0634  06/29/18   0413  06/28/18   0323   NA  137  141  139   K  3.7  3.7  3.9   CL  99  102  104   CO2  28  27  23   BUN  31*  27*  24*   CREA  1.03  1.36*  1.44*   GLU  265*  276*  313*   CA  8.8  8.9  8.0*   MG   --    --   1.7     Recent Labs      06/28/18   0323   TROIQ  >100.00*     Lab Results   Component Value Date/Time    Cholesterol, total 135 06/28/2018 03:23 AM    HDL Cholesterol 34 06/28/2018 03:23 AM    LDL, calculated 64.4 06/28/2018 03:23 AM    Triglyceride 183 (H) 06/28/2018 03:23 AM    CHOL/HDL Ratio 4.0 06/28/2018 03:23 AM       Recent Labs      06/30/18   0634  06/29/18   0413  06/28/18   0323   SGOT  75*  247*  1152*   AP  61  64  65   TP  6.9  7.0  6.5   ALB  2.8*  3.1*  3.2*   GLOB  4.1*  3.9  3.3     Recent Labs      06/29/18   0505  06/28/18   0525   PH  7.42  7.42   PCO2  39  30*   PO2  60*  233*       Medications Personally Reviewed:    Current Facility-Administered Medications   Medication Dose Route Frequency    ondansetron (ZOFRAN) injection 4 mg  4 mg IntraVENous Q6H PRN    furosemide (LASIX) injection 80 mg  80 mg IntraVENous BID    carvedilol (COREG) tablet 3.125 mg  3.125 mg Oral BID WITH MEALS    aspirin chewable tablet 81 mg  81 mg Oral DAILY    enoxaparin (LOVENOX) injection 40 mg  40 mg SubCUTAneous Q24H    oxyCODONE IR (ROXICODONE) tablet 5 mg  5 mg Oral Q4H PRN    atorvastatin (LIPITOR) tablet 40 mg  40 mg Oral DAILY    insulin glargine (LANTUS) injection 45 Units  45 Units SubCUTAneous ACL    saline peripheral flush soln 10 mL  10 mL InterCATHeter TID    saline peripheral flush soln 5 mL  5 mL InterCATHeter PRN    albuterol-ipratropium (DUO-NEB) 2.5 MG-0.5 MG/3 ML  3 mL Nebulization Q6H RT    insulin lispro (HUMALOG) injection   SubCUTAneous Q6H    glucose chewable tablet 16 g  4 Tab Oral PRN    dextrose (D50W) injection syrg 12.5-25 g  12.5-25 g IntraVENous PRN    glucagon (GLUCAGEN) injection 1 mg  1 mg IntraMUSCular PRN    ticagrelor (BRILINTA) tablet 90 mg  90 mg Oral BID    chlorhexidine (PERIDEX) 0.12 % mouthwash 15 mL  15 mL Oral Q12H    mupirocin (BACTROBAN) 2 % ointment   Both Nostrils BID         Sid Torres MD

## 2018-06-30 NOTE — PROGRESS NOTES
0730 Bedside shift report received. Pt sitting on side of bed. No c/o pain. 1330  Paged Dr. Evelyn Hill per wifes request regarding pt having nausea throughout the day and SOB when laying down. Notified him pt has posterior crackles bilaterally and is % on 6LPM NC when sitting up. O2 sats drop when lying down. Dr. Evelyn Hill will come eval pt.  1450  Dr. Evelyn Hill at bedside to evaluate pt. Spoke with pt and wife. Lasix IV 80mg ordered. 1600  Pt diuresing from lasix. O2 weaned to 4LPM NC.  1745  Coreg given  1830  Cardizem gtt stopped.

## 2018-06-30 NOTE — PROGRESS NOTES
[facility]  Pulmonary, Critical Care, and Sleep Medicine    Name: Latrell Shields MRN: [id number]   : 1960 Hospital: [facility]   Date: 2018        IMPRESSION:   · Acute hypoxic/hypercapnic respiratory failure now off vent  · Acute inferior STEMI initially NSTEMI, while at ER at [facility] [place] developed complete heart block, hypotension, with repeat ECG showing inferior STEMI  · Cath 2018 with 90% prox LAD, 100% prox dominant LCx, Small disease non-dominant RCA. PCI with stent to OM, Bifurcation stent to prox LCx and OM, PCI with stent to prox LAD  · Ischemic cardiomyopathy EF 35%, inferior hypokinesis  · Sinus with transient complete heart block  · Acute pulmonary edema  · Cardiogenic shock  · DM  · Asthma, no details available, without exacerbation  · H/O tobacco use  · Acute renal failure      PLAN:   · Mobilize   · Bronchodilators  · Off Pressors/Impella DC'd  · Diuresis   · Creatinine better  · Insulin/glycemic control  · Pain control  · DVT/GI prophylaxis      Subjective/Interval History:   I have reviewed the flowsheet and previous days notes. Review of Systems   Unable to perform ROS: Other   Respiratory: Negative for cough and shortness of breath. Cardiovascular: Negative for chest pain. Gastrointestinal: Negative for diarrhea and nausea. Neurological: Negative for dizziness and headaches. Objective:   Vital Signs:    Visit Vitals    /81    Pulse 78    Temp 98.3 °F (36.8 °C)    Resp 22    Ht 5' 10\" (1.778 m)    SpO2 90%    BMI 34.01 kg/m2       O2 Device: Nasal cannula   O2 Flow Rate (L/min): 6 l/min   Temp (24hrs), Av.9 °F (37.2 °C), Min:98 °F (36.7 °C), Max:99.7 °F (37.6 °C)       Intake/Output:   Last shift:         Last 3 shifts:  1901 -  0700  In: 1909.4 [P.O.:1320;  I.V.:589.4]  Out: 2440 [Urine:2390]    Intake/Output Summary (Last 24 hours) at 18 1027  Last data filed at 18 0700   Gross per 24 hour Intake          1340.13 ml   Output             1175 ml   Net           165.13 ml     Hemodynamics:   PAP:   CO:     Wedge:   CI:     CVP:    SVR:       PVR:       Ventilator Settings:  Mode Rate Tidal Volume Pressure FiO2 PEEP   CPAP   500 ml  5 cm H2O 50 % 6 cm H20     Peak airway pressure: 12 cm H2O    Minute ventilation: 8.8 l/min       Physical Exam   Constitutional: He is intubated. HENT:   Head: Normocephalic and atraumatic. Mouth/Throat: No oropharyngeal exudate. Eyes: No scleral icterus. Cardiovascular: Regular rhythm. Tachycardia present. No murmur heard. Pulmonary/Chest: He is intubated. He has no wheezes. He has rales. Abdominal: Soft. Bowel sounds are normal. He exhibits no distension. There is no tenderness. Musculoskeletal: He exhibits no edema. Neurological: He is alert. Skin: Skin is warm and dry.      Data:     Current Facility-Administered Medications   Medication Dose Route Frequency    aspirin chewable tablet 81 mg  81 mg Oral DAILY    enoxaparin (LOVENOX) injection 40 mg  40 mg SubCUTAneous Q24H    atorvastatin (LIPITOR) tablet 40 mg  40 mg Oral DAILY    furosemide (LASIX) tablet 40 mg  40 mg Oral DAILY    insulin glargine (LANTUS) injection 45 Units  45 Units SubCUTAneous ACL    dilTIAZem (CARDIZEM) 100 mg in 0.9% sodium chloride (MBP/ADV) 100 mL infusion  0-15 mg/hr IntraVENous TITRATE    saline peripheral flush soln 10 mL  10 mL InterCATHeter TID    albuterol-ipratropium (DUO-NEB) 2.5 MG-0.5 MG/3 ML  3 mL Nebulization Q6H RT    insulin lispro (HUMALOG) injection   SubCUTAneous Q6H    ticagrelor (BRILINTA) tablet 90 mg  90 mg Oral BID    chlorhexidine (PERIDEX) 0.12 % mouthwash 15 mL  15 mL Oral Q12H    mupirocin (BACTROBAN) 2 % ointment   Both Nostrils BID                Labs:  Recent Labs      06/29/18   0413  06/28/18   0323  06/27/18   1215   WBC  16.1*  18.9*  10.6   HGB  13.3  14.3  15.9   HCT  40.9  42.2  46.3   PLT  157  201  187     Recent Labs 06/30/18   7181  06/29/18   0413  06/28/18   0323  06/27/18   1215   NA  137  141  139  135*   K  3.7  3.7  3.9  3.5   CL  99  102  104  99   CO2  28  27  23  26   GLU  265*  276*  313*  250*   BUN  31*  27*  24*  20   CREA  1.03  1.36*  1.44*  1.09   CA  8.8  8.9  8.0*  9.3   MG   --    --   1.7   --    PHOS   --    --   3.9   --    ALB  2.8*  3.1*  3.2*  3.8   TBILI  1.1*  0.8  1.1*  0.7   SGOT  75*  247*  1152*  95*   ALT  47  68  96*  49   INR   --    --    --   1.2*     Recent Labs      06/29/18   0505  06/28/18   0525  06/27/18   1946   PH  7.42  7.42  7.30*   PCO2  39  30*  47*   PO2  60*  233*  198*   HCO3  25  19*  23   FIO2  50  80  100       Imaging:  I have personally reviewed the patients radiographs and have reviewed the reports:  CXR: chronic elevation right hemidiaphragm, edema better        Total critical care time exclusive of procedures:  minutes  Sudhakar Adams MD

## 2018-07-01 LAB
ANION GAP SERPL CALC-SCNC: 10 MMOL/L (ref 5–15)
BUN SERPL-MCNC: 33 MG/DL (ref 6–20)
BUN/CREAT SERPL: 40 (ref 12–20)
CALCIUM SERPL-MCNC: 8.9 MG/DL (ref 8.5–10.1)
CHLORIDE SERPL-SCNC: 99 MMOL/L (ref 97–108)
CO2 SERPL-SCNC: 28 MMOL/L (ref 21–32)
CREAT SERPL-MCNC: 0.82 MG/DL (ref 0.7–1.3)
GLUCOSE BLD STRIP.AUTO-MCNC: 132 MG/DL (ref 65–100)
GLUCOSE BLD STRIP.AUTO-MCNC: 179 MG/DL (ref 65–100)
GLUCOSE BLD STRIP.AUTO-MCNC: 212 MG/DL (ref 65–100)
GLUCOSE BLD STRIP.AUTO-MCNC: 249 MG/DL (ref 65–100)
GLUCOSE SERPL-MCNC: 130 MG/DL (ref 65–100)
POTASSIUM SERPL-SCNC: 3.1 MMOL/L (ref 3.5–5.1)
SERVICE CMNT-IMP: ABNORMAL
SODIUM SERPL-SCNC: 137 MMOL/L (ref 136–145)

## 2018-07-01 PROCEDURE — 74011250636 HC RX REV CODE- 250/636: Performed by: INTERNAL MEDICINE

## 2018-07-01 PROCEDURE — 74011250637 HC RX REV CODE- 250/637: Performed by: INTERNAL MEDICINE

## 2018-07-01 PROCEDURE — 74011000258 HC RX REV CODE- 258: Performed by: INTERNAL MEDICINE

## 2018-07-01 PROCEDURE — 82962 GLUCOSE BLOOD TEST: CPT

## 2018-07-01 PROCEDURE — 36415 COLL VENOUS BLD VENIPUNCTURE: CPT | Performed by: INTERNAL MEDICINE

## 2018-07-01 PROCEDURE — 74011636637 HC RX REV CODE- 636/637: Performed by: INTERNAL MEDICINE

## 2018-07-01 PROCEDURE — 65660000000 HC RM CCU STEPDOWN

## 2018-07-01 PROCEDURE — 80048 BASIC METABOLIC PNL TOTAL CA: CPT | Performed by: INTERNAL MEDICINE

## 2018-07-01 PROCEDURE — 74011000250 HC RX REV CODE- 250: Performed by: INTERNAL MEDICINE

## 2018-07-01 PROCEDURE — 77010033678 HC OXYGEN DAILY

## 2018-07-01 PROCEDURE — 94640 AIRWAY INHALATION TREATMENT: CPT

## 2018-07-01 RX ORDER — GUAIFENESIN 600 MG/1
600 TABLET, EXTENDED RELEASE ORAL EVERY 12 HOURS
Status: DISCONTINUED | OUTPATIENT
Start: 2018-07-01 | End: 2018-07-09

## 2018-07-01 RX ORDER — POTASSIUM CHLORIDE 20 MEQ/1
40 TABLET, EXTENDED RELEASE ORAL 2 TIMES DAILY
Status: COMPLETED | OUTPATIENT
Start: 2018-07-01 | End: 2018-07-02

## 2018-07-01 RX ORDER — INSULIN LISPRO 100 [IU]/ML
INJECTION, SOLUTION INTRAVENOUS; SUBCUTANEOUS
Status: DISCONTINUED | OUTPATIENT
Start: 2018-07-01 | End: 2018-07-09 | Stop reason: HOSPADM

## 2018-07-01 RX ADMIN — IPRATROPIUM BROMIDE AND ALBUTEROL SULFATE 3 ML: .5; 3 SOLUTION RESPIRATORY (INHALATION) at 14:24

## 2018-07-01 RX ADMIN — GUAIFENESIN 600 MG: 600 TABLET, EXTENDED RELEASE ORAL at 09:17

## 2018-07-01 RX ADMIN — CHLORHEXIDINE GLUCONATE 15 ML: 1.2 RINSE ORAL at 20:17

## 2018-07-01 RX ADMIN — POTASSIUM CHLORIDE 40 MEQ: 20 TABLET, EXTENDED RELEASE ORAL at 09:17

## 2018-07-01 RX ADMIN — GUAIFENESIN 600 MG: 600 TABLET, EXTENDED RELEASE ORAL at 20:15

## 2018-07-01 RX ADMIN — CHLORHEXIDINE GLUCONATE 15 ML: 1.2 RINSE ORAL at 09:19

## 2018-07-01 RX ADMIN — ASPIRIN 81 MG 81 MG: 81 TABLET ORAL at 09:17

## 2018-07-01 RX ADMIN — POTASSIUM CHLORIDE 40 MEQ: 20 TABLET, EXTENDED RELEASE ORAL at 17:24

## 2018-07-01 RX ADMIN — ATORVASTATIN CALCIUM 40 MG: 40 TABLET, FILM COATED ORAL at 09:17

## 2018-07-01 RX ADMIN — Medication 10 ML: at 09:53

## 2018-07-01 RX ADMIN — Medication 10 ML: at 17:31

## 2018-07-01 RX ADMIN — IPRATROPIUM BROMIDE AND ALBUTEROL SULFATE 3 ML: .5; 3 SOLUTION RESPIRATORY (INHALATION) at 20:38

## 2018-07-01 RX ADMIN — TICAGRELOR 90 MG: 90 TABLET ORAL at 09:17

## 2018-07-01 RX ADMIN — IPRATROPIUM BROMIDE AND ALBUTEROL SULFATE 3 ML: .5; 3 SOLUTION RESPIRATORY (INHALATION) at 07:04

## 2018-07-01 RX ADMIN — INSULIN LISPRO 2 UNITS: 100 INJECTION, SOLUTION INTRAVENOUS; SUBCUTANEOUS at 17:24

## 2018-07-01 RX ADMIN — INSULIN LISPRO 3 UNITS: 100 INJECTION, SOLUTION INTRAVENOUS; SUBCUTANEOUS at 11:44

## 2018-07-01 RX ADMIN — CARVEDILOL 3.12 MG: 3.12 TABLET, FILM COATED ORAL at 09:17

## 2018-07-01 RX ADMIN — FUROSEMIDE 80 MG: 10 INJECTION, SOLUTION INTRAMUSCULAR; INTRAVENOUS at 17:24

## 2018-07-01 RX ADMIN — CARVEDILOL 3.12 MG: 3.12 TABLET, FILM COATED ORAL at 17:24

## 2018-07-01 RX ADMIN — FUROSEMIDE 80 MG: 10 INJECTION, SOLUTION INTRAMUSCULAR; INTRAVENOUS at 09:18

## 2018-07-01 RX ADMIN — TICAGRELOR 90 MG: 90 TABLET ORAL at 17:24

## 2018-07-01 RX ADMIN — Medication 10 ML: at 22:00

## 2018-07-01 RX ADMIN — MUPIROCIN: 20 OINTMENT TOPICAL at 09:19

## 2018-07-01 RX ADMIN — IPRATROPIUM BROMIDE AND ALBUTEROL SULFATE 3 ML: .5; 3 SOLUTION RESPIRATORY (INHALATION) at 01:27

## 2018-07-01 RX ADMIN — MUPIROCIN: 20 OINTMENT TOPICAL at 20:17

## 2018-07-01 RX ADMIN — INSULIN GLARGINE 45 UNITS: 100 INJECTION, SOLUTION SUBCUTANEOUS at 11:44

## 2018-07-01 RX ADMIN — ENOXAPARIN SODIUM 40 MG: 100 INJECTION SUBCUTANEOUS at 09:21

## 2018-07-01 RX ADMIN — CEFTRIAXONE 1 G: 1 INJECTION, POWDER, FOR SOLUTION INTRAMUSCULAR; INTRAVENOUS at 09:18

## 2018-07-01 NOTE — PROGRESS NOTES
SHIFT SUMMARY:    1200: Patient arrived to floor. Vitals stable, oxygen saturation 97% on 4LNC. Family at bedside. 1315: Walked in patients room, patient had taken nasal canula off. Oxygen saturation 83% on room air. Patient placed back on 5L Nasal canula, encouraged to keep oxygen on. Patient stated it was irritating his nose. Humidifier placed. Saturation came up to 93% on 5L. Will continue to monitor and wean as tolerated. Primary Nurse Stone Hoffman and Obinna Green RN performed a dual skin assessment on this patient No impairment noted (Right groin incision site bruising noted. dressing dry and intact.)  David score is 21    1900: Bedside report given to oncrachid nurse. [facility] NURSING NOTE   Admission Date 6/27/2018   Admission Diagnosis ST elevation (STEMI) myocardial infarction [facility])  STEMI involving left circumflex coronary artery (HCC)   Consults None      Cardiac Monitoring [x] Yes [] No      Purposeful Hourly Rounding [x] Yes    Chastity Score Total Score: 1   Chastity score 3 or > [x] Bed Alarm [] Avasys [] 1:1 sitter [] Patient refused (Signed refusal form in chart)   David Score David Score: 21   David score 14 or < [] PMT consult [] Wound Care consult    []  Specialty bed  [] Nutrition consult      Influenza Vaccine Received Flu Vaccine for Current Season (usually Sept-March): Not Flu Season           Oxygen needs? [] Room air Oxygen @  []1L    []2L    []3L   [x]4L    []5L   []6L via  NC   Chronic home O2 use?  [] Yes [x] No  Perform O2 challenge test and document in progress note using smartphrase (.Homeoxygen)      Last bowel movement Last Bowel Movement Date: 07/01/18      Urinary Catheter [REMOVED] Urinary Catheter 06/27/18 2- way-Indications for Use: Accurate measurement of urinary output     [REMOVED] Urinary Catheter 06/27/18 2- way-Urine Output (mL): 100 ml     LDAs               Peripheral IV 06/27/18 Left Antecubital (Active)   Site Assessment Clean, dry, & intact 7/1/2018 12:30 PM   Phlebitis Assessment 0 7/1/2018 12:30 PM   Infiltration Assessment 0 7/1/2018 12:30 PM   Dressing Status Clean, dry, & intact 7/1/2018 12:30 PM   Dressing Type Tape;Transparent 7/1/2018 12:30 PM   Hub Color/Line Status Green;Flushed;Patent 7/1/2018 12:30 PM   Action Taken Open ports on tubing capped 7/1/2018  4:00 AM   Alcohol Cap Used No 7/1/2018  9:00 AM                         Readmission Risk Assessment Tool Score Low Risk            12       Total Score        3 Has Seen PCP in Last 6 Months (Yes=3, No=0)    2 . Living with Significant Other. Assisted Living. LTAC. SNF. or   Rehab    4 IP Visits Last 12 Months (1-3=4, 4=9, >4=11)    3 Charlson Comorbidity Score (Age + Comorbid Conditions)        Criteria that do not apply:    Patient Length of Stay (>5 days = 3)    Pt.  Coverage (Medicare=5 , Medicaid, or Self-Pay=4)       Expected Length of Stay 29d 0h   Actual Length of Stay 4

## 2018-07-01 NOTE — PROGRESS NOTES
Progress Note      7/1/2018 8:20 AM  NAME: Augusta Mosley   MRN:  [id number]   Admit Diagnosis: ST elevation (STEMI) myocardial infarction [facility])  STEMI involving left circumflex coronary artery [facility])                          Assessment:                 1. Chest pain initially NSTEMI, while at ER at [facility] [place] developed complete heart block, hypotension, with repeat ECG showing inferior STEMI, cardiogenic shock, acute systolic chf, acute kidney injury, acute liver failure  2. Cath 6/2018 with 90% prox LAD, 100% prox dominant LCx, Small disease non-dominant RCA. PCI with stent to OM, Bifurcation stent to prox LCx and OM, PCI with stent to prox LAD  3. Ischemic cardiomyopathy EF 35%, inferior hypokinesis  4. Sinus with transient complete heart block  5. HTN  6. DM  7. Dyslipidemia  8. Recent colitis with GI bleed s/p endoscopy with Dr. Robles Moreno  9. Hx of tonsillectomy  10. Hx of hernia repair  11. Hx of basal cell ca removal  12. Quit tobacco in '94  13. , wife is  for medical practice in [address] [place] [address], he works on boat, active                               Plan:                  Inferior STEMI secondary to LCx. OM, LCx bifurcation, LAD stented SHAGUFTA. Acute systolic chf EF 59%, improving with diuresis  Transient complete heart block, currently in sinus. Cardiogenic shock, impela and levophed weaned off. Hypoxemic resp failure, extubated today. Acute kidney injury will follow cr  Acute liver failure will follow lft     1. Cont Added aspirin  2. Cont Added brilinta  3. Levophed stopped Thursday night, consider toprol xl 12.5mg on Saturday AM   4. Holding losartan HCT for now, consider low dose losartan 12.5mg on sunday  5. Change lasix to 40mg daily  6. Add statin  7. Insulin sliding scale     Impella site healed. Extubated  6/29   Supportive care. Appreciate ICU help. 6/30   He has worsening SOB today. Up to 6 l/m on O2. Has to sit up on side of bed.  He did urinate today , had dose of PO lasix. Lungs have decreased BS 1/2 way up bilateral.  Few rales. VSS  Nausea today . Will put him on IV Lasix and see if that helps   CXR tomorrow. He is in NSR . Will d/c IV Diltiazem . Start on low dose Coreg. 7/1 He is feeling better, still some SOB though. Laying supine in bed which he could not do yesterday. O2 Sat 95% on nasal O2.  desats into 80s on room air. Dr Lamberto Tsai has started him on antibiotic empirically. WBC count is up   For CXR tomorrow. Tolerating  B Blocker. Sinus rhythm.                [x]        High complexity decision making was performed         Subjective:     Dmitriy Kos no chest pain, no dyspnea, actually feels ok. Discussed with RN events overnight. Review of Systems:    Symptom Y/N Comments  Symptom Y/N Comments   Fever/Chills N   Chest Pain     Poor Appetite N   Edema N    Cough N   Abdominal Pain N    Sputum N   Joint Pain N    SOB/MAGALLANES N   Pruritis/Rash N    Nausea/vomit N   Tolerating PT/OT     Diarrhea N   Tolerating Diet     Constipation N   Other       Could NOT obtain due to:      Objective:      Physical Exam:    Last 24hrs VS reviewed since prior progress note. Most recent are:    Visit Vitals    /80    Pulse 94    Temp 99.7 °F (37.6 °C)    Resp 27    Ht 5' 10\" (1.778 m)    SpO2 97%    BMI 34.01 kg/m2       Intake/Output Summary (Last 24 hours) at 07/01/18 1106  Last data filed at 07/01/18 0900   Gross per 24 hour   Intake           816.42 ml   Output             1400 ml   Net          -583.58 ml        General Appearance: Well developed, well nourished, able to follow commands, A + O x 3,    no acute distress. Ears/Nose/Mouth/Throat: Hearing grossly normal.  Neck: Supple. Chest: Lungs ronchi , decreased BS at bases. Cardiovascular: Regular rate and rhythm, S1S2 normal, no murmur. Abdomen: Soft, non-tender, bowel sounds are active. Extremities: No edema bilaterally. Skin: Warm and dry.     PMH/SH reviewed - no change compared to H&P    Data Review    Telemetry: normal sinus rhythm     Lab Data Personally Reviewed:    Recent Labs      06/29/18   0413   WBC  16.1*   HGB  13.3   HCT  40.9   PLT  157     No results for input(s): INR, PTP, APTT in the last 72 hours. No lab exists for component: Brtit Dickson   Recent Labs      07/01/18   0605  06/30/18   0634  06/29/18   0413   NA  137  137  141   K  3.1*  3.7  3.7   CL  99  99  102   CO2  28  28  27   BUN  33*  31*  27*   CREA  0.82  1.03  1.36*   GLU  130*  265*  276*   CA  8.9  8.8  8.9     No results for input(s): CPK, CKNDX, TROIQ in the last 72 hours.     No lab exists for component: CPKMB  Lab Results   Component Value Date/Time    Cholesterol, total 135 06/28/2018 03:23 AM    HDL Cholesterol 34 06/28/2018 03:23 AM    LDL, calculated 64.4 06/28/2018 03:23 AM    Triglyceride 183 (H) 06/28/2018 03:23 AM    CHOL/HDL Ratio 4.0 06/28/2018 03:23 AM       Recent Labs      06/30/18   0634  06/29/18   0413   SGOT  75*  247*   AP  61  64   TP  6.9  7.0   ALB  2.8*  3.1*   GLOB  4.1*  3.9     Recent Labs      06/29/18   0505   PH  7.42   PCO2  39   PO2  60*       Medications Personally Reviewed:    Current Facility-Administered Medications   Medication Dose Route Frequency    cefTRIAXone (ROCEPHIN) 1 g in 0.9% sodium chloride (MBP/ADV) 50 mL  1 g IntraVENous Q24H    insulin lispro (HUMALOG) injection   SubCUTAneous AC&HS    guaiFENesin ER (MUCINEX) tablet 600 mg  600 mg Oral Q12H    potassium chloride (K-DUR, KLOR-CON) SR tablet 40 mEq  40 mEq Oral BID    ondansetron (ZOFRAN) injection 4 mg  4 mg IntraVENous Q6H PRN    furosemide (LASIX) injection 80 mg  80 mg IntraVENous BID    carvedilol (COREG) tablet 3.125 mg  3.125 mg Oral BID WITH MEALS    aspirin chewable tablet 81 mg  81 mg Oral DAILY    enoxaparin (LOVENOX) injection 40 mg  40 mg SubCUTAneous Q24H    oxyCODONE IR (ROXICODONE) tablet 5 mg  5 mg Oral Q4H PRN    atorvastatin (LIPITOR) tablet 40 mg  40 mg Oral DAILY  insulin glargine (LANTUS) injection 45 Units  45 Units SubCUTAneous ACL    saline peripheral flush soln 10 mL  10 mL InterCATHeter TID    saline peripheral flush soln 5 mL  5 mL InterCATHeter PRN    albuterol-ipratropium (DUO-NEB) 2.5 MG-0.5 MG/3 ML  3 mL Nebulization Q6H RT    glucose chewable tablet 16 g  4 Tab Oral PRN    dextrose (D50W) injection syrg 12.5-25 g  12.5-25 g IntraVENous PRN    glucagon (GLUCAGEN) injection 1 mg  1 mg IntraMUSCular PRN    ticagrelor (BRILINTA) tablet 90 mg  90 mg Oral BID    chlorhexidine (PERIDEX) 0.12 % mouthwash 15 mL  15 mL Oral Q12H    mupirocin (BACTROBAN) 2 % ointment   Both Nostrils BID         Gena Dhillon MD

## 2018-07-01 NOTE — PROGRESS NOTES
Problem: Falls - Risk of  Goal: *Absence of Falls  Document Chastity Fall Risk and appropriate interventions in the flowsheet. Outcome: Progressing Towards Goal  Fall Risk Interventions:  Mobility Interventions: PT Consult for mobility concerns, Patient to call before getting OOB    Mentation Interventions: Door open when patient unattended, Evaluate medications/consider consulting pharmacy    Medication Interventions: Assess postural VS orthostatic hypotension, Bed/chair exit alarm, Evaluate medications/consider consulting pharmacy    Elimination Interventions: Call light in reach, Patient to call for help with toileting needs, Urinal in reach             Problem: Pressure Injury - Risk of  Goal: *Prevention of pressure injury  Document David Scale and appropriate interventions in the flowsheet.    Outcome: Progressing Towards Goal  Pressure Injury Interventions:  Sensory Interventions: Assess changes in LOC, Assess need for specialty bed, Keep linens dry and wrinkle-free, Minimize linen layers, Pressure redistribution bed/mattress (bed type)    Moisture Interventions: Absorbent underpads, Minimize layers    Activity Interventions: Assess need for specialty bed, Chair cushion, Increase time out of bed    Mobility Interventions: HOB 30 degrees or less, PT/OT evaluation    Nutrition Interventions: Document food/fluid/supplement intake, Discuss nutritional consult with provider, Offer support with meals,snacks and hydration    Friction and Shear Interventions: HOB 30 degrees or less, Lift sheet

## 2018-07-01 NOTE — PROGRESS NOTES
[facility]  Pulmonary, Critical Care, and Sleep Medicine    Name: Irasema Becker MRN: [id number]   : 1960 Hospital: [facility]   Date: 2018        IMPRESSION:   · Acute hypoxic/hypercapnic respiratory failure now off vent but dropping sats < 90% when trying to wean  · Acute inferior STEMI initially NSTEMI, while at ER at [facility] developed complete heart block, hypotension, with repeat ECG showing inferior STEMI  · Cath 2018 with 90% prox LAD, 100% prox dominant LCx, Small disease non-dominant RCA. PCI with stent to OM, Bifurcation stent to prox LCx and OM, PCI with stent to prox LAD  · Ischemic cardiomyopathy EF 35%, inferior hypokinesis  · Leukocytosis  · Acute bronchitis and at risk for penumonia  · Sinus with transient complete heart block  · Acute pulmonary edema  · Cardiogenic shock  · DM  · Asthma, no details available, without exacerbation  · H/O tobacco use  · Acute renal failure      PLAN:   · Mobilize   · outpt sleep study? · Empiric abx  · CXR in AM  · Try to wean off O2 as long as sats > 90%  · Bronchodilators  · May transfer to floor  · Diuresis per cardiology  · CBC in AM hope WBC drops  · Creatinine better  · Insulin/glycemic control  · Pain control  · DVT/GI prophylaxis      Subjective/Interval History:   I have reviewed the flowsheet and previous days notes. Dark bloody sputum. Eli Finner. No fever. Unable ot wean off O2. On 4 LPm and when sleeping needs more    Review of Systems   Unable to perform ROS: Other   HENT: Negative for congestion. Respiratory: Positive for cough. Negative for chest tightness and shortness of breath. Cardiovascular: Negative for chest pain. Gastrointestinal: Negative for abdominal pain, diarrhea and nausea. Neurological: Negative for dizziness and headaches.      Objective:   Vital Signs:    Visit Vitals    /86    Pulse 90    Temp 98.6 °F (37 °C)    Resp 27    Ht 5' 10\" (1.778 m)    SpO2 96%    BMI 34.01 kg/m2       O2 Device: Nasal cannula   O2 Flow Rate (L/min): 4 l/min   Temp (24hrs), Av.3 °F (36.8 °C), Min:98.2 °F (36.8 °C), Max:98.6 °F (37 °C)       Intake/Output:   Last shift:         Last 3 shifts:  1901 -  0700  In: 1405.8 [P.O.:1200; I.V.:205.8]  Out: 1900 [Urine:1900]    Intake/Output Summary (Last 24 hours) at 18 0830  Last data filed at 18 1730   Gross per 24 hour   Intake           771.42 ml   Output             1250 ml   Net          -478.58 ml     Hemodynamics:   PAP:   CO:     Wedge:   CI:     CVP:    SVR:       PVR:       Ventilator Settings:  Mode Rate Tidal Volume Pressure FiO2 PEEP   CPAP   500 ml  5 cm H2O 50 % 6 cm H20     Peak airway pressure: 12 cm H2O    Minute ventilation: 8.8 l/min       Physical Exam   Constitutional: Nasal cannula in place. HENT:   Head: Normocephalic and atraumatic. Mouth/Throat: No oropharyngeal exudate. Eyes: No scleral icterus. Cardiovascular: Normal rate and regular rhythm. No murmur heard. Pulmonary/Chest: He has no wheezes. He has rales. Abdominal: Soft. Bowel sounds are normal. He exhibits no distension. There is no tenderness. Musculoskeletal: He exhibits no edema. Neurological: He is alert. Skin: Skin is warm and dry.      Data:     Current Facility-Administered Medications   Medication Dose Route Frequency    cefTRIAXone (ROCEPHIN) 1 g in 0.9% sodium chloride (MBP/ADV) 50 mL  1 g IntraVENous Q24H    furosemide (LASIX) injection 80 mg  80 mg IntraVENous BID    carvedilol (COREG) tablet 3.125 mg  3.125 mg Oral BID WITH MEALS    aspirin chewable tablet 81 mg  81 mg Oral DAILY    enoxaparin (LOVENOX) injection 40 mg  40 mg SubCUTAneous Q24H    atorvastatin (LIPITOR) tablet 40 mg  40 mg Oral DAILY    insulin glargine (LANTUS) injection 45 Units  45 Units SubCUTAneous ACL    saline peripheral flush soln 10 mL  10 mL InterCATHeter TID    albuterol-ipratropium (DUO-NEB) 2.5 MG-0.5 MG/3 ML  3 mL Nebulization Q6H RT    insulin lispro (HUMALOG) injection   SubCUTAneous Q6H    ticagrelor (BRILINTA) tablet 90 mg  90 mg Oral BID    chlorhexidine (PERIDEX) 0.12 % mouthwash 15 mL  15 mL Oral Q12H    mupirocin (BACTROBAN) 2 % ointment   Both Nostrils BID                Labs:  Recent Labs      06/29/18   0413   WBC  16.1*   HGB  13.3   HCT  40.9   PLT  157     Recent Labs      07/01/18   0605  06/30/18   0634  06/29/18   0413   NA  137  137  141   K  3.1*  3.7  3.7   CL  99  99  102   CO2  28  28  27   GLU  130*  265*  276*   BUN  33*  31*  27*   CREA  0.82  1.03  1.36*   CA  8.9  8.8  8.9   ALB   --   2.8*  3.1*   TBILI   --   1.1*  0.8   SGOT   --   75*  247*   ALT   --   47  68     Recent Labs      06/29/18   0505   PH  7.42   PCO2  39   PO2  60*   HCO3  25   FIO2  50       Imaging:  I have personally reviewed the patients radiographs and have reviewed the reports:  None today        Total critical care time exclusive of procedures:  minutes  Estela Mason MD

## 2018-07-01 NOTE — PROGRESS NOTES
0700: Received bedside and verbal shift report from [facility].    0900: Assessment complete, see flowsheets for details; NSR, on 4L NC, lungs course with rhonchi, patient independent with using incentive spirometer, temp 99.7 orally, no complaints of pain, vitals stable. TRANSFER - OUT REPORT:    Verbal report given to Eric Ngo RN (name) on Paula Proffer  being transferred to [facility] (unit) for routine progression of care       Report consisted of patients Situation, Background, Assessment and   Recommendations(SBAR). Information from the following report(s) SBAR, ED Summary, Procedure Summary, Intake/Output, Recent Results and Cardiac Rhythm NSR was reviewed with the receiving nurse. Lines:   Peripheral IV 06/27/18 Left Antecubital (Active)   Site Assessment Clean, dry, & intact 7/1/2018  9:00 AM   Phlebitis Assessment 0 7/1/2018  9:00 AM   Infiltration Assessment 0 7/1/2018  9:00 AM   Dressing Status Clean, dry, & intact 7/1/2018  9:00 AM   Dressing Type Transparent;Tape 7/1/2018  9:00 AM   Hub Color/Line Status Green;Flushed; Infusing 7/1/2018  9:00 AM   Action Taken Open ports on tubing capped 7/1/2018  4:00 AM   Alcohol Cap Used No 7/1/2018  9:00 AM        Opportunity for questions and clarification was provided.       Patient transported with:   Monitor  O2 @ 4 liters  Patient-specific medications from Pharmacy  Registered Nurse  Tech

## 2018-07-01 NOTE — PROGRESS NOTES
TRANSFER - IN REPORT:    Verbal report received from PARTH Machado(name) on Janice Sexton  being received from CCU(unit) for routine progression of care      Report consisted of patients Situation, Background, Assessment and   Recommendations(SBAR). Information from the following report(s) SBAR, Kardex, Intake/Output, MAR, Recent Results and Cardiac Rhythm NSR was reviewed with the receiving nurse. Opportunity for questions and clarification was provided. Assessment completed upon patients arrival to unit and care assumed.

## 2018-07-02 LAB
ANION GAP SERPL CALC-SCNC: 11 MMOL/L (ref 5–15)
BUN SERPL-MCNC: 30 MG/DL (ref 6–20)
BUN/CREAT SERPL: 33 (ref 12–20)
CALCIUM SERPL-MCNC: 8.5 MG/DL (ref 8.5–10.1)
CHLORIDE SERPL-SCNC: 98 MMOL/L (ref 97–108)
CO2 SERPL-SCNC: 29 MMOL/L (ref 21–32)
CREAT SERPL-MCNC: 0.9 MG/DL (ref 0.7–1.3)
ERYTHROCYTE [DISTWIDTH] IN BLOOD BY AUTOMATED COUNT: 13.2 % (ref 11.5–14.5)
GLUCOSE BLD STRIP.AUTO-MCNC: 126 MG/DL (ref 65–100)
GLUCOSE BLD STRIP.AUTO-MCNC: 148 MG/DL (ref 65–100)
GLUCOSE BLD STRIP.AUTO-MCNC: 179 MG/DL (ref 65–100)
GLUCOSE BLD STRIP.AUTO-MCNC: 211 MG/DL (ref 65–100)
GLUCOSE SERPL-MCNC: 118 MG/DL (ref 65–100)
HCT VFR BLD AUTO: 33.6 % (ref 36.6–50.3)
HGB BLD-MCNC: 11.2 G/DL (ref 12.1–17)
MCH RBC QN AUTO: 28.9 PG (ref 26–34)
MCHC RBC AUTO-ENTMCNC: 33.3 G/DL (ref 30–36.5)
MCV RBC AUTO: 86.6 FL (ref 80–99)
NRBC # BLD: 0 K/UL (ref 0–0.01)
NRBC BLD-RTO: 0 PER 100 WBC
PLATELET # BLD AUTO: 249 K/UL (ref 150–400)
PMV BLD AUTO: 9.5 FL (ref 8.9–12.9)
POTASSIUM SERPL-SCNC: 3.1 MMOL/L (ref 3.5–5.1)
RBC # BLD AUTO: 3.88 M/UL (ref 4.1–5.7)
SERVICE CMNT-IMP: ABNORMAL
SODIUM SERPL-SCNC: 138 MMOL/L (ref 136–145)
WBC # BLD AUTO: 13.7 K/UL (ref 4.1–11.1)

## 2018-07-02 PROCEDURE — 74011250636 HC RX REV CODE- 250/636: Performed by: INTERNAL MEDICINE

## 2018-07-02 PROCEDURE — 74011000250 HC RX REV CODE- 250: Performed by: INTERNAL MEDICINE

## 2018-07-02 PROCEDURE — 74011636637 HC RX REV CODE- 636/637: Performed by: INTERNAL MEDICINE

## 2018-07-02 PROCEDURE — 74011250637 HC RX REV CODE- 250/637: Performed by: INTERNAL MEDICINE

## 2018-07-02 PROCEDURE — 80048 BASIC METABOLIC PNL TOTAL CA: CPT | Performed by: INTERNAL MEDICINE

## 2018-07-02 PROCEDURE — 76937 US GUIDE VASCULAR ACCESS: CPT

## 2018-07-02 PROCEDURE — 97116 GAIT TRAINING THERAPY: CPT

## 2018-07-02 PROCEDURE — 94640 AIRWAY INHALATION TREATMENT: CPT

## 2018-07-02 PROCEDURE — 97535 SELF CARE MNGMENT TRAINING: CPT

## 2018-07-02 PROCEDURE — 65660000000 HC RM CCU STEPDOWN

## 2018-07-02 PROCEDURE — 36415 COLL VENOUS BLD VENIPUNCTURE: CPT | Performed by: INTERNAL MEDICINE

## 2018-07-02 PROCEDURE — 74011000258 HC RX REV CODE- 258: Performed by: INTERNAL MEDICINE

## 2018-07-02 PROCEDURE — 82962 GLUCOSE BLOOD TEST: CPT

## 2018-07-02 PROCEDURE — 77010033678 HC OXYGEN DAILY

## 2018-07-02 PROCEDURE — 85027 COMPLETE CBC AUTOMATED: CPT | Performed by: INTERNAL MEDICINE

## 2018-07-02 RX ORDER — INSULIN GLARGINE 100 [IU]/ML
25 INJECTION, SOLUTION SUBCUTANEOUS
Status: DISCONTINUED | OUTPATIENT
Start: 2018-07-02 | End: 2018-07-05

## 2018-07-02 RX ORDER — POTASSIUM CHLORIDE 750 MG/1
20 TABLET, FILM COATED, EXTENDED RELEASE ORAL 2 TIMES DAILY
Status: DISCONTINUED | OUTPATIENT
Start: 2018-07-02 | End: 2018-07-06

## 2018-07-02 RX ORDER — SPIRONOLACTONE 25 MG/1
12.5 TABLET ORAL DAILY
Status: DISCONTINUED | OUTPATIENT
Start: 2018-07-02 | End: 2018-07-03

## 2018-07-02 RX ORDER — IPRATROPIUM BROMIDE AND ALBUTEROL SULFATE 2.5; .5 MG/3ML; MG/3ML
3 SOLUTION RESPIRATORY (INHALATION)
Status: DISCONTINUED | OUTPATIENT
Start: 2018-07-02 | End: 2018-07-03

## 2018-07-02 RX ORDER — SODIUM CHLORIDE 0.9 % (FLUSH) 0.9 %
SYRINGE (ML) INJECTION
Status: COMPLETED
Start: 2018-07-02 | End: 2018-07-02

## 2018-07-02 RX ORDER — GLIMEPIRIDE 1 MG/1
1 TABLET ORAL
Status: DISCONTINUED | OUTPATIENT
Start: 2018-07-02 | End: 2018-07-09 | Stop reason: HOSPADM

## 2018-07-02 RX ORDER — ACETAMINOPHEN 325 MG/1
650 TABLET ORAL
Status: DISCONTINUED | OUTPATIENT
Start: 2018-07-02 | End: 2018-07-09 | Stop reason: HOSPADM

## 2018-07-02 RX ORDER — FAMOTIDINE 20 MG/1
20 TABLET, FILM COATED ORAL 2 TIMES DAILY
Status: DISCONTINUED | OUTPATIENT
Start: 2018-07-02 | End: 2018-07-09

## 2018-07-02 RX ADMIN — Medication 10 ML: at 09:50

## 2018-07-02 RX ADMIN — IPRATROPIUM BROMIDE AND ALBUTEROL SULFATE 3 ML: .5; 3 SOLUTION RESPIRATORY (INHALATION) at 07:56

## 2018-07-02 RX ADMIN — INSULIN LISPRO 2 UNITS: 100 INJECTION, SOLUTION INTRAVENOUS; SUBCUTANEOUS at 21:19

## 2018-07-02 RX ADMIN — CHLORHEXIDINE GLUCONATE 15 ML: 1.2 RINSE ORAL at 21:20

## 2018-07-02 RX ADMIN — IPRATROPIUM BROMIDE AND ALBUTEROL SULFATE 3 ML: .5; 3 SOLUTION RESPIRATORY (INHALATION) at 19:50

## 2018-07-02 RX ADMIN — CHLORHEXIDINE GLUCONATE 15 ML: 1.2 RINSE ORAL at 09:49

## 2018-07-02 RX ADMIN — CARVEDILOL 3.12 MG: 3.12 TABLET, FILM COATED ORAL at 17:46

## 2018-07-02 RX ADMIN — ASPIRIN 81 MG 81 MG: 81 TABLET ORAL at 09:47

## 2018-07-02 RX ADMIN — TICAGRELOR 90 MG: 90 TABLET ORAL at 09:48

## 2018-07-02 RX ADMIN — FUROSEMIDE 80 MG: 10 INJECTION, SOLUTION INTRAMUSCULAR; INTRAVENOUS at 09:48

## 2018-07-02 RX ADMIN — CEFTRIAXONE 1 G: 1 INJECTION, POWDER, FOR SOLUTION INTRAMUSCULAR; INTRAVENOUS at 09:48

## 2018-07-02 RX ADMIN — FAMOTIDINE 20 MG: 20 TABLET, FILM COATED ORAL at 09:46

## 2018-07-02 RX ADMIN — MUPIROCIN: 20 OINTMENT TOPICAL at 09:50

## 2018-07-02 RX ADMIN — FAMOTIDINE 20 MG: 20 TABLET, FILM COATED ORAL at 17:46

## 2018-07-02 RX ADMIN — FUROSEMIDE 80 MG: 10 INJECTION, SOLUTION INTRAMUSCULAR; INTRAVENOUS at 17:46

## 2018-07-02 RX ADMIN — CARVEDILOL 3.12 MG: 3.12 TABLET, FILM COATED ORAL at 09:47

## 2018-07-02 RX ADMIN — Medication 10 ML: at 21:20

## 2018-07-02 RX ADMIN — INSULIN LISPRO 2 UNITS: 100 INJECTION, SOLUTION INTRAVENOUS; SUBCUTANEOUS at 17:45

## 2018-07-02 RX ADMIN — Medication 10 ML: at 17:55

## 2018-07-02 RX ADMIN — GUAIFENESIN 600 MG: 600 TABLET, EXTENDED RELEASE ORAL at 21:19

## 2018-07-02 RX ADMIN — ENOXAPARIN SODIUM 40 MG: 100 INJECTION SUBCUTANEOUS at 09:48

## 2018-07-02 RX ADMIN — INSULIN GLARGINE 25 UNITS: 100 INJECTION, SOLUTION SUBCUTANEOUS at 11:45

## 2018-07-02 RX ADMIN — SPIRONOLACTONE 12.5 MG: 25 TABLET, FILM COATED ORAL at 09:46

## 2018-07-02 RX ADMIN — POTASSIUM CHLORIDE 20 MEQ: 750 TABLET, EXTENDED RELEASE ORAL at 17:46

## 2018-07-02 RX ADMIN — TICAGRELOR 90 MG: 90 TABLET ORAL at 17:46

## 2018-07-02 RX ADMIN — INSULIN LISPRO 2 UNITS: 100 INJECTION, SOLUTION INTRAVENOUS; SUBCUTANEOUS at 11:44

## 2018-07-02 RX ADMIN — GUAIFENESIN 600 MG: 600 TABLET, EXTENDED RELEASE ORAL at 09:48

## 2018-07-02 RX ADMIN — POTASSIUM CHLORIDE 40 MEQ: 20 TABLET, EXTENDED RELEASE ORAL at 09:47

## 2018-07-02 RX ADMIN — GLIMEPIRIDE 1 MG: 1 TABLET ORAL at 17:46

## 2018-07-02 RX ADMIN — GLIMEPIRIDE 1 MG: 1 TABLET ORAL at 09:47

## 2018-07-02 RX ADMIN — Medication: at 12:00

## 2018-07-02 RX ADMIN — IPRATROPIUM BROMIDE AND ALBUTEROL SULFATE 3 ML: .5; 3 SOLUTION RESPIRATORY (INHALATION) at 00:30

## 2018-07-02 RX ADMIN — ATORVASTATIN CALCIUM 40 MG: 40 TABLET, FILM COATED ORAL at 09:46

## 2018-07-02 NOTE — PROGRESS NOTES
1900 Received report from PARTH Kumar. Assumed patient care. 0410 Pt complaining of fullness in his stomach that he says \"feels like gas that's moving around\" and SOB when laying down. Pt VSS and has no complaints of SOB when sitting up. O2 sat 98% on room air. Contacted on call physician. Dr Sharri Pritchett gave telephone order for a STAT CXR and instructed me to administer pts 0900 dose of Lasix 80mg IV after the cxr. Orders placed and pt aware. Pt sitting in bed with call bell in reach. Pt wife at bedside. 0665 Pt converted from NSR to AFib with RVR. HR low 100's to low 1 teens. Pt has no complaints of SOB or CP. Stat EKG done. VSS. Contacted on call physician. Dr Sharri Pritchett made aware of rhythm change. No new orders received. Will continue to monitor. Bedside shift change report given to Yesica Bright RN (oncoming nurse) by Haris Fuller RN (offgoing nurse). Report included the following information SBAR, Kardex, Intake/Output, MAR, Recent Results and Cardiac Rhythm NSR.

## 2018-07-02 NOTE — PROGRESS NOTES
ADULT PROTOCOL: JET AEROSOL  REASSESSMENT    Patient  Kwame Bethea     59 y.o.   male     7/2/2018  10:59 AM    Breath Sounds Pre Procedure: Right Breath Sounds: Clear                               Left Breath Sounds: Clear    Breath Sounds Post Procedure: Right Breath Sounds: Clear                                 Left Breath Sounds: Clear    Breathing pattern: Pre procedure Breathing Pattern: Regular          Post procedure Breathing Pattern: Regular    Heart Rate: Pre procedure Pulse: 81           Post procedure Pulse: 82    Resp Rate: Pre procedure Respirations: 18           Post procedure Respirations: 20      Cough: Pre procedure Cough: Non-productive               Post procedure Cough: Non-productive      Oxygen: O2 Device: Nasal cannula   Flow rate (L/min) 2     Changed: NO    SpO2: Pre procedure SpO2: 96 %   with oxygen              Post procedure SpO2: 96 %  with oxygen    Nebulizer Therapy: Current medications Aerosolized Medications: DuoNeb      Changed: YES    Smoking History: former smoker    Problem List:   Patient Active Problem List   Diagnosis Code    Dehydration E86.0    Diarrhea R19.7    Vomiting R11.10    Viral gastritis K29.70    Acute kidney injury ([facility] N17.9    ST elevation (STEMI) myocardial infarction ([facility] I21.3    STEMI involving left circumflex coronary artery ([facility] I21.21       Respiratory Therapist: [place]ley [place], RT

## 2018-07-02 NOTE — PROGRESS NOTES
Progress Note      7/2/2018 8:20 AM  NAME: Irasema Becker   MRN:  [id number]   Admit Diagnosis: ST elevation (STEMI) myocardial infarction [facility])  STEMI involving left circumflex coronary artery [facility])                          Assessment:                 1. Chest pain initially NSTEMI, while at ER at [facility] developed complete heart block, hypotension, with repeat ECG showing inferior STEMI, cardiogenic shock, acute systolic chf, acute kidney injury, acute liver failure  2. Cath 6/2018 with 90% prox LAD, 100% prox dominant LCx, Small disease non-dominant RCA. PCI with stent to OM, Bifurcation stent to prox LCx and OM, PCI with stent to prox LAD  3. Ischemic cardiomyopathy EF 35%, inferior hypokinesis  4. Sinus with transient complete heart block, transient PAF  5. HTN  6. DM  7. Dyslipidemia  8. Recent colitis with GI bleed s/p endoscopy with Dr. Jose Landin  9. Hx of tonsillectomy  10. Hx of hernia repair  11. Hx of basal cell ca removal  12. Quit tobacco in '94  13. , wife is  for medical practice in [address] [place] [address] [employer], he works on boat, active                               Plan:                  Inferior STEMI secondary to LCx. OM, LCx bifurcation, LAD stented SHAGUFTA. Acute systolic chf EF 07%, slowly improving with diuresis  Transient complete heart block, transient PAF currently in sinus. Cardiogenic shock, impela and levophed weaned off. Hypoxemic resp failure, extubated, still with some volume overload. Acute kidney injury improved  Acute liver failure improved  Possible pneumonia/bronchitis on emperic Abx  Mild decrease in Hg dilutional, no hematoma, no signs of bleeding at this time.     1. Cont Added aspirin  2. Cont Added brilinta  3. Cont coreg 3.125mg po bid  4. Holding losartan HCT for now, consider low dose losartan 12.5mg soon  5. Cont lasix 80mg iv bid, follow bmp  6. Cont KCL  7. Add sprionolactone 12.5mg daily  8. Cont added statin  9.  Restart glimeperide, decrease lantus, holding trulicity and metformin, Insulin sliding scale     Impella site healed. On 3L O2, taper as tolerates  PT/OT, needs to ambulate         [x]        High complexity decision making was performed         Subjective:     Chencho Nuno no chest pain, mild dyspnea, cough improving  Discussed with RN events overnight. Review of Systems:    Symptom Y/N Comments  Symptom Y/N Comments   Fever/Chills N   Chest Pain     Poor Appetite N   Edema N    Cough Y   Abdominal Pain N    Sputum Y   Joint Pain N    SOB/MAGALLANES Y   Pruritis/Rash N    Nausea/vomit N   Tolerating PT/OT     Diarrhea N   Tolerating Diet     Constipation N   Other       Could NOT obtain due to:      Objective:      Physical Exam:    Last 24hrs VS reviewed since prior progress note. Most recent are:    Visit Vitals    /76 (BP 1 Location: Right arm, BP Patient Position: Sitting)    Pulse 82    Temp 97.9 °F (36.6 °C)    Resp 20    Ht 5' 10\" (1.778 m)    Wt 110.9 kg (244 lb 7.8 oz)    SpO2 96%    BMI 35.08 kg/m2       Intake/Output Summary (Last 24 hours) at 07/02/18 0835  Last data filed at 07/01/18 1734   Gross per 24 hour   Intake              675 ml   Output              150 ml   Net              525 ml        General Appearance: Well developed, well nourished, able to follow commands, A + O x 3,    no acute distress. Ears/Nose/Mouth/Throat: Hearing grossly normal.  Neck: Supple. Chest: Lungs clear to auscultation bilaterally. Cardiovascular: Regular rate and rhythm, S1S2 normal, no murmur. Abdomen: Soft, non-tender, bowel sounds are active. Extremities: +1 edema bilaterally, R> L  Skin: Warm and dry. PMH/SH reviewed - no change compared to H&P    Data Review    Telemetry: normal sinus rhythm     Lab Data Personally Reviewed:    Recent Labs      07/02/18   0259   WBC  13.7*   HGB  11.2*   HCT  33.6*   PLT  249     No results for input(s): INR, PTP, APTT in the last 72 hours.     No lab exists for component: INREXT, INREXT   Recent Labs 07/02/18   0259  07/01/18   0605  06/30/18   0634   NA  138  137  137   K  3.1*  3.1*  3.7   CL  98  99  99   CO2  29  28  28   BUN  30*  33*  31*   CREA  0.90  0.82  1.03   GLU  118*  130*  265*   CA  8.5  8.9  8.8     No results for input(s): CPK, CKNDX, TROIQ in the last 72 hours. No lab exists for component: CPKMB  Lab Results   Component Value Date/Time    Cholesterol, total 135 06/28/2018 03:23 AM    HDL Cholesterol 34 06/28/2018 03:23 AM    LDL, calculated 64.4 06/28/2018 03:23 AM    Triglyceride 183 (H) 06/28/2018 03:23 AM    CHOL/HDL Ratio 4.0 06/28/2018 03:23 AM       Recent Labs      06/30/18   0634   SGOT  75*   AP  61   TP  6.9   ALB  2.8*   GLOB  4.1*     No results for input(s): PH, PCO2, PO2 in the last 72 hours.     Medications Personally Reviewed:    Current Facility-Administered Medications   Medication Dose Route Frequency    spironolactone (ALDACTONE) tablet 12.5 mg  12.5 mg Oral DAILY    acetaminophen (TYLENOL) tablet 650 mg  650 mg Oral Q6H PRN    famotidine (PEPCID) tablet 20 mg  20 mg Oral BID    potassium chloride SR (KLOR-CON 10) tablet 20 mEq  20 mEq Oral BID    glimepiride (AMARYL) tablet 1 mg  1 mg Oral ACB&D    insulin glargine (LANTUS) injection 25 Units  25 Units SubCUTAneous ACL    cefTRIAXone (ROCEPHIN) 1 g in 0.9% sodium chloride (MBP/ADV) 50 mL  1 g IntraVENous Q24H    insulin lispro (HUMALOG) injection   SubCUTAneous AC&HS    guaiFENesin ER (MUCINEX) tablet 600 mg  600 mg Oral Q12H    potassium chloride (K-DUR, KLOR-CON) SR tablet 40 mEq  40 mEq Oral BID    ondansetron (ZOFRAN) injection 4 mg  4 mg IntraVENous Q6H PRN    furosemide (LASIX) injection 80 mg  80 mg IntraVENous BID    carvedilol (COREG) tablet 3.125 mg  3.125 mg Oral BID WITH MEALS    aspirin chewable tablet 81 mg  81 mg Oral DAILY    enoxaparin (LOVENOX) injection 40 mg  40 mg SubCUTAneous Q24H    oxyCODONE IR (ROXICODONE) tablet 5 mg  5 mg Oral Q4H PRN    atorvastatin (LIPITOR) tablet 40 mg  40 mg Oral DAILY    saline peripheral flush soln 10 mL  10 mL InterCATHeter TID    saline peripheral flush soln 5 mL  5 mL InterCATHeter PRN    albuterol-ipratropium (DUO-NEB) 2.5 MG-0.5 MG/3 ML  3 mL Nebulization Q6H RT    glucose chewable tablet 16 g  4 Tab Oral PRN    dextrose (D50W) injection syrg 12.5-25 g  12.5-25 g IntraVENous PRN    glucagon (GLUCAGEN) injection 1 mg  1 mg IntraMUSCular PRN    ticagrelor (BRILINTA) tablet 90 mg  90 mg Oral BID    chlorhexidine (PERIDEX) 0.12 % mouthwash 15 mL  15 mL Oral Q12H    mupirocin (BACTROBAN) 2 % ointment   Both Nostrils BID         Yris Sorensen MD

## 2018-07-02 NOTE — PROGRESS NOTES
Dr. Mike Bright in to see patient. Patient has 1 plus edema right leg, trace in left. Crackles left lower lobe. And diminished in right.

## 2018-07-02 NOTE — PROGRESS NOTES
Bedside shift change report given to  (oncoming nurse) by [address] [place], [place] [address]way (offgoing nurse). Report included the following information SBAR, Kardex, ED Summary, Procedure Summary, Intake/Output, MAR and Recent Results.

## 2018-07-02 NOTE — PROGRESS NOTES
Problem: Mobility Impaired (Adult and Pediatric)  Goal: *Acute Goals and Plan of Care (Insert Text)  Physical Therapy Goals  Initiated 6/29/2018  1. Patient will move from supine to sit and sit to supine , scoot up and down and roll side to side in bed with independence within 7 day(s). 2.  Patient will transfer from bed to chair and chair to bed with independence using the least restrictive device within 7 day(s). 3.  Patient will perform sit to stand with independence within 7 day(s). 4.  Patient will ambulate with independence for 200 feet with the least restrictive device within 7 day(s). 5.  Patient will ascend/descend 5 stairs with no handrail(s) with independence within 7 day(s). physical Therapy TREATMENT  Patient: Chencho Nuno (66 y.o. male)  Date: 7/2/2018  Diagnosis: ST elevation (STEMI) myocardial infarction [facility])  STEMI involving left circumflex coronary artery ([facility] <principal problem not specified>       Precautions:    Chart, physical therapy assessment, plan of care and goals were reviewed. ASSESSMENT:  Pt cleared by nurse to mobilize. Pt received up in chair on 2LPM. Pts o2 stats at 97%. Pt agreeable to therapy. Pt performed sit to stand transfer at supervision. Pt ambulated 200ft at [facility] with HHA x1. Pt requiring cueing for PLB with activity . Pt able to perform. Pt using HHA to steady with gait. Pts stability improved with time. Pt ambulated 100ft on 1LPM. Pts o2 stats at 95%. Pt put on RA for last 100ft. Pts o2 stats at 92% on RA. Pt with mild SOB but >92% throughout session. Pt left on 1LPM for comfort. Pt moving well will continue to increase activity tolerance. Will perform stairs next session . Pt will benefit from OP cardiac rehab to endurance and activity tolerance.    Progression toward goals:  [x]    Improving appropriately and progressing toward goals  []    Improving slowly and progressing toward goals  []    Not making progress toward goals and plan of care will be adjusted     PLAN:  Patient continues to benefit from skilled intervention to address the above impairments. Continue treatment per established plan of care. Discharge Recommendations:  Outpatient Cardiac rehab  Further Equipment Recommendations for Discharge:  May need SPC (depending on progress)     SUBJECTIVE:   Patient stated I feel good .     OBJECTIVE DATA SUMMARY:   Critical Behavior:  Neurologic State: Alert  Orientation Level: Appropriate for age, Oriented X4  Cognition: Appropriate decision making  Safety/Judgement: Awareness of environment, Fall prevention, Home safety  Functional Mobility Training:  Transfers:  Sit to Stand: Supervision  Stand to Sit: Supervision                             Balance:  Sitting: Intact  Standing: Impaired  Standing - Static: Good;Constant support  Standing - Dynamic : Fair (to good)  Ambulation/Gait Training:  Distance (ft): 200 Feet (ft)  Assistive Device: Gait belt (HHA x1)  Ambulation - Level of Assistance: Contact guard assistance        Gait Abnormalities: Trunk sway increased        Base of Support: Widened     Speed/Maria Antonia: Pace decreased (<100 feet/min)  Step Length: Right shortened;Left shortened         Pain:  Pain Scale 1: Numeric (0 - 10)  Pain Intensity 1: 0              Activity Tolerance:   Pt moving well will continue to improve endurance.      After treatment:   [x]    Patient left in no apparent distress sitting up in chair  []    Patient left in no apparent distress in bed  [x]    Call bell left within reach  [x]    Nursing notified  []    Caregiver present  []    Bed alarm activated    COMMUNICATION/COLLABORATION:   The patients plan of care was discussed with: Registered Nurse    Chung Alex   Time Calculation: 11 mins

## 2018-07-02 NOTE — PROGRESS NOTES
Problem: Self Care Deficits Care Plan (Adult)  Goal: *Acute Goals and Plan of Care (Insert Text)  Occupational Therapy Goals:  Initiated 6/29/2018  1. Patient will perform grooming standing with independence within 7 days. 2. Patient will perform toileting with independence within 7 days. 3. Patient will perform dressing with independence within 7 days. 4. Patient will independent with energy conservation techniques within 7 days. 5. Patient will transfer from toilet with independence within 7 days. 6. Patient will perform one IADL task in standing with independence within 7 days. Occupational Therapy TREATMENT  Patient: Jaime Jean (37 y.o. male)  Date: 7/2/2018  Diagnosis: ST elevation (STEMI) myocardial infarction [facility])  STEMI involving left circumflex coronary artery ([facility] [address] <principal problem not specified>       Precautions:  Universal  Chart, occupational therapy assessment, plan of care, and goals were reviewed. ASSESSMENT:  Nursing cleared for therapy. Received on RA. Provided brief ed on ECT with pacing/planning and PLB with activity. Completed toileting transfers without AD with supervision, amb to front sink with supervision without AD and item retrieval with supervision with hi and low retrieval.  No LOB. Breif seated rest between each task. Spo2 on RA 87-96%. Left in chair. Patient making excellent progression with ADL tasks. May benefit from 1-2 additional OT sessions to maximize act tolerance and indep with ADL tsks. Do not anticipate any additional OT services at [facility]. Patient reports he has supportive wife. High level PTA, working as [employer]. Progression toward goals:  []       Improving appropriately and progressing toward goals  [x]       Improving slowly and progressing toward goals  []       Not making progress toward goals and plan of care will be adjusted     PLAN:  Patient continues to benefit from skilled intervention to address the above impairments. Continue treatment per established plan of care. Discharge Recommendations:  None  Further Equipment Recommendations for Discharge:  Do not anticipate any DME needs for OT     SUBJECTIVE:   Patient stated Its my first time without it.   (O2)    OBJECTIVE DATA SUMMARY:   Cognitive/Behavioral Status:  Neurologic State: Alert  Orientation Level: Appropriate for age;Oriented X4  Cognition: Appropriate decision making        Functional Mobility and Transfers for ADLs:  Bed Mobility:       Transfers:  Sit to Stand: Supervision  Functional Transfers  Toilet Transfer : Supervision       Balance:  Sitting: Intact  Standing: Intact  Standing - Static: Good  Standing - Dynamic : Good    ADL Intervention:   Completed amb to bathroom with supervision, no RW and no LOB. Brief seated rest on commode prior to amb to sink in front of room and complete hand hygiene. Brief seated rest prior to item retrieval completion through out room at multiple levels. Ed on PLB and posture to maximize efficient breathing. Breif ed on ECT including pacing and planning with good understanding. 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 Grooming  Washing Hands: Supervision/set-up    Pain:  Pain Scale 1: Numeric (0 - 10)  Pain Intensity 1: 0        Activity Tolerance:   Spo2 87-96% during session. When dropping 87% able to PLB within 1 min to return to 90s. Mild visible MAGALLANES, however patient denies.      After treatment:   [x] Patient left in no apparent distress sitting up in chair  [] Patient left in no apparent distress in bed  [x] Call bell left within reach  [x] Nursing notified  [] Caregiver present  [] Bed alarm activated    COMMUNICATION/COLLABORATION:   The patients plan of care was discussed with: Physical Therapist, Registered Nurse and patient    Lucila Eldridge OT  Time Calculation: 26 mins

## 2018-07-02 NOTE — PROGRESS NOTES
Cardiopulmonary Care Interdisciplinary rounds were held today to discuss patient's plan of care and outcomes. The following members were present: NP/Physician, Pharmacy, Nursing and Case Management.     Expected Length of Stay:  29d 0h    Plan of Care: Continue current treatment plan  1500mL fluid restriction, WBC elevated on ABX, weaning O2, working with PT - rec outpatient cardiac rehab

## 2018-07-02 NOTE — PROGRESS NOTES
Spiritual Care Assessment/Progress Note  [facility]      NAME: Kwame Bethea      MRN: [id number]  AGE: 59 y.o.  SEX: male  Voodoo Affiliation: Temple   Language: English     7/2/2018     Total Time (in minutes): 12     Spiritual Assessment begun in [facility] CARDIOPULMONARY CARE through conversation with:         [x]Patient        [] Family    [] Friend(s)        Reason for Consult: Initial/Spiritual assessment, patient floor     Spiritual beliefs: (Please include comment if needed)     [x] Identifies with a brayden tradition:         [] Supported by a brayden community:            [] Claims no spiritual orientation:           [] Seeking spiritual identity:                [] Adheres to an individual form of spirituality:           [] Not able to assess:                           Identified resources for coping:      [] Prayer                               [] Music                  [] Guided Imagery     [x] Family/friends                 [] Pet visits     [] Devotional reading                         [] Unknown     [] Other:                                               Interventions offered during this visit: (See comments for more details)    Patient Interventions: Affirmation of brayden, Affirmation of emotions/emotional suffering, Catharsis/review of pertinent events in supportive environment, Iconic (affirming the presence of God/Higher Power), Initial/Spiritual assessment, patient floor, Coping skills reviewed/reinforced, Normalization of emotional/spiritual concerns           Plan of Care:     [] Support spiritual and/or cultural needs    [] Support AMD and/or advance care planning process      [] Support grieving process   [] Coordinate Rites and/or Rituals    [] Coordination with community clergy   [] No spiritual needs identified at this time   [] Detailed Plan of Care below (See Comments)  [] Make referral to Music Therapy  [] Make referral to Pet Therapy     [] Make referral to Addiction services  [] Make referral to [facility]  [] Make referral to Spiritual Care Partner  [] No future visits requested        [x] Follow up visits as needed     Comments: Initial visit with patient [address] due to length of hospitalization. Introduced self and role of chaplains in the hospital. Provided active listening and facilitated processing of emotions as patient shared events leading up to his hospitalization. Patient's brayden in God was evident throughout his re-telling of the events as he shared that as a  he spends more time out on the water than docked and had his medical event occurred on the water he would have likely . Spent time talking about how he is emotionally coping with what happened and he shared that it he is trying to take each day as it comes and admitted to feeling shaken by how suddenly things have changed on top of additional stressors his family is trying to handle. Patient has strong family support and believes God is looking out for them as a whole. Advised of  availability and assured of ongoing support as needed and desired.  Misty Isaac M.Div.    Paging Service [phone])

## 2018-07-02 NOTE — PROGRESS NOTES
Problem: Falls - Risk of  Goal: *Absence of Falls  Document Chastity Fall Risk and appropriate interventions in the flowsheet.    Outcome: Progressing Towards Goal  Fall Risk Interventions:  Mobility Interventions: Patient to call before getting OOB    Mentation Interventions: Adequate sleep, hydration, pain control    Medication Interventions: Patient to call before getting OOB    Elimination Interventions: Call light in reach

## 2018-07-02 NOTE — PROGRESS NOTES
02 sat at rest on RA is 95%  02 sat ambulation dropped to 91%  02 sat while resting after ambulation 95%  No complaints of SOB and tolerated activity

## 2018-07-03 ENCOUNTER — APPOINTMENT (OUTPATIENT)
Dept: GENERAL RADIOLOGY | Age: 58
DRG: 215 | End: 2018-07-03
Attending: INTERNAL MEDICINE
Payer: COMMERCIAL

## 2018-07-03 ENCOUNTER — APPOINTMENT (OUTPATIENT)
Dept: ULTRASOUND IMAGING | Age: 58
DRG: 215 | End: 2018-07-03
Attending: INTERNAL MEDICINE
Payer: COMMERCIAL

## 2018-07-03 LAB
ANION GAP SERPL CALC-SCNC: 11 MMOL/L (ref 5–15)
ATRIAL RATE: 91 BPM
BUN SERPL-MCNC: 31 MG/DL (ref 6–20)
BUN/CREAT SERPL: 30 (ref 12–20)
CALCIUM SERPL-MCNC: 9.2 MG/DL (ref 8.5–10.1)
CALCULATED R AXIS, ECG10: -104 DEGREES
CALCULATED T AXIS, ECG11: 43 DEGREES
CHLORIDE SERPL-SCNC: 97 MMOL/L (ref 97–108)
CO2 SERPL-SCNC: 28 MMOL/L (ref 21–32)
CREAT SERPL-MCNC: 1.05 MG/DL (ref 0.7–1.3)
DIAGNOSIS, 93000: NORMAL
ERYTHROCYTE [DISTWIDTH] IN BLOOD BY AUTOMATED COUNT: 13.3 % (ref 11.5–14.5)
GLUCOSE BLD STRIP.AUTO-MCNC: 182 MG/DL (ref 65–100)
GLUCOSE BLD STRIP.AUTO-MCNC: 206 MG/DL (ref 65–100)
GLUCOSE BLD STRIP.AUTO-MCNC: 282 MG/DL (ref 65–100)
GLUCOSE BLD STRIP.AUTO-MCNC: 302 MG/DL (ref 65–100)
GLUCOSE SERPL-MCNC: 170 MG/DL (ref 65–100)
HCT VFR BLD AUTO: 39.4 % (ref 36.6–50.3)
HGB BLD-MCNC: 12.9 G/DL (ref 12.1–17)
MCH RBC QN AUTO: 28.6 PG (ref 26–34)
MCHC RBC AUTO-ENTMCNC: 32.7 G/DL (ref 30–36.5)
MCV RBC AUTO: 87.4 FL (ref 80–99)
NRBC # BLD: 0 K/UL (ref 0–0.01)
NRBC BLD-RTO: 0 PER 100 WBC
PLATELET # BLD AUTO: 284 K/UL (ref 150–400)
PMV BLD AUTO: 9.9 FL (ref 8.9–12.9)
POTASSIUM SERPL-SCNC: 3.2 MMOL/L (ref 3.5–5.1)
Q-T INTERVAL, ECG07: 370 MS
QRS DURATION, ECG06: 108 MS
QTC CALCULATION (BEZET), ECG08: 489 MS
RBC # BLD AUTO: 4.51 M/UL (ref 4.1–5.7)
SERVICE CMNT-IMP: ABNORMAL
SODIUM SERPL-SCNC: 136 MMOL/L (ref 136–145)
VENTRICULAR RATE, ECG03: 105 BPM
WBC # BLD AUTO: 12.3 K/UL (ref 4.1–11.1)

## 2018-07-03 PROCEDURE — 93005 ELECTROCARDIOGRAM TRACING: CPT

## 2018-07-03 PROCEDURE — 85027 COMPLETE CBC AUTOMATED: CPT | Performed by: INTERNAL MEDICINE

## 2018-07-03 PROCEDURE — 74011250636 HC RX REV CODE- 250/636: Performed by: INTERNAL MEDICINE

## 2018-07-03 PROCEDURE — 80048 BASIC METABOLIC PNL TOTAL CA: CPT | Performed by: INTERNAL MEDICINE

## 2018-07-03 PROCEDURE — 74011636637 HC RX REV CODE- 636/637: Performed by: INTERNAL MEDICINE

## 2018-07-03 PROCEDURE — 74011250637 HC RX REV CODE- 250/637: Performed by: INTERNAL MEDICINE

## 2018-07-03 PROCEDURE — 93970 EXTREMITY STUDY: CPT

## 2018-07-03 PROCEDURE — 93306 TTE W/DOPPLER COMPLETE: CPT

## 2018-07-03 PROCEDURE — 36415 COLL VENOUS BLD VENIPUNCTURE: CPT | Performed by: INTERNAL MEDICINE

## 2018-07-03 PROCEDURE — 82962 GLUCOSE BLOOD TEST: CPT

## 2018-07-03 PROCEDURE — 65660000000 HC RM CCU STEPDOWN

## 2018-07-03 PROCEDURE — 74011000250 HC RX REV CODE- 250: Performed by: INTERNAL MEDICINE

## 2018-07-03 PROCEDURE — 94640 AIRWAY INHALATION TREATMENT: CPT

## 2018-07-03 PROCEDURE — 74011000258 HC RX REV CODE- 258: Performed by: INTERNAL MEDICINE

## 2018-07-03 PROCEDURE — 71045 X-RAY EXAM CHEST 1 VIEW: CPT

## 2018-07-03 RX ORDER — SPIRONOLACTONE 25 MG/1
25 TABLET ORAL DAILY
Status: DISCONTINUED | OUTPATIENT
Start: 2018-07-04 | End: 2018-07-09 | Stop reason: HOSPADM

## 2018-07-03 RX ORDER — FUROSEMIDE 10 MG/ML
60 INJECTION INTRAMUSCULAR; INTRAVENOUS 2 TIMES DAILY
Status: DISCONTINUED | OUTPATIENT
Start: 2018-07-03 | End: 2018-07-03

## 2018-07-03 RX ORDER — AMIODARONE HYDROCHLORIDE 200 MG/1
200 TABLET ORAL DAILY
Status: DISCONTINUED | OUTPATIENT
Start: 2018-07-06 | End: 2018-07-09 | Stop reason: HOSPADM

## 2018-07-03 RX ORDER — FUROSEMIDE 10 MG/ML
80 INJECTION INTRAMUSCULAR; INTRAVENOUS 2 TIMES DAILY
Status: DISCONTINUED | OUTPATIENT
Start: 2018-07-03 | End: 2018-07-06

## 2018-07-03 RX ORDER — AMIODARONE HYDROCHLORIDE 200 MG/1
400 TABLET ORAL 3 TIMES DAILY
Status: COMPLETED | OUTPATIENT
Start: 2018-07-03 | End: 2018-07-05

## 2018-07-03 RX ORDER — MAGNESIUM SULFATE HEPTAHYDRATE 40 MG/ML
2 INJECTION, SOLUTION INTRAVENOUS ONCE
Status: COMPLETED | OUTPATIENT
Start: 2018-07-03 | End: 2018-07-03

## 2018-07-03 RX ORDER — POTASSIUM CHLORIDE 20 MEQ/1
40 TABLET, EXTENDED RELEASE ORAL
Status: COMPLETED | OUTPATIENT
Start: 2018-07-03 | End: 2018-07-03

## 2018-07-03 RX ADMIN — INSULIN LISPRO 3 UNITS: 100 INJECTION, SOLUTION INTRAVENOUS; SUBCUTANEOUS at 12:15

## 2018-07-03 RX ADMIN — MAGNESIUM SULFATE HEPTAHYDRATE 2 G: 40 INJECTION, SOLUTION INTRAVENOUS at 10:02

## 2018-07-03 RX ADMIN — AMIODARONE HYDROCHLORIDE 400 MG: 200 TABLET ORAL at 10:01

## 2018-07-03 RX ADMIN — ATORVASTATIN CALCIUM 40 MG: 40 TABLET, FILM COATED ORAL at 08:00

## 2018-07-03 RX ADMIN — TICAGRELOR 90 MG: 90 TABLET ORAL at 17:15

## 2018-07-03 RX ADMIN — INSULIN LISPRO 4 UNITS: 100 INJECTION, SOLUTION INTRAVENOUS; SUBCUTANEOUS at 22:13

## 2018-07-03 RX ADMIN — GLIMEPIRIDE 1 MG: 1 TABLET ORAL at 07:54

## 2018-07-03 RX ADMIN — AMIODARONE HYDROCHLORIDE 400 MG: 200 TABLET ORAL at 15:24

## 2018-07-03 RX ADMIN — CHLORHEXIDINE GLUCONATE 15 ML: 1.2 RINSE ORAL at 08:01

## 2018-07-03 RX ADMIN — FAMOTIDINE 20 MG: 20 TABLET, FILM COATED ORAL at 17:15

## 2018-07-03 RX ADMIN — FUROSEMIDE 80 MG: 10 INJECTION, SOLUTION INTRAMUSCULAR; INTRAVENOUS at 04:49

## 2018-07-03 RX ADMIN — AMIODARONE HYDROCHLORIDE 400 MG: 200 TABLET ORAL at 22:14

## 2018-07-03 RX ADMIN — ASPIRIN 81 MG 81 MG: 81 TABLET ORAL at 08:00

## 2018-07-03 RX ADMIN — CARVEDILOL 3.12 MG: 3.12 TABLET, FILM COATED ORAL at 07:01

## 2018-07-03 RX ADMIN — INSULIN LISPRO 5 UNITS: 100 INJECTION, SOLUTION INTRAVENOUS; SUBCUTANEOUS at 17:16

## 2018-07-03 RX ADMIN — CARVEDILOL 3.12 MG: 3.12 TABLET, FILM COATED ORAL at 17:15

## 2018-07-03 RX ADMIN — POTASSIUM CHLORIDE 20 MEQ: 750 TABLET, EXTENDED RELEASE ORAL at 08:00

## 2018-07-03 RX ADMIN — POTASSIUM CHLORIDE 20 MEQ: 750 TABLET, EXTENDED RELEASE ORAL at 17:15

## 2018-07-03 RX ADMIN — Medication 10 ML: at 22:14

## 2018-07-03 RX ADMIN — FUROSEMIDE 80 MG: 10 INJECTION, SOLUTION INTRAMUSCULAR; INTRAVENOUS at 17:15

## 2018-07-03 RX ADMIN — POTASSIUM CHLORIDE 40 MEQ: 1500 TABLET, EXTENDED RELEASE ORAL at 10:01

## 2018-07-03 RX ADMIN — INSULIN LISPRO 2 UNITS: 100 INJECTION, SOLUTION INTRAVENOUS; SUBCUTANEOUS at 07:53

## 2018-07-03 RX ADMIN — TICAGRELOR 90 MG: 90 TABLET ORAL at 08:01

## 2018-07-03 RX ADMIN — GUAIFENESIN 600 MG: 600 TABLET, EXTENDED RELEASE ORAL at 08:00

## 2018-07-03 RX ADMIN — SPIRONOLACTONE 12.5 MG: 25 TABLET, FILM COATED ORAL at 08:00

## 2018-07-03 RX ADMIN — GUAIFENESIN 600 MG: 600 TABLET, EXTENDED RELEASE ORAL at 22:14

## 2018-07-03 RX ADMIN — IPRATROPIUM BROMIDE AND ALBUTEROL SULFATE 3 ML: .5; 3 SOLUTION RESPIRATORY (INHALATION) at 07:07

## 2018-07-03 RX ADMIN — GLIMEPIRIDE 1 MG: 1 TABLET ORAL at 17:15

## 2018-07-03 RX ADMIN — ENOXAPARIN SODIUM 40 MG: 100 INJECTION SUBCUTANEOUS at 10:01

## 2018-07-03 RX ADMIN — Medication 10 ML: at 15:24

## 2018-07-03 RX ADMIN — Medication 10 ML: at 08:02

## 2018-07-03 RX ADMIN — CEFTRIAXONE 1 G: 1 INJECTION, POWDER, FOR SOLUTION INTRAMUSCULAR; INTRAVENOUS at 08:00

## 2018-07-03 RX ADMIN — FAMOTIDINE 20 MG: 20 TABLET, FILM COATED ORAL at 08:01

## 2018-07-03 RX ADMIN — INSULIN GLARGINE 25 UNITS: 100 INJECTION, SOLUTION SUBCUTANEOUS at 12:15

## 2018-07-03 NOTE — PROGRESS NOTES
Progress Note      7/3/2018 8:20 AM  NAME: Staci Hardy   MRN:  [id number]   Admit Diagnosis: ST elevation (STEMI) myocardial infarction [facility])  STEMI involving left circumflex coronary artery [facility])                          Assessment:                 1. Chest pain initially NSTEMI, while at ER at [facility] developed complete heart block, hypotension, with repeat ECG showing inferior STEMI, cardiogenic shock, acute systolic chf, acute kidney injury, acute liver failure  2. Cath 6/2018 with 90% prox LAD, 100% prox dominant LCx, Small disease non-dominant RCA. PCI with stent to OM, Bifurcation stent to prox LCx and OM, PCI with stent to prox LAD  3. Ischemic cardiomyopathy EF 35%, inferior hypokinesis, Echo 7/2018 EF 50%, mild MR  4. Sinus with transient complete heart block, transient PAF  5. HTN  6. DM  7. Dyslipidemia  8. Recent colitis with GI bleed s/p endoscopy with Dr. Kishore Sparks  9. Hx of tonsillectomy  10. Hx of hernia repair  11. Hx of basal cell ca removal  12. Quit tobacco in '94  13. , wife is  for medical practice in [employer] [facility] [employer], he works on boat, active                               Plan:                  Inferior STEMI secondary to LCx. OM, LCx bifurcation, LAD stented SHAGUFTA. Acute systolic chf EF 98%, slowly improving with diuresis  Transient complete heart block, prox afib, currently in afib  Cardiogenic shock, impela and levophed weaned off. Hypoxemic resp failure, extubated, still with some volume overload, some orthopnea at night. Acute kidney injury improved  Acute liver failure improved  Possible pneumonia/bronchitis on emperic Abx  Mild decrease in Hg dilutional, no hematoma, no signs of bleeding at this time. Lower ext doppler with no DVT     1. Cont Added aspirin  2. Cont Added brilinta (could change to clopidogrel if continues to have dyspnea once euvolmic)  3. Add amiodarone 400mg TID, discharge on 200mg daily, will not continue long term  4.  Cont coreg 3.125mg po bid  5. Holding losartan HCT for now, consider low dose losartan 12.5mg soon  6. Cont lasix 80mg iv bid, follow bmp, decrease dose of lasix soon  7. Cont KCL  8. Increase added sprionolactone 25mg daily  9. Cont added statin  10. Restart glimeperide, decrease lantus, holding trulicity and metformin, Insulin sliding scale     Impella site healed. Currently off O2  PT/OT, needs to ambulate         [x]        High complexity decision making was performed         Subjective:     Andree Luis no chest pain, mild dyspnea, cough improving  Discussed with RN events overnight. Review of Systems:    Symptom Y/N Comments  Symptom Y/N Comments   Fever/Chills N   Chest Pain     Poor Appetite N   Edema N    Cough Y   Abdominal Pain N    Sputum Y   Joint Pain N    SOB/MAGALLANES Y   Pruritis/Rash N    Nausea/vomit N   Tolerating PT/OT     Diarrhea N   Tolerating Diet     Constipation N   Other       Could NOT obtain due to:      Objective:      Physical Exam:    Last 24hrs VS reviewed since prior progress note. Most recent are:    Visit Vitals    /88 (BP 1 Location: Left arm, BP Patient Position: At rest;Sitting)    Pulse (!) 109  Comment: ntoified RN    Temp 98.1 °F (36.7 °C)    Resp 20    Ht 5' 10\" (1.778 m)    Wt 110.9 kg (244 lb 7.8 oz)    SpO2 100%    BMI 35.08 kg/m2       Intake/Output Summary (Last 24 hours) at 07/03/18 1553  Last data filed at 07/02/18 1924   Gross per 24 hour   Intake              100 ml   Output              400 ml   Net             -300 ml        General Appearance: Well developed, well nourished, able to follow commands, A + O x 3,    no acute distress. Ears/Nose/Mouth/Throat: Hearing grossly normal.  Neck: Supple. Chest: Lungs clear to auscultation bilaterally. Cardiovascular: Regular rate and rhythm, S1S2 normal, no murmur. Abdomen: Soft, non-tender, bowel sounds are active. Extremities: +1 edema bilaterally, R> L  Skin: Warm and dry.     PMH/SH reviewed - no change compared to H&P    Data Review    Telemetry: normal sinus rhythm, prox afib    Lab Data Personally Reviewed:    Recent Labs      07/03/18   0511  07/02/18   0259   WBC  12.3*  13.7*   HGB  12.9  11.2*   HCT  39.4  33.6*   PLT  284  249     No results for input(s): INR, PTP, APTT in the last 72 hours. No lab exists for component: Geryl Bowels   Recent Labs      07/03/18   0303  07/02/18   0259  07/01/18   0605   NA  136  138  137   K  3.2*  3.1*  3.1*   CL  97  98  99   CO2  28  29  28   BUN  31*  30*  33*   CREA  1.05  0.90  0.82   GLU  170*  118*  130*   CA  9.2  8.5  8.9     No results for input(s): CPK, CKNDX, TROIQ in the last 72 hours. No lab exists for component: CPKMB  Lab Results   Component Value Date/Time    Cholesterol, total 135 06/28/2018 03:23 AM    HDL Cholesterol 34 06/28/2018 03:23 AM    LDL, calculated 64.4 06/28/2018 03:23 AM    Triglyceride 183 (H) 06/28/2018 03:23 AM    CHOL/HDL Ratio 4.0 06/28/2018 03:23 AM       No results for input(s): SGOT, GPT, AP, TBIL, TP, ALB, GLOB, GGT, AML, LPSE in the last 72 hours. No lab exists for component: AMYP, HLPSE  No results for input(s): PH, PCO2, PO2 in the last 72 hours.     Medications Personally Reviewed:    Current Facility-Administered Medications   Medication Dose Route Frequency    amiodarone (CORDARONE) tablet 400 mg  400 mg Oral TID    [START ON 7/6/2018] amiodarone (CORDARONE) tablet 200 mg  200 mg Oral DAILY    [START ON 7/4/2018] spironolactone (ALDACTONE) tablet 25 mg  25 mg Oral DAILY    furosemide (LASIX) injection 80 mg  80 mg IntraVENous BID    acetaminophen (TYLENOL) tablet 650 mg  650 mg Oral Q6H PRN    famotidine (PEPCID) tablet 20 mg  20 mg Oral BID    potassium chloride SR (KLOR-CON 10) tablet 20 mEq  20 mEq Oral BID    glimepiride (AMARYL) tablet 1 mg  1 mg Oral ACB&D    insulin glargine (LANTUS) injection 25 Units  25 Units SubCUTAneous ACL    cefTRIAXone (ROCEPHIN) 1 g in 0.9% sodium chloride (MBP/ADV) 50 mL  1 g IntraVENous Q24H    insulin lispro (HUMALOG) injection   SubCUTAneous AC&HS    guaiFENesin ER (MUCINEX) tablet 600 mg  600 mg Oral Q12H    ondansetron (ZOFRAN) injection 4 mg  4 mg IntraVENous Q6H PRN    carvedilol (COREG) tablet 3.125 mg  3.125 mg Oral BID WITH MEALS    aspirin chewable tablet 81 mg  81 mg Oral DAILY    enoxaparin (LOVENOX) injection 40 mg  40 mg SubCUTAneous Q24H    oxyCODONE IR (ROXICODONE) tablet 5 mg  5 mg Oral Q4H PRN    atorvastatin (LIPITOR) tablet 40 mg  40 mg Oral DAILY    saline peripheral flush soln 10 mL  10 mL InterCATHeter TID    saline peripheral flush soln 5 mL  5 mL InterCATHeter PRN    glucose chewable tablet 16 g  4 Tab Oral PRN    dextrose (D50W) injection syrg 12.5-25 g  12.5-25 g IntraVENous PRN    glucagon (GLUCAGEN) injection 1 mg  1 mg IntraMUSCular PRN    ticagrelor (BRILINTA) tablet 90 mg  90 mg Oral BID    chlorhexidine (PERIDEX) 0.12 % mouthwash 15 mL  15 mL Oral Q12H         Ann Norman MD

## 2018-07-03 NOTE — PROGRESS NOTES
1900 Received report from Agapito Sweeney [place]. Assumed patient care. 0330 Received call from tele. Pt had a 4.24 sec pause at 0308 then a 5.86 sec pause at 0318. Pt in AFib/Flutter at a controlled rate ranging from 70-90. Pt resting quietly in bed. Contacted on call physician. Dr David Patiño informed me to monitor pt and to call back if pt has multiple pauses, if he has a pause >10sec, or if I have any other concerns. Will continue to monitor. Bedside shift change report given to Agapito Sweeney RN (oncoming nurse) by Migdalia Jeong RN (offgoing nurse). Report included the following information SBAR, Kardex, Intake/Output, MAR, Recent Results and Cardiac Rhythm AFib.

## 2018-07-03 NOTE — DIABETES MGMT
DTC Progress Note    Recommendations/ Comments: Pt's BG > 180 mg/dL. 1. Increasing glimepiride to  2 mg twice daily before meals if eating 50% of meals     Current hospital DM medications: Lantus 25 units before lunch, lispro correction - normal sensitivity , glimepiride 1 mg twice daily before meals      Chart reviewed on Davon Godoy for hyperglyecmia. A1c:   Lab Results   Component Value Date/Time    Hemoglobin A1c 8.4 (H) 06/28/2018 03:23 AM           Recent Glucose Results:   Lab Results   Component Value Date/Time     (H) 07/03/2018 03:03 AM    GLUCPOC 206 (H) 07/03/2018 12:11 PM    GLUCPOC 182 (H) 07/03/2018 07:27 AM    GLUCPOC 211 (H) 07/02/2018 09:06 PM        Lab Results   Component Value Date/Time    Creatinine 1.05 07/03/2018 03:03 AM     Estimated Creatinine Clearance: 93.3 mL/min (based on Cr of 1.05). Active Orders   Diet    DIET DIABETIC CONSISTENT CARB Regular; AHA-LOW-CHOL FAT        PO intake:   Patient Vitals for the past 72 hrs:   % Diet Eaten   07/03/18 1352 25 %   07/03/18 0900 0 %   07/02/18 1837 75 %   07/02/18 1144 50 %   07/02/18 0840 50 %   07/01/18 1734 25 %   07/01/18 1448 50 %   07/01/18 0900 40 %       Will continue to follow as needed.     Thank you,   Darin Shin, [facility]    Office: [phone]

## 2018-07-03 NOTE — PROGRESS NOTES
Physical Therapy Note    Chart reviewed. Attempted to see patient for PT intervention, however pt currently HARRY for testing. Will defer and continue to follow. Thank you,  Phyllis Ivey, PT, DPT       7046: Followed up for PT services. Discussed patient with nursing. Patient with A fib. Watching on tele monitor patient -151 seen on monitor at rest.  He has been started on PO meds. Will defer and continue to follow.

## 2018-07-03 NOTE — PROGRESS NOTES
Occupational Therapy    1150: Completed chart review and attempted OT eval.  Patient off the floor for testing. Will continue to follow for OT services. 1345: Follow up for OT services. Discussed patient with nursing. Patient with A fib. Watching on tele monitor patient -151 seen on monitor at rest.  He has been started on PO meds. Will continue to follow.      Thank you,    Giovanna Noyola OTR/BREANNA

## 2018-07-03 NOTE — PROGRESS NOTES
Problem: Falls - Risk of  Goal: *Absence of Falls  Document Chastity Fall Risk and appropriate interventions in the flowsheet.    Outcome: Progressing Towards Goal  Fall Risk Interventions:  Mobility Interventions: Communicate number of staff needed for ambulation/transfer, OT consult for ADLs, Patient to call before getting OOB, PT Consult for assist device competence, Strengthening exercises (ROM-active/passive)    Mentation Interventions: Adequate sleep, hydration, pain control    Medication Interventions: Patient to call before getting OOB, Teach patient to arise slowly    Elimination Interventions: Call light in reach

## 2018-07-04 LAB
ANION GAP SERPL CALC-SCNC: 10 MMOL/L (ref 5–15)
BUN SERPL-MCNC: 24 MG/DL (ref 6–20)
BUN/CREAT SERPL: 24 (ref 12–20)
CALCIUM SERPL-MCNC: 9 MG/DL (ref 8.5–10.1)
CHLORIDE SERPL-SCNC: 101 MMOL/L (ref 97–108)
CO2 SERPL-SCNC: 27 MMOL/L (ref 21–32)
CREAT SERPL-MCNC: 1.02 MG/DL (ref 0.7–1.3)
GLUCOSE BLD STRIP.AUTO-MCNC: 203 MG/DL (ref 65–100)
GLUCOSE BLD STRIP.AUTO-MCNC: 234 MG/DL (ref 65–100)
GLUCOSE BLD STRIP.AUTO-MCNC: 312 MG/DL (ref 65–100)
GLUCOSE BLD STRIP.AUTO-MCNC: 350 MG/DL (ref 65–100)
GLUCOSE SERPL-MCNC: 179 MG/DL (ref 65–100)
MAGNESIUM SERPL-MCNC: 2.2 MG/DL (ref 1.6–2.4)
POTASSIUM SERPL-SCNC: 3.7 MMOL/L (ref 3.5–5.1)
SERVICE CMNT-IMP: ABNORMAL
SODIUM SERPL-SCNC: 138 MMOL/L (ref 136–145)

## 2018-07-04 PROCEDURE — 65660000000 HC RM CCU STEPDOWN

## 2018-07-04 PROCEDURE — 97116 GAIT TRAINING THERAPY: CPT

## 2018-07-04 PROCEDURE — 74011250636 HC RX REV CODE- 250/636: Performed by: INTERNAL MEDICINE

## 2018-07-04 PROCEDURE — 74011250637 HC RX REV CODE- 250/637: Performed by: INTERNAL MEDICINE

## 2018-07-04 PROCEDURE — 36415 COLL VENOUS BLD VENIPUNCTURE: CPT | Performed by: INTERNAL MEDICINE

## 2018-07-04 PROCEDURE — 93005 ELECTROCARDIOGRAM TRACING: CPT

## 2018-07-04 PROCEDURE — 82962 GLUCOSE BLOOD TEST: CPT

## 2018-07-04 PROCEDURE — 80048 BASIC METABOLIC PNL TOTAL CA: CPT | Performed by: INTERNAL MEDICINE

## 2018-07-04 PROCEDURE — 74011636637 HC RX REV CODE- 636/637: Performed by: INTERNAL MEDICINE

## 2018-07-04 PROCEDURE — 83735 ASSAY OF MAGNESIUM: CPT | Performed by: INTERNAL MEDICINE

## 2018-07-04 PROCEDURE — 74011000258 HC RX REV CODE- 258: Performed by: INTERNAL MEDICINE

## 2018-07-04 RX ADMIN — FAMOTIDINE 20 MG: 20 TABLET, FILM COATED ORAL at 08:36

## 2018-07-04 RX ADMIN — TICAGRELOR 90 MG: 90 TABLET ORAL at 08:36

## 2018-07-04 RX ADMIN — CARVEDILOL 3.12 MG: 3.12 TABLET, FILM COATED ORAL at 17:04

## 2018-07-04 RX ADMIN — CEFTRIAXONE 1 G: 1 INJECTION, POWDER, FOR SOLUTION INTRAMUSCULAR; INTRAVENOUS at 09:31

## 2018-07-04 RX ADMIN — GLIMEPIRIDE 1 MG: 1 TABLET ORAL at 17:04

## 2018-07-04 RX ADMIN — Medication 10 ML: at 08:40

## 2018-07-04 RX ADMIN — INSULIN LISPRO 2 UNITS: 100 INJECTION, SOLUTION INTRAVENOUS; SUBCUTANEOUS at 18:02

## 2018-07-04 RX ADMIN — CHLORHEXIDINE GLUCONATE 15 ML: 1.2 RINSE ORAL at 08:43

## 2018-07-04 RX ADMIN — GUAIFENESIN 600 MG: 600 TABLET, EXTENDED RELEASE ORAL at 08:36

## 2018-07-04 RX ADMIN — ENOXAPARIN SODIUM 40 MG: 100 INJECTION SUBCUTANEOUS at 10:25

## 2018-07-04 RX ADMIN — INSULIN LISPRO 4 UNITS: 100 INJECTION, SOLUTION INTRAVENOUS; SUBCUTANEOUS at 21:49

## 2018-07-04 RX ADMIN — AMIODARONE HYDROCHLORIDE 400 MG: 200 TABLET ORAL at 21:49

## 2018-07-04 RX ADMIN — POTASSIUM CHLORIDE 20 MEQ: 750 TABLET, EXTENDED RELEASE ORAL at 08:35

## 2018-07-04 RX ADMIN — ASPIRIN 81 MG 81 MG: 81 TABLET ORAL at 08:35

## 2018-07-04 RX ADMIN — INSULIN LISPRO 8 UNITS: 100 INJECTION, SOLUTION INTRAVENOUS; SUBCUTANEOUS at 12:52

## 2018-07-04 RX ADMIN — GUAIFENESIN 600 MG: 600 TABLET, EXTENDED RELEASE ORAL at 21:49

## 2018-07-04 RX ADMIN — TICAGRELOR 90 MG: 90 TABLET ORAL at 17:04

## 2018-07-04 RX ADMIN — CARVEDILOL 3.12 MG: 3.12 TABLET, FILM COATED ORAL at 08:36

## 2018-07-04 RX ADMIN — FAMOTIDINE 20 MG: 20 TABLET, FILM COATED ORAL at 17:04

## 2018-07-04 RX ADMIN — AMIODARONE HYDROCHLORIDE 400 MG: 200 TABLET ORAL at 17:04

## 2018-07-04 RX ADMIN — FUROSEMIDE 80 MG: 10 INJECTION, SOLUTION INTRAMUSCULAR; INTRAVENOUS at 08:35

## 2018-07-04 RX ADMIN — POTASSIUM CHLORIDE 20 MEQ: 750 TABLET, EXTENDED RELEASE ORAL at 17:04

## 2018-07-04 RX ADMIN — Medication 10 ML: at 21:50

## 2018-07-04 RX ADMIN — FUROSEMIDE 80 MG: 10 INJECTION, SOLUTION INTRAMUSCULAR; INTRAVENOUS at 17:04

## 2018-07-04 RX ADMIN — APIXABAN 2.5 MG: 2.5 TABLET, FILM COATED ORAL at 18:02

## 2018-07-04 RX ADMIN — ATORVASTATIN CALCIUM 40 MG: 40 TABLET, FILM COATED ORAL at 08:36

## 2018-07-04 RX ADMIN — SPIRONOLACTONE 25 MG: 25 TABLET, FILM COATED ORAL at 08:36

## 2018-07-04 RX ADMIN — INSULIN GLARGINE 25 UNITS: 100 INJECTION, SOLUTION SUBCUTANEOUS at 12:51

## 2018-07-04 RX ADMIN — INSULIN LISPRO 3 UNITS: 100 INJECTION, SOLUTION INTRAVENOUS; SUBCUTANEOUS at 07:30

## 2018-07-04 RX ADMIN — GLIMEPIRIDE 1 MG: 1 TABLET ORAL at 08:36

## 2018-07-04 RX ADMIN — AMIODARONE HYDROCHLORIDE 400 MG: 200 TABLET ORAL at 08:36

## 2018-07-04 NOTE — PROGRESS NOTES
Problem: Mobility Impaired (Adult and Pediatric)  Goal: *Acute Goals and Plan of Care (Insert Text)  Physical Therapy Goals  Initiated 6/29/2018  1. Patient will move from supine to sit and sit to supine , scoot up and down and roll side to side in bed with independence within 7 day(s). 2.  Patient will transfer from bed to chair and chair to bed with independence using the least restrictive device within 7 day(s). 3.  Patient will perform sit to stand with independence within 7 day(s). 4.  Patient will ambulate with independence for 200 feet with the least restrictive device within 7 day(s). 5.  Patient will ascend/descend 5 stairs with no handrail(s) with independence within 7 day(s). physical Therapy TREATMENT  Patient: Jolie Monteiro (27 y.o. male)  Date: 7/4/2018  Diagnosis: ST elevation (STEMI) myocardial infarction [facility])  STEMI involving left circumflex coronary artery ([facility] [address] <principal problem not specified>       Precautions:    Chart, physical therapy assessment, plan of care and goals were reviewed. ASSESSMENT:  Pt received standing in room and agreeable to PT intervention. Pt cleared by nursing for mobility. HR and activity tolerance improved this date. HR 90s bpm prior to gait training and pt without c/o SOB. He ambulated 195 ft with supervision, demonstrating steady gait without external support, however noting decreased gait speed as compared to baseline (per patient report). Educated pt on the importance of increasing gait speed to return to baseline as tolerated; pt verbalized understanding. He ascended/descended 5 steps with R railing and SBA/CGA and 5 steps without railings with CGA. Educated pt on proper sequencing on steps for safety. Pt demonstrates good safety awareness on steps and during all mobility. -140s bpm with activity, however pt only c/o mild SOB post-stair training.  Pt was assisted into sitting on EOB with HR improving to 110s bpm with rest. Encouraged pt to ambulate in hallways throughout the day during admission to improve endurance; pt verbalized understanding. Pt was left with all needs met, wife present, and RN aware following therapy session. Recommend pt discharge home with family support pending progress in acute PT and medical stability. Decreased frequency to 3x/week as pt is safe to mobilize in hallways with nursing and wife support. Recommend with nursing patient to complete as able in order to maintain strength, endurance and independence: OOB to chair 3x/day and walking daily with supervision. Thank you for your assistance. Progression toward goals:  [x]    Improving appropriately and progressing toward goals  []    Improving slowly and progressing toward goals  []    Not making progress toward goals and plan of care will be adjusted     PLAN:  Patient continues to benefit from skilled intervention to address the above impairments. Continue treatment per established plan of care. Discharge Recommendations: To Be Determined - likely none  Further Equipment Recommendations for Discharge:  TBD - likely none     SUBJECTIVE:   Patient stated It's okay.  referring to breathing    OBJECTIVE DATA SUMMARY:   Critical Behavior:  Neurologic State: Alert  Orientation Level: Oriented X4  Cognition: Appropriate decision making  Safety/Judgement: Awareness of environment, Fall prevention, Home safety  Functional Mobility Training:  Transfers:  Sit to Stand: Independent  Stand to Sit: Independent                             Balance:  Sitting: Intact  Standing: Intact  Standing - Static: Good  Standing - Dynamic : Good  Ambulation/Gait Training:  Distance (ft): 195 Feet (ft)  Assistive Device: Gait belt  Ambulation - Level of Assistance: Supervision                 Base of Support: Widened     Speed/Maria Antonia: Pace decreased (<100 feet/min)  Step Length: Left shortened;Right shortened    Stairs:  Number of Stairs Trained: 10  Stairs - Level of Assistance: Contact guard assistance;Assist X1;Additional time   Rail Use: Right  (x 5 steps; none x 5 steps)    Pain:  Pain Scale 1: Numeric (0 - 10)  Pain Intensity 1: 0              Activity Tolerance:   Improving - increased activity on RA; SaO2 >96%, HR 90-140s bpm with activity; mild SOB with increased activity  Please refer to the flowsheet for vital signs taken during this treatment.   After treatment:   []    Patient left in no apparent distress sitting up in chair  [x]    Patient left in no apparent distress in bed - sitting on EOB  [x]    Call bell left within reach  [x]    Nursing notified  [x]    Caregiver present  []    Bed alarm activated    COMMUNICATION/COLLABORATION:   The patients plan of care was discussed with: Physical Therapist and Registered Nurse    Jada Bennett, PT, DPT   Time Calculation: 11 mins

## 2018-07-04 NOTE — PROGRESS NOTES
Pharmacy Automatic Direct Oral Anticoagulant Renal Dosing Protocol  Patient currently receiving Apixaban 2.5 mg bid with an indication of Transient PAF, recent colitis with GI Bleed s/p endoscopy     Start date: 7/4/18    Major interacting medications:     Platelet inhibitors (dose/frequency):   ASA 81 daily    Labs:  Recent Labs      07/04/18   0616  07/03/18   0511  07/03/18   0303  07/02/18   0259   CREA  1.02   --   1.05  0.90   HGB   --   12.9   --   11.2*   PLT   --   284   --   249     Wt Readings from Last 1 Encounters:   07/02/18 110.9 kg (244 lb 7.8 oz)        Creatinine Clearance:     Impression/Plan:   - Will continue Apixaban 2.5 mg po BID although patient does qualify for the higher dose of Apixaban 5 mg BID (WT > 60kg, Age <80, Cr < 1.5)   - If Apixaban to continue and GI has stabilized, could consider 5mg po BID. Pharmacy will follow daily and adjust as appropriate.     Thank you,  Kristina Mckeon, [facility]

## 2018-07-04 NOTE — PROGRESS NOTES
Initial Nutrition Assessment:    INTERVENTIONS/RECOMMENDATIONS:   · Continue current diet. ASSESSMENT:   Chart reviewed, medically noted for STEMI and PMH shown below. Assessing pt due to LOS. Chart review shows pt is consuming 50-75% of meals and did not experience significant weight loss PTA. BG are elevated, DTC is following. Past Medical History:   Diagnosis Date    Actinic keratosis     Asthma     Cellulitis     Diabetes ([address]     Endocrine disease     Essential hypertension     GERD (gastroesophageal reflux disease)     Hypertension     Sun-damaged skin     Sunburn, blistering        Diet Order: Cardiac, Consistent carb  % Eaten:  Patient Vitals for the past 72 hrs:   % Diet Eaten   18 1352 25 %   18 0900 0 %   18 1837 75 %   18 1144 50 %   18 0840 50 %   18 1734 25 %   18 1448 50 %   18 0900 40 %     Pertinent Medications: [x]Reviewed: pepcid, lasix, lantus, humalog, KCl, aldactone  Pertinent Labs: [x]Reviewed: B, 302, 234  Food Allergies: [x]NKFA  []Other   Last BM:   Edema:        []RUE   []LUE   [x]RLE 2+  [x]LLE 1+     Pressure Injury:      [] Stage I   [] Stage II   [] Stage III   [] Stage IV      Wt Readings from Last 30 Encounters:   18 110.9 kg (244 lb 7.8 oz)   18 107.5 kg (237 lb)   18 109.4 kg (241 lb 1.6 oz)   16 115.7 kg (255 lb)       Anthropometrics:   Height: 5' 10\" (177.8 cm) Weight: 110.9 kg (244 lb 7.8 oz)   IBW (%IBW):   ( ) UBW (%UBW):   (  %)   Last Weight Metrics:  Weight Loss Metrics 2018   Today's Wt 244 lb 7.8 oz - 237 lb - 241 lb 1.6 oz 255 lb   BMI - 35.08 kg/m2 - 34.01 kg/m2 34.59 kg/m2 35.58 kg/m2       BMI: Body mass index is 35.08 kg/(m^2).     This BMI is indicative of:   []Underweight    []Normal    []Overweight    [x] Obesity   [] Extreme Obesity (BMI>40)     Estimated Nutrition Needs (Based on):   2100 Kcals/day (BMR: 1900 x 1.1) , 90 g (0.8 g/kg) Protein  Carbohydrate: At Least 130 g/day  Fluids: 2100 mL/day (1ml/kcal) or per primary team    NUTRITION DIAGNOSES:   Problem:  Altered nutrition-related lab values      Etiology: related to BM     Signs/Symptoms: as evidenced by BG>180 mg/dl      NUTRITION INTERVENTIONS:  Meals/Snacks: General/healthful diet                  GOAL:   stabilize BG <180 mg/dl in 5-7 days    LEARNING NEEDS (Diet, Food/Nutrient-Drug Interaction):    [x] None Identified   [] Identified and Education Provided/Documented   [] Identified and Pt declined/was not appropriate     Cultureal, Confucianist, OR Ethnic Dietary Needs:    [x] None Identified   [] Identified and Addressed     [x] Interdisciplinary Care Plan Reviewed/Documented    [x] Discharge Planning:  Heart healthy consistent carb diet     MONITORING /EVALUATION:      Food/Nutrient Intake Outcomes:  Total energy intake  Physical Signs/Symptoms Outcomes: Weight/weight change, Electrolyte and renal profile, Glucose profile, GI    NUTRITION RISK:    [] High              [] Moderate           [x]  Low  []  Minimal/Uncompromised    PT SEEN FOR:    []  MD Consult: []Calorie Count      []Diabetic Diet Education        []Diet Education     []Electrolyte Management     []General Nutrition Management and Supplements     []Management of Tube Feeding     []TPN Recommendations    []  RN Referral:  []MST score >=2     []Enteral/Parenteral Nutrition PTA     []Pregnant: Gestational DM or Multigestation     []Pressure Ulcer/Wound Care needs        []  Low BMI  [x]  LOS Referral       Marcelina Becker RDN  Pager [phone]  Weekend Pager [phone]

## 2018-07-04 NOTE — PROGRESS NOTES
Progress Note      7/4/2018   NAME: Hans Crawford   MRN:  [id number]   Admit Diagnosis: ST elevation (STEMI) myocardial infarction [facility])  STEMI involving left circumflex coronary artery [facility])                          Assessment:                 1. Chest pain initially NSTEMI, while at ER at [facility] developed complete heart block, hypotension, with repeat ECG showing inferior STEMI, cardiogenic shock, acute systolic chf, acute kidney injury, acute liver failure  2. Cath 6/2018 with 90% prox LAD, 100% prox dominant LCx, Small disease non-dominant RCA. PCI with stent to OM, Bifurcation stent to prox LCx and OM, PCI with stent to prox LAD  3. Ischemic cardiomyopathy EF 35%, inferior hypokinesis, Echo 7/2018 EF 50%, mild MR  4. Sinus with transient complete heart block, transient PAF and also atrial flutter  5. HTN  6. DM type 2  7. Dyslipidemia  8. Recent colitis with GI bleed s/p endoscopy with Dr. Christie Burciaga  9. Hx of tonsillectomy  10. Hx of hernia repair  11. Hx of basal cell ca removal  12. Quit tobacco in '94  13. , wife is  for medical practice in [employer] [facility], he works on boat, active                               Plan:                  Inferior STEMI secondary to LCx. OM, LCx bifurcation, LAD stented SHAGUFTA. Acute systolic chf EF 17%, slowly improving with diuresis. Transient complete heart block, paroxysmal afib, currently in atrial flutter. Cardiogenic shock, impela and levophed weaned off. Hypoxemic resp failure, extubated, still with some volume overload, some orthopnea at night. Acute kidney injury improved. Acute liver failure improved. Possible pneumonia/bronchitis on emperic Abx. Mild decrease in Hg dilutional, no hematoma, no signs of bleeding at this time. Lower ext doppler with no DVT.     1. Cont Added aspirin  2. Cont Added brilinta (could change to clopidogrel if continues to have dyspnea once euvolemic)  3.  Add anticoagulation with Eliquis 2.5mg po bid starting 7/4 PM.  4. Add amiodarone 400mg TID, discharge on 200mg daily, will not continue long term  5. Cont coreg 3.125mg po bid  6. Holding losartan HCT for now, consider low dose losartan 12.5mg soon  7. Cont lasix 80mg iv bid, follow bmp, decrease dose of lasix soon  8. Cont KCL  9. Increase added sprionolactone 25mg daily  10. Cont added statin  11. Restart glimeperide, decrease lantus, holding trulicity and metformin, Insulin sliding scale     Impella site healed. Currently off O2. PT/OT, needs to ambulate. Consider rhythm conversion (cardioversion or ablation) prior to discharge. Will get an ECG to see if this is typical right atrial flutter morphology.       [x]        High complexity decision making was performed       Subjective:     Particia [place] no chest pain, mild dyspnea on exertion, cough improving. Did have an episode of palpitations and shortness of breath early 7/4 AM.    Discussed with RN events overnight. Review of Systems:    Symptom Y/N Comments  Symptom Y/N Comments   Fever/Chills N   Chest Pain     Poor Appetite N   Edema N    Cough Y   Abdominal Pain N    Sputum Y   Joint Pain N    SOB/MAGALLANES Y   Pruritis/Rash N    Nausea/vomit N   Tolerating PT/OT     Diarrhea N   Tolerating Diet     Constipation N   Other       Could NOT obtain due to:      Objective:      Physical Exam:    Last 24hrs VS reviewed since prior progress note. Most recent are:    Visit Vitals    /71 (BP 1 Location: Left arm, BP Patient Position: Sitting)    Pulse 78    Temp 97.8 °F (36.6 °C)    Resp 20    Ht 5' 10\" (1.778 m)    Wt 110.9 kg (244 lb 7.8 oz)    SpO2 97%    BMI 35.08 kg/m2       Intake/Output Summary (Last 24 hours) at 07/04/18 1410  Last data filed at 07/04/18 0929   Gross per 24 hour   Intake              530 ml   Output                0 ml   Net              530 ml        General Appearance: Well developed, well nourished, able to follow commands, A + O x 3, no acute distress.   Ears/Nose/Mouth/Throat: Hearing grossly normal.  No abnormalities. Neck: Supple, nontender, no masses. Chest: Lungs clear to auscultation bilaterally. Unlabored. Symmetric air movement. Cardiovascular: Irregular rate and rhythm, S1S2 normal, no murmur, rub, or gallop. Abdomen: Soft, non-tender, bowel sounds are active. No pulsatile masses. Extremities: Trace edema bilaterally, R> L. No cyanosis or clubbing. Skin: Warm and dry. No jaundice, petechiae, purpura. Neurologic:  No tremor. Grossly nonfocal exam.    PMH/SH reviewed - no change compared to H&P    Data Review    Telemetry:  Atrial flutter with variable block. Lab Data Personally Reviewed:    Recent Labs      07/03/18   0511  07/02/18   0259   WBC  12.3*  13.7*   HGB  12.9  11.2*   HCT  39.4  33.6*   PLT  284  249     No results for input(s): INR, PTP, APTT in the last 72 hours. No lab exists for component: Abdoulaye Ask   Recent Labs      07/04/18   0616  07/03/18   0303  07/02/18   0259   NA  138  136  138   K  3.7  3.2*  3.1*   CL  101  97  98   CO2  27  28  29   BUN  24*  31*  30*   CREA  1.02  1.05  0.90   GLU  179*  170*  118*   CA  9.0  9.2  8.5   MG  2.2   --    --      No results for input(s): CPK, CKNDX, TROIQ in the last 72 hours. No lab exists for component: CPKMB  Lab Results   Component Value Date/Time    Cholesterol, total 135 06/28/2018 03:23 AM    HDL Cholesterol 34 06/28/2018 03:23 AM    LDL, calculated 64.4 06/28/2018 03:23 AM    Triglyceride 183 (H) 06/28/2018 03:23 AM    CHOL/HDL Ratio 4.0 06/28/2018 03:23 AM       No results for input(s): SGOT, GPT, AP, TBIL, TP, ALB, GLOB, GGT, AML, LPSE in the last 72 hours. No lab exists for component: AMYP, HLPSE  No results for input(s): PH, PCO2, PO2 in the last 72 hours.     Medications Personally Reviewed:    Current Facility-Administered Medications   Medication Dose Route Frequency    amiodarone (CORDARONE) tablet 400 mg  400 mg Oral TID    [START ON 7/6/2018] amiodarone (CORDARONE) tablet 200 mg  200 mg Oral DAILY    spironolactone (ALDACTONE) tablet 25 mg  25 mg Oral DAILY    furosemide (LASIX) injection 80 mg  80 mg IntraVENous BID    acetaminophen (TYLENOL) tablet 650 mg  650 mg Oral Q6H PRN    famotidine (PEPCID) tablet 20 mg  20 mg Oral BID    potassium chloride SR (KLOR-CON 10) tablet 20 mEq  20 mEq Oral BID    glimepiride (AMARYL) tablet 1 mg  1 mg Oral ACB&D    insulin glargine (LANTUS) injection 25 Units  25 Units SubCUTAneous ACL    cefTRIAXone (ROCEPHIN) 1 g in 0.9% sodium chloride (MBP/ADV) 50 mL  1 g IntraVENous Q24H    insulin lispro (HUMALOG) injection   SubCUTAneous AC&HS    guaiFENesin ER (MUCINEX) tablet 600 mg  600 mg Oral Q12H    ondansetron (ZOFRAN) injection 4 mg  4 mg IntraVENous Q6H PRN    carvedilol (COREG) tablet 3.125 mg  3.125 mg Oral BID WITH MEALS    aspirin chewable tablet 81 mg  81 mg Oral DAILY    enoxaparin (LOVENOX) injection 40 mg  40 mg SubCUTAneous Q24H    oxyCODONE IR (ROXICODONE) tablet 5 mg  5 mg Oral Q4H PRN    atorvastatin (LIPITOR) tablet 40 mg  40 mg Oral DAILY    saline peripheral flush soln 10 mL  10 mL InterCATHeter TID    saline peripheral flush soln 5 mL  5 mL InterCATHeter PRN    glucose chewable tablet 16 g  4 Tab Oral PRN    dextrose (D50W) injection syrg 12.5-25 g  12.5-25 g IntraVENous PRN    glucagon (GLUCAGEN) injection 1 mg  1 mg IntraMUSCular PRN    ticagrelor (BRILINTA) tablet 90 mg  90 mg Oral BID    chlorhexidine (PERIDEX) 0.12 % mouthwash 15 mL  15 mL Oral Q12H         Karen Trinidad MD

## 2018-07-04 NOTE — PROGRESS NOTES
Bedside shift change report given to Bette Layne RN (oncoming nurse) by Marycarmen Wheeler RN (offgoing nurse). Report included the following information SBAR.     1138 Blood glucose 350. Sent message to Dr. Brandee Torres instructed to give Pt 8 units for blood glucose 350.    1215 Pt complaining of feeling SOB. Described as feeling like he needs to cough something up, but has no cough. Sats 97% RA and HR 78. Notified Dr. Hyun Torres. [address] message to Dr. Hyun Torres to view EKG just performed. Preliminary shows Pt now in A flutter with acute MI.    1429 Dr. Hyun Torres confirmed Pt in A flutter currently. No new MI.      Bedside shift change report given to Fred Crocker RN (oncoming nurse). Report included the following information SBAR. SHIFT SUMMARY:            [facility] NURSING NOTE   Admission Date 6/27/2018   Admission Diagnosis ST elevation (STEMI) myocardial infarction [facility])  STEMI involving left circumflex coronary artery (HCC)   Consults None      Cardiac Monitoring [x] Yes [] No      Purposeful Hourly Rounding [x] Yes    Chastity Score Total Score: 1   Chastity score 3 or > [] Bed Alarm [] Avasys [] 1:1 sitter [] Patient refused (Signed refusal form in chart)   David Score David Score: 20   David score 14 or < [] PMT consult [] Wound Care consult    []  Specialty bed  [] Nutrition consult      Influenza Vaccine Received Flu Vaccine for Current Season (usually Sept-March): Not Flu Season           Oxygen needs? [x] Room air Oxygen @  []1L    []2L    []3L   []4L    []5L   []6L via  NC   Chronic home O2 use?  [] Yes [x] No  Perform O2 challenge test and document in progress note using smartphrase (.Homeoxygen)      Last bowel movement Last Bowel Movement Date: 07/04/18      Urinary Catheter [REMOVED] Urinary Catheter 06/27/18 2- way-Indications for Use: Accurate measurement of urinary output     [REMOVED] Urinary Catheter 06/27/18 2- way-Urine Output (mL): 100 ml     LDAs               Peripheral IV 07/02/18 Right; Upper Forearm (Active)   Site Assessment Clean, dry, & intact 7/4/2018  2:38 PM   Phlebitis Assessment 0 7/4/2018  2:38 PM   Infiltration Assessment 0 7/4/2018  2:38 PM   Dressing Status Clean, dry, & intact 7/4/2018  2:38 PM   Dressing Type Tape 7/4/2018  2:38 PM   Hub Color/Line Status Blue 7/4/2018  2:38 PM   Action Taken Other (comment) 7/2/2018 11:15 AM                         Readmission Risk Assessment Tool Score Medium Risk            15       Total Score        3 Has Seen PCP in Last 6 Months (Yes=3, No=0)    2 . Living with Significant Other. Assisted Living. LTAC. SNF. or   Rehab    3 Patient Length of Stay (>5 days = 3)    4 IP Visits Last 12 Months (1-3=4, 4=9, >4=11)    3 Charlson Comorbidity Score (Age + Comorbid Conditions)        Criteria that do not apply:    Pt.  Coverage (Medicare=5 , Medicaid, or Self-Pay=4)       Expected Length of Stay 29d 0h   Actual Length of Stay 7

## 2018-07-04 NOTE — PROGRESS NOTES
Bedside shift change report given to Karlene Parry, RN (oncoming nurse) by Migdalia Jeong RN (offgoing nurse). Report included the following information SBAR.     1138 Blood glucose 350. Sent message to Dr. Moira Patiño instructed to give Pt 8 units for blood glucose 350.    1215 Pt complaining of feeling SOB. Described as feeling like he needs to cough something up, but has not cough. Sats 97% RA and HR 78. Notified Dr. David Patiño. [address] message to Dr. David Patiño to view EKG just performed.   Preliminary shows Pt now in A flutter with acute MI.

## 2018-07-05 LAB
ANION GAP SERPL CALC-SCNC: 9 MMOL/L (ref 5–15)
ATRIAL RATE: 312 BPM
BUN SERPL-MCNC: 25 MG/DL (ref 6–20)
BUN/CREAT SERPL: 22 (ref 12–20)
CALCIUM SERPL-MCNC: 9.3 MG/DL (ref 8.5–10.1)
CALCULATED R AXIS, ECG10: -64 DEGREES
CALCULATED T AXIS, ECG11: 70 DEGREES
CHLORIDE SERPL-SCNC: 100 MMOL/L (ref 97–108)
CO2 SERPL-SCNC: 26 MMOL/L (ref 21–32)
CREAT SERPL-MCNC: 1.12 MG/DL (ref 0.7–1.3)
DIAGNOSIS, 93000: NORMAL
GLUCOSE BLD STRIP.AUTO-MCNC: 219 MG/DL (ref 65–100)
GLUCOSE BLD STRIP.AUTO-MCNC: 264 MG/DL (ref 65–100)
GLUCOSE BLD STRIP.AUTO-MCNC: 285 MG/DL (ref 65–100)
GLUCOSE BLD STRIP.AUTO-MCNC: 381 MG/DL (ref 65–100)
GLUCOSE SERPL-MCNC: 229 MG/DL (ref 65–100)
POTASSIUM SERPL-SCNC: 3.7 MMOL/L (ref 3.5–5.1)
Q-T INTERVAL, ECG07: 428 MS
QRS DURATION, ECG06: 104 MS
QTC CALCULATION (BEZET), ECG08: 546 MS
SERVICE CMNT-IMP: ABNORMAL
SODIUM SERPL-SCNC: 135 MMOL/L (ref 136–145)
VENTRICULAR RATE, ECG03: 98 BPM

## 2018-07-05 PROCEDURE — 74011250636 HC RX REV CODE- 250/636: Performed by: INTERNAL MEDICINE

## 2018-07-05 PROCEDURE — 80048 BASIC METABOLIC PNL TOTAL CA: CPT | Performed by: INTERNAL MEDICINE

## 2018-07-05 PROCEDURE — 74011250637 HC RX REV CODE- 250/637: Performed by: INTERNAL MEDICINE

## 2018-07-05 PROCEDURE — 74011636637 HC RX REV CODE- 636/637: Performed by: INTERNAL MEDICINE

## 2018-07-05 PROCEDURE — 74011000258 HC RX REV CODE- 258: Performed by: INTERNAL MEDICINE

## 2018-07-05 PROCEDURE — 97535 SELF CARE MNGMENT TRAINING: CPT

## 2018-07-05 PROCEDURE — 65660000000 HC RM CCU STEPDOWN

## 2018-07-05 PROCEDURE — 82962 GLUCOSE BLOOD TEST: CPT

## 2018-07-05 PROCEDURE — 36415 COLL VENOUS BLD VENIPUNCTURE: CPT | Performed by: INTERNAL MEDICINE

## 2018-07-05 PROCEDURE — 97116 GAIT TRAINING THERAPY: CPT

## 2018-07-05 RX ORDER — FUROSEMIDE 10 MG/ML
80 INJECTION INTRAMUSCULAR; INTRAVENOUS ONCE
Status: COMPLETED | OUTPATIENT
Start: 2018-07-05 | End: 2018-07-05

## 2018-07-05 RX ORDER — CLOPIDOGREL BISULFATE 75 MG/1
75 TABLET ORAL DAILY
Status: DISCONTINUED | OUTPATIENT
Start: 2018-07-06 | End: 2018-07-09 | Stop reason: HOSPADM

## 2018-07-05 RX ORDER — POTASSIUM CHLORIDE 20 MEQ/1
20 TABLET, EXTENDED RELEASE ORAL
Status: COMPLETED | OUTPATIENT
Start: 2018-07-05 | End: 2018-07-05

## 2018-07-05 RX ORDER — LORAZEPAM 0.5 MG/1
0.5 TABLET ORAL
Status: DISCONTINUED | OUTPATIENT
Start: 2018-07-05 | End: 2018-07-09 | Stop reason: HOSPADM

## 2018-07-05 RX ORDER — INSULIN GLARGINE 100 [IU]/ML
35 INJECTION, SOLUTION SUBCUTANEOUS
Status: DISCONTINUED | OUTPATIENT
Start: 2018-07-05 | End: 2018-07-09 | Stop reason: HOSPADM

## 2018-07-05 RX ORDER — INSULIN LISPRO 100 [IU]/ML
6 INJECTION, SOLUTION INTRAVENOUS; SUBCUTANEOUS ONCE
Status: COMPLETED | OUTPATIENT
Start: 2018-07-05 | End: 2018-07-05

## 2018-07-05 RX ORDER — CLOPIDOGREL BISULFATE 75 MG/1
300 TABLET ORAL
Status: COMPLETED | OUTPATIENT
Start: 2018-07-05 | End: 2018-07-05

## 2018-07-05 RX ADMIN — FUROSEMIDE 80 MG: 10 INJECTION, SOLUTION INTRAMUSCULAR; INTRAVENOUS at 10:25

## 2018-07-05 RX ADMIN — Medication 10 ML: at 17:17

## 2018-07-05 RX ADMIN — FAMOTIDINE 20 MG: 20 TABLET, FILM COATED ORAL at 08:13

## 2018-07-05 RX ADMIN — FAMOTIDINE 20 MG: 20 TABLET, FILM COATED ORAL at 17:16

## 2018-07-05 RX ADMIN — INSULIN LISPRO 3 UNITS: 100 INJECTION, SOLUTION INTRAVENOUS; SUBCUTANEOUS at 17:17

## 2018-07-05 RX ADMIN — GLIMEPIRIDE 1 MG: 1 TABLET ORAL at 17:15

## 2018-07-05 RX ADMIN — ASPIRIN 81 MG 81 MG: 81 TABLET ORAL at 08:13

## 2018-07-05 RX ADMIN — GUAIFENESIN 600 MG: 600 TABLET, EXTENDED RELEASE ORAL at 08:13

## 2018-07-05 RX ADMIN — INSULIN GLARGINE 35 UNITS: 100 INJECTION, SOLUTION SUBCUTANEOUS at 12:02

## 2018-07-05 RX ADMIN — CLOPIDOGREL BISULFATE 300 MG: 75 TABLET ORAL at 08:14

## 2018-07-05 RX ADMIN — SPIRONOLACTONE 25 MG: 25 TABLET, FILM COATED ORAL at 08:13

## 2018-07-05 RX ADMIN — APIXABAN 2.5 MG: 2.5 TABLET, FILM COATED ORAL at 08:13

## 2018-07-05 RX ADMIN — CEFTRIAXONE 1 G: 1 INJECTION, POWDER, FOR SOLUTION INTRAMUSCULAR; INTRAVENOUS at 08:14

## 2018-07-05 RX ADMIN — POTASSIUM CHLORIDE 20 MEQ: 1500 TABLET, EXTENDED RELEASE ORAL at 10:25

## 2018-07-05 RX ADMIN — CARVEDILOL 3.12 MG: 3.12 TABLET, FILM COATED ORAL at 08:13

## 2018-07-05 RX ADMIN — Medication 10 ML: at 21:03

## 2018-07-05 RX ADMIN — GUAIFENESIN 600 MG: 600 TABLET, EXTENDED RELEASE ORAL at 21:03

## 2018-07-05 RX ADMIN — INSULIN LISPRO 6 UNITS: 100 INJECTION, SOLUTION INTRAVENOUS; SUBCUTANEOUS at 21:33

## 2018-07-05 RX ADMIN — POTASSIUM CHLORIDE 20 MEQ: 750 TABLET, EXTENDED RELEASE ORAL at 08:13

## 2018-07-05 RX ADMIN — CARVEDILOL 3.12 MG: 3.12 TABLET, FILM COATED ORAL at 17:16

## 2018-07-05 RX ADMIN — Medication 10 ML: at 09:00

## 2018-07-05 RX ADMIN — APIXABAN 2.5 MG: 2.5 TABLET, FILM COATED ORAL at 17:16

## 2018-07-05 RX ADMIN — LORAZEPAM 0.5 MG: 0.5 TABLET ORAL at 21:03

## 2018-07-05 RX ADMIN — INSULIN LISPRO 3 UNITS: 100 INJECTION, SOLUTION INTRAVENOUS; SUBCUTANEOUS at 08:14

## 2018-07-05 RX ADMIN — ATORVASTATIN CALCIUM 40 MG: 40 TABLET, FILM COATED ORAL at 08:13

## 2018-07-05 RX ADMIN — AMIODARONE HYDROCHLORIDE 400 MG: 200 TABLET ORAL at 08:13

## 2018-07-05 RX ADMIN — AMIODARONE HYDROCHLORIDE 400 MG: 200 TABLET ORAL at 17:16

## 2018-07-05 RX ADMIN — INSULIN LISPRO 5 UNITS: 100 INJECTION, SOLUTION INTRAVENOUS; SUBCUTANEOUS at 12:02

## 2018-07-05 RX ADMIN — AMIODARONE HYDROCHLORIDE 400 MG: 200 TABLET ORAL at 21:03

## 2018-07-05 RX ADMIN — FUROSEMIDE 80 MG: 10 INJECTION, SOLUTION INTRAMUSCULAR; INTRAVENOUS at 17:15

## 2018-07-05 RX ADMIN — FUROSEMIDE 80 MG: 10 INJECTION, SOLUTION INTRAMUSCULAR; INTRAVENOUS at 08:14

## 2018-07-05 RX ADMIN — GLIMEPIRIDE 1 MG: 1 TABLET ORAL at 08:14

## 2018-07-05 RX ADMIN — POTASSIUM CHLORIDE 20 MEQ: 750 TABLET, EXTENDED RELEASE ORAL at 17:16

## 2018-07-05 NOTE — DIABETES MGMT
DTC Progress Note    Recommendations/ Comments: Please consider beginning humalog 10 units ac tid or increasing amaryl to 2mg ac b/d once diet resumed. BG remains consisently >180. Noted lantus increased today. Current hospital DM medications: Lantus 35 units before lunch, lispro correction - normal sensitivity , glimepiride 1 mg twice daily before meals      Chart reviewed on [facility] for hyperglyecmia. A1c:   Lab Results   Component Value Date/Time    Hemoglobin A1c 8.4 (H) 06/28/2018 03:23 AM           Recent Glucose Results:   Lab Results   Component Value Date/Time     (H) 07/05/2018 05:28 AM    GLUCPOC 285 (H) 07/05/2018 04:48 PM    GLUCPOC 264 (H) 07/05/2018 11:21 AM    GLUCPOC 219 (H) 07/05/2018 07:17 AM        Lab Results   Component Value Date/Time    Creatinine 1.12 07/05/2018 05:28 AM     Estimated Creatinine Clearance: 87.5 mL/min (based on Cr of 1.12). Active Orders   Diet    DIET DIABETIC CONSISTENT CARB Regular; AHA-LOW-CHOL FAT    DIET NPO        PO intake:   Patient Vitals for the past 72 hrs:   % Diet Eaten   07/04/18 1457 50 %   07/04/18 0929 95 %   07/03/18 1352 25 %   07/03/18 0900 0 %   07/02/18 1837 75 %       Will continue to follow as needed.     Thank you,   Dontrell Burch, VIRGILN, RN, [address]

## 2018-07-05 NOTE — PROGRESS NOTES
Progress Note      7/5/2018   NAME: Jaime Jean   MRN:  [id number]   Admit Diagnosis: ST elevation (STEMI) myocardial infarction [facility])  STEMI involving left circumflex coronary artery [facility])                          Assessment:                 1. Chest pain initially NSTEMI, while at ER at [facility] developed complete heart block, hypotension, with repeat ECG showing inferior STEMI, cardiogenic shock, acute systolic chf, acute kidney injury, acute liver failure  2. Cath 6/2018 with 90% prox LAD, 100% prox dominant LCx, Small disease non-dominant RCA. PCI with stent to OM, Bifurcation stent to prox LCx and OM, PCI with stent to prox LAD  3. Ischemic cardiomyopathy EF 35%, inferior hypokinesis, Echo 7/2018 EF 50%, mild MR  4. Sinus with transient complete heart block, transient PAF and also atrial flutter  5. HTN  6. DM type 2  7. Dyslipidemia  8. Recent colitis with GI bleed s/p endoscopy with Dr. Toyin De La Rosa  9. Hx of tonsillectomy  10. Hx of hernia repair  11. Hx of basal cell ca removal  12. Quit tobacco in '94  13. , wife is  for medical practice in [place], he works on boat, active                               Plan:                  Inferior STEMI secondary to LCx. OM, LCx bifurcation, LAD stented SHAGUFTA. Acute systolic chf EF 38%, slowly improving with diuresis. Transient complete heart block, paroxysmal afib, currently in atrial flutter. Cardiogenic shock, impela and levophed weaned off. Hypoxemic resp failure, extubated, still with some volume overload, some orthopnea at night, difficult to diurese. Acute kidney injury improved. Acute liver failure improved. Possible pneumonia/bronchitis on emperic Abx. Mild decrease in Hg dilutional, no hematoma, no signs of bleeding at this time. Lower ext doppler with no DVT.     1. Cont added eliquis 2.5mg bidCont Added aspirin  2. Change added brilinta to clopidogrel, unclear if contributing to dyspnea  3.  Add amiodarone 400mg TID, discharge on 200mg daily, will not continue long term  4. Cont coreg 3.125mg po bid  5. Holding losartan HCT for now, consider low dose losartan 12.5mg soon  6. Cont added lasix 80mg iv bid, follow bmp, give additional lasix today. 7. Cont KCL  8. Cont added sprionolactone 25mg daily  9. Cont added statin  10. Restart glimeperide, cont lantus, holding trulicity and metformin, Insulin sliding scale     Impella site healed. Currently off O2. PT/OT, needs to ambulate. Plan for MELA/CV friday       [x]        High complexity decision making was performed       Subjective:     Jolie Monteiro no chest pain, mild dyspnea on exertion, cough improving. Did have an episode of palpitations and shortness of breath early 7/4 AM.    Discussed with RN events overnight. Review of Systems:    Symptom Y/N Comments  Symptom Y/N Comments   Fever/Chills N   Chest Pain     Poor Appetite N   Edema N    Cough Y   Abdominal Pain N    Sputum Y   Joint Pain N    SOB/MAGALLANES Y   Pruritis/Rash N    Nausea/vomit N   Tolerating PT/OT     Diarrhea N   Tolerating Diet     Constipation N   Other       Could NOT obtain due to:      Objective:      Physical Exam:    Last 24hrs VS reviewed since prior progress note. Most recent are:    Visit Vitals    /79 (BP 1 Location: Left arm, BP Patient Position: At rest;Sitting)    Pulse 77    Temp 97.5 °F (36.4 °C)    Resp 22    Ht 5' 10\" (1.778 m)    Wt 110.9 kg (244 lb 7.8 oz)    SpO2 100%    BMI 35.08 kg/m2       Intake/Output Summary (Last 24 hours) at 07/05/18 1000  Last data filed at 07/04/18 2230   Gross per 24 hour   Intake              360 ml   Output              500 ml   Net             -140 ml        General Appearance: Well developed, well nourished, able to follow commands, A + O x 3, no acute distress. Ears/Nose/Mouth/Throat: Hearing grossly normal.  No abnormalities. Neck: Supple, nontender, no masses. Chest: Lungs clear to auscultation bilaterally. Unlabored.   Symmetric air movement. Cardiovascular: Irregular rate and rhythm, S1S2 normal, no murmur, rub, or gallop. Abdomen: Soft, non-tender, bowel sounds are active. No pulsatile masses. Extremities: Trace edema bilaterally, R> L. No cyanosis or clubbing. Skin: Warm and dry. No jaundice, petechiae, purpura. Neurologic:  No tremor. Grossly nonfocal exam.    PMH/ reviewed - no change compared to H&P    Data Review    Telemetry:  Atrial flutter with variable block. Lab Data Personally Reviewed:    Recent Labs      07/03/18   0511   WBC  12.3*   HGB  12.9   HCT  39.4   PLT  284     No results for input(s): INR, PTP, APTT in the last 72 hours. No lab exists for component: Jean-Paul Parekh   Recent Labs      07/05/18   0528  07/04/18   0616  07/03/18   0303   NA  135*  138  136   K  3.7  3.7  3.2*   CL  100  101  97   CO2  26  27  28   BUN  25*  24*  31*   CREA  1.12  1.02  1.05   GLU  229*  179*  170*   CA  9.3  9.0  9.2   MG   --   2.2   --      No results for input(s): CPK, CKNDX, TROIQ in the last 72 hours. No lab exists for component: CPKMB  Lab Results   Component Value Date/Time    Cholesterol, total 135 06/28/2018 03:23 AM    HDL Cholesterol 34 06/28/2018 03:23 AM    LDL, calculated 64.4 06/28/2018 03:23 AM    Triglyceride 183 (H) 06/28/2018 03:23 AM    CHOL/HDL Ratio 4.0 06/28/2018 03:23 AM       No results for input(s): SGOT, GPT, AP, TBIL, TP, ALB, GLOB, GGT, AML, LPSE in the last 72 hours. No lab exists for component: AMYP, HLPSE  No results for input(s): PH, PCO2, PO2 in the last 72 hours.     Medications Personally Reviewed:    Current Facility-Administered Medications   Medication Dose Route Frequency    insulin glargine (LANTUS) injection 35 Units  35 Units SubCUTAneous ACL    [START ON 7/6/2018] clopidogrel (PLAVIX) tablet 75 mg  75 mg Oral DAILY    furosemide (LASIX) injection 80 mg  80 mg IntraVENous ONCE    potassium chloride (K-DUR, KLOR-CON) SR tablet 20 mEq  20 mEq Oral NOW    LORazepam (ATIVAN) tablet 0.5 mg  0.5 mg Oral Q6H PRN    apixaban (ELIQUIS) tablet 2.5 mg  2.5 mg Oral BID    amiodarone (CORDARONE) tablet 400 mg  400 mg Oral TID    [START ON 7/6/2018] amiodarone (CORDARONE) tablet 200 mg  200 mg Oral DAILY    spironolactone (ALDACTONE) tablet 25 mg  25 mg Oral DAILY    furosemide (LASIX) injection 80 mg  80 mg IntraVENous BID    acetaminophen (TYLENOL) tablet 650 mg  650 mg Oral Q6H PRN    famotidine (PEPCID) tablet 20 mg  20 mg Oral BID    potassium chloride SR (KLOR-CON 10) tablet 20 mEq  20 mEq Oral BID    glimepiride (AMARYL) tablet 1 mg  1 mg Oral ACB&D    insulin lispro (HUMALOG) injection   SubCUTAneous AC&HS    guaiFENesin ER (MUCINEX) tablet 600 mg  600 mg Oral Q12H    ondansetron (ZOFRAN) injection 4 mg  4 mg IntraVENous Q6H PRN    carvedilol (COREG) tablet 3.125 mg  3.125 mg Oral BID WITH MEALS    aspirin chewable tablet 81 mg  81 mg Oral DAILY    oxyCODONE IR (ROXICODONE) tablet 5 mg  5 mg Oral Q4H PRN    atorvastatin (LIPITOR) tablet 40 mg  40 mg Oral DAILY    saline peripheral flush soln 10 mL  10 mL InterCATHeter TID    saline peripheral flush soln 5 mL  5 mL InterCATHeter PRN    glucose chewable tablet 16 g  4 Tab Oral PRN    dextrose (D50W) injection syrg 12.5-25 g  12.5-25 g IntraVENous PRN    glucagon (GLUCAGEN) injection 1 mg  1 mg IntraMUSCular PRN    chlorhexidine (PERIDEX) 0.12 % mouthwash 15 mL  15 mL Oral Q12H         Pat Frank MD

## 2018-07-05 NOTE — PROGRESS NOTES
Problem: Falls - Risk of  Goal: *Absence of Falls  Document Chastity Fall Risk and appropriate interventions in the flowsheet. Outcome: Progressing Towards Goal  Fall Risk Interventions:  Mobility Interventions: Communicate number of staff needed for ambulation/transfer    Mentation Interventions: Adequate sleep, hydration, pain control    Medication Interventions: Evaluate medications/consider consulting pharmacy    Elimination Interventions: Call light in reach, Urinal in reach             Problem: Patient Education: Go to Patient Education Activity  Goal: Patient/Family Education  Outcome: Progressing Towards Goal  Pt tolerating activity better with less dyspnea noted. Problem: Pressure Injury - Risk of  Goal: *Prevention of pressure injury  Document David Scale and appropriate interventions in the flowsheet.    Outcome: Progressing Towards Goal  Pressure Injury Interventions:  Sensory Interventions: Check visual cues for pain, Minimize linen layers    Moisture Interventions: Minimize layers    Activity Interventions: Increase time out of bed    Mobility Interventions: HOB 30 degrees or less    Nutrition Interventions: Document food/fluid/supplement intake    Friction and Shear Interventions: HOB 30 degrees or less, Minimize layers

## 2018-07-05 NOTE — ROUTINE PROCESS
Bedside report received from Zhou Colin RN. SBAR report and pt updates reviewed. Pt and wife participating in report--pt to receive Ativan tonight for sleep, and knows to be NPO p MN for MELA.

## 2018-07-05 NOTE — PROGRESS NOTES
Problem: Self Care Deficits Care Plan (Adult)  Goal: *Acute Goals and Plan of Care (Insert Text)  Occupational Therapy Goals:  Initiated 6/29/2018  1. Patient will perform grooming standing with independence within 7 days. - GOAL MET  2. Patient will perform toileting with independence within 7 days. - GOAL MET  3. Patient will perform dressing with independence within 7 days.- GOAL MET  4. Patient will independent with energy conservation techniques within 7 days.- GOAL MET    5. Patient will transfer from toilet with independence within 7 days.- GOAL MET  6. Patient will perform one IADL task in standing with independence within 7 days. GOAL MET     Occupational Therapy TREATMENT/DISCHARGE  Patient: Jones Bell (46 y.o. male)  Date: 7/5/2018  Diagnosis: ST elevation (STEMI) myocardial infarction [facility])  STEMI involving left circumflex coronary artery ([facility] [address] <principal problem not specified>       Precautions:  Universal  Chart, occupational therapy assessment, plan of care, and goals were reviewed. ASSESSMENT:  Nursing cleared for therapy. Received sidelying on window couch, pleasant and agreeable for therapy. Donned socks with mod indep. He reports no difficulty with toileting, dressing, bathing and grooming at the sink which he completed this AM.  Completed light bed making task for sheet and blanket with indep, no LOB. HR  seen on portable monitor. Denies SOB or dizziness. Patient making great progress with OT services. Denies any concerns with ADL tasks with plans for dc home with support from wife and OP cardiac per MD recommendations. Recommend continued participation with ADL tasks with support from wife and nursing. Patient with plan for MELA tomorrow. If change in function, please re consult OT services.        Progression toward goals:  []            Improving appropriately and progressing toward goals  [x]            Improving slowly and progressing toward goals  []            Not making progress toward goals and plan of care will be adjusted     PLAN:  Patient will be discharged from occupational therapy at this time. Rationale for discharge:  []   Goals Achieved  [x]   Plateau Reached  []   Patient not participating in therapy  []   Other:  Discharge Recommendations:  Outpatient cardiac   Further Equipment Recommendations for Discharge:  None for OT     SUBJECTIVE:   Patient stated I am doing well other than needing a good sleep.     OBJECTIVE DATA SUMMARY:   Cognitive/Behavioral Status:  Neurologic State: Alert; Appropriate for age  Orientation Level: Oriented X4                Functional Mobility and Transfers for ADLs:  Bed Mobility:  Rolling:  (received up and left up)    Transfers:     Functional Transfers  Toilet Transfer : Modified independent        Balance:  Sitting: Intact  Standing: Intact  Standing - Static: Good  Standing - Dynamic : Good    ADL Intervention:      Excellent understanding. Seated, donned socks with mod indep. Discussion regarding ADL tasks in which he denies any difficulties and had completed toileting, sponge bathing, dressing and standing at sink for oral care with no difficulty this AM.  Completed light bed making task for sheet and blanket with indep, no LOB. Provided education on basic energy conservation techniques including pacing, planning and seated vs standing tasks. Good self pacing with task. Lower Body Dressing Assistance  Socks:  Independent    Toileting  Toileting Assistance: Modified independent  Bladder Hygiene: Modified independent  Bowel Hygiene: Modified indpendent  Clothing Management: Modified independent    Functional Measure:  Barthel Index:    Bathin  Bladder: 10  Bowels: 10  Groomin  Dressing: 10  Feeding: 10  Mobility: 10  Stairs: 5  Toilet Use: 10  Transfer (Bed to Chair and Back): 15  Total: 90       Barthel and G-code impairment scale:  Percentage of impairment CH  0% CI  1-19% CJ  20-39% CK  40-59% CL  60-79% CM  80-99% CN  100%   Barthel Score 0-100 100 99-80 79-60 59-40 20-39 1-19   0   Barthel Score 0-20 20 17-19 13-16 9-12 5-8 1-4 0      The Barthel ADL Index: Guidelines  1. The index should be used as a record of what a patient does, not as a record of what a patient could do. 2. The main aim is to establish degree of independence from any help, physical or verbal, however minor and for whatever reason. 3. The need for supervision renders the patient not independent. 4. A patient's performance should be established using the best available evidence. Asking the patient, friends/relatives and nurses are the usual sources, but direct observation and common sense are also important. However direct testing is not needed. 5. Usually the patient's performance over the preceding 24-48 hours is important, but occasionally longer periods will be relevant. 6. Middle categories imply that the patient supplies over 50 per cent of the effort. 7. Use of aids to be independent is allowed. Irasema Sykes., Barthel, D.W. (6752). Functional evaluation: the Barthel Index. [facility] (14)2. JOSE Horta, Trav Baker., Jimy Dockery., Sunny, [address] [place] [address] (1999). Measuring the change indisability after inpatient rehabilitation; comparison of the responsiveness of the Barthel Index and Functional [place] Measure. Journal of Neurology, Neurosurgery, and Psychiatry, 66(4), 084-826. Navdeep West, N.J.A, VIJAYA HeadJ.AHMET, & Juliana Menjivar, MLindsayA. (2004.) Assessment of post-stroke quality of life in cost-effectiveness studies: The usefulness of the Barthel Index and the EuroQoL-5D. Quality of Life Research, 13, 626-40         G codes: In compliance with CMSs Claims Based Outcome Reporting, the following G-code set was chosen for this patient based on their primary functional limitation being treated:     The outcome measure chosen to determine the severity of the functional limitation was the Barthel index with a score of 90/100 which was correlated with the impairment scale. ? Self Care:     - CURRENT STATUS: CI - 1%-19% impaired, limited or restricted    - GOAL STATUS: CI - 1%-19% impaired, limited or restricted    - D/C STATUS:  CI - 1%-19% impaired, limited or restricted     Pain:  Pain Scale 1: Numeric (0 - 10)  Pain Intensity 1: 0        Activity Tolerance:   HR  seen on portable monitor. Denies SOB or dizziness.      After treatment:   Patient left sidelying on window couch  [] Patient left in no apparent distress sitting up in chair  [] Patient left in no apparent distress in bed  [] Call bell left within reach  [x] Nursing notified  [] Caregiver present  [] Bed alarm activated    COMMUNICATION/COLLABORATION:   The patients plan of care was discussed with: Physical Therapist, Registered Nurse and Patient    Ashely Sanders OT  Time Calculation: 20 mins

## 2018-07-05 NOTE — PROGRESS NOTES
Problem: Mobility Impaired (Adult and Pediatric)  Goal: *Acute Goals and Plan of Care (Insert Text)  Physical Therapy Goals  Initiated 6/29/2018  1. Patient will move from supine to sit and sit to supine , scoot up and down and roll side to side in bed with independence within 7 day(s). 2.  Patient will transfer from bed to chair and chair to bed with independence using the least restrictive device within 7 day(s). 3.  Patient will perform sit to stand with independence within 7 day(s). 4.  Patient will ambulate with independence for 200 feet with the least restrictive device within 7 day(s). 5.  Patient will ascend/descend 5 stairs with no handrail(s) with independence within 7 day(s). physical Therapy TREATMENT  Patient: Krissy Esposito (60 y.o. male)  Date: 7/5/2018  Diagnosis: ST elevation (STEMI) myocardial infarction [facility])  STEMI involving left circumflex coronary artery ([facility] [address] <principal problem not specified>       Precautions:    Chart, physical therapy assessment, plan of care and goals were reviewed. ASSESSMENT:  Pt cleared by nurse to mobilize. Pt received up sitting in the couch. Pt agreeable to therapy. Pt performed sit to stand transfer independently. Pt ambulated 240ft with no device at supervision. Pts HR at 82BPM after 100ft. Pts HR after 200ft at 130BPM. Pt requiring a standing rest break for HR to decrease. Pt able to quickly recover. Pt with increased mobility tolerance and improved HR throughout ambulation. Pt likely have no PT needs after discharge. Progression toward goals:  [x]    Improving appropriately and progressing toward goals  []    Improving slowly and progressing toward goals  []    Not making progress toward goals and plan of care will be adjusted     PLAN:  Patient continues to benefit from skilled intervention to address the above impairments. Continue treatment per established plan of care.   Discharge Recommendations:  None  Further Equipment Recommendations for Discharge:  Likely none     SUBJECTIVE:   Patient stated I feel a little winded but I feel good.     OBJECTIVE DATA SUMMARY:   Critical Behavior:  Neurologic State: Alert, Appropriate for age  Orientation Level: Oriented X4  Cognition: Appropriate decision making, Appropriate for age attention/concentration, Appropriate safety awareness, Follows commands  Safety/Judgement: Awareness of environment, Fall prevention, Home safety  Functional Mobility Training:  Bed Mobility:  Rolling:  (received up and left up)                 Transfers:  Sit to Stand: Independent  Stand to Sit: Independent                             Balance:  Sitting: Intact  Standing: Intact  Standing - Static: Good  Standing - Dynamic : Good  Ambulation/Gait Training:  Distance (ft): 240 Feet (ft)  Assistive Device: Gait belt  Ambulation - Level of Assistance: Supervision        Gait Abnormalities: Trunk sway increased        Base of Support: Widened     Speed/Maria Antonia: Pace decreased (<100 feet/min)  Step Length: Right shortened;Left shortened        Pain:  Pain Scale 1: Numeric (0 - 10)  Pain Intensity 1: 0              Activity Tolerance:   Pt with improved activity tolerance with HR only increasing to 130s.      After treatment:   [x]    Patient left in no apparent distress sitting up in chair  []    Patient left in no apparent distress in bed  [x]    Call bell left within reach  [x]    Nursing notified  [x]    Caregiver present  []    Bed alarm activated    COMMUNICATION/COLLABORATION:   The patients plan of care was discussed with: Registered Nurse    Vijay Deng PTA   Time Calculation: 11 mins

## 2018-07-06 LAB
ANION GAP SERPL CALC-SCNC: 10 MMOL/L (ref 5–15)
BUN SERPL-MCNC: 23 MG/DL (ref 6–20)
BUN/CREAT SERPL: 22 (ref 12–20)
CALCIUM SERPL-MCNC: 8.8 MG/DL (ref 8.5–10.1)
CHLORIDE SERPL-SCNC: 103 MMOL/L (ref 97–108)
CO2 SERPL-SCNC: 23 MMOL/L (ref 21–32)
CREAT SERPL-MCNC: 1.03 MG/DL (ref 0.7–1.3)
GLUCOSE BLD STRIP.AUTO-MCNC: 184 MG/DL (ref 65–100)
GLUCOSE BLD STRIP.AUTO-MCNC: 202 MG/DL (ref 65–100)
GLUCOSE BLD STRIP.AUTO-MCNC: 306 MG/DL (ref 65–100)
GLUCOSE BLD STRIP.AUTO-MCNC: 328 MG/DL (ref 65–100)
GLUCOSE SERPL-MCNC: 240 MG/DL (ref 65–100)
POTASSIUM SERPL-SCNC: 3.9 MMOL/L (ref 3.5–5.1)
SERVICE CMNT-IMP: ABNORMAL
SODIUM SERPL-SCNC: 136 MMOL/L (ref 136–145)

## 2018-07-06 PROCEDURE — 74011636637 HC RX REV CODE- 636/637: Performed by: INTERNAL MEDICINE

## 2018-07-06 PROCEDURE — 82962 GLUCOSE BLOOD TEST: CPT

## 2018-07-06 PROCEDURE — 96374 THER/PROPH/DIAG INJ IV PUSH: CPT

## 2018-07-06 PROCEDURE — 74011250637 HC RX REV CODE- 250/637: Performed by: INTERNAL MEDICINE

## 2018-07-06 PROCEDURE — 97116 GAIT TRAINING THERAPY: CPT

## 2018-07-06 PROCEDURE — 65660000000 HC RM CCU STEPDOWN

## 2018-07-06 PROCEDURE — 74011250636 HC RX REV CODE- 250/636

## 2018-07-06 PROCEDURE — 74011000250 HC RX REV CODE- 250

## 2018-07-06 PROCEDURE — 77030018729 HC ELECTRD DEFIB PAD CARD -B

## 2018-07-06 PROCEDURE — 5A2204Z RESTORATION OF CARDIAC RHYTHM, SINGLE: ICD-10-PCS | Performed by: INTERNAL MEDICINE

## 2018-07-06 PROCEDURE — 80048 BASIC METABOLIC PNL TOTAL CA: CPT | Performed by: INTERNAL MEDICINE

## 2018-07-06 PROCEDURE — 36415 COLL VENOUS BLD VENIPUNCTURE: CPT | Performed by: INTERNAL MEDICINE

## 2018-07-06 PROCEDURE — 74011000250 HC RX REV CODE- 250: Performed by: INTERNAL MEDICINE

## 2018-07-06 PROCEDURE — 92960 CARDIOVERSION ELECTRIC EXT: CPT

## 2018-07-06 PROCEDURE — 74011250636 HC RX REV CODE- 250/636: Performed by: INTERNAL MEDICINE

## 2018-07-06 RX ORDER — LIDOCAINE HYDROCHLORIDE 20 MG/ML
SOLUTION OROPHARYNGEAL
Status: COMPLETED
Start: 2018-07-06 | End: 2018-07-06

## 2018-07-06 RX ORDER — FUROSEMIDE 10 MG/ML
60 INJECTION INTRAMUSCULAR; INTRAVENOUS 2 TIMES DAILY
Status: DISCONTINUED | OUTPATIENT
Start: 2018-07-06 | End: 2018-07-07

## 2018-07-06 RX ORDER — MIDAZOLAM HYDROCHLORIDE 1 MG/ML
.5-2 INJECTION, SOLUTION INTRAMUSCULAR; INTRAVENOUS
Status: DISCONTINUED | OUTPATIENT
Start: 2018-07-06 | End: 2018-07-06

## 2018-07-06 RX ORDER — FENTANYL CITRATE 50 UG/ML
25-50 INJECTION, SOLUTION INTRAMUSCULAR; INTRAVENOUS
Status: DISCONTINUED | OUTPATIENT
Start: 2018-07-06 | End: 2018-07-06

## 2018-07-06 RX ORDER — POTASSIUM CHLORIDE 750 MG/1
10 TABLET, FILM COATED, EXTENDED RELEASE ORAL 2 TIMES DAILY
Status: DISCONTINUED | OUTPATIENT
Start: 2018-07-06 | End: 2018-07-09 | Stop reason: HOSPADM

## 2018-07-06 RX ORDER — FENTANYL CITRATE 50 UG/ML
INJECTION, SOLUTION INTRAMUSCULAR; INTRAVENOUS
Status: COMPLETED
Start: 2018-07-06 | End: 2018-07-06

## 2018-07-06 RX ORDER — MIDAZOLAM HYDROCHLORIDE 1 MG/ML
INJECTION, SOLUTION INTRAMUSCULAR; INTRAVENOUS
Status: COMPLETED
Start: 2018-07-06 | End: 2018-07-06

## 2018-07-06 RX ORDER — LIDOCAINE HYDROCHLORIDE 20 MG/ML
15 SOLUTION OROPHARYNGEAL AS NEEDED
Status: DISCONTINUED | OUTPATIENT
Start: 2018-07-06 | End: 2018-07-06

## 2018-07-06 RX ADMIN — FUROSEMIDE 60 MG: 10 INJECTION, SOLUTION INTRAMUSCULAR; INTRAVENOUS at 17:26

## 2018-07-06 RX ADMIN — INSULIN LISPRO 3 UNITS: 100 INJECTION, SOLUTION INTRAVENOUS; SUBCUTANEOUS at 11:50

## 2018-07-06 RX ADMIN — MIDAZOLAM HYDROCHLORIDE 2 MG: 1 INJECTION, SOLUTION INTRAMUSCULAR; INTRAVENOUS at 10:08

## 2018-07-06 RX ADMIN — CARVEDILOL 3.12 MG: 3.12 TABLET, FILM COATED ORAL at 08:56

## 2018-07-06 RX ADMIN — CLOPIDOGREL BISULFATE 75 MG: 75 TABLET ORAL at 08:56

## 2018-07-06 RX ADMIN — FENTANYL CITRATE 25 MCG: 50 INJECTION, SOLUTION INTRAMUSCULAR; INTRAVENOUS at 10:29

## 2018-07-06 RX ADMIN — Medication 10 ML: at 08:56

## 2018-07-06 RX ADMIN — GUAIFENESIN 600 MG: 600 TABLET, EXTENDED RELEASE ORAL at 08:55

## 2018-07-06 RX ADMIN — INSULIN LISPRO 2 UNITS: 100 INJECTION, SOLUTION INTRAVENOUS; SUBCUTANEOUS at 08:54

## 2018-07-06 RX ADMIN — FUROSEMIDE 60 MG: 10 INJECTION, SOLUTION INTRAMUSCULAR; INTRAVENOUS at 08:54

## 2018-07-06 RX ADMIN — INSULIN LISPRO 7 UNITS: 100 INJECTION, SOLUTION INTRAVENOUS; SUBCUTANEOUS at 17:23

## 2018-07-06 RX ADMIN — INSULIN GLARGINE 35 UNITS: 100 INJECTION, SOLUTION SUBCUTANEOUS at 11:50

## 2018-07-06 RX ADMIN — CARVEDILOL 3.12 MG: 3.12 TABLET, FILM COATED ORAL at 17:24

## 2018-07-06 RX ADMIN — GUAIFENESIN 600 MG: 600 TABLET, EXTENDED RELEASE ORAL at 21:38

## 2018-07-06 RX ADMIN — INSULIN LISPRO 4 UNITS: 100 INJECTION, SOLUTION INTRAVENOUS; SUBCUTANEOUS at 21:38

## 2018-07-06 RX ADMIN — BENZOCAINE, BUTAMBEN, AND TETRACAINE HYDROCHLORIDE 2 SPRAY: .028; .004; .004 AEROSOL, SPRAY TOPICAL at 10:14

## 2018-07-06 RX ADMIN — ASPIRIN 81 MG 81 MG: 81 TABLET ORAL at 08:56

## 2018-07-06 RX ADMIN — APIXABAN 2.5 MG: 2.5 TABLET, FILM COATED ORAL at 17:25

## 2018-07-06 RX ADMIN — ATORVASTATIN CALCIUM 40 MG: 40 TABLET, FILM COATED ORAL at 08:55

## 2018-07-06 RX ADMIN — LIDOCAINE HYDROCHLORIDE 15 ML: 20 SOLUTION OROPHARYNGEAL at 10:14

## 2018-07-06 RX ADMIN — POTASSIUM CHLORIDE 10 MEQ: 750 TABLET, EXTENDED RELEASE ORAL at 17:25

## 2018-07-06 RX ADMIN — FAMOTIDINE 20 MG: 20 TABLET, FILM COATED ORAL at 08:54

## 2018-07-06 RX ADMIN — MIDAZOLAM HYDROCHLORIDE 1 MG: 1 INJECTION, SOLUTION INTRAMUSCULAR; INTRAVENOUS at 10:29

## 2018-07-06 RX ADMIN — LORAZEPAM 0.5 MG: 0.5 TABLET ORAL at 03:55

## 2018-07-06 RX ADMIN — Medication 10 ML: at 17:23

## 2018-07-06 RX ADMIN — Medication 10 ML: at 21:38

## 2018-07-06 RX ADMIN — AMIODARONE HYDROCHLORIDE 200 MG: 200 TABLET ORAL at 08:55

## 2018-07-06 RX ADMIN — APIXABAN 2.5 MG: 2.5 TABLET, FILM COATED ORAL at 08:56

## 2018-07-06 RX ADMIN — POTASSIUM CHLORIDE 10 MEQ: 750 TABLET, EXTENDED RELEASE ORAL at 08:55

## 2018-07-06 RX ADMIN — FAMOTIDINE 20 MG: 20 TABLET, FILM COATED ORAL at 17:25

## 2018-07-06 RX ADMIN — GLIMEPIRIDE 1 MG: 1 TABLET ORAL at 17:25

## 2018-07-06 RX ADMIN — SPIRONOLACTONE 25 MG: 25 TABLET, FILM COATED ORAL at 08:55

## 2018-07-06 RX ADMIN — LORAZEPAM 0.5 MG: 0.5 TABLET ORAL at 21:37

## 2018-07-06 RX ADMIN — MIDAZOLAM 2 MG: 1 INJECTION INTRAMUSCULAR; INTRAVENOUS at 10:08

## 2018-07-06 RX ADMIN — MIDAZOLAM HYDROCHLORIDE 1 MG: 1 INJECTION, SOLUTION INTRAMUSCULAR; INTRAVENOUS at 10:20

## 2018-07-06 RX ADMIN — FENTANYL CITRATE 50 MCG: 50 INJECTION, SOLUTION INTRAMUSCULAR; INTRAVENOUS at 10:08

## 2018-07-06 RX ADMIN — LIDOCAINE HYDROCHLORIDE 15 ML: 20 SOLUTION ORAL; TOPICAL at 10:14

## 2018-07-06 NOTE — DIABETES MGMT
DTC Progress Note    Recommendations/ Comments: Please consider beginning humalog 10 units ac tid or increasing amaryl to 2mg ac b/d - noted diet resumed this morning. BG remains consisently >180. Current hospital DM medications: Lantus 35 units before lunch, lispro correction - normal sensitivity , glimepiride 1 mg twice daily before meals      Chart reviewed on Hans Crawford for hyperglyecmia. A1c:   Lab Results   Component Value Date/Time    Hemoglobin A1c 8.4 (H) 06/28/2018 03:23 AM           Recent Glucose Results:   Lab Results   Component Value Date/Time     (H) 07/06/2018 03:55 AM    GLUCPOC 202 (H) 07/06/2018 11:44 AM    GLUCPOC 184 (H) 07/06/2018 07:06 AM    GLUCPOC 381 (H) 07/05/2018 08:38 PM        Lab Results   Component Value Date/Time    Creatinine 1.03 07/06/2018 03:55 AM     Estimated Creatinine Clearance: 93.5 mL/min (based on Cr of 1.03). Active Orders   Diet    DIET CARDIAC Regular        PO intake:   Patient Vitals for the past 72 hrs:   % Diet Eaten   07/04/18 1457 50 %   07/04/18 0929 95 %   07/03/18 1352 25 %       Will continue to follow as needed.     Thank you,     Lester Arredondo, [address], [address]  Office: [phone]

## 2018-07-06 NOTE — PROGRESS NOTES
Physical Therapy Note  Chart reviewed. Noted pt currently HARRY for MELA and cardioversion. Will defer and follow back for PT intervention once medically appropriate.     Thank you,  Siena Dickson, PT, DPT

## 2018-07-06 NOTE — PROGRESS NOTES
TRANSFER - IN REPORT:    Verbal report received from DANNY Antoine RN on Hans Crawford  being received from Cath Lab for routine progression of care. Report consisted of patients Situation, Background, Assessment and Recommendations(SBAR). Information from the following report(s) SBAR, Kardex, OR Summary, Procedure Summary, Intake/Output and MAR was reviewed with the receiving clinician. Opportunity for questions and clarification was provided. Assessment completed upon patients arrival to [address] [place] [address] and care assumed. Cardiac Cath Lab Recovery Arrival Note:    Hans Crawford arrived to [facility] recovery area. Patient procedure= MELA CV. Patient on cardiac monitor, non-invasive blood pressure, SPO2 monitor. O2 @ 6 lpm via NC.  IV  Saline lock. Patient status doing well with some problems : sarah and hypotensive. Patient is A&Ox 0. Patient reports visually no pain. PROCEDURE SITE CHECK:    Procedure site:no procedure site, no pain/discomfort reported at procedure site. No change in patient status. Continue to monitor patient and status.

## 2018-07-06 NOTE — PROGRESS NOTES
Problem: Mobility Impaired (Adult and Pediatric)  Goal: *Acute Goals and Plan of Care (Insert Text)  Physical Therapy Goals  Initiated 6/29/2018  1. Patient will move from supine to sit and sit to supine , scoot up and down and roll side to side in bed with independence within 7 day(s). 2.  Patient will transfer from bed to chair and chair to bed with independence using the least restrictive device within 7 day(s). 3.  Patient will perform sit to stand with independence within 7 day(s). 4.  Patient will ambulate with independence for 200 feet with the least restrictive device within 7 day(s). 5.  Patient will ascend/descend 5 stairs with no handrail(s) with independence within 7 day(s). physical Therapy TREATMENT/DISCHARGE  Patient: Andree Luis (19 y.o. male)  Date: 7/6/2018  Diagnosis: ST elevation (STEMI) myocardial infarction [facility])  STEMI involving left circumflex coronary artery ([facility] [address] <principal problem not specified>       Precautions:    Chart, physical therapy assessment, plan of care and goals were reviewed. ASSESSMENT:  Pt received supine in bed with wife present and agreeable to PT intervention. Pt cleared by nursing for mobility. Pt with good progress towards therapy goals this date. Pt with MELA and cardioversion this AM. HR 84 bpm at rest. All functional mobility performed independently, including bed mobility, transfers, and gait. He ambulated 430 ft independently, demonstrating slow, but steady gait throughout. No c/o SOB or pain and HR 90s bpm with mobility. Pt declined stair training this date, however demonstrated good safety awareness on steps during prior therapy session. Educated pt on having wife present when initially entering home post-discharge; pt verbalized understanding. Pt was left supine in bed with all needs met, RN aware, and wife present. Recommend pt return home with family support at discharge. Further skilled acute PT not warranted. Will sign off.      Recommend with nursing patient to complete as able in order to maintain strength, endurance and independence: OOB to chair 3x/day and walking daily with family assist. Thank you for your assistance. Progression toward goals:  [x]      Improving appropriately and progressing toward goals  []      Improving slowly and progressing toward goals  []      Not making progress toward goals and plan of care will be adjusted     PLAN:  Patient will be discharged from physical therapy at this time. Rationale for discharge:  [x] Goals Achieved  [] [address]  [] Patient not participating in therapy  [] Other:  Discharge Recommendations:  None  Further Equipment Recommendations for Discharge:  None     SUBJECTIVE:   Patient stated I guess this procedure helped the shortness of breath.     OBJECTIVE DATA SUMMARY:   Critical Behavior:  Neurologic State: Alert  Orientation Level: Oriented X4  Cognition: Appropriate decision making, Appropriate for age attention/concentration, Appropriate safety awareness  Safety/Judgement: Awareness of environment, Fall prevention, Home safety  Functional Mobility Training:  Bed Mobility:     Supine to Sit: Independent  Sit to Supine: Independent  Scooting: Independent        Transfers:  Sit to Stand: Independent  Stand to Sit: Independent        Bed to Chair: Independent                    Balance:  Sitting: Intact  Standing: Intact  Standing - Static: Good  Standing - Dynamic : Good  Ambulation/Gait Training:  Distance (ft): 430 Feet (ft)  Assistive Device: Gait belt  Ambulation - Level of Assistance: Independent                 Base of Support: Widened     Speed/Maria Antonia: Slow  Functional Measure:  Barthel Index:    Bathin  Bladder: 10  Bowels: 10  Groomin  Dressing: 10  Feeding: 10  Mobility: 15  Stairs: 5  Toilet Use: 10  Transfer (Bed to Chair and Back): 15  Total: 95       Barthel and G-code impairment scale:  Percentage of impairment CH  0% CI  1-19% CJ  20-39% CK  40-59% CL  60-79% CM  80-99% CN  100%   Barthel Score 0-100 100 99-80 79-60 59-40 20-39 1-19   0   Barthel Score 0-20 20 17-19 13-16 9-12 5-8 1-4 0      The Barthel ADL Index: Guidelines  1. The index should be used as a record of what a patient does, not as a record of what a patient could do. 2. The main aim is to establish degree of independence from any help, physical or verbal, however minor and for whatever reason. 3. The need for supervision renders the patient not independent. 4. A patient's performance should be established using the best available evidence. Asking the patient, friends/relatives and nurses are the usual sources, but direct observation and common sense are also important. However direct testing is not needed. 5. Usually the patient's performance over the preceding 24-48 hours is important, but occasionally longer periods will be relevant. 6. Middle categories imply that the patient supplies over 50 per cent of the effort. 7. Use of aids to be independent is allowed. Anthony Carnes., Barthel, D.W. (8647). Functional evaluation: the Barthel Index. [address] [facility] (14)2. Sandria Cogan tata JOSE Castillo, Elinor Lynne, Sandra Blackburn, Sunny, [address] [place] [address] (1999). Measuring the change indisability after inpatient rehabilitation; comparison of the responsiveness of the Barthel Index and Functional [place] Measure. Journal of Neurology, Neurosurgery, and Psychiatry, 66(4), 941-124. [place], N.[place].A, GISELA Head, & Una Mora M.A. (2004.) Assessment of post-stroke quality of life in cost-effectiveness studies: The usefulness of the Barthel Index and the EuroQoL-5D. Quality of Life Research, 13, 222-83       G codes: In compliance with CMSs Claims Based Outcome Reporting, the following G-code set was chosen for this patient based on their primary functional limitation being treated:     The outcome measure chosen to determine the severity of the functional limitation was the Barthel Index with a score of 95/100 which was correlated with the impairment scale. ? Mobility - Walking and Moving Around:     - CURRENT STATUS: CI - 1%-19% impaired, limited or restricted    - GOAL STATUS: CH - 0% impaired, limited or restricted    - D/C STATUS:  CI - 1%-19% impaired, limited or restricted     Pain:  Pain Scale 1: Visual  Pain Intensity 1: 0              Activity Tolerance:   Good - pt without complaints; no SOB; HR 80-90s BPM with activity  Please refer to the flowsheet for vital signs taken during this treatment.   After treatment:   [] Patient left in no apparent distress sitting up in chair  [x] Patient left in no apparent distress in bed  [x] Call bell left within reach  [x] Nursing notified  [x] Caregiver present  [] Bed alarm activated    COMMUNICATION/COLLABORATION:   The patients plan of care was discussed with: Physical Therapist and Registered Nurse    Joan Eng, PT, DPT   Time Calculation: 13 mins

## 2018-07-06 NOTE — PROGRESS NOTES
1036: TRANSFER - IN REPORT:    Verbal report received from Odalys, [address] [facility] (name) on Socrates Greenberg  being received from Cath Lab(unit) for routine progression of care      Report consisted of patients Situation, Background, Assessment and   Recommendations(SBAR). Information from the following report(s) Procedure Summary and Cardiac Rhythm NSR was reviewed with the receiving nurse. Opportunity for questions and clarification was provided. Assessment completed upon patients arrival to unit and care assumed. 1040: Patient back to the floor. A/o x4 and drowsy/ .    1047: Paged Dr. Myranda Bang in order to see if patient can resume diet. Awaiting call back. 1050: MD placed patient on a cardiac diet. 1900:   Bedside shift change report given to PARTH Morrow (oncoming nurse). Report included the following information SBAR and Kardex. SHIFT SUMMARY:            [address] NURSING NOTE   Admission Date 6/27/2018   Admission Diagnosis ST elevation (STEMI) myocardial infarction [facility])  STEMI involving left circumflex coronary artery (HCC)   Consults None      Cardiac Monitoring [x] Yes [] No      Purposeful Hourly Rounding [x] Yes    Chastity Score Total Score: 1   Chastity score 3 or > [x] Bed Alarm [] Avasys [] 1:1 sitter [] Patient refused (Signed refusal form in chart)   David Score David Score: 22   David score 14 or < [] PMT consult [] Wound Care consult    []  Specialty bed  [] Nutrition consult      Influenza Vaccine Received Flu Vaccine for Current Season (usually Sept-March): Not Flu Season           Oxygen needs? [x] Room air Oxygen @  []1L    []2L    []3L   []4L    []5L   []6L via  NC   Chronic home O2 use?  [] Yes [x] No  Perform O2 challenge test and document in progress note using smartphrase (.Homeoxygen)      Last bowel movement Last Bowel Movement Date: 07/05/18      Urinary Catheter [REMOVED] Urinary Catheter 06/27/18 2- way-Indications for Use: Accurate measurement of urinary output     [REMOVED] Urinary Catheter 06/27/18 2- way-Urine Output (mL): 100 ml     LDAs               Peripheral IV 07/02/18 Right;Upper Forearm (Active)   Site Assessment Clean, dry, & intact 7/6/2018  3:00 PM   Phlebitis Assessment 0 7/6/2018  3:00 PM   Infiltration Assessment 0 7/6/2018  3:00 PM   Dressing Status Clean, dry, & intact 7/6/2018  3:00 PM   Dressing Type Transparent 7/6/2018  3:00 PM   Hub Color/Line Status Blue;Flushed 7/6/2018  3:00 PM   Action Taken Other (comment) 7/2/2018 11:15 AM                         Readmission Risk Assessment Tool Score Medium Risk            15       Total Score        3 Has Seen PCP in Last 6 Months (Yes=3, No=0)    2 . Living with Significant Other. Assisted Living. LTAC. SNF. or   Rehab    3 Patient Length of Stay (>5 days = 3)    4 IP Visits Last 12 Months (1-3=4, 4=9, >4=11)    3 Charlson Comorbidity Score (Age + Comorbid Conditions)        Criteria that do not apply:    Pt.  Coverage (Medicare=5 , Medicaid, or Self-Pay=4)       Expected Length of Stay 29d 0h   Actual Length of Stay 9

## 2018-07-06 NOTE — PROCEDURES
[id number] [facility]    Reba Bartholomew  MR#: [id number]  : 1960  ACCOUNT #: [de-identified]   DATE OF SERVICE: 2018    SURGEON:  Harpal Lucio MD    PROCEDURE:    1.  Cardioversion. 2.  Sedation. Sedation was administered by cath lab staff and I supervised them as they monitored the patient for the duration of the procedure. Duration is 10 minutes. SEDATION: Versed and fentanyl. INDICATION:  Persistent atrial flutter. ESTIMATED BLOOD LOSS:  None. SPECIMENS:  None. COMPLICATIONS:  None. PROCEDURE:  After transesophageal echocardiogram showed no evidence of intracardiac thrombus, the patient was sedated and received a 200 joule biphasic shock which converted atrial fibrillation to sinus rhythm. CONCLUSION:  Successful cardioversion of atrial fibrillation to sinus rhythm.       Sukhwinder Nelson MD       SR / RN  D: 2018 11:57     T: 2018 12:24  JOB #: [id number]

## 2018-07-06 NOTE — PROGRESS NOTES
0700: Bedside shift change report given to Anaya Ojeda RN (oncoming nurse) by Alena Christy RN (offgoing nurse). Report included the following information SBAR, Kardex and ED Summary. 4415: Patient HARRY to CCL. Transport did not call prior to taking patient down to procedure.

## 2018-07-06 NOTE — PROGRESS NOTES
Progress Note      7/6/2018   NAME: Krissy Esposito   MRN:  [id number]   Admit Diagnosis: ST elevation (STEMI) myocardial infarction [facility])  STEMI involving left circumflex coronary artery [facility])                          Assessment:                 1. Chest pain initially NSTEMI, while at ER at [facility] developed complete heart block, hypotension, with repeat ECG showing inferior STEMI, cardiogenic shock, acute systolic chf, acute kidney injury, acute liver failure  2. Cath 6/2018 with 90% prox LAD, 100% prox dominant LCx, Small disease non-dominant RCA. PCI with stent to OM, Bifurcation stent to prox LCx and OM, PCI with stent to prox LAD  3. Ischemic cardiomyopathy EF 35%, inferior hypokinesis, Echo 7/2018 EF 50%, mild MR  4. Sinus with transient complete heart block, transient PAF and also atrial flutter  5. HTN  6. DM type 2  7. Dyslipidemia  8. Recent colitis with GI bleed s/p endoscopy with Dr. Beti Ramirez  9. Hx of tonsillectomy  10. Hx of hernia repair  11. Hx of basal cell ca removal  12. Quit tobacco in '94  13. , wife is  for medical practice in [address] neck, he works on boat, active                               Plan:                  Inferior STEMI secondary to LCx. OM, LCx bifurcation, LAD stented SHAUGFTA. Acute systolic chf EF 01%, slowly improving with diuresis. Transient complete heart block, paroxysmal afib, currently in atrial flutter. Cardiogenic shock, impela and levophed weaned off. Hypoxemic resp failure, extubated, still with some volume overload, some orthopnea at night, difficult to diurese. Acute kidney injury improved. Acute liver failure improved. Possible pneumonia/bronchitis on emperic Abx. Mild decrease in Hg dilutional, no hematoma, no signs of bleeding at this time. Lower ext doppler with no DVT.     1. Cont added eliquis 2.5mg bidCont Added aspirin  2. Change added brilinta to clopidogrel, unclear if contributing to dyspnea  3.  Cont added amiodaroen 200mg daily, will not continue long term  4. Cont coreg 3.125mg po bid  5. Holding losartan HCT for now, consider low dose losartan 12.5mg soon  6. Decrease added lasix 80mg to 60mg  iv bid, follow bmp, hopefully change to po saturday  7. Cont KCL  8. Cont added sprionolactone 25mg daily  9. Cont added statin  10. Restart glimeperide, cont lantus, holding trulicity and metformin, Insulin sliding scale     Impella site healed. Currently off O2. PT/OT, ambulating    Plan for MELA/CV friday       [x]        High complexity decision making was performed       Subjective:     Chencho Nuno no chest pain, mild dyspnea on exertion, cough improving. Discussed with RN events overnight. Review of Systems:    Symptom Y/N Comments  Symptom Y/N Comments   Fever/Chills N   Chest Pain     Poor Appetite N   Edema N    Cough Y   Abdominal Pain N    Sputum Y   Joint Pain N    SOB/MAGALLANES Y   Pruritis/Rash N    Nausea/vomit N   Tolerating PT/OT     Diarrhea N   Tolerating Diet     Constipation N   Other       Could NOT obtain due to:      Objective:      Physical Exam:    Last 24hrs VS reviewed since prior progress note. Most recent are:    Visit Vitals    /70 (BP 1 Location: Left arm, BP Patient Position: Sitting)    Pulse 70    Temp 97.8 °F (36.6 °C)    Resp 18    Ht 5' 10\" (1.778 m)    Wt 107.2 kg (236 lb 6.4 oz)    SpO2 96%    BMI 33.92 kg/m2       Intake/Output Summary (Last 24 hours) at 07/06/18 0758  Last data filed at 07/05/18 1021   Gross per 24 hour   Intake                0 ml   Output              475 ml   Net             -475 ml        General Appearance: Well developed, well nourished, able to follow commands, A + O x 3, no acute distress. Ears/Nose/Mouth/Throat: Hearing grossly normal.  No abnormalities. Neck: Supple, nontender, no masses. Chest: Lungs clear to auscultation bilaterally. Unlabored. Symmetric air movement.   Cardiovascular: Irregular rate and rhythm, S1S2 normal, no murmur, rub, or gallop. Abdomen: Soft, non-tender, bowel sounds are active. No pulsatile masses. Extremities: Trace edema bilaterally, R> L. No cyanosis or clubbing. Skin: Warm and dry. No jaundice, petechiae, purpura. Neurologic:  No tremor. Grossly nonfocal exam.    PMH/SH reviewed - no change compared to H&P    Data Review    Telemetry:  Atrial flutter with variable block. Lab Data Personally Reviewed:    No results for input(s): WBC, HGB, HCT, PLT, HGBEXT, HCTEXT, PLTEXT, HGBEXT, HCTEXT, PLTEXT in the last 72 hours. No results for input(s): INR, PTP, APTT in the last 72 hours. No lab exists for component: Flavio Pelayo   Recent Labs      07/06/18   0355  07/05/18   0528  07/04/18   0616   NA  136  135*  138   K  3.9  3.7  3.7   CL  103  100  101   CO2  23  26  27   BUN  23*  25*  24*   CREA  1.03  1.12  1.02   GLU  240*  229*  179*   CA  8.8  9.3  9.0   MG   --    --   2.2     No results for input(s): CPK, CKNDX, TROIQ in the last 72 hours. No lab exists for component: CPKMB  Lab Results   Component Value Date/Time    Cholesterol, total 135 06/28/2018 03:23 AM    HDL Cholesterol 34 06/28/2018 03:23 AM    LDL, calculated 64.4 06/28/2018 03:23 AM    Triglyceride 183 (H) 06/28/2018 03:23 AM    CHOL/HDL Ratio 4.0 06/28/2018 03:23 AM       No results for input(s): SGOT, GPT, AP, TBIL, TP, ALB, GLOB, GGT, AML, LPSE in the last 72 hours. No lab exists for component: AMYP, HLPSE  No results for input(s): PH, PCO2, PO2 in the last 72 hours.     Medications Personally Reviewed:    Current Facility-Administered Medications   Medication Dose Route Frequency    potassium chloride SR (KLOR-CON 10) tablet 10 mEq  10 mEq Oral BID    furosemide (LASIX) injection 60 mg  60 mg IntraVENous BID    insulin glargine (LANTUS) injection 35 Units  35 Units SubCUTAneous ACL    clopidogrel (PLAVIX) tablet 75 mg  75 mg Oral DAILY    LORazepam (ATIVAN) tablet 0.5 mg  0.5 mg Oral Q6H PRN    apixaban (ELIQUIS) tablet 2.5 mg  2.5 mg Oral BID    amiodarone (CORDARONE) tablet 200 mg  200 mg Oral DAILY    spironolactone (ALDACTONE) tablet 25 mg  25 mg Oral DAILY    acetaminophen (TYLENOL) tablet 650 mg  650 mg Oral Q6H PRN    famotidine (PEPCID) tablet 20 mg  20 mg Oral BID    glimepiride (AMARYL) tablet 1 mg  1 mg Oral ACB&D    insulin lispro (HUMALOG) injection   SubCUTAneous AC&HS    guaiFENesin ER (MUCINEX) tablet 600 mg  600 mg Oral Q12H    ondansetron (ZOFRAN) injection 4 mg  4 mg IntraVENous Q6H PRN    carvedilol (COREG) tablet 3.125 mg  3.125 mg Oral BID WITH MEALS    aspirin chewable tablet 81 mg  81 mg Oral DAILY    oxyCODONE IR (ROXICODONE) tablet 5 mg  5 mg Oral Q4H PRN    atorvastatin (LIPITOR) tablet 40 mg  40 mg Oral DAILY    saline peripheral flush soln 10 mL  10 mL InterCATHeter TID    saline peripheral flush soln 5 mL  5 mL InterCATHeter PRN    glucose chewable tablet 16 g  4 Tab Oral PRN    dextrose (D50W) injection syrg 12.5-25 g  12.5-25 g IntraVENous PRN    glucagon (GLUCAGEN) injection 1 mg  1 mg IntraMUSCular PRN    chlorhexidine (PERIDEX) 0.12 % mouthwash 15 mL  15 mL Oral Q12H         Ruben Smith MD

## 2018-07-06 NOTE — PROGRESS NOTES
Problem: Falls - Risk of  Goal: *Absence of Falls  Document Chastity Fall Risk and appropriate interventions in the flowsheet. Outcome: Progressing Towards Goal  Fall Risk Interventions:  Mobility Interventions: Communicate number of staff needed for ambulation/transfer    Mentation Interventions: Adequate sleep, hydration, pain control, Family/sitter at bedside    Medication Interventions: Evaluate medications/consider consulting pharmacy    Elimination Interventions: Call light in reach             Problem: Patient Education: Go to Patient Education Activity  Goal: Patient/Family Education  Outcome: Progressing Towards Goal  No safety event overnight. Problem: Pressure Injury - Risk of  Goal: *Prevention of pressure injury  Document David Scale and appropriate interventions in the flowsheet. Outcome: Progressing Towards Goal  Pressure Injury Interventions:  Sensory Interventions: Check visual cues for pain    Moisture Interventions: Maintain skin hydration (lotion/cream), Minimize layers    Activity Interventions: Increase time out of bed    Mobility Interventions: HOB 30 degrees or less    Nutrition Interventions: Offer support with meals,snacks and hydration    Friction and Shear Interventions: Lift sheet, Minimize layers               Problem: Patient Education: Go to Patient Education Activity  Goal: Patient/Family Education  Outcome: Progressing Towards Goal  Pt received 2 doses of PO Ativan to facilitate sleep prior to MELA and probable cardioversion tentatively scheduled for 1145. Pt able to sleep better than he has been able to do in the last several nights. Pt's wife at the bedside. Pt easily aroused and able to focus when necessary.

## 2018-07-06 NOTE — PROGRESS NOTES
Pt's blood glucose 381. Per MD ISREAL to be notified. Called on call physician for Dr. Lizzeth Mckoy.

## 2018-07-07 LAB
ANION GAP SERPL CALC-SCNC: 7 MMOL/L (ref 5–15)
BUN SERPL-MCNC: 23 MG/DL (ref 6–20)
BUN/CREAT SERPL: 19 (ref 12–20)
CALCIUM SERPL-MCNC: 9.2 MG/DL (ref 8.5–10.1)
CHLORIDE SERPL-SCNC: 100 MMOL/L (ref 97–108)
CO2 SERPL-SCNC: 27 MMOL/L (ref 21–32)
CREAT SERPL-MCNC: 1.19 MG/DL (ref 0.7–1.3)
GLUCOSE BLD STRIP.AUTO-MCNC: 210 MG/DL (ref 65–100)
GLUCOSE BLD STRIP.AUTO-MCNC: 245 MG/DL (ref 65–100)
GLUCOSE BLD STRIP.AUTO-MCNC: 256 MG/DL (ref 65–100)
GLUCOSE BLD STRIP.AUTO-MCNC: 301 MG/DL (ref 65–100)
GLUCOSE SERPL-MCNC: 249 MG/DL (ref 65–100)
POTASSIUM SERPL-SCNC: 4.2 MMOL/L (ref 3.5–5.1)
SERVICE CMNT-IMP: ABNORMAL
SODIUM SERPL-SCNC: 134 MMOL/L (ref 136–145)

## 2018-07-07 PROCEDURE — 80048 BASIC METABOLIC PNL TOTAL CA: CPT | Performed by: INTERNAL MEDICINE

## 2018-07-07 PROCEDURE — 74011636637 HC RX REV CODE- 636/637: Performed by: INTERNAL MEDICINE

## 2018-07-07 PROCEDURE — 74011250637 HC RX REV CODE- 250/637: Performed by: INTERNAL MEDICINE

## 2018-07-07 PROCEDURE — 74011250636 HC RX REV CODE- 250/636: Performed by: INTERNAL MEDICINE

## 2018-07-07 PROCEDURE — 36415 COLL VENOUS BLD VENIPUNCTURE: CPT | Performed by: INTERNAL MEDICINE

## 2018-07-07 PROCEDURE — 65660000000 HC RM CCU STEPDOWN

## 2018-07-07 PROCEDURE — 82962 GLUCOSE BLOOD TEST: CPT

## 2018-07-07 RX ORDER — FUROSEMIDE 80 MG/1
80 TABLET ORAL
Status: DISCONTINUED | OUTPATIENT
Start: 2018-07-07 | End: 2018-07-09 | Stop reason: HOSPADM

## 2018-07-07 RX ADMIN — SPIRONOLACTONE 25 MG: 25 TABLET, FILM COATED ORAL at 08:46

## 2018-07-07 RX ADMIN — AMIODARONE HYDROCHLORIDE 200 MG: 200 TABLET ORAL at 08:45

## 2018-07-07 RX ADMIN — ATORVASTATIN CALCIUM 40 MG: 40 TABLET, FILM COATED ORAL at 08:45

## 2018-07-07 RX ADMIN — INSULIN LISPRO 5 UNITS: 100 INJECTION, SOLUTION INTRAVENOUS; SUBCUTANEOUS at 17:58

## 2018-07-07 RX ADMIN — Medication 10 ML: at 15:30

## 2018-07-07 RX ADMIN — FAMOTIDINE 20 MG: 20 TABLET, FILM COATED ORAL at 08:44

## 2018-07-07 RX ADMIN — FAMOTIDINE 20 MG: 20 TABLET, FILM COATED ORAL at 17:58

## 2018-07-07 RX ADMIN — INSULIN LISPRO 3 UNITS: 100 INJECTION, SOLUTION INTRAVENOUS; SUBCUTANEOUS at 08:44

## 2018-07-07 RX ADMIN — GLIMEPIRIDE 1 MG: 1 TABLET ORAL at 17:58

## 2018-07-07 RX ADMIN — APIXABAN 2.5 MG: 2.5 TABLET, FILM COATED ORAL at 17:58

## 2018-07-07 RX ADMIN — GUAIFENESIN 600 MG: 600 TABLET, EXTENDED RELEASE ORAL at 08:46

## 2018-07-07 RX ADMIN — GUAIFENESIN 600 MG: 600 TABLET, EXTENDED RELEASE ORAL at 21:49

## 2018-07-07 RX ADMIN — CHLORHEXIDINE GLUCONATE 15 ML: 1.2 RINSE ORAL at 21:49

## 2018-07-07 RX ADMIN — INSULIN LISPRO 7 UNITS: 100 INJECTION, SOLUTION INTRAVENOUS; SUBCUTANEOUS at 12:40

## 2018-07-07 RX ADMIN — APIXABAN 2.5 MG: 2.5 TABLET, FILM COATED ORAL at 08:46

## 2018-07-07 RX ADMIN — LORAZEPAM 0.5 MG: 0.5 TABLET ORAL at 17:56

## 2018-07-07 RX ADMIN — FUROSEMIDE 60 MG: 10 INJECTION, SOLUTION INTRAMUSCULAR; INTRAVENOUS at 08:43

## 2018-07-07 RX ADMIN — CLOPIDOGREL BISULFATE 75 MG: 75 TABLET ORAL at 08:45

## 2018-07-07 RX ADMIN — POTASSIUM CHLORIDE 10 MEQ: 750 TABLET, EXTENDED RELEASE ORAL at 08:47

## 2018-07-07 RX ADMIN — CARVEDILOL 3.12 MG: 3.12 TABLET, FILM COATED ORAL at 08:46

## 2018-07-07 RX ADMIN — ASPIRIN 81 MG 81 MG: 81 TABLET ORAL at 08:45

## 2018-07-07 RX ADMIN — INSULIN GLARGINE 35 UNITS: 100 INJECTION, SOLUTION SUBCUTANEOUS at 12:40

## 2018-07-07 RX ADMIN — Medication 10 ML: at 21:50

## 2018-07-07 RX ADMIN — FUROSEMIDE 80 MG: 80 TABLET ORAL at 17:57

## 2018-07-07 RX ADMIN — CARVEDILOL 3.12 MG: 3.12 TABLET, FILM COATED ORAL at 17:57

## 2018-07-07 RX ADMIN — Medication 10 ML: at 08:47

## 2018-07-07 RX ADMIN — GLIMEPIRIDE 1 MG: 1 TABLET ORAL at 08:44

## 2018-07-07 RX ADMIN — POTASSIUM CHLORIDE 10 MEQ: 750 TABLET, EXTENDED RELEASE ORAL at 17:58

## 2018-07-07 RX ADMIN — INSULIN LISPRO 2 UNITS: 100 INJECTION, SOLUTION INTRAVENOUS; SUBCUTANEOUS at 21:49

## 2018-07-07 NOTE — PROGRESS NOTES
0700: Bedside shift change report given to [facility]. RN (oncoming nurse) by Trilby Cabot. RN (offgoing nurse). Report included the following information SBAR, Kardex and ED Summary.

## 2018-07-07 NOTE — PROGRESS NOTES
Progress Note 7/7/2018 NAME: Sulma Montgomery MRN:  [id number] Admit Diagnosis: ST elevation (STEMI) myocardial infarction ([facility] [address] STEMI involving left circumflex coronary artery ([facility] [address] Assessment:             
  
1. Chest pain initially NSTEMI, while at ER at [facility] developed complete heart block, hypotension, with repeat ECG showing inferior STEMI, cardiogenic shock, acute systolic chf, acute kidney injury, acute liver failure 2. Cath 6/2018 with 90% prox LAD, 100% prox dominant LCx, Small disease non-dominant RCA. PCI with stent to OM, Bifurcation stent to prox LCx and OM, PCI with stent to prox LAD 3. Ischemic cardiomyopathy EF 35%, inferior hypokinesis, Echo 7/2018 EF 50%, mild MR 
4. Sinus with transient complete heart block, transient PAF and also atrial flutter 5. HTN 6. DM type 2 
7. Dyslipidemia 8. Recent colitis with GI bleed s/p endoscopy with Dr. Shai Paris 9. Hx of tonsillectomy 10. Hx of hernia repair 11. Hx of basal cell ca removal 
12. Quit tobacco in '94 
13. , wife is  for medical practice in [employer], he works on boat, active    
  
                      Plan:              
  
Inferior STEMI secondary to LCx. OM, LCx bifurcation, LAD stented SHAGUFTA. Acute systolic chf EF 28%, slowly improving with diuresis. Transient complete heart block, paroxysmal afib, currently in atrial flutter. Cardiogenic shock, impela and levophed weaned off. Hypoxemic resp failure, extubated, still with some volume overload, some orthopnea at night, difficult to diurese. Acute kidney injury improved. Acute liver failure improved. Possible pneumonia/bronchitis on emperic Abx. Mild decrease in Hg dilutional, no hematoma, no signs of bleeding at this time. Lower ext doppler with no DVT. 7/7:  Feels much better after DCCV. Lays flat. Ambulating. KAREL improved. SOB markedly improved. 
  
1. Cont added eliquis 2.5mg bid 2.  Cont Added aspirin 3. Change added brilinta to clopidogrel, unclear if contributing to dyspnea 4. Cont added amiodarone 200mg daily, will not continue long term 5. Cont coreg 3.125mg po bid 6. Holding losartan HCT for now, consider low dose losartan 12.5mg soon 7. Change IV lasix to oral; 80mg BID. 8. Cont KCL 9. Cont added sprionolactone 25mg daily 10. Cont added statin 11. Restart glimeperide, cont lantus, holding trulicity and metformin, Insulin sliding scale 
  
Impella site healed. Currently off O2. PT/OT, ambulating 
  
  
 [x]        High complexity decision making was performed 
  
 
Subjective:  
 
Socrates Greenberg no chest pain. Discussed with RN events overnight. Review of Systems: 
 
Symptom Y/N Comments  Symptom Y/N Comments Fever/Chills N   Chest Pain Poor Appetite N   Edema N   
Cough Y   Abdominal Pain N Sputum Y   Joint Pain N   
SOB/MAGALLANES Y   Pruritis/Rash N   
Nausea/vomit N   Tolerating PT/OT Diarrhea N   Tolerating Diet Constipation N   Other Could NOT obtain due to:   
 
Objective:  
  
Physical Exam: 
 
Last 24hrs VS reviewed since prior progress note. Most recent are: 
 
Visit Vitals  /67 (BP 1 Location: Left arm, BP Patient Position: At rest)  Pulse 72  Temp 98 °F (36.7 °C)  Resp 18  Ht 5' 10\" (1.778 m)  Wt 107.5 kg (237 lb)  SpO2 95%  BMI 34.01 kg/m2 Intake/Output Summary (Last 24 hours) at 07/07/18 1254 Last data filed at 07/06/18 2037 Gross per 24 hour Intake                0 ml Output             1150 ml Net            -1150 ml General Appearance: Well developed, well nourished, able to follow commands, A + O x 3, no acute distress. Ears/Nose/Mouth/Throat: Hearing grossly normal.  No abnormalities. Neck: Supple, nontender, no masses. Chest: Lungs clear to auscultation bilaterally. Unlabored. Symmetric air movement. Cardiovascular: Irregular rate and rhythm, S1S2 normal, no murmur, rub, or gallop.  
Abdomen: Soft, non-tender, bowel sounds are active. No pulsatile masses. Extremities: 1+ edema RLE. No cyanosis or clubbing. Skin: Warm and dry. No jaundice, petechiae, purpura. Neurologic:  No tremor. Grossly nonfocal exam. 
 
PMH/SH reviewed - no change compared to H&P Data Review Telemetry:  SR 
 
Lab Data Personally Reviewed: 
 
No results for input(s): WBC, HGB, HCT, PLT, HGBEXT, HCTEXT, PLTEXT, HGBEXT, HCTEXT, PLTEXT in the last 72 hours. No results for input(s): INR, PTP, APTT in the last 72 hours. No lab exists for component: INREXT, INREXT Recent Labs  
   07/07/18 
 0347  07/06/18 
 0355  07/05/18 
 4399 NA  134*  136  135* K  4.2  3.9  3.7 CL  100  103  100 CO2  27  23  26 BUN  23*  23*  25* CREA  1.19  1.03  1.12  
GLU  249*  240*  229* CA  9.2  8.8  9.3 No results for input(s): CPK, CKNDX, TROIQ in the last 72 hours. No lab exists for component: CPKMB Lab Results Component Value Date/Time Cholesterol, total 135 06/28/2018 03:23 AM  
 HDL Cholesterol 34 06/28/2018 03:23 AM  
 LDL, calculated 64.4 06/28/2018 03:23 AM  
 Triglyceride 183 (H) 06/28/2018 03:23 AM  
 CHOL/HDL Ratio 4.0 06/28/2018 03:23 AM  
 
 
No results for input(s): SGOT, GPT, AP, TBIL, TP, ALB, GLOB, GGT, AML, LPSE in the last 72 hours. No lab exists for component: AMYP, HLPSE No results for input(s): PH, PCO2, PO2 in the last 72 hours. Medications Personally Reviewed: 
 
Current Facility-Administered Medications Medication Dose Route Frequency  potassium chloride SR (KLOR-CON 10) tablet 10 mEq  10 mEq Oral BID  furosemide (LASIX) injection 60 mg  60 mg IntraVENous BID  insulin glargine (LANTUS) injection 35 Units  35 Units SubCUTAneous ACL  clopidogrel (PLAVIX) tablet 75 mg  75 mg Oral DAILY  LORazepam (ATIVAN) tablet 0.5 mg  0.5 mg Oral Q6H PRN  
 apixaban (ELIQUIS) tablet 2.5 mg  2.5 mg Oral BID  
 amiodarone (CORDARONE) tablet 200 mg  200 mg Oral DAILY  spironolactone (ALDACTONE) tablet 25 mg  25 mg Oral DAILY  acetaminophen (TYLENOL) tablet 650 mg  650 mg Oral Q6H PRN  
 famotidine (PEPCID) tablet 20 mg  20 mg Oral BID  
 glimepiride (AMARYL) tablet 1 mg  1 mg Oral ACB&D  
 insulin lispro (HUMALOG) injection   SubCUTAneous AC&HS  
 guaiFENesin ER (MUCINEX) tablet 600 mg  600 mg Oral Q12H  
 ondansetron (ZOFRAN) injection 4 mg  4 mg IntraVENous Q6H PRN  
 carvedilol (COREG) tablet 3.125 mg  3.125 mg Oral BID WITH MEALS  
 aspirin chewable tablet 81 mg  81 mg Oral DAILY  oxyCODONE IR (ROXICODONE) tablet 5 mg  5 mg Oral Q4H PRN  
 atorvastatin (LIPITOR) tablet 40 mg  40 mg Oral DAILY  saline peripheral flush soln 10 mL  10 mL InterCATHeter TID  saline peripheral flush soln 5 mL  5 mL InterCATHeter PRN  
 glucose chewable tablet 16 g  4 Tab Oral PRN  
 dextrose (D50W) injection syrg 12.5-25 g  12.5-25 g IntraVENous PRN  
 glucagon (GLUCAGEN) injection 1 mg  1 mg IntraMUSCular PRN  chlorhexidine (PERIDEX) 0.12 % mouthwash 15 mL  15 mL Oral Q12H Cristal Gallardo III, DO

## 2018-07-08 LAB
ANION GAP SERPL CALC-SCNC: 9 MMOL/L (ref 5–15)
BUN SERPL-MCNC: 21 MG/DL (ref 6–20)
BUN/CREAT SERPL: 19 (ref 12–20)
CALCIUM SERPL-MCNC: 8.8 MG/DL (ref 8.5–10.1)
CHLORIDE SERPL-SCNC: 99 MMOL/L (ref 97–108)
CO2 SERPL-SCNC: 27 MMOL/L (ref 21–32)
CREAT SERPL-MCNC: 1.11 MG/DL (ref 0.7–1.3)
GLUCOSE BLD STRIP.AUTO-MCNC: 181 MG/DL (ref 65–100)
GLUCOSE BLD STRIP.AUTO-MCNC: 247 MG/DL (ref 65–100)
GLUCOSE BLD STRIP.AUTO-MCNC: 285 MG/DL (ref 65–100)
GLUCOSE BLD STRIP.AUTO-MCNC: 317 MG/DL (ref 65–100)
GLUCOSE SERPL-MCNC: 195 MG/DL (ref 65–100)
POTASSIUM SERPL-SCNC: 3.7 MMOL/L (ref 3.5–5.1)
SERVICE CMNT-IMP: ABNORMAL
SODIUM SERPL-SCNC: 135 MMOL/L (ref 136–145)

## 2018-07-08 PROCEDURE — 74011636637 HC RX REV CODE- 636/637: Performed by: INTERNAL MEDICINE

## 2018-07-08 PROCEDURE — 65660000000 HC RM CCU STEPDOWN

## 2018-07-08 PROCEDURE — 80048 BASIC METABOLIC PNL TOTAL CA: CPT | Performed by: INTERNAL MEDICINE

## 2018-07-08 PROCEDURE — 36415 COLL VENOUS BLD VENIPUNCTURE: CPT | Performed by: INTERNAL MEDICINE

## 2018-07-08 PROCEDURE — 74011250637 HC RX REV CODE- 250/637: Performed by: INTERNAL MEDICINE

## 2018-07-08 PROCEDURE — 82962 GLUCOSE BLOOD TEST: CPT

## 2018-07-08 RX ADMIN — FAMOTIDINE 20 MG: 20 TABLET, FILM COATED ORAL at 08:34

## 2018-07-08 RX ADMIN — ASPIRIN 81 MG 81 MG: 81 TABLET ORAL at 08:34

## 2018-07-08 RX ADMIN — Medication 10 ML: at 08:38

## 2018-07-08 RX ADMIN — AMIODARONE HYDROCHLORIDE 200 MG: 200 TABLET ORAL at 08:35

## 2018-07-08 RX ADMIN — GLIMEPIRIDE 1 MG: 1 TABLET ORAL at 08:36

## 2018-07-08 RX ADMIN — INSULIN LISPRO 3 UNITS: 100 INJECTION, SOLUTION INTRAVENOUS; SUBCUTANEOUS at 12:15

## 2018-07-08 RX ADMIN — INSULIN LISPRO 4 UNITS: 100 INJECTION, SOLUTION INTRAVENOUS; SUBCUTANEOUS at 21:11

## 2018-07-08 RX ADMIN — FUROSEMIDE 80 MG: 80 TABLET ORAL at 17:29

## 2018-07-08 RX ADMIN — CLOPIDOGREL BISULFATE 75 MG: 75 TABLET ORAL at 08:34

## 2018-07-08 RX ADMIN — FUROSEMIDE 80 MG: 80 TABLET ORAL at 08:34

## 2018-07-08 RX ADMIN — APIXABAN 2.5 MG: 2.5 TABLET, FILM COATED ORAL at 17:35

## 2018-07-08 RX ADMIN — FAMOTIDINE 20 MG: 20 TABLET, FILM COATED ORAL at 17:33

## 2018-07-08 RX ADMIN — LORAZEPAM 0.5 MG: 0.5 TABLET ORAL at 19:47

## 2018-07-08 RX ADMIN — INSULIN LISPRO 5 UNITS: 100 INJECTION, SOLUTION INTRAVENOUS; SUBCUTANEOUS at 17:29

## 2018-07-08 RX ADMIN — GUAIFENESIN 600 MG: 600 TABLET, EXTENDED RELEASE ORAL at 19:47

## 2018-07-08 RX ADMIN — APIXABAN 2.5 MG: 2.5 TABLET, FILM COATED ORAL at 08:35

## 2018-07-08 RX ADMIN — CARVEDILOL 3.12 MG: 3.12 TABLET, FILM COATED ORAL at 17:32

## 2018-07-08 RX ADMIN — GLIMEPIRIDE 1 MG: 1 TABLET ORAL at 17:29

## 2018-07-08 RX ADMIN — ATORVASTATIN CALCIUM 40 MG: 40 TABLET, FILM COATED ORAL at 08:34

## 2018-07-08 RX ADMIN — SPIRONOLACTONE 25 MG: 25 TABLET, FILM COATED ORAL at 08:35

## 2018-07-08 RX ADMIN — INSULIN GLARGINE 35 UNITS: 100 INJECTION, SOLUTION SUBCUTANEOUS at 12:16

## 2018-07-08 RX ADMIN — POTASSIUM CHLORIDE 10 MEQ: 750 TABLET, EXTENDED RELEASE ORAL at 17:33

## 2018-07-08 RX ADMIN — INSULIN LISPRO 2 UNITS: 100 INJECTION, SOLUTION INTRAVENOUS; SUBCUTANEOUS at 08:33

## 2018-07-08 RX ADMIN — Medication 10 ML: at 17:35

## 2018-07-08 RX ADMIN — CARVEDILOL 3.12 MG: 3.12 TABLET, FILM COATED ORAL at 08:35

## 2018-07-08 RX ADMIN — GUAIFENESIN 600 MG: 600 TABLET, EXTENDED RELEASE ORAL at 08:35

## 2018-07-08 RX ADMIN — POTASSIUM CHLORIDE 10 MEQ: 750 TABLET, EXTENDED RELEASE ORAL at 08:34

## 2018-07-08 RX ADMIN — CHLORHEXIDINE GLUCONATE 15 ML: 1.2 RINSE ORAL at 21:12

## 2018-07-08 NOTE — PROGRESS NOTES
Progress Note 7/8/2018 NAME: Julia Huerta MRN:  [id number] Admit Diagnosis: ST elevation (STEMI) myocardial infarction ([facility] [address] STEMI involving left circumflex coronary artery ([facility] [address] Assessment:             
  
1. Chest pain initially NSTEMI, while at ER at [facility] developed complete heart block, hypotension, with repeat ECG showing inferior STEMI, cardiogenic shock, acute systolic chf, acute kidney injury, acute liver failure 2. Cath 6/2018 with 90% prox LAD, 100% prox dominant LCx, Small disease non-dominant RCA. PCI with stent to OM, Bifurcation stent to prox LCx and OM, PCI with stent to prox LAD 3. Ischemic cardiomyopathy EF 35%, inferior hypokinesis, Echo 7/2018 EF 50%, mild MR 
4. Sinus with transient complete heart block, transient PAF and also atrial flutter 5. HTN 6. DM type 2 
7. Dyslipidemia 8. Recent colitis with GI bleed s/p endoscopy with Dr. Sapna Eisenberg 9. Hx of tonsillectomy 10. Hx of hernia repair 11. Hx of basal cell ca removal 
12. Quit tobacco in '94 
13. , wife is  for medical practice in [employer], he works on boat, active    
  
                      Plan:              
  
Inferior STEMI secondary to LCx. OM, LCx bifurcation, LAD stented SHAGUFTA. Acute systolic chf EF 97%, slowly improving with diuresis. Transient complete heart block, paroxysmal afib, currently in atrial flutter. Cardiogenic shock, impela and levophed weaned off. Hypoxemic resp failure, extubated, still with some volume overload, some orthopnea at night, difficult to diurese. Acute kidney injury improved. Acute liver failure improved. Possible pneumonia/bronchitis on emperic Abx. Mild decrease in Hg dilutional, no hematoma, no signs of bleeding at this time. Lower ext doppler with no DVT. 7/7:  Feels much better after DCCV. Lays flat. Ambulating. KAREL improved. SOB markedly improved. 7/8:  VSS.  sCr stable w/ oral lasix. NAEO.   Wants to go home tomorrow. 
  
1. Cont added eliquis 2.5mg bid 2. Cont Added aspirin 3. Change added brilinta to clopidogrel, unclear if contributing to dyspnea 4. Cont added amiodarone 200mg daily, will not continue long term 5. Cont coreg 3.125mg po bid 6. Holding losartan HCT for now, consider low dose losartan 12.5mg soon 7. Continue lasix PO; 80mg BID. 8. Cont KCL 9. Cont added sprionolactone 25mg daily 10. Cont added statin 11. Restart glimeperide, cont lantus, holding trulicity and metformin, Insulin sliding scale 
  
Impella site healed. Currently off O2. PT/OT, ambulating 
  
  
 [x]        High complexity decision making was performed 
  
 
Subjective:  
 
Nathalie Martins no chest pain. Discussed with RN events overnight. Review of Systems: 
 
Symptom Y/N Comments  Symptom Y/N Comments Fever/Chills N   Chest Pain Poor Appetite N   Edema N   
Cough Y   Abdominal Pain N Sputum Y   Joint Pain N   
SOB/MAGALLAENS Y   Pruritis/Rash N   
Nausea/vomit N   Tolerating PT/OT Diarrhea N   Tolerating Diet Constipation N   Other Could NOT obtain due to:   
 
Objective:  
  
Physical Exam: 
 
Last 24hrs VS reviewed since prior progress note. Most recent are: 
 
Visit Vitals  /78 (BP 1 Location: Left arm, BP Patient Position: Sitting)  Pulse 65  Temp 98.1 °F (36.7 °C)  Resp 18  Ht 5' 10\" (1.778 m)  Wt 107.3 kg (236 lb 8 oz)  SpO2 100%  BMI 33.93 kg/m2 Intake/Output Summary (Last 24 hours) at 07/08/18 1005 Last data filed at 07/08/18 4880 Gross per 24 hour Intake                0 ml Output             1450 ml Net            -1450 ml General Appearance: Well developed, well nourished, able to follow commands, A + O x 3, no acute distress. Ears/Nose/Mouth/Throat: Hearing grossly normal.  No abnormalities. Neck: Supple, nontender, no masses. Chest: Lungs clear to auscultation bilaterally. Unlabored. Symmetric air movement.  
Cardiovascular: Irregular rate and rhythm, S1S2 normal, no murmur, rub, or gallop. Abdomen: Soft, non-tender, bowel sounds are active. No pulsatile masses. Extremities: 1+ edema RLE. No cyanosis or clubbing. Skin: Warm and dry. No jaundice, petechiae, purpura. Neurologic:  No tremor. Grossly nonfocal exam. 
 
PMH/SH reviewed - no change compared to H&P Data Review Telemetry:   
 
Lab Data Personally Reviewed: 
 
No results for input(s): WBC, HGB, HCT, PLT, HGBEXT, HCTEXT, PLTEXT, HGBEXT, HCTEXT, PLTEXT in the last 72 hours. No results for input(s): INR, PTP, APTT in the last 72 hours. No lab exists for component: INREXT, INREXT Recent Labs  
   07/08/18 
 0439  07/07/18 
 0347  07/06/18 
 0355 NA  135*  134*  136  
K  3.7  4.2  3.9 CL  99  100  103 CO2  27  27  23 BUN  21*  23*  23* CREA  1.11  1.19  1.03  
GLU  195*  249*  240* CA  8.8  9.2  8.8 No results for input(s): CPK, CKNDX, TROIQ in the last 72 hours. No lab exists for component: CPKMB Lab Results Component Value Date/Time Cholesterol, total 135 06/28/2018 03:23 AM  
 HDL Cholesterol 34 06/28/2018 03:23 AM  
 LDL, calculated 64.4 06/28/2018 03:23 AM  
 Triglyceride 183 (H) 06/28/2018 03:23 AM  
 CHOL/HDL Ratio 4.0 06/28/2018 03:23 AM  
 
 
No results for input(s): SGOT, GPT, AP, TBIL, TP, ALB, GLOB, GGT, AML, LPSE in the last 72 hours. No lab exists for component: AMYP, HLPSE No results for input(s): PH, PCO2, PO2 in the last 72 hours. Medications Personally Reviewed: 
 
Current Facility-Administered Medications Medication Dose Route Frequency  furosemide (LASIX) tablet 80 mg  80 mg Oral ACB&D  potassium chloride SR (KLOR-CON 10) tablet 10 mEq  10 mEq Oral BID  insulin glargine (LANTUS) injection 35 Units  35 Units SubCUTAneous ACL  clopidogrel (PLAVIX) tablet 75 mg  75 mg Oral DAILY  LORazepam (ATIVAN) tablet 0.5 mg  0.5 mg Oral Q6H PRN  
 apixaban (ELIQUIS) tablet 2.5 mg  2.5 mg Oral BID  amiodarone (CORDARONE) tablet 200 mg  200 mg Oral DAILY  spironolactone (ALDACTONE) tablet 25 mg  25 mg Oral DAILY  acetaminophen (TYLENOL) tablet 650 mg  650 mg Oral Q6H PRN  
 famotidine (PEPCID) tablet 20 mg  20 mg Oral BID  
 glimepiride (AMARYL) tablet 1 mg  1 mg Oral ACB&D  
 insulin lispro (HUMALOG) injection   SubCUTAneous AC&HS  
 guaiFENesin ER (MUCINEX) tablet 600 mg  600 mg Oral Q12H  
 ondansetron (ZOFRAN) injection 4 mg  4 mg IntraVENous Q6H PRN  
 carvedilol (COREG) tablet 3.125 mg  3.125 mg Oral BID WITH MEALS  
 aspirin chewable tablet 81 mg  81 mg Oral DAILY  oxyCODONE IR (ROXICODONE) tablet 5 mg  5 mg Oral Q4H PRN  
 atorvastatin (LIPITOR) tablet 40 mg  40 mg Oral DAILY  saline peripheral flush soln 10 mL  10 mL InterCATHeter TID  saline peripheral flush soln 5 mL  5 mL InterCATHeter PRN  
 glucose chewable tablet 16 g  4 Tab Oral PRN  
 dextrose (D50W) injection syrg 12.5-25 g  12.5-25 g IntraVENous PRN  
 glucagon (GLUCAGEN) injection 1 mg  1 mg IntraMUSCular PRN  chlorhexidine (PERIDEX) 0.12 % mouthwash 15 mL  15 mL Oral Q12H Belem Koch III, DO

## 2018-07-08 NOTE — PROGRESS NOTES
0700: Bedside shift change report given to Desi Murillo RN (oncoming nurse) by Kaushal Marin RN (offgoing nurse). Report included the following information SBAR and Kardex.

## 2018-07-09 VITALS
RESPIRATION RATE: 20 BRPM | SYSTOLIC BLOOD PRESSURE: 120 MMHG | TEMPERATURE: 98.2 F | OXYGEN SATURATION: 96 % | HEIGHT: 70 IN | WEIGHT: 233.69 LBS | DIASTOLIC BLOOD PRESSURE: 68 MMHG | BODY MASS INDEX: 33.46 KG/M2 | HEART RATE: 71 BPM

## 2018-07-09 LAB
ANION GAP SERPL CALC-SCNC: 8 MMOL/L (ref 5–15)
BUN SERPL-MCNC: 18 MG/DL (ref 6–20)
BUN/CREAT SERPL: 16 (ref 12–20)
CALCIUM SERPL-MCNC: 9 MG/DL (ref 8.5–10.1)
CHLORIDE SERPL-SCNC: 99 MMOL/L (ref 97–108)
CO2 SERPL-SCNC: 27 MMOL/L (ref 21–32)
CREAT SERPL-MCNC: 1.14 MG/DL (ref 0.7–1.3)
ERYTHROCYTE [DISTWIDTH] IN BLOOD BY AUTOMATED COUNT: 13 % (ref 11.5–14.5)
GLUCOSE BLD STRIP.AUTO-MCNC: 200 MG/DL (ref 65–100)
GLUCOSE SERPL-MCNC: 226 MG/DL (ref 65–100)
HCT VFR BLD AUTO: 39.3 % (ref 36.6–50.3)
HGB BLD-MCNC: 13.1 G/DL (ref 12.1–17)
MCH RBC QN AUTO: 28.5 PG (ref 26–34)
MCHC RBC AUTO-ENTMCNC: 33.3 G/DL (ref 30–36.5)
MCV RBC AUTO: 85.4 FL (ref 80–99)
NRBC # BLD: 0 K/UL (ref 0–0.01)
NRBC BLD-RTO: 0 PER 100 WBC
PLATELET # BLD AUTO: 316 K/UL (ref 150–400)
PMV BLD AUTO: 8.9 FL (ref 8.9–12.9)
POTASSIUM SERPL-SCNC: 3.8 MMOL/L (ref 3.5–5.1)
RBC # BLD AUTO: 4.6 M/UL (ref 4.1–5.7)
SERVICE CMNT-IMP: ABNORMAL
SODIUM SERPL-SCNC: 134 MMOL/L (ref 136–145)
WBC # BLD AUTO: 12.8 K/UL (ref 4.1–11.1)

## 2018-07-09 PROCEDURE — 82962 GLUCOSE BLOOD TEST: CPT

## 2018-07-09 PROCEDURE — 74011250637 HC RX REV CODE- 250/637: Performed by: INTERNAL MEDICINE

## 2018-07-09 PROCEDURE — 74011636637 HC RX REV CODE- 636/637: Performed by: INTERNAL MEDICINE

## 2018-07-09 PROCEDURE — 85027 COMPLETE CBC AUTOMATED: CPT | Performed by: INTERNAL MEDICINE

## 2018-07-09 PROCEDURE — 80048 BASIC METABOLIC PNL TOTAL CA: CPT | Performed by: INTERNAL MEDICINE

## 2018-07-09 PROCEDURE — 36415 COLL VENOUS BLD VENIPUNCTURE: CPT | Performed by: INTERNAL MEDICINE

## 2018-07-09 RX ORDER — AMIODARONE HYDROCHLORIDE 200 MG/1
200 TABLET ORAL DAILY
Qty: 90 TAB | Refills: 0 | Status: SHIPPED | OUTPATIENT
Start: 2018-07-09

## 2018-07-09 RX ORDER — CARVEDILOL 3.12 MG/1
3.12 TABLET ORAL 2 TIMES DAILY WITH MEALS
Qty: 180 TAB | Refills: 3 | Status: SHIPPED | OUTPATIENT
Start: 2018-07-09

## 2018-07-09 RX ORDER — PANTOPRAZOLE SODIUM 40 MG/1
40 TABLET, DELAYED RELEASE ORAL
Status: DISCONTINUED | OUTPATIENT
Start: 2018-07-09 | End: 2018-07-09 | Stop reason: HOSPADM

## 2018-07-09 RX ORDER — PANTOPRAZOLE SODIUM 40 MG/1
40 TABLET, DELAYED RELEASE ORAL
Qty: 90 TAB | Refills: 4 | Status: SHIPPED | OUTPATIENT
Start: 2018-07-09

## 2018-07-09 RX ORDER — FUROSEMIDE 40 MG/1
80 TABLET ORAL 2 TIMES DAILY
Qty: 360 TAB | Refills: 1 | Status: SHIPPED | OUTPATIENT
Start: 2018-07-09

## 2018-07-09 RX ORDER — ATORVASTATIN CALCIUM 40 MG/1
40 TABLET, FILM COATED ORAL DAILY
Qty: 90 TAB | Refills: 3 | Status: SHIPPED | OUTPATIENT
Start: 2018-07-09

## 2018-07-09 RX ORDER — POTASSIUM CHLORIDE 750 MG/1
10 TABLET, EXTENDED RELEASE ORAL 2 TIMES DAILY
Qty: 180 TAB | Refills: 3 | Status: SHIPPED | OUTPATIENT
Start: 2018-07-09

## 2018-07-09 RX ORDER — AMIODARONE HYDROCHLORIDE 200 MG/1
200 TABLET ORAL DAILY
Qty: 90 TAB | Refills: 0 | Status: SHIPPED | OUTPATIENT
Start: 2018-07-09 | End: 2018-07-09

## 2018-07-09 RX ORDER — SPIRONOLACTONE 25 MG/1
25 TABLET ORAL DAILY
Qty: 90 TAB | Refills: 3 | Status: SHIPPED | OUTPATIENT
Start: 2018-07-09

## 2018-07-09 RX ORDER — CLOPIDOGREL BISULFATE 75 MG/1
75 TABLET ORAL DAILY
Qty: 90 TAB | Refills: 3 | Status: SHIPPED | OUTPATIENT
Start: 2018-07-09

## 2018-07-09 RX ADMIN — CARVEDILOL 3.12 MG: 3.12 TABLET, FILM COATED ORAL at 08:00

## 2018-07-09 RX ADMIN — CLOPIDOGREL BISULFATE 75 MG: 75 TABLET ORAL at 08:58

## 2018-07-09 RX ADMIN — ATORVASTATIN CALCIUM 40 MG: 40 TABLET, FILM COATED ORAL at 08:58

## 2018-07-09 RX ADMIN — GLIMEPIRIDE 1 MG: 1 TABLET ORAL at 07:41

## 2018-07-09 RX ADMIN — SPIRONOLACTONE 25 MG: 25 TABLET, FILM COATED ORAL at 08:57

## 2018-07-09 RX ADMIN — AMIODARONE HYDROCHLORIDE 200 MG: 200 TABLET ORAL at 08:58

## 2018-07-09 RX ADMIN — INSULIN LISPRO 3 UNITS: 100 INJECTION, SOLUTION INTRAVENOUS; SUBCUTANEOUS at 07:41

## 2018-07-09 RX ADMIN — APIXABAN 2.5 MG: 2.5 TABLET, FILM COATED ORAL at 08:58

## 2018-07-09 RX ADMIN — ASPIRIN 81 MG 81 MG: 81 TABLET ORAL at 08:58

## 2018-07-09 RX ADMIN — PANTOPRAZOLE SODIUM 40 MG: 40 TABLET, DELAYED RELEASE ORAL at 08:58

## 2018-07-09 RX ADMIN — FUROSEMIDE 80 MG: 80 TABLET ORAL at 07:41

## 2018-07-09 RX ADMIN — POTASSIUM CHLORIDE 10 MEQ: 750 TABLET, EXTENDED RELEASE ORAL at 08:57

## 2018-07-09 NOTE — PROGRESS NOTES
[facility] CM completed chart review and noted that Pt is ready for discharge today. Pt lives with wife in [place]. Therapy saw Pt and had no recommendations. CM is working on getting Cardiology and PCP appointments. CM will add to AVS once that is obtained. PCP will need to check BMP at that time and fax copy of labs to Dr. Coni Henry. Wife can transport home in car. 9:18 AM   No further CM needs. Care Management Interventions  PCP Verified by CM: Yes  Palliative Care Criteria Met (RRAT>21 & CHF Dx)?: No  Mode of Transport at Discharge:  Other (see comment)  Transition of Care Consult (CM Consult): Discharge Planning  MyChart Signup: No  Discharge Durable Medical Equipment: No  Physical Therapy Consult: No  Occupational Therapy Consult: No  Speech Therapy Consult: No  Current Support Network: Lives with Spouse, Own Home  Confirm Follow Up Transport: Family  Plan discussed with Pt/Family/Caregiver: Yes  Freedom of Choice Offered: Yes  Discharge Location  Discharge Placement: Home with family assistance    [id number]

## 2018-07-09 NOTE — DISCHARGE INSTRUCTIONS
[address] [facility], [facility] [address]    [phone]  [address]    [email or web address]    Patient Discharge Instructions    Hayden Thakkar / [id number] : 1960    Admitted 2018 Discharged: 2018       · It is important that you take the medication exactly as they are prescribed. · Keep your medication in the bottles provided by the pharmacist and keep a list of the medication names, dosages, and times to be taken in your wallet. · Do not take other medications without consulting your doctor. BRING ALL OF YOUR MEDICINES TO YOUR OFFICE VISIT. Follow-up with Maryuri Hernandez MD in 2 weeks. Follow-up with your primary care physician in one week. Heart Attack: After Your Hospitalization     Your Care Instructions    A heart attack (myocardial infarction, or MI) occurs when one or more of the coronary arteries, which supply the heart with oxygen-rich blood, is blocked. A blockage usually occurs when plaque inside the artery breaks open and a blood clot forms in the artery. After a heart attack, you may be worried about your future. Over the next several weeks, your heart will start to heal. Although it is sometimes hard to break old habits, you can prevent another heart attack by making some lifestyle changes and by taking medicines. You may use the following information for ideas about what to do at home to speed your recovery. Follow-up care is a key part of your treatment and safety. Be sure to make and go to all appointments, and call your doctor if you are having problems. It is also a good idea to keep a list of the medicines you take, including dose. How can you care for yourself at home? Activity  Increase your activities slowly. Take short rest breaks when you get tired. As you are able, get more exercise. Walking is a good choice. Bit by bit, increase the amount you walk every day. Try for at least 30 minutes on most days of the week.  You also may want to swim, bike, or do other activities. Cardiac rehabilitation (rehab) program is strongly recommended. Cardiac rehab includes supervised exercise, help with diet and lifestyle changes, and emotional support. It may reduce your risk of future heart problems. Do not drive until your doctor says you can. You can have sex when you are strong enough. This usually means when you can easily walk around or climb stairs. Talk with your doctor if you have any concerns. Do not take sildenafil citrate (Viagra), tadalafil (Cialis), or vardenafil (Levitra) if you are taking nitroglycerin. Lifestyle changes  Do not smoke. Smoking increases your risk of another heart attack. If you need help quitting, talk to your doctor about stop-smoking programs and medicines. These can increase your chances of quitting for good. Eat a heart-healthy diet that is low in cholesterol, saturated fat, and salt, and is full of fruits, vegetables and whole-grains. Eat at least two servings of fish each week. You may get more details about how to eat healthy, but these tips can help you get started. Our website has more information:  [email or web address] [email or web address]  Avoid colds and flu. Get a pneumococcal vaccine shot. If you have had one before, ask your primary care doctor whether you need a second dose. Get a flu shot every fall. If you must be around people with colds or flu, wash your hands often. Medicines  Take your medicines exactly as prescribed. Call your doctor if you think you are having a problem with your medicine. You may need several medicines. Angiotensin-converting enzyme (ACE) inhibitors, beta-blockers, and statins can help prevent another heart attack. ACE inhibitors control your blood pressure, and statins help lower cholesterol. Aspirin and other blood thinners help prevent blood clots. Blood clots can cause a stroke or heart attack. If Plavix is prescribed, you must take it to prevent your stent from clotting.   You will likely need the plavix for at least 1 to 2 years. If your doctor has given you nitroglycerin, keep it with you at all times. If you have chest pain, sit down and rest, and take the first dose of nitroglycerin if the discomfort does not resolve. If chest pain gets worse or is not getting better within 5 minutes, call 911 immediately. Stay on the phone with the emergency ; he or she will give you further instructions. Be sure to tell your doctor about any chest pain you have had, even if it went away. Do not take any over-the-counter medicines, vitamins, or herbal products without talking to your doctor first.    Mental health  Talk to your family, friends, or a counselor about your feelings. It is normal to feel frightened, angry, hopeless, helpless, and even guilty. Talking openly about bad feelings can help you cope. If the blues last, talk to your primary care doctor. When should you call for help? Call 911 anytime you think you may need emergency care. For example, call if:  You have signs of a heart attack. These may include:  Chest pain or pressure. Sweating. Shortness of breath. Nausea or vomiting. Pain that spreads from the chest to the neck, jaw, or one or both shoulders or arms. Dizziness or lightheadedness. A fast or irregular pulse. After calling 911, chew 1 adult-strength aspirin. Wait for an ambulance. Do not try to drive yourself. You passed out (lost consciousness). You feel like you are having another heart attack. Call your doctor now or seek immediate medical care if:  You have had any chest pain, even if it has gone away. You have new or increased shortness of breath. You are dizzy or lightheaded, or you feel like you may faint. Watch closely for changes in your health, and be sure to contact your doctor if you have any problems, including gaining 5 pounds or more. Cardiac Catheterization  Discharge Instructions     You may take a shower.  Be sure to get the dressing wet and then remove it; gently wash the area with warm soapy water. Pat dry and leave open to air. To help prevent infections, be sure to keep the cath site clean and dry. No lotions, creams, powders, ointments, etc. in the cath site for approximately 1 week.  Do not take a tub bath, get in a hot tub or swimming pool for approximately 5 days or until the cath site is completely healed.  No strenuous activity or heavy lifting over 10 lbs. for 7 days.  After your cath, some bruising or discomfort is common during the healing process. Tylenol, 1-2 tablets every 6 hours as needed, is recommended if you experience any discomfort. If you experience any signs or symptoms of infection such as fever, chills, or poorly healing incision, persistent tenderness or swelling in the groin, redness and/or warmth to the touch, numbness, significant tingling or pain at the groin site or affected extremity, rash, drainage from the cath site, or if the leg feels tight or swollen, call your physician right away.  If bleeding at the cath site occurs, take a clean gauze pad and apply direct pressure to the groin just above the puncture site. Call 911 immediately, and continue to apply direct pressure until an ambulance gets to your location. Information obtained by :  I understand that if any problems occur once I am at home I am to contact my physician. I understand and acknowledge receipt of the instructions indicated above.                                                                                                                                            R.N.'s Signature                                                                  Date/Time                                                                                                                                              Patient or Representative Signature                                                          Date/Time      Eric Ricky Johnson MD      Where can you learn more? Go to [email or web address]            [place], [address]   [phone]  [place], [address] [facility]    [email or web address] [email or web address] [facility]

## 2018-07-09 NOTE — DIABETES MGMT
DTC Progress Note Recommendations/ Comments: Noted DTC consult. DTC attempted to visit pt this am at 0930 hrs, however pt was not in room and per staff, pt was discharged. Chart reviewed on Erlin Guadarrama. Patient is a 59 y.o. male with known DM  On glimepiride 1 mg BID, meftormin 716 mg and trulicity 1.5 mg every 7 days. A1c:  
Lab Results Component Value Date/Time Hemoglobin A1c 8.4 (H) 06/28/2018 03:23 AM  
 Hemoglobin A1c 7.6 (H) 02/11/2018 09:00 AM  
 
 
Recent Glucose Results:  
Lab Results Component Value Date/Time  (H) 07/09/2018 03:51 AM  
 GLUCPOC 200 (H) 07/09/2018 07:11 AM  
 GLUCPOC 317 (H) 07/08/2018 08:38 PM  
 GLUCPOC 285 (H) 07/08/2018 04:22 PM  
  
 
Lab Results Component Value Date/Time Creatinine 1.14 07/09/2018 03:51 AM  
 
Estimated Creatinine Clearance: 84 mL/min (based on Cr of 1.14). Active Orders Diet DIET CARDIAC Regular PO intake: No data found. Will continue to follow as needed. Thank you, Jorge Luis Lacy [facility] Office: [phone]

## 2018-07-09 NOTE — PROGRESS NOTES
Patient and wife present for discharge instructions. Patient and wife verbalized understanding of his discharge instructions. He was given his summary of care and all his prescriptions. Patient follow up appointments were made. Patient VS stable upon discharge. Patient IV was removed and [facility]

## 2018-07-09 NOTE — DISCHARGE SUMMARY
Cardiology Discharge Summary             Patient ID:  Jones Bell  [id number]  49 y.o.  1960    Admit Date: 6/27/2018     Discharge Date: 7/9/2018   Admitting Physician: Siva Rubin MD   Discharge Physician: Siva Rubin MD                          [id number]:                  1. Chest pain initially NSTEMI, while at ER at [facility] developed complete heart block, hypotension, with repeat ECG showing inferior STEMI, cardiogenic shock, acute systolic chf, acute kidney injury, acute liver failure  2. Cath 6/2018 with 90% prox LAD, 100% prox dominant LCx, Small disease non-dominant RCA.   PCI with stent to OM, Bifurcation stent to prox LCx and OM, PCI with stent to prox LAD  3. Ischemic cardiomyopathy EF 35%, inferior hypokinesis, Echo 7/2018 EF 50%, mild MR  4. Sinus with transient complete heart block, transient PAF and also atrial flutter s/p MELA/CV 7/2018  5. HTN  6. DM type 2  7. Dyslipidemia  8. Recent colitis with GI bleed s/p endoscopy with Dr. Salazar Olguin  9. Hx of tonsillectomy  10. Hx of hernia repair  11. Hx of basal cell ca removal  12. Quit tobacco in '94  13. , wife is  for medical practice in [address] [place], he works on boat, active  [address] ([employer])                        MRXV:                   Inferior STEMI secondary to LCx. OM, LCx bifurcation, LAD stented SHAGUFTA. Acute systolic chf EF 92%, slowly improving with diuresis. Transient complete heart block, paroxysmal afib/aflutter s/p MELA/CV 7/2018  Cardiogenic shock, impela and levophed weaned off. Hypoxemic resp failure, extubated, still with some volume overload, some orthopnea at night, difficult to diurese, but improving. Acute kidney injury improved. Acute liver failure improved. Possible pneumonia/bronchitis on emperic Abx. Mild decrease in Hg dilutional, no hematoma, no signs of bleeding at this time. Lower ext doppler with no DVT.     1. Cont added eliquis 2.5mg bid for one month  2. Cont Added aspirin  3.  Changed added brilinta to clopidogrel, unclear if contributing to dyspnea  4. Cont added amiodarone 200mg daily for three months  5. Cont added coreg 3.125mg po bid  6. Holding losartan HCT for now, consider low dose losartan 12.5mg soon  7. Cont added lasix 80mg po bid, follow weights, hopefully can decrease dose soon  8. Cont KCL  9. Cont added sprionolactone 25mg daily  10. Cont added statin  11. Restart glimeperide/trulicity, follow up with endocrinology      Impella site healed. Ambulating without symptoms. Home today. He will look into cardiac rehab in [facility]. Follow up with primary in two weeks, check bmp at that time, fax copy to me. Follow up with me in one month. > 30 min coordinating discharge.         [x]        High complexity decision making was performed      Consults:  ICU doc    Hospital Course and Discharge Exam:    On the day of discharge, ambulatory and taking oral.  All questions answered. Aware of signs and symptoms warranting urgent medical follow-up or calling 911. Visit Vitals    /68 (BP 1 Location: Left arm, BP Patient Position: Sitting)    Pulse 71    Temp 98.2 °F (36.8 °C)    Resp 20    Ht 5' 10\" (1.778 m)    Wt 106 kg (233 lb 11 oz)    SpO2 96%    BMI 33.53 kg/m2       Physical Exam:  Nondiaphoretic, not in acute distress. Unlabored, clear to auscultation bilaterally. Regular rate and rhythm, no murmur, rub, or gallop. No JVD or peripheral edema. Palpable radial pulses bilaterally. Groin site(s) OK. No cyanosis. Skin warm and dry. Awake, appropriate, neuro grossly nonfocal.  Ambulatory.     Lab Results   Component Value Date/Time    WBC 12.8 (H) 07/09/2018 03:51 AM    HGB 13.1 07/09/2018 03:51 AM    HCT 39.3 07/09/2018 03:51 AM    PLATELET 374 78/74/4713 03:51 AM    MCV 85.4 07/09/2018 03:51 AM     Lab Results   Component Value Date/Time    Sodium 134 (L) 07/09/2018 03:51 AM    Potassium 3.8 07/09/2018 03:51 AM    Chloride 99 07/09/2018 03:51 AM    CO2 27 07/09/2018 03:51 AM    Anion gap 8 07/09/2018 03:51 AM    Glucose 226 (H) 07/09/2018 03:51 AM    BUN 18 07/09/2018 03:51 AM    Creatinine 1.14 07/09/2018 03:51 AM    BUN/Creatinine ratio 16 07/09/2018 03:51 AM    GFR est AA >60 07/09/2018 03:51 AM    GFR est non-AA >60 07/09/2018 03:51 AM    Calcium 9.0 07/09/2018 03:51 AM     Lab Results   Component Value Date/Time     (H) 06/27/2018 12:15 PM    CK - MB 44.6 (H) 06/27/2018 12:15 PM    CK-MB Index 5.8 (H) 06/27/2018 12:15 PM    Troponin-I, Qt. >100.00 (H) 06/28/2018 03:23 AM    BNP 49 02/14/2018 03:45 PM     Lab Results   Component Value Date/Time    TSH 1.94 06/28/2018 03:23 AM     Lab Results   Component Value Date/Time    Cholesterol, total 135 06/28/2018 03:23 AM    HDL Cholesterol 34 06/28/2018 03:23 AM    LDL, calculated 64.4 06/28/2018 03:23 AM    VLDL, calculated 36.6 06/28/2018 03:23 AM    Triglyceride 183 (H) 06/28/2018 03:23 AM    CHOL/HDL Ratio 4.0 06/28/2018 03:23 AM     Lab Results   Component Value Date/Time    Hemoglobin A1c 8.4 (H) 06/28/2018 03:23 AM             Disposition: home    Patient Instructions:   Current Discharge Medication List      START taking these medications    Details   atorvastatin (LIPITOR) 40 mg tablet Take 1 Tab by mouth daily. Qty: 90 Tab, Refills: 3      carvedilol (COREG) 3.125 mg tablet Take 1 Tab by mouth two (2) times daily (with meals). Qty: 180 Tab, Refills: 3      clopidogrel (PLAVIX) 75 mg tab Take 1 Tab by mouth daily. Qty: 90 Tab, Refills: 3      furosemide (LASIX) 40 mg tablet Take 2 Tabs by mouth two (2) times a day. Qty: 360 Tab, Refills: 1      pantoprazole (PROTONIX) 40 mg tablet Take 1 Tab by mouth Daily (before breakfast). Qty: 90 Tab, Refills: 4      spironolactone (ALDACTONE) 25 mg tablet Take 1 Tab by mouth daily. Qty: 90 Tab, Refills: 3      amiodarone (CORDARONE) 200 mg tablet Take 1 Tab by mouth daily.   Qty: 90 Tab, Refills: 0      apixaban (ELIQUIS) 2.5 mg tablet Take 1 Tab by mouth two (2) times a day. Qty: 60 Tab, Refills: 0         CONTINUE these medications which have CHANGED    Details   potassium chloride (KLOR-CON) 10 mEq tablet Take 1 Tab by mouth two (2) times a day. Qty: 180 Tab, Refills: 3    Associated Diagnoses: Diarrhea, unspecified type         CONTINUE these medications which have NOT CHANGED    Details   lactobacillus rhamnosus gg 10 billion cell (CULTURELLE) 10 billion cell capsule Take 1 Cap by mouth daily. Qty: 30 Cap, Refills: 0      glimepiride (AMARYL) 1 mg tablet Take 1 mg by mouth two (2) times a day. fish oil-omega-3 fatty acids 340-1,000 mg capsule Take 1 Cap by mouth daily. cyanocobalamin (VITAMIN B-12) 500 mcg tablet Take 500 mcg by mouth daily. aspirin delayed-release 81 mg tablet Take  by mouth daily. dulaglutide (TRULICITY) 1.5 ND/1.8 mL sub-q pen 1.5 mg by SubCUTAneous route every seven (7) days. STOP taking these medications       cholestyramine-aspartame (QUESTRAN LIGHT) 4 gram packet Comments:   Reason for Stopping:         metFORMIN (GLUCOPHAGE) 500 mg tablet Comments:   Reason for Stopping:         losartan-hydroCHLOROthiazide (HYZAAR) 100-12.5 mg per tablet Comments:   Reason for Stopping:               FOLLOW-UP:       Call the office [phone] to make an appointment for 4 weeks. [address] [place] [address], [address]    [phone]  [address]    [email or web address]    Thank you for placing your trust for your heart with the physicians and staff of [facility]. We have long provided [place] with the most advanced cardiovascular care possible using a personalized and caring approach. And we hope to continue this strong tradition well into the future. A heart condition, or the fear of having a heart condition, can be a stressful time for a patient and their family. There are appointments, testing procedures and unfamiliar terms that can cause natural questions and concerns.  While we encourage you to speak with your nurse or physician regarding any questions you might have, please consider this web site as a powerful resource you can use at any time. Often, questions or concerns only arise once you've left our office. There are many useful sections on this web site and links to other professional organizations and advocacy groups. These sources can help answer many questions you might have from the comfort of your own home or office. Again, thank you for considering [facility] as your trusted provider of heart care. Please don't hesitate to let us know if there is anything we can do to enhance your experience with us.      Signed:  Misty Lay MD  7/9/2018

## 2018-10-11 ENCOUNTER — OFFICE VISIT (OUTPATIENT)
Dept: HEMATOLOGY | Age: 58
End: 2018-10-11

## 2018-10-11 VITALS
DIASTOLIC BLOOD PRESSURE: 85 MMHG | HEART RATE: 70 BPM | TEMPERATURE: 97.3 F | SYSTOLIC BLOOD PRESSURE: 114 MMHG | BODY MASS INDEX: 34.9 KG/M2 | WEIGHT: 243.8 LBS | HEIGHT: 70 IN | OXYGEN SATURATION: 98 %

## 2018-10-11 DIAGNOSIS — R74.8 ELEVATED LIVER ENZYMES: Primary | ICD-10-CM

## 2018-10-11 PROBLEM — R79.89 ELEVATED LFTS: Status: ACTIVE | Noted: 2018-10-11

## 2018-10-11 RX ORDER — PREGABALIN 75 MG/1
CAPSULE ORAL
Refills: 1 | COMMUNITY
Start: 2018-09-17

## 2018-10-11 RX ORDER — LOSARTAN POTASSIUM 25 MG/1
TABLET ORAL
Refills: 3 | COMMUNITY
Start: 2018-09-14

## 2018-10-11 RX ORDER — METFORMIN HYDROCHLORIDE 500 MG/1
500 TABLET, EXTENDED RELEASE ORAL
COMMUNITY
Start: 2018-10-08

## 2018-10-11 RX ORDER — INSULIN GLARGINE 100 [IU]/ML
INJECTION, SOLUTION SUBCUTANEOUS
Refills: 3 | COMMUNITY
Start: 2018-09-06

## 2018-10-11 NOTE — PROGRESS NOTES
Chief Complaint   Patient presents with    New Patient     Fibroscan     Visit Vitals    /85 (BP 1 Location: Left arm, BP Patient Position: Sitting)    Pulse 70    Temp 97.3 °F (36.3 °C) (Tympanic)    Ht 5' 10\" (1.778 m)    Wt 243 lb 12.8 oz (110.6 kg)    SpO2 98%    BMI 34.98 kg/m2     PHQ over the last two weeks 10/11/2018   Little interest or pleasure in doing things Not at all   Feeling down, depressed, irritable, or hopeless Not at all   Total Score PHQ 2 0     Learning Assessment 10/11/2018   PRIMARY LEARNER Patient   BARRIERS PRIMARY LEARNER NONE   PRIMARY LANGUAGE ENGLISH   LEARNER PREFERENCE PRIMARY VIDEOS   LEARNING SPECIAL TOPICS -   ANSWERED BY patient   RELATIONSHIP SELF   ASSESSMENT COMMENT -     Abuse Screening Questionnaire 10/11/2018   Do you ever feel afraid of your partner? N   Are you in a relationship with someone who physically or mentally threatens you? N   Is it safe for you to go home?  Shawna Molina

## 2018-10-11 NOTE — PROGRESS NOTES
[address] [facility], [place], [address] [address]       VASU Garvey, SUSHMA Wiggins, [facility]   Veronda Olives, NP Rachell Crigler, VASU Baum [facility] 136    at [address], [address] [facility], [place]  22.    [phone]    FAX: [address] [place] [address], [address] [facility], [address]    [phone]    FAX: [phone]     Patient Care Team:  Ivory Moore MD as PCP - General (Internal Medicine)    Problem List  Date Reviewed: 10/11/2018          Codes Class Noted    Elevated LFTs ICD-10-CM: R94.5  ICD-9-CM: 790.6  10/11/2018        ST elevation (STEMI) myocardial infarction [facility]) ICD-10-CM: I21.3  ICD-9-CM: 410.90  6/27/2018        STEMI involving left circumflex coronary artery [facility]) ICD-10-CM: I21.21  ICD-9-CM: 410.81  6/27/2018        Dehydration ICD-10-CM: E86.0  ICD-9-CM: 276.51  2/11/2018        Diarrhea ICD-10-CM: R19.7  ICD-9-CM: 787.91  2/11/2018        Vomiting ICD-10-CM: R11.10  ICD-9-CM: 787.03  2/11/2018        Viral gastritis ICD-10-CM: K29.70  ICD-9-CM: 535.50  2/11/2018        Acute kidney injury [facility]) ICD-10-CM: N17.9  ICD-9-CM: 584.9  2/11/2018            The clinician listed above has asked me to see Donnie Murry in consultation regarding fatty liver disease and to perform assessment of fibrosis by means of in-office FibroScan analysis. The patient is a 59 y.o.  male who was diagnosed with liver disease in 6/2018. The patient has no symptoms which could be attributed to the liver disorder. The patient completes all daily activities without any functional limitations. The patient has not experienced pain in the right side over the liver or yellowing of the eyes or skin. ALLERGIES:  Allergies   Allergen Reactions    Pcn [Penicillins] Nausea and Vomiting     MEDICATIONS:  Current Outpatient Prescriptions   Medication Sig    LANTUS SOLOSTAR U-100 INSULIN 100 unit/mL (3 mL) inpn INJECT 56 UNITS UNDER THE SKIN QHS    losartan (COZAAR) 25 mg tablet     metFORMIN ER (GLUCOPHAGE XR) 500 mg tablet Take 500 mg by mouth daily (with dinner).  LYRICA 75 mg capsule TAKE 1 C PO BID    atorvastatin (LIPITOR) 40 mg tablet Take 1 Tab by mouth daily.  carvedilol (COREG) 3.125 mg tablet Take 1 Tab by mouth two (2) times daily (with meals).  clopidogrel (PLAVIX) 75 mg tab Take 1 Tab by mouth daily.  furosemide (LASIX) 40 mg tablet Take 2 Tabs by mouth two (2) times a day.  pantoprazole (PROTONIX) 40 mg tablet Take 1 Tab by mouth Daily (before breakfast).  spironolactone (ALDACTONE) 25 mg tablet Take 1 Tab by mouth daily.  apixaban (ELIQUIS) 2.5 mg tablet Take 1 Tab by mouth two (2) times a day.  glimepiride (AMARYL) 1 mg tablet Take 2 mg by mouth two (2) times a day.  fish oil-omega-3 fatty acids 340-1,000 mg capsule Take 1 Cap by mouth daily.  cyanocobalamin (VITAMIN B-12) 500 mcg tablet Take 500 mcg by mouth daily.  aspirin delayed-release 81 mg tablet Take  by mouth daily.  potassium chloride (KLOR-CON) 10 mEq tablet Take 1 Tab by mouth two (2) times a day.  amiodarone (CORDARONE) 200 mg tablet Take 1 Tab by mouth daily.  lactobacillus rhamnosus gg 10 billion cell (CULTURELLE) 10 billion cell capsule Take 1 Cap by mouth daily.  dulaglutide (TRULICITY) 1.5 SP/0.9 mL sub-q pen 1.5 mg by SubCUTAneous route every seven (7) days. No current facility-administered medications for this visit. SYSTEM REVIEW NOT RELATED TO LIVER DISEASE OR REVIEWED ABOVE:  Constitution systems: Negative for fever, chills, weight gain, weight loss. Eyes: Negative for visual changes. ENT: Negative for sore throat, painful swallowing.    Respiratory: Negative for cough, hemoptysis, SOB. Cardiology: Negative for chest pain, palpitations. GI:  Negative for constipation or diarrhea. : Negative for urinary frequency, dysuria, hematuria, nocturia. Skin: Negative for rash. Hematology: Negative for easy bruising, blood clots. Musculo-skeletal: Negative for back pain, muscle pain, weakness. Neurologic: Negative for headaches, dizziness, vertigo, memory problems not related to HE. Psychology: Negative for anxiety, depression. FAMILY HISTORY:  The father  from cancer. The mother  from cancer. There is no family history of liver disease. There is no family history of immune disorders. SOCIAL HISTORY:  The patient is . The patient has 2 children  The patient stopped using tobacco products in . The patient has never consumed significant amounts of alcohol but has further decreased it following NSTEMI. The patient was working full time as a captain of a fishing boat but after NSTEMI has not been back out. PHYSICAL EXAMINATION:  Visit Vitals    /85 (BP 1 Location: Left arm, BP Patient Position: Sitting)    Pulse 70    Temp 97.3 °F (36.3 °C) (Tympanic)    Ht 5' 10\" (1.778 m)    Wt 243 lb 12.8 oz (110.6 kg)    SpO2 98%    BMI 34.98 kg/m2     General: No acute distress. Skin: No spider angiomata. No jaundice. No palmar erythema. Abdomen: Soft non-tender. Liver size normal to percussion/palpation. Spleen not palpable. No obvious ascites. Extremities: No edema. No muscle wasting. No gross arthritic changes. Neurologic: Alert and oriented. Cranial nerves grossly intact. No asterixis. LIVER HISTOLOGY:  10/2018. FibroScan performed at [address]. EkPa was 10.4. Suggested fibrosis level is F2-F3. CAP score of 353, this is consistent with steatosis. ASSESSMENT AND PLAN:  NAFL with stage 2 septal fibrosis.     Assessment of fibrosis was made through performance of FibroScan in the office today. The results of the analysis were shared with the patient in the office at the time of the procedure. A copy of the report was provided to the patient and will be forwarded to the referring medical practice. All questions were answered. The patient expressed a clear understanding of the above. Follow-up with referring physician.     Jd Ely, [facility]-BC  Liver [facility] [address] [place], [address] [place]  22.  [phone]

## 2018-10-11 NOTE — MR AVS SNAPSHOT
[address] [phone] [facility] 
[phone] Patient: Gudelia Billings MRN: [id number] RVV:[id number]/[id number] Visit Information Date & Time Provider Department Dept. Phone Encounter #  
 10/11/2018  7:40 AM Kristine Ojeda, 3687 [facility] 219 [id number] Upcoming Health Maintenance Date Due Hepatitis C Screening 1960 DTaP/Tdap/Td series (1 - Tdap) 3/19/1979 Shingrix Vaccine Age 50> (1 of 2) 3/19/2008 Influenza Age 5 to Adult 8/1/2018 FOBT Q 1 YEAR AGE 50-75 2/11/2019 Allergies as of 10/11/2018  Review Complete On: 10/11/2018 By: Concepción Merrill Severity Noted Reaction Type Reactions Pcn [Penicillins] High 05/17/2016    Nausea and Vomiting Current Immunizations  Never Reviewed No immunizations on file. Not reviewed this visit Vitals BP Pulse Temp Height(growth percentile) 114/85 (BP 1 Location: Left arm, BP Patient Position: Sitting) 70 97.3 °F (36.3 °C) (Tympanic) 5' 10\" (1.778 m) Weight(growth percentile) SpO2 BMI Smoking Status 243 lb 12.8 oz (110.6 kg) 98% 34.98 kg/m2 Former Smoker BMI and BSA Data Body Mass Index Body Surface Area 34.98 kg/m 2 2.34 m 2 Preferred Pharmacy Pharmacy Name Phone [id number], [address] [phone] Your Updated Medication List  
  
   
This list is accurate as of 10/11/18  8:17 AM.  Always use your most recent med list.  
  
  
  
  
 amiodarone 200 mg tablet Commonly known as:  CORDARONE Take 1 Tab by mouth daily. apixaban 2.5 mg tablet Commonly known as:  Alarcon Kuster Take 1 Tab by mouth two (2) times a day. aspirin delayed-release 81 mg tablet Take  by mouth daily. atorvastatin 40 mg tablet Commonly known as:  LIPITOR Take 1 Tab by mouth daily. carvedilol 3.125 mg tablet Commonly known as:  Annel Moore  
 Take 1 Tab by mouth two (2) times daily (with meals). clopidogrel 75 mg Tab Commonly known as:  PLAVIX Take 1 Tab by mouth daily. dulaglutide 1.5 mg/0.5 mL sub-q pen Commonly known as:  TRULICITY  
1.5 mg by SubCUTAneous route every seven (7) days. fish oil-omega-3 fatty acids 340-1,000 mg capsule Take 1 Cap by mouth daily. furosemide 40 mg tablet Commonly known as:  LASIX Take 2 Tabs by mouth two (2) times a day. glimepiride 1 mg tablet Commonly known as:  AMARYL Take 2 mg by mouth two (2) times a day. lactobacillus rhamnosus gg 10 billion cell 10 billion cell capsule Commonly known as:  Elena Riceon Take 1 Cap by mouth daily. LANTUS SOLOSTAR U-100 INSULIN 100 unit/mL (3 mL) Inpn Generic drug:  insulin glargine INJECT 56 UNITS UNDER THE SKIN QHS  
  
 losartan 25 mg tablet Commonly known as:  COZAAR  
  
 LYRICA 75 mg capsule Generic drug:  pregabalin TAKE 1 C PO BID  
  
 metFORMIN  mg tablet Commonly known as:  GLUCOPHAGE XR Take 500 mg by mouth daily (with dinner). pantoprazole 40 mg tablet Commonly known as:  PROTONIX Take 1 Tab by mouth Daily (before breakfast). potassium chloride 10 mEq tablet Commonly known as:  KLOR-CON Take 1 Tab by mouth two (2) times a day. spironolactone 25 mg tablet Commonly known as:  ALDACTONE Take 1 Tab by mouth daily. VITAMIN B-12 500 mcg tablet Generic drug:  cyanocobalamin Take 500 mcg by mouth daily. Introducing [facility]! Dear Danial Bañuelos: 
Thank you for requesting a [employer] account. Our records indicate that you already have an active [employer] account. You can access your account anytime at [email or web address] Did you know that you can access your hospital and ER discharge instructions at any time in Max-Viz? You can also review all of your test results from your hospital stay or ER visit. Additional Information If you have questions, please visit the Frequently Asked Questions section of the [email or web address] website at [email or web address] Remember, [employer] is NOT to be used for urgent needs. For medical emergencies, dial 911. Now available from your iPhone and Android! Please provide this summary of care documentation to your next provider. Your primary care clinician is listed as Golden Triana. If you have any questions after today's visit, please call [phone].

## 2022-03-18 PROBLEM — E86.0 DEHYDRATION: Status: ACTIVE | Noted: 2018-02-11

## 2022-03-19 PROBLEM — R79.89 ELEVATED LFTS: Status: ACTIVE | Noted: 2018-10-11

## 2022-03-19 PROBLEM — I21.21 STEMI INVOLVING LEFT CIRCUMFLEX CORONARY ARTERY (HCC): Status: ACTIVE | Noted: 2018-06-27

## 2022-03-19 PROBLEM — R11.10 VOMITING: Status: ACTIVE | Noted: 2018-02-11

## 2022-03-19 PROBLEM — N17.9 ACUTE KIDNEY INJURY (HCC): Status: ACTIVE | Noted: 2018-02-11

## 2022-03-19 PROBLEM — I21.3 ST ELEVATION (STEMI) MYOCARDIAL INFARCTION (HCC): Status: ACTIVE | Noted: 2018-06-27

## 2022-03-19 PROBLEM — R19.7 DIARRHEA: Status: ACTIVE | Noted: 2018-02-11

## 2022-03-20 PROBLEM — K29.70 VIRAL GASTRITIS: Status: ACTIVE | Noted: 2018-02-11

## 2024-01-22 ENCOUNTER — TRANSCRIBE ORDERS (OUTPATIENT)
Facility: HOSPITAL | Age: 64
End: 2024-01-22

## 2024-01-22 ENCOUNTER — HOSPITAL ENCOUNTER (OUTPATIENT)
Facility: HOSPITAL | Age: 64
Discharge: HOME | End: 2024-01-25
Payer: COMMERCIAL

## 2024-01-22 DIAGNOSIS — S91.341A PUNCTURE WOUND OF RIGHT FOOT WITH FOREIGN BODY, INITIAL ENCOUNTER: ICD-10-CM

## 2024-01-22 DIAGNOSIS — S91.341A PUNCTURE WOUND OF RIGHT FOOT WITH FOREIGN BODY, INITIAL ENCOUNTER: Primary | ICD-10-CM

## 2024-01-22 PROCEDURE — 73630 X-RAY EXAM OF FOOT: CPT

## 2024-03-08 ENCOUNTER — APPOINTMENT (OUTPATIENT)
Facility: HOSPITAL | Age: 64
End: 2024-03-08
Payer: COMMERCIAL

## 2024-03-08 ENCOUNTER — HOSPITAL ENCOUNTER (EMERGENCY)
Facility: HOSPITAL | Age: 64
Discharge: ANOTHER HOSPITAL | End: 2024-03-08
Attending: FAMILY MEDICINE
Payer: COMMERCIAL

## 2024-03-08 VITALS
TEMPERATURE: 98.2 F | HEIGHT: 71 IN | RESPIRATION RATE: 24 BRPM | SYSTOLIC BLOOD PRESSURE: 126 MMHG | OXYGEN SATURATION: 95 % | DIASTOLIC BLOOD PRESSURE: 76 MMHG | HEART RATE: 95 BPM | BODY MASS INDEX: 34.27 KG/M2 | WEIGHT: 244.8 LBS

## 2024-03-08 DIAGNOSIS — E11.621 DIABETIC ULCER OF RIGHT MIDFOOT ASSOCIATED WITH TYPE 2 DIABETES MELLITUS, WITH FAT LAYER EXPOSED (HCC): Primary | ICD-10-CM

## 2024-03-08 DIAGNOSIS — R07.9 CHEST PAIN, UNSPECIFIED TYPE: ICD-10-CM

## 2024-03-08 DIAGNOSIS — L97.412 DIABETIC ULCER OF RIGHT MIDFOOT ASSOCIATED WITH TYPE 2 DIABETES MELLITUS, WITH FAT LAYER EXPOSED (HCC): Primary | ICD-10-CM

## 2024-03-08 LAB
ALBUMIN SERPL-MCNC: 4 G/DL (ref 3.5–5)
ALBUMIN/GLOB SERPL: 1.1 (ref 1.1–2.2)
ALP SERPL-CCNC: 83 U/L (ref 45–117)
ALT SERPL-CCNC: 28 U/L (ref 12–78)
ANION GAP SERPL CALC-SCNC: 10 MMOL/L (ref 5–15)
AST SERPL-CCNC: 25 U/L (ref 15–37)
BASOPHILS # BLD: 0.1 K/UL (ref 0–0.1)
BASOPHILS NFR BLD: 0 % (ref 0–1)
BILIRUB SERPL-MCNC: 0.9 MG/DL (ref 0.2–1)
BUN SERPL-MCNC: 22 MG/DL (ref 6–20)
BUN/CREAT SERPL: 20 (ref 12–20)
CALCIUM SERPL-MCNC: 9.6 MG/DL (ref 8.5–10.1)
CHLORIDE SERPL-SCNC: 101 MMOL/L (ref 97–108)
CO2 SERPL-SCNC: 30 MMOL/L (ref 21–32)
CREAT SERPL-MCNC: 1.1 MG/DL (ref 0.7–1.3)
DIFFERENTIAL METHOD BLD: ABNORMAL
EOSINOPHIL # BLD: 0 K/UL (ref 0–0.4)
EOSINOPHIL NFR BLD: 0 % (ref 0–7)
ERYTHROCYTE [DISTWIDTH] IN BLOOD BY AUTOMATED COUNT: 14.9 % (ref 11.5–14.5)
ERYTHROCYTE [SEDIMENTATION RATE] IN BLOOD: 5 MM/HR (ref 0–20)
FLUAV RNA SPEC QL NAA+PROBE: NOT DETECTED
FLUBV RNA SPEC QL NAA+PROBE: NOT DETECTED
GLOBULIN SER CALC-MCNC: 3.5 G/DL (ref 2–4)
GLUCOSE SERPL-MCNC: 124 MG/DL (ref 65–100)
HCT VFR BLD AUTO: 50.9 % (ref 36.6–50.3)
HGB BLD-MCNC: 16.6 G/DL (ref 12.1–17)
IMM GRANULOCYTES # BLD AUTO: 0.1 K/UL (ref 0–0.04)
IMM GRANULOCYTES NFR BLD AUTO: 0 % (ref 0–0.5)
LACTATE SERPL-SCNC: 1.6 MMOL/L (ref 0.4–2)
LYMPHOCYTES # BLD: 1.1 K/UL (ref 0.8–3.5)
LYMPHOCYTES NFR BLD: 6 % (ref 12–49)
MCH RBC QN AUTO: 27.3 PG (ref 26–34)
MCHC RBC AUTO-ENTMCNC: 32.6 G/DL (ref 30–36.5)
MCV RBC AUTO: 83.7 FL (ref 80–99)
MONOCYTES # BLD: 1.8 K/UL (ref 0–1)
MONOCYTES NFR BLD: 10 % (ref 5–13)
NEUTS SEG # BLD: 14.7 K/UL (ref 1.8–8)
NEUTS SEG NFR BLD: 84 % (ref 32–75)
NRBC # BLD: 0 K/UL (ref 0–0.01)
NRBC BLD-RTO: 0 PER 100 WBC
PLATELET # BLD AUTO: 181 K/UL (ref 150–400)
PMV BLD AUTO: 9.8 FL (ref 8.9–12.9)
POTASSIUM SERPL-SCNC: 3.6 MMOL/L (ref 3.5–5.1)
PROT SERPL-MCNC: 7.5 G/DL (ref 6.4–8.2)
RBC # BLD AUTO: 6.08 M/UL (ref 4.1–5.7)
SARS-COV-2 RNA RESP QL NAA+PROBE: NOT DETECTED
SODIUM SERPL-SCNC: 141 MMOL/L (ref 136–145)
TROPONIN I SERPL HS-MCNC: 13 NG/L (ref 0–76)
TROPONIN I SERPL HS-MCNC: 14 NG/L (ref 0–76)
WBC # BLD AUTO: 17.8 K/UL (ref 4.1–11.1)

## 2024-03-08 PROCEDURE — 85025 COMPLETE CBC W/AUTO DIFF WBC: CPT

## 2024-03-08 PROCEDURE — 83605 ASSAY OF LACTIC ACID: CPT

## 2024-03-08 PROCEDURE — 6370000000 HC RX 637 (ALT 250 FOR IP): Performed by: FAMILY MEDICINE

## 2024-03-08 PROCEDURE — 84484 ASSAY OF TROPONIN QUANT: CPT

## 2024-03-08 PROCEDURE — 73630 X-RAY EXAM OF FOOT: CPT

## 2024-03-08 PROCEDURE — 36415 COLL VENOUS BLD VENIPUNCTURE: CPT

## 2024-03-08 PROCEDURE — 85652 RBC SED RATE AUTOMATED: CPT

## 2024-03-08 PROCEDURE — 96367 TX/PROPH/DG ADDL SEQ IV INF: CPT

## 2024-03-08 PROCEDURE — 6360000002 HC RX W HCPCS: Performed by: FAMILY MEDICINE

## 2024-03-08 PROCEDURE — 87636 SARSCOV2 & INF A&B AMP PRB: CPT

## 2024-03-08 PROCEDURE — 87040 BLOOD CULTURE FOR BACTERIA: CPT

## 2024-03-08 PROCEDURE — 96366 THER/PROPH/DIAG IV INF ADDON: CPT

## 2024-03-08 PROCEDURE — 71045 X-RAY EXAM CHEST 1 VIEW: CPT

## 2024-03-08 PROCEDURE — 96365 THER/PROPH/DIAG IV INF INIT: CPT

## 2024-03-08 PROCEDURE — 99285 EMERGENCY DEPT VISIT HI MDM: CPT

## 2024-03-08 PROCEDURE — 84145 PROCALCITONIN (PCT): CPT

## 2024-03-08 PROCEDURE — 80053 COMPREHEN METABOLIC PANEL: CPT

## 2024-03-08 PROCEDURE — 2580000003 HC RX 258: Performed by: FAMILY MEDICINE

## 2024-03-08 RX ORDER — ASPIRIN 81 MG/1
324 TABLET, CHEWABLE ORAL ONCE
Status: COMPLETED | OUTPATIENT
Start: 2024-03-08 | End: 2024-03-08

## 2024-03-08 RX ORDER — 0.9 % SODIUM CHLORIDE 0.9 %
1000 INTRAVENOUS SOLUTION INTRAVENOUS ONCE
Status: COMPLETED | OUTPATIENT
Start: 2024-03-08 | End: 2024-03-08

## 2024-03-08 RX ADMIN — VANCOMYCIN HYDROCHLORIDE 1500 MG: 1.5 INJECTION, POWDER, LYOPHILIZED, FOR SOLUTION INTRAVENOUS at 21:35

## 2024-03-08 RX ADMIN — VANCOMYCIN HYDROCHLORIDE 1000 MG: 1 INJECTION, POWDER, LYOPHILIZED, FOR SOLUTION INTRAVENOUS at 22:45

## 2024-03-08 RX ADMIN — PIPERACILLIN AND TAZOBACTAM 4500 MG: 4; .5 INJECTION, POWDER, LYOPHILIZED, FOR SOLUTION INTRAVENOUS at 20:55

## 2024-03-08 RX ADMIN — ASPIRIN 324 MG: 81 TABLET, CHEWABLE ORAL at 22:45

## 2024-03-08 RX ADMIN — SODIUM CHLORIDE 1000 ML: 9 INJECTION, SOLUTION INTRAVENOUS at 20:55

## 2024-03-08 ASSESSMENT — LIFESTYLE VARIABLES
HOW MANY STANDARD DRINKS CONTAINING ALCOHOL DO YOU HAVE ON A TYPICAL DAY: 1 OR 2
HOW OFTEN DO YOU HAVE A DRINK CONTAINING ALCOHOL: MONTHLY OR LESS

## 2024-03-09 ENCOUNTER — HOSPITAL ENCOUNTER (INPATIENT)
Facility: HOSPITAL | Age: 64
LOS: 12 days | Discharge: NURSING, REHAB OR LONG-TERM CARE | DRG: 617 | End: 2024-03-21
Attending: INTERNAL MEDICINE | Admitting: INTERNAL MEDICINE
Payer: COMMERCIAL

## 2024-03-09 ENCOUNTER — APPOINTMENT (OUTPATIENT)
Facility: HOSPITAL | Age: 64
DRG: 617 | End: 2024-03-09
Attending: INTERNAL MEDICINE
Payer: COMMERCIAL

## 2024-03-09 DIAGNOSIS — L08.9 DIABETIC FOOT INFECTION (HCC): ICD-10-CM

## 2024-03-09 DIAGNOSIS — I20.0 UNSTABLE ANGINA PECTORIS (HCC): Primary | ICD-10-CM

## 2024-03-09 DIAGNOSIS — L08.9 FOOT INFECTION: ICD-10-CM

## 2024-03-09 DIAGNOSIS — E11.628 DIABETIC FOOT INFECTION (HCC): ICD-10-CM

## 2024-03-09 LAB
GLUCOSE BLD STRIP.AUTO-MCNC: 107 MG/DL (ref 65–117)
GLUCOSE BLD STRIP.AUTO-MCNC: 109 MG/DL (ref 65–117)
GLUCOSE BLD STRIP.AUTO-MCNC: 118 MG/DL (ref 65–117)
GLUCOSE BLD STRIP.AUTO-MCNC: 120 MG/DL (ref 65–117)
GLUCOSE BLD STRIP.AUTO-MCNC: 130 MG/DL (ref 65–117)
PROCALCITONIN SERPL-MCNC: <0.05 NG/ML
SERVICE CMNT-IMP: ABNORMAL
SERVICE CMNT-IMP: NORMAL
SERVICE CMNT-IMP: NORMAL

## 2024-03-09 PROCEDURE — 6370000000 HC RX 637 (ALT 250 FOR IP): Performed by: HOSPITALIST

## 2024-03-09 PROCEDURE — 82962 GLUCOSE BLOOD TEST: CPT

## 2024-03-09 PROCEDURE — 2580000003 HC RX 258: Performed by: INTERNAL MEDICINE

## 2024-03-09 PROCEDURE — 36415 COLL VENOUS BLD VENIPUNCTURE: CPT

## 2024-03-09 PROCEDURE — 6370000000 HC RX 637 (ALT 250 FOR IP): Performed by: INTERNAL MEDICINE

## 2024-03-09 PROCEDURE — 1100000003 HC PRIVATE W/ TELEMETRY

## 2024-03-09 PROCEDURE — 6360000002 HC RX W HCPCS: Performed by: INTERNAL MEDICINE

## 2024-03-09 PROCEDURE — 87040 BLOOD CULTURE FOR BACTERIA: CPT

## 2024-03-09 PROCEDURE — 73718 MRI LOWER EXTREMITY W/O DYE: CPT

## 2024-03-09 RX ORDER — PREGABALIN 75 MG/1
75 CAPSULE ORAL 2 TIMES DAILY
Status: DISCONTINUED | OUTPATIENT
Start: 2024-03-09 | End: 2024-03-12

## 2024-03-09 RX ORDER — ATORVASTATIN CALCIUM 40 MG/1
40 TABLET, FILM COATED ORAL NIGHTLY
Status: DISCONTINUED | OUTPATIENT
Start: 2024-03-09 | End: 2024-03-21 | Stop reason: HOSPADM

## 2024-03-09 RX ORDER — SPIRONOLACTONE 25 MG/1
25 TABLET ORAL 2 TIMES DAILY
Status: DISCONTINUED | OUTPATIENT
Start: 2024-03-09 | End: 2024-03-12

## 2024-03-09 RX ORDER — SODIUM CHLORIDE 0.9 % (FLUSH) 0.9 %
5-40 SYRINGE (ML) INJECTION EVERY 12 HOURS SCHEDULED
Status: DISCONTINUED | OUTPATIENT
Start: 2024-03-09 | End: 2024-03-21 | Stop reason: HOSPADM

## 2024-03-09 RX ORDER — SPIRONOLACTONE 25 MG/1
25 TABLET ORAL DAILY
Status: ON HOLD | COMMUNITY
End: 2024-03-14 | Stop reason: HOSPADM

## 2024-03-09 RX ORDER — TAMSULOSIN HYDROCHLORIDE 0.4 MG/1
0.4 CAPSULE ORAL DAILY
Status: DISCONTINUED | OUTPATIENT
Start: 2024-03-09 | End: 2024-03-21 | Stop reason: HOSPADM

## 2024-03-09 RX ORDER — ENOXAPARIN SODIUM 100 MG/ML
30 INJECTION SUBCUTANEOUS 2 TIMES DAILY
Status: DISCONTINUED | OUTPATIENT
Start: 2024-03-09 | End: 2024-03-21 | Stop reason: HOSPADM

## 2024-03-09 RX ORDER — ACETAMINOPHEN 325 MG/1
650 TABLET ORAL EVERY 6 HOURS PRN
Status: DISCONTINUED | OUTPATIENT
Start: 2024-03-09 | End: 2024-03-21 | Stop reason: HOSPADM

## 2024-03-09 RX ORDER — INSULIN GLARGINE 100 [IU]/ML
20 INJECTION, SOLUTION SUBCUTANEOUS NIGHTLY
Status: DISCONTINUED | OUTPATIENT
Start: 2024-03-09 | End: 2024-03-21 | Stop reason: HOSPADM

## 2024-03-09 RX ORDER — ATORVASTATIN CALCIUM 40 MG/1
40 TABLET, FILM COATED ORAL NIGHTLY
Status: ON HOLD | COMMUNITY

## 2024-03-09 RX ORDER — INSULIN LISPRO 100 [IU]/ML
0-8 INJECTION, SOLUTION INTRAVENOUS; SUBCUTANEOUS
Status: DISCONTINUED | OUTPATIENT
Start: 2024-03-09 | End: 2024-03-21 | Stop reason: HOSPADM

## 2024-03-09 RX ORDER — ONDANSETRON 4 MG/1
4 TABLET, ORALLY DISINTEGRATING ORAL EVERY 8 HOURS PRN
Status: DISCONTINUED | OUTPATIENT
Start: 2024-03-09 | End: 2024-03-21 | Stop reason: HOSPADM

## 2024-03-09 RX ORDER — FUROSEMIDE 40 MG/1
40 TABLET ORAL 2 TIMES DAILY
Status: DISCONTINUED | OUTPATIENT
Start: 2024-03-09 | End: 2024-03-21 | Stop reason: HOSPADM

## 2024-03-09 RX ORDER — SODIUM CHLORIDE 9 MG/ML
INJECTION, SOLUTION INTRAVENOUS PRN
Status: DISCONTINUED | OUTPATIENT
Start: 2024-03-09 | End: 2024-03-21 | Stop reason: HOSPADM

## 2024-03-09 RX ORDER — SACUBITRIL AND VALSARTAN 24; 26 MG/1; MG/1
1 TABLET, FILM COATED ORAL 2 TIMES DAILY
Status: ON HOLD | COMMUNITY
End: 2024-03-14 | Stop reason: HOSPADM

## 2024-03-09 RX ORDER — CLOPIDOGREL BISULFATE 75 MG/1
75 TABLET ORAL DAILY
Status: DISCONTINUED | OUTPATIENT
Start: 2024-03-09 | End: 2024-03-09

## 2024-03-09 RX ORDER — POLYETHYLENE GLYCOL 3350 17 G/17G
17 POWDER, FOR SOLUTION ORAL DAILY PRN
Status: DISCONTINUED | OUTPATIENT
Start: 2024-03-09 | End: 2024-03-21 | Stop reason: HOSPADM

## 2024-03-09 RX ORDER — LANOLIN ALCOHOL/MO/W.PET/CERES
6 CREAM (GRAM) TOPICAL NIGHTLY PRN
Status: DISCONTINUED | OUTPATIENT
Start: 2024-03-09 | End: 2024-03-21 | Stop reason: HOSPADM

## 2024-03-09 RX ORDER — CLOPIDOGREL BISULFATE 75 MG/1
75 TABLET ORAL DAILY
Status: ON HOLD | COMMUNITY

## 2024-03-09 RX ORDER — DIAZEPAM 5 MG/ML
2.5 INJECTION, SOLUTION INTRAMUSCULAR; INTRAVENOUS ONCE
Status: DISCONTINUED | OUTPATIENT
Start: 2024-03-09 | End: 2024-03-21 | Stop reason: HOSPADM

## 2024-03-09 RX ORDER — PREGABALIN 75 MG/1
75 CAPSULE ORAL 3 TIMES DAILY
Status: ON HOLD | COMMUNITY

## 2024-03-09 RX ORDER — ASPIRIN 81 MG/1
81 TABLET, CHEWABLE ORAL DAILY
Status: DISCONTINUED | OUTPATIENT
Start: 2024-03-09 | End: 2024-03-21 | Stop reason: HOSPADM

## 2024-03-09 RX ORDER — SODIUM CHLORIDE 0.9 % (FLUSH) 0.9 %
5-40 SYRINGE (ML) INJECTION PRN
Status: DISCONTINUED | OUTPATIENT
Start: 2024-03-09 | End: 2024-03-21 | Stop reason: HOSPADM

## 2024-03-09 RX ORDER — ONDANSETRON 2 MG/ML
4 INJECTION INTRAMUSCULAR; INTRAVENOUS EVERY 6 HOURS PRN
Status: DISCONTINUED | OUTPATIENT
Start: 2024-03-09 | End: 2024-03-21 | Stop reason: HOSPADM

## 2024-03-09 RX ORDER — FUROSEMIDE 40 MG/1
40 TABLET ORAL 2 TIMES DAILY
Status: ON HOLD | COMMUNITY

## 2024-03-09 RX ORDER — ACETAMINOPHEN 650 MG/1
650 SUPPOSITORY RECTAL EVERY 6 HOURS PRN
Status: DISCONTINUED | OUTPATIENT
Start: 2024-03-09 | End: 2024-03-21 | Stop reason: HOSPADM

## 2024-03-09 RX ORDER — TAMSULOSIN HYDROCHLORIDE 0.4 MG/1
0.4 CAPSULE ORAL DAILY
Status: ON HOLD | COMMUNITY

## 2024-03-09 RX ORDER — INSULIN LISPRO 100 [IU]/ML
0-4 INJECTION, SOLUTION INTRAVENOUS; SUBCUTANEOUS NIGHTLY
Status: DISCONTINUED | OUTPATIENT
Start: 2024-03-09 | End: 2024-03-21 | Stop reason: HOSPADM

## 2024-03-09 RX ORDER — DEXTROSE MONOHYDRATE 100 MG/ML
INJECTION, SOLUTION INTRAVENOUS CONTINUOUS PRN
Status: DISCONTINUED | OUTPATIENT
Start: 2024-03-09 | End: 2024-03-21 | Stop reason: HOSPADM

## 2024-03-09 RX ORDER — GLUCAGON 1 MG/ML
1 KIT INJECTION PRN
Status: DISCONTINUED | OUTPATIENT
Start: 2024-03-09 | End: 2024-03-21 | Stop reason: HOSPADM

## 2024-03-09 RX ADMIN — PREGABALIN 75 MG: 75 CAPSULE ORAL at 20:39

## 2024-03-09 RX ADMIN — PIPERACILLIN AND TAZOBACTAM 3375 MG: 3; .375 INJECTION, POWDER, LYOPHILIZED, FOR SOLUTION INTRAVENOUS at 03:00

## 2024-03-09 RX ADMIN — TAMSULOSIN HYDROCHLORIDE 0.4 MG: 0.4 CAPSULE ORAL at 13:38

## 2024-03-09 RX ADMIN — ASPIRIN 81 MG: 81 TABLET, CHEWABLE ORAL at 09:38

## 2024-03-09 RX ADMIN — PIPERACILLIN AND TAZOBACTAM 3375 MG: 3; .375 INJECTION, POWDER, LYOPHILIZED, FOR SOLUTION INTRAVENOUS at 18:34

## 2024-03-09 RX ADMIN — PIPERACILLIN AND TAZOBACTAM 3375 MG: 3; .375 INJECTION, POWDER, LYOPHILIZED, FOR SOLUTION INTRAVENOUS at 10:58

## 2024-03-09 RX ADMIN — CLOPIDOGREL BISULFATE 75 MG: 75 TABLET ORAL at 09:38

## 2024-03-09 RX ADMIN — SODIUM CHLORIDE: 9 INJECTION, SOLUTION INTRAVENOUS at 02:55

## 2024-03-09 RX ADMIN — VANCOMYCIN HYDROCHLORIDE 1000 MG: 1 INJECTION, POWDER, LYOPHILIZED, FOR SOLUTION INTRAVENOUS at 23:02

## 2024-03-09 RX ADMIN — MELATONIN 6 MG: at 23:00

## 2024-03-09 RX ADMIN — PREGABALIN 75 MG: 75 CAPSULE ORAL at 13:38

## 2024-03-09 RX ADMIN — ACETAMINOPHEN 650 MG: 325 TABLET ORAL at 20:39

## 2024-03-09 RX ADMIN — ATORVASTATIN CALCIUM 40 MG: 40 TABLET, FILM COATED ORAL at 20:39

## 2024-03-09 RX ADMIN — FUROSEMIDE 40 MG: 40 TABLET ORAL at 17:26

## 2024-03-09 RX ADMIN — SODIUM CHLORIDE, PRESERVATIVE FREE 10 ML: 5 INJECTION INTRAVENOUS at 09:38

## 2024-03-09 RX ADMIN — VANCOMYCIN HYDROCHLORIDE 1000 MG: 1 INJECTION, POWDER, LYOPHILIZED, FOR SOLUTION INTRAVENOUS at 09:38

## 2024-03-09 RX ADMIN — SODIUM CHLORIDE, PRESERVATIVE FREE 10 ML: 5 INJECTION INTRAVENOUS at 20:42

## 2024-03-09 RX ADMIN — INSULIN GLARGINE 20 UNITS: 100 INJECTION, SOLUTION SUBCUTANEOUS at 20:41

## 2024-03-09 NOTE — ED TRIAGE NOTES
Patient felt a discomfort in his chest and a hot flash while having dinner, patient has a past cardiac history.  Patient denies pain at the time of arrival.

## 2024-03-09 NOTE — ED NOTES
TRANSFER - OUT REPORT:    Verbal report given to Lacy DUARTE RN on Joseph Seymour  being transferred to [facility] 2112 for routine progression of patient care       Report consisted of patient's Situation, Background, Assessment and   Recommendations(SBAR).     Information from the following report(s) ED Encounter Summary, ED SBAR, MAR, Recent Results, and Event Log was reviewed with the receiving nurse.    Standard Fall Assessment:    Presents to emergency department  because of falls (Syncope, seizure, or loss of consciousness): No  Age > 70: No  Altered Mental Status, Intoxication with alcohol or substance confusion (Disorientation, impaired judgment, poor safety awaremess, or inability to follow instructions): No  Impaired Mobility: Ambulates or transfers with assistive devices or assistance; Unable to ambulate or transer.: No  Nursing Judgement: No          Lines:   Peripheral IV 03/08/24 Right;Anterior Forearm (Active)   Site Assessment Clean, dry & intact 03/08/24 2039   Line Status Brisk blood return;Flushed;Normal saline locked;Specimen collected 03/08/24 2039   Dressing Status New dressing applied;Clean, dry & intact 03/08/24 2039   Dressing Type Transparent 03/08/24 2039        Opportunity for questions and clarification was provided.      Patient transported with:  Monitor

## 2024-03-09 NOTE — PROGRESS NOTES
Pt being transferred to [facility]   room 2112 with [facility]. Confirmed with bobby at access center.

## 2024-03-09 NOTE — H&P
aphasia or slurred speech. Symmetrical strength, Sensation grossly intact. AAOx4.               LAB DATA REVIEWED:    Recent Results (from the past 12 hour(s))   CBC with Auto Differential    Collection Time: 03/08/24  7:30 PM   Result Value Ref Range    WBC 17.8 (H) 4.1 - 11.1 K/uL    RBC 6.08 (H) 4.10 - 5.70 M/uL    Hemoglobin 16.6 12.1 - 17.0 g/dL    Hematocrit 50.9 (H) 36.6 - 50.3 %    MCV 83.7 80.0 - 99.0 FL    MCH 27.3 26.0 - 34.0 PG    MCHC 32.6 30.0 - 36.5 g/dL    RDW 14.9 (H) 11.5 - 14.5 %    Platelets 181 150 - 400 K/uL    MPV 9.8 8.9 - 12.9 FL    Nucleated RBCs 0.0 0  WBC    nRBC 0.00 0.00 - 0.01 K/uL    Neutrophils % 84 (H) 32 - 75 %    Lymphocytes % 6 (L) 12 - 49 %    Monocytes % 10 5 - 13 %    Eosinophils % 0 0 - 7 %    Basophils % 0 0 - 1 %    Immature Granulocytes 0 0.0 - 0.5 %    Neutrophils Absolute 14.7 (H) 1.8 - 8.0 K/UL    Lymphocytes Absolute 1.1 0.8 - 3.5 K/UL    Monocytes Absolute 1.8 (H) 0.0 - 1.0 K/UL    Eosinophils Absolute 0.0 0.0 - 0.4 K/UL    Basophils Absolute 0.1 0.0 - 0.1 K/UL    Absolute Immature Granulocyte 0.1 (H) 0.00 - 0.04 K/UL    Differential Type AUTOMATED     Comprehensive Metabolic Panel    Collection Time: 03/08/24  7:30 PM   Result Value Ref Range    Sodium 141 136 - 145 mmol/L    Potassium 3.6 3.5 - 5.1 mmol/L    Chloride 101 97 - 108 mmol/L    CO2 30 21 - 32 mmol/L    Anion Gap 10 5 - 15 mmol/L    Glucose 124 (H) 65 - 100 mg/dL    BUN 22 (H) 6 - 20 MG/DL    Creatinine 1.10 0.70 - 1.30 MG/DL    Bun/Cre Ratio 20 12 - 20      Est, Glom Filt Rate >60 >60 ml/min/1.73m2    Calcium 9.6 8.5 - 10.1 MG/DL    Total Bilirubin 0.9 0.2 - 1.0 MG/DL    ALT 28 12 - 78 U/L    AST 25 15 - 37 U/L    Alk Phosphatase 83 45 - 117 U/L    Total Protein 7.5 6.4 - 8.2 g/dL    Albumin 4.0 3.5 - 5.0 g/dL    Globulin 3.5 2.0 - 4.0 g/dL    Albumin/Globulin Ratio 1.1 1.1 - 2.2     Lactate, Sepsis    Collection Time: 03/08/24  7:30 PM   Result Value Ref Range    Lactic Acid, Sepsis 1.6 0.4 - 2.0

## 2024-03-09 NOTE — ED PROVIDER NOTES
level of care. [PS]   2132 Contacted transfer center to discuss transfer with hospitalist. [PS]   2226 Care discussed with Dr. Christianson who accepts patient in transfer, will give patient 324 of aspirin due to chest pain earlier today.  Care had also been discussed with patient's primary care doctor, Dr. Tyler earlier tonight prior to patient's arrival. [PS]   2241 Second troponin unchanged at 14. [PS]   2336 Labs revealed no change in troponin, urinalysis was not collected, lactic acid was normal, Pro-Blue is pending, COVID and influenza were negative, sed rate was normal at 5, CBC with elevated white count of 17,800 and a normal hemoglobin.  Differential with left shift.  Blood cultures are pending.  CMP is unremarkable except for sugar of 124, LFTs and electrolytes were normal.  X-ray was without evidence of osteomyelitis.  Chest x-ray was with basilar atelectasis and elevated hemidiaphragm which appear to be chronic and unchanged.  Patient tolerated IV vancomycin and Zosyn well while in the ED.    [PS]   2337 3-hour sepsis reevaluation performed at 2300 reveals patient to be alert and interactive with good skin turgor vital signs are stable heart rate was improved after liter of normal saline patient was tolerating antibiotics well. [PS]   2337   He appeared to be hemodynamically stable. [PS]      ED Course User Index  [PS] Jose Servin MD       Disposition Considerations (Tests not done, Shared Decision Making, Pt Expectation of Test or Tx.): Labs and care discussed with patient and wife who are comfortable with being transferred to [facility] for higher level of care.    FINAL IMPRESSION     1. Diabetic ulcer of right midfoot associated with type 2 diabetes mellitus, with fat layer exposed (HCC)    2. Chest pain, unspecified type          DISPOSITION/PLAN   DISPOSITION Decision To Transfer 03/08/2024 10:25:20 PM      Transfer: The patient is being transferred to [facility] for

## 2024-03-10 LAB
ANION GAP SERPL CALC-SCNC: 8 MMOL/L (ref 5–15)
BASOPHILS # BLD: 0.1 K/UL (ref 0–0.1)
BASOPHILS NFR BLD: 0 % (ref 0–1)
BUN SERPL-MCNC: 17 MG/DL (ref 6–20)
BUN/CREAT SERPL: 18 (ref 12–20)
CALCIUM SERPL-MCNC: 8.7 MG/DL (ref 8.5–10.1)
CHLORIDE SERPL-SCNC: 104 MMOL/L (ref 97–108)
CO2 SERPL-SCNC: 24 MMOL/L (ref 21–32)
CREAT SERPL-MCNC: 0.95 MG/DL (ref 0.7–1.3)
DIFFERENTIAL METHOD BLD: ABNORMAL
EOSINOPHIL # BLD: 0.1 K/UL (ref 0–0.4)
EOSINOPHIL NFR BLD: 0 % (ref 0–7)
ERYTHROCYTE [DISTWIDTH] IN BLOOD BY AUTOMATED COUNT: 14.7 % (ref 11.5–14.5)
GLUCOSE BLD STRIP.AUTO-MCNC: 104 MG/DL (ref 65–117)
GLUCOSE BLD STRIP.AUTO-MCNC: 128 MG/DL (ref 65–117)
GLUCOSE BLD STRIP.AUTO-MCNC: 143 MG/DL (ref 65–117)
GLUCOSE BLD STRIP.AUTO-MCNC: 188 MG/DL (ref 65–117)
GLUCOSE SERPL-MCNC: 107 MG/DL (ref 65–100)
HCT VFR BLD AUTO: 46.2 % (ref 36.6–50.3)
HGB BLD-MCNC: 15.2 G/DL (ref 12.1–17)
IMM GRANULOCYTES # BLD AUTO: 0.1 K/UL (ref 0–0.04)
IMM GRANULOCYTES NFR BLD AUTO: 1 % (ref 0–0.5)
LYMPHOCYTES # BLD: 1.1 K/UL (ref 0.8–3.5)
LYMPHOCYTES NFR BLD: 6 % (ref 12–49)
MAGNESIUM SERPL-MCNC: 1.9 MG/DL (ref 1.6–2.4)
MCH RBC QN AUTO: 29 PG (ref 26–34)
MCHC RBC AUTO-ENTMCNC: 32.9 G/DL (ref 30–36.5)
MCV RBC AUTO: 88 FL (ref 80–99)
MONOCYTES # BLD: 1.7 K/UL (ref 0–1)
MONOCYTES NFR BLD: 10 % (ref 5–13)
NEUTS SEG # BLD: 14.4 K/UL (ref 1.8–8)
NEUTS SEG NFR BLD: 83 % (ref 32–75)
NRBC # BLD: 0 K/UL (ref 0–0.01)
NRBC BLD-RTO: 0 PER 100 WBC
PLATELET # BLD AUTO: 146 K/UL (ref 150–400)
PMV BLD AUTO: 10.1 FL (ref 8.9–12.9)
POTASSIUM SERPL-SCNC: 3.3 MMOL/L (ref 3.5–5.1)
RBC # BLD AUTO: 5.25 M/UL (ref 4.1–5.7)
SERVICE CMNT-IMP: ABNORMAL
SERVICE CMNT-IMP: NORMAL
SODIUM SERPL-SCNC: 136 MMOL/L (ref 136–145)
VANCOMYCIN SERPL-MCNC: 14.8 UG/ML
WBC # BLD AUTO: 17.5 K/UL (ref 4.1–11.1)

## 2024-03-10 PROCEDURE — 80202 ASSAY OF VANCOMYCIN: CPT

## 2024-03-10 PROCEDURE — 6370000000 HC RX 637 (ALT 250 FOR IP): Performed by: HOSPITALIST

## 2024-03-10 PROCEDURE — 2580000003 HC RX 258: Performed by: INTERNAL MEDICINE

## 2024-03-10 PROCEDURE — 36415 COLL VENOUS BLD VENIPUNCTURE: CPT

## 2024-03-10 PROCEDURE — 6370000000 HC RX 637 (ALT 250 FOR IP): Performed by: INTERNAL MEDICINE

## 2024-03-10 PROCEDURE — 82962 GLUCOSE BLOOD TEST: CPT

## 2024-03-10 PROCEDURE — 6360000002 HC RX W HCPCS: Performed by: INTERNAL MEDICINE

## 2024-03-10 PROCEDURE — 85025 COMPLETE CBC W/AUTO DIFF WBC: CPT

## 2024-03-10 PROCEDURE — 1100000003 HC PRIVATE W/ TELEMETRY

## 2024-03-10 PROCEDURE — 83735 ASSAY OF MAGNESIUM: CPT

## 2024-03-10 PROCEDURE — 80048 BASIC METABOLIC PNL TOTAL CA: CPT

## 2024-03-10 RX ORDER — POTASSIUM CHLORIDE 20 MEQ/1
40 TABLET, EXTENDED RELEASE ORAL ONCE
Status: COMPLETED | OUTPATIENT
Start: 2024-03-10 | End: 2024-03-10

## 2024-03-10 RX ADMIN — MELATONIN 6 MG: at 22:17

## 2024-03-10 RX ADMIN — SODIUM CHLORIDE, PRESERVATIVE FREE 10 ML: 5 INJECTION INTRAVENOUS at 08:58

## 2024-03-10 RX ADMIN — FUROSEMIDE 40 MG: 40 TABLET ORAL at 08:55

## 2024-03-10 RX ADMIN — ACETAMINOPHEN 650 MG: 325 TABLET ORAL at 15:51

## 2024-03-10 RX ADMIN — PREGABALIN 75 MG: 75 CAPSULE ORAL at 08:55

## 2024-03-10 RX ADMIN — PREGABALIN 75 MG: 75 CAPSULE ORAL at 20:20

## 2024-03-10 RX ADMIN — PIPERACILLIN AND TAZOBACTAM 3375 MG: 3; .375 INJECTION, POWDER, LYOPHILIZED, FOR SOLUTION INTRAVENOUS at 03:18

## 2024-03-10 RX ADMIN — ATORVASTATIN CALCIUM 40 MG: 40 TABLET, FILM COATED ORAL at 20:24

## 2024-03-10 RX ADMIN — POTASSIUM CHLORIDE 40 MEQ: 1500 TABLET, EXTENDED RELEASE ORAL at 08:55

## 2024-03-10 RX ADMIN — ASPIRIN 81 MG: 81 TABLET, CHEWABLE ORAL at 08:55

## 2024-03-10 RX ADMIN — PIPERACILLIN AND TAZOBACTAM 3375 MG: 3; .375 INJECTION, POWDER, LYOPHILIZED, FOR SOLUTION INTRAVENOUS at 19:30

## 2024-03-10 RX ADMIN — INSULIN GLARGINE 20 UNITS: 100 INJECTION, SOLUTION SUBCUTANEOUS at 20:20

## 2024-03-10 RX ADMIN — VANCOMYCIN HYDROCHLORIDE 1000 MG: 1 INJECTION, POWDER, LYOPHILIZED, FOR SOLUTION INTRAVENOUS at 08:57

## 2024-03-10 RX ADMIN — PIPERACILLIN AND TAZOBACTAM 3375 MG: 3; .375 INJECTION, POWDER, LYOPHILIZED, FOR SOLUTION INTRAVENOUS at 10:52

## 2024-03-10 RX ADMIN — ACETAMINOPHEN 650 MG: 325 TABLET ORAL at 22:17

## 2024-03-10 RX ADMIN — TAMSULOSIN HYDROCHLORIDE 0.4 MG: 0.4 CAPSULE ORAL at 08:55

## 2024-03-10 RX ADMIN — VANCOMYCIN HYDROCHLORIDE 1000 MG: 1 INJECTION, POWDER, LYOPHILIZED, FOR SOLUTION INTRAVENOUS at 15:45

## 2024-03-10 RX ADMIN — FUROSEMIDE 40 MG: 40 TABLET ORAL at 17:02

## 2024-03-10 RX ADMIN — SODIUM CHLORIDE, PRESERVATIVE FREE 10 ML: 5 INJECTION INTRAVENOUS at 20:20

## 2024-03-10 NOTE — CARE COORDINATION
Care Management Initial Assessment     RUR: 9% - Low  Readmission? No  1st IM letter given? Yes   1st  letter given: No    62 YO White Male admitted on 3/9/24 for foot infection. Lives in 1-story house (5 SON w/ bilateral handrails) w/ spouse in [place], [place] (PHYSICAL ADDRESS: [address], [place] [address]). Has 2 sons that live locally. Reports independent with mobility, transfers, ADLs/IADLs to include driving. Hx of heart attack ~5.5 years ago, hx of IPR @ [facility]. Denies hx of HH/SNF. DME includes: BP monitor. Has Medicare A&B and supplement [place] [employer] plan (not primary). Preferred Rx is [facility]).     Demographics verified, assessment completed. No PT/OT consults or needs at this time. MRI pending. Spouse to transport home when medically stable.     CM to remain available as needed for d/c planning     03/10/24 0801   Service Assessment   Patient Orientation Alert and Oriented   Cognition Alert   History Provided By Patient   Primary Caregiver Self   Support Systems Spouse/Significant Other;Children   Patient's Healthcare Decision Maker is: Patient Declined (Legal Next of Kin Remains as Decision Maker)   PCP Verified by CM Yes   Last Visit to PCP Within last 3 months   Prior Functional Level Independent in ADLs/IADLs   Current Functional Level Independent in ADLs/IADLs   Can patient return to prior living arrangement Yes   Family able to assist with home care needs: Yes  (Spouse)   Would you like for me to discuss the discharge plan with any other family members/significant others, and if so, who? Yes  (Spouse)   Financial Resources Medicare;Other (Comment)  (Medicare A&B, BCBS [place])   Community Resources None   Social/Functional History   Lives With Spouse   Type of Home House   Home Layout One level   Home Access Stairs to enter with rails   Entrance Stairs - Number of Steps 5   Entrance Stairs - Rails Both   Home Equipment   (BP monitor)   Active  Yes   Discharge

## 2024-03-11 ENCOUNTER — ANESTHESIA EVENT (OUTPATIENT)
Facility: HOSPITAL | Age: 64
End: 2024-03-11
Payer: COMMERCIAL

## 2024-03-11 ENCOUNTER — ANESTHESIA (OUTPATIENT)
Facility: HOSPITAL | Age: 64
End: 2024-03-11
Payer: COMMERCIAL

## 2024-03-11 ENCOUNTER — APPOINTMENT (OUTPATIENT)
Facility: HOSPITAL | Age: 64
DRG: 617 | End: 2024-03-11
Attending: INTERNAL MEDICINE
Payer: COMMERCIAL

## 2024-03-11 LAB
ANION GAP SERPL CALC-SCNC: 4 MMOL/L (ref 5–15)
BASOPHILS # BLD: 0.1 K/UL (ref 0–0.1)
BASOPHILS NFR BLD: 0 % (ref 0–1)
BUN SERPL-MCNC: 16 MG/DL (ref 6–20)
BUN/CREAT SERPL: 16 (ref 12–20)
CALCIUM SERPL-MCNC: 8.4 MG/DL (ref 8.5–10.1)
CHLORIDE SERPL-SCNC: 105 MMOL/L (ref 97–108)
CO2 SERPL-SCNC: 29 MMOL/L (ref 21–32)
CREAT SERPL-MCNC: 0.97 MG/DL (ref 0.7–1.3)
DIFFERENTIAL METHOD BLD: ABNORMAL
EOSINOPHIL # BLD: 0.1 K/UL (ref 0–0.4)
EOSINOPHIL NFR BLD: 0 % (ref 0–7)
ERYTHROCYTE [DISTWIDTH] IN BLOOD BY AUTOMATED COUNT: 14.7 % (ref 11.5–14.5)
GLUCOSE BLD STRIP.AUTO-MCNC: 106 MG/DL (ref 65–117)
GLUCOSE BLD STRIP.AUTO-MCNC: 115 MG/DL (ref 65–117)
GLUCOSE BLD STRIP.AUTO-MCNC: 180 MG/DL (ref 65–117)
GLUCOSE BLD STRIP.AUTO-MCNC: 91 MG/DL (ref 65–117)
GLUCOSE SERPL-MCNC: 114 MG/DL (ref 65–100)
HCT VFR BLD AUTO: 44.6 % (ref 36.6–50.3)
HGB BLD-MCNC: 14.2 G/DL (ref 12.1–17)
IMM GRANULOCYTES # BLD AUTO: 0.1 K/UL (ref 0–0.04)
IMM GRANULOCYTES NFR BLD AUTO: 1 % (ref 0–0.5)
LYMPHOCYTES # BLD: 1.1 K/UL (ref 0.8–3.5)
LYMPHOCYTES NFR BLD: 10 % (ref 12–49)
MCH RBC QN AUTO: 27.6 PG (ref 26–34)
MCHC RBC AUTO-ENTMCNC: 31.8 G/DL (ref 30–36.5)
MCV RBC AUTO: 86.8 FL (ref 80–99)
MONOCYTES # BLD: 1.2 K/UL (ref 0–1)
MONOCYTES NFR BLD: 10 % (ref 5–13)
NEUTS SEG # BLD: 8.9 K/UL (ref 1.8–8)
NEUTS SEG NFR BLD: 79 % (ref 32–75)
NRBC # BLD: 0 K/UL (ref 0–0.01)
NRBC BLD-RTO: 0 PER 100 WBC
PLATELET # BLD AUTO: 154 K/UL (ref 150–400)
PMV BLD AUTO: 9.6 FL (ref 8.9–12.9)
POTASSIUM SERPL-SCNC: 3.1 MMOL/L (ref 3.5–5.1)
RBC # BLD AUTO: 5.14 M/UL (ref 4.1–5.7)
SERVICE CMNT-IMP: ABNORMAL
SERVICE CMNT-IMP: NORMAL
SODIUM SERPL-SCNC: 138 MMOL/L (ref 136–145)
VANCOMYCIN SERPL-MCNC: 42.1 UG/ML
WBC # BLD AUTO: 11.3 K/UL (ref 4.1–11.1)

## 2024-03-11 PROCEDURE — 80048 BASIC METABOLIC PNL TOTAL CA: CPT

## 2024-03-11 PROCEDURE — 87075 CULTR BACTERIA EXCEPT BLOOD: CPT

## 2024-03-11 PROCEDURE — 87205 SMEAR GRAM STAIN: CPT

## 2024-03-11 PROCEDURE — 6370000000 HC RX 637 (ALT 250 FOR IP): Performed by: PODIATRIST

## 2024-03-11 PROCEDURE — 6370000000 HC RX 637 (ALT 250 FOR IP): Performed by: INTERNAL MEDICINE

## 2024-03-11 PROCEDURE — 2720000010 HC SURG SUPPLY STERILE: Performed by: PODIATRIST

## 2024-03-11 PROCEDURE — 2580000003 HC RX 258: Performed by: INTERNAL MEDICINE

## 2024-03-11 PROCEDURE — 80202 ASSAY OF VANCOMYCIN: CPT

## 2024-03-11 PROCEDURE — 88307 TISSUE EXAM BY PATHOLOGIST: CPT

## 2024-03-11 PROCEDURE — 6370000000 HC RX 637 (ALT 250 FOR IP): Performed by: HOSPITALIST

## 2024-03-11 PROCEDURE — 7100000000 HC PACU RECOVERY - FIRST 15 MIN: Performed by: PODIATRIST

## 2024-03-11 PROCEDURE — 7100000001 HC PACU RECOVERY - ADDTL 15 MIN: Performed by: PODIATRIST

## 2024-03-11 PROCEDURE — 2500000003 HC RX 250 WO HCPCS: Performed by: NURSE ANESTHETIST, CERTIFIED REGISTERED

## 2024-03-11 PROCEDURE — 36415 COLL VENOUS BLD VENIPUNCTURE: CPT

## 2024-03-11 PROCEDURE — 2580000003 HC RX 258

## 2024-03-11 PROCEDURE — 87070 CULTURE OTHR SPECIMN AEROBIC: CPT

## 2024-03-11 PROCEDURE — 88311 DECALCIFY TISSUE: CPT

## 2024-03-11 PROCEDURE — 2580000003 HC RX 258: Performed by: PODIATRIST

## 2024-03-11 PROCEDURE — 2709999900 HC NON-CHARGEABLE SUPPLY: Performed by: PODIATRIST

## 2024-03-11 PROCEDURE — 2580000003 HC RX 258: Performed by: STUDENT IN AN ORGANIZED HEALTH CARE EDUCATION/TRAINING PROGRAM

## 2024-03-11 PROCEDURE — 3700000001 HC ADD 15 MINUTES (ANESTHESIA): Performed by: PODIATRIST

## 2024-03-11 PROCEDURE — 3600000002 HC SURGERY LEVEL 2 BASE: Performed by: PODIATRIST

## 2024-03-11 PROCEDURE — 3700000000 HC ANESTHESIA ATTENDED CARE: Performed by: PODIATRIST

## 2024-03-11 PROCEDURE — 87186 SC STD MICRODIL/AGAR DIL: CPT

## 2024-03-11 PROCEDURE — 6360000002 HC RX W HCPCS: Performed by: INTERNAL MEDICINE

## 2024-03-11 PROCEDURE — 3600000012 HC SURGERY LEVEL 2 ADDTL 15MIN: Performed by: PODIATRIST

## 2024-03-11 PROCEDURE — 6360000002 HC RX W HCPCS: Performed by: NURSE ANESTHETIST, CERTIFIED REGISTERED

## 2024-03-11 PROCEDURE — 6360000002 HC RX W HCPCS: Performed by: PODIATRIST

## 2024-03-11 PROCEDURE — 85025 COMPLETE CBC W/AUTO DIFF WBC: CPT

## 2024-03-11 PROCEDURE — 0Y6M0ZB DETACHMENT AT RIGHT FOOT, PARTIAL 2ND RAY, OPEN APPROACH: ICD-10-PCS | Performed by: PODIATRIST

## 2024-03-11 PROCEDURE — 1100000003 HC PRIVATE W/ TELEMETRY

## 2024-03-11 PROCEDURE — 82962 GLUCOSE BLOOD TEST: CPT

## 2024-03-11 RX ORDER — PROCHLORPERAZINE EDISYLATE 5 MG/ML
5 INJECTION INTRAMUSCULAR; INTRAVENOUS
Status: DISCONTINUED | OUTPATIENT
Start: 2024-03-11 | End: 2024-03-11 | Stop reason: HOSPADM

## 2024-03-11 RX ORDER — ONDANSETRON 2 MG/ML
INJECTION INTRAMUSCULAR; INTRAVENOUS PRN
Status: DISCONTINUED | OUTPATIENT
Start: 2024-03-11 | End: 2024-03-11 | Stop reason: SDUPTHER

## 2024-03-11 RX ORDER — FENTANYL CITRATE 50 UG/ML
INJECTION, SOLUTION INTRAMUSCULAR; INTRAVENOUS PRN
Status: DISCONTINUED | OUTPATIENT
Start: 2024-03-11 | End: 2024-03-11 | Stop reason: SDUPTHER

## 2024-03-11 RX ORDER — SODIUM CHLORIDE 0.9 % (FLUSH) 0.9 %
5-40 SYRINGE (ML) INJECTION EVERY 12 HOURS SCHEDULED
Status: DISCONTINUED | OUTPATIENT
Start: 2024-03-11 | End: 2024-03-11 | Stop reason: HOSPADM

## 2024-03-11 RX ORDER — DEXTROSE MONOHYDRATE 100 MG/ML
INJECTION, SOLUTION INTRAVENOUS CONTINUOUS PRN
Status: DISCONTINUED | OUTPATIENT
Start: 2024-03-11 | End: 2024-03-11 | Stop reason: HOSPADM

## 2024-03-11 RX ORDER — FENTANYL CITRATE 50 UG/ML
25 INJECTION, SOLUTION INTRAMUSCULAR; INTRAVENOUS EVERY 5 MIN PRN
Status: DISCONTINUED | OUTPATIENT
Start: 2024-03-11 | End: 2024-03-11 | Stop reason: HOSPADM

## 2024-03-11 RX ORDER — NALOXONE HYDROCHLORIDE 0.4 MG/ML
INJECTION, SOLUTION INTRAMUSCULAR; INTRAVENOUS; SUBCUTANEOUS PRN
Status: DISCONTINUED | OUTPATIENT
Start: 2024-03-11 | End: 2024-03-11 | Stop reason: HOSPADM

## 2024-03-11 RX ORDER — SODIUM CHLORIDE, SODIUM LACTATE, POTASSIUM CHLORIDE, CALCIUM CHLORIDE 600; 310; 30; 20 MG/100ML; MG/100ML; MG/100ML; MG/100ML
INJECTION, SOLUTION INTRAVENOUS CONTINUOUS PRN
Status: DISCONTINUED | OUTPATIENT
Start: 2024-03-11 | End: 2024-03-11 | Stop reason: SDUPTHER

## 2024-03-11 RX ORDER — BUPIVACAINE HYDROCHLORIDE 5 MG/ML
INJECTION, SOLUTION PERINEURAL PRN
Status: DISCONTINUED | OUTPATIENT
Start: 2024-03-11 | End: 2024-03-11 | Stop reason: ALTCHOICE

## 2024-03-11 RX ORDER — ENOXAPARIN SODIUM 100 MG/ML
30 INJECTION SUBCUTANEOUS 2 TIMES DAILY
Status: CANCELLED | OUTPATIENT
Start: 2024-03-11

## 2024-03-11 RX ORDER — SODIUM CHLORIDE, SODIUM LACTATE, POTASSIUM CHLORIDE, CALCIUM CHLORIDE 600; 310; 30; 20 MG/100ML; MG/100ML; MG/100ML; MG/100ML
INJECTION, SOLUTION INTRAVENOUS ONCE
Status: COMPLETED | OUTPATIENT
Start: 2024-03-11 | End: 2024-03-11

## 2024-03-11 RX ORDER — MIDAZOLAM HYDROCHLORIDE 1 MG/ML
INJECTION INTRAMUSCULAR; INTRAVENOUS PRN
Status: DISCONTINUED | OUTPATIENT
Start: 2024-03-11 | End: 2024-03-11 | Stop reason: SDUPTHER

## 2024-03-11 RX ORDER — LIDOCAINE HYDROCHLORIDE 20 MG/ML
INJECTION, SOLUTION EPIDURAL; INFILTRATION; INTRACAUDAL; PERINEURAL PRN
Status: DISCONTINUED | OUTPATIENT
Start: 2024-03-11 | End: 2024-03-11 | Stop reason: SDUPTHER

## 2024-03-11 RX ORDER — SODIUM CHLORIDE 9 MG/ML
INJECTION, SOLUTION INTRAVENOUS PRN
Status: DISCONTINUED | OUTPATIENT
Start: 2024-03-11 | End: 2024-03-11 | Stop reason: HOSPADM

## 2024-03-11 RX ORDER — HYDROMORPHONE HYDROCHLORIDE 1 MG/ML
0.5 INJECTION, SOLUTION INTRAMUSCULAR; INTRAVENOUS; SUBCUTANEOUS EVERY 5 MIN PRN
Status: DISCONTINUED | OUTPATIENT
Start: 2024-03-11 | End: 2024-03-11 | Stop reason: HOSPADM

## 2024-03-11 RX ORDER — PROPOFOL 10 MG/ML
INJECTION, EMULSION INTRAVENOUS CONTINUOUS PRN
Status: DISCONTINUED | OUTPATIENT
Start: 2024-03-11 | End: 2024-03-11 | Stop reason: SDUPTHER

## 2024-03-11 RX ORDER — ONDANSETRON 2 MG/ML
4 INJECTION INTRAMUSCULAR; INTRAVENOUS
Status: DISCONTINUED | OUTPATIENT
Start: 2024-03-11 | End: 2024-03-11 | Stop reason: HOSPADM

## 2024-03-11 RX ORDER — SODIUM CHLORIDE 0.9 % (FLUSH) 0.9 %
5-40 SYRINGE (ML) INJECTION PRN
Status: DISCONTINUED | OUTPATIENT
Start: 2024-03-11 | End: 2024-03-11 | Stop reason: HOSPADM

## 2024-03-11 RX ORDER — IPRATROPIUM BROMIDE AND ALBUTEROL SULFATE 2.5; .5 MG/3ML; MG/3ML
1 SOLUTION RESPIRATORY (INHALATION)
Status: DISCONTINUED | OUTPATIENT
Start: 2024-03-11 | End: 2024-03-11 | Stop reason: HOSPADM

## 2024-03-11 RX ADMIN — VANCOMYCIN HYDROCHLORIDE 1000 MG: 1 INJECTION, POWDER, LYOPHILIZED, FOR SOLUTION INTRAVENOUS at 00:30

## 2024-03-11 RX ADMIN — TAMSULOSIN HYDROCHLORIDE 0.4 MG: 0.4 CAPSULE ORAL at 08:37

## 2024-03-11 RX ADMIN — MELATONIN 6 MG: at 21:25

## 2024-03-11 RX ADMIN — SODIUM CHLORIDE, POTASSIUM CHLORIDE, SODIUM LACTATE AND CALCIUM CHLORIDE: 600; 310; 30; 20 INJECTION, SOLUTION INTRAVENOUS at 15:20

## 2024-03-11 RX ADMIN — FENTANYL CITRATE 25 MCG: 50 INJECTION, SOLUTION INTRAMUSCULAR; INTRAVENOUS at 16:56

## 2024-03-11 RX ADMIN — PIPERACILLIN AND TAZOBACTAM 3375 MG: 3; .375 INJECTION, POWDER, LYOPHILIZED, FOR SOLUTION INTRAVENOUS at 10:36

## 2024-03-11 RX ADMIN — ACETAMINOPHEN 650 MG: 325 TABLET ORAL at 23:20

## 2024-03-11 RX ADMIN — MIDAZOLAM HYDROCHLORIDE 2 MG: 1 INJECTION, SOLUTION INTRAMUSCULAR; INTRAVENOUS at 16:09

## 2024-03-11 RX ADMIN — PROPOFOL 100 MCG/KG/MIN: 10 INJECTION, EMULSION INTRAVENOUS at 16:17

## 2024-03-11 RX ADMIN — SODIUM CHLORIDE: 900 INJECTION INTRAVENOUS at 18:06

## 2024-03-11 RX ADMIN — ACETAMINOPHEN 650 MG: 325 TABLET ORAL at 08:37

## 2024-03-11 RX ADMIN — PIPERACILLIN AND TAZOBACTAM 3375 MG: 3; .375 INJECTION, POWDER, LYOPHILIZED, FOR SOLUTION INTRAVENOUS at 18:09

## 2024-03-11 RX ADMIN — FUROSEMIDE 40 MG: 40 TABLET ORAL at 18:04

## 2024-03-11 RX ADMIN — FENTANYL CITRATE 25 MCG: 50 INJECTION, SOLUTION INTRAMUSCULAR; INTRAVENOUS at 16:44

## 2024-03-11 RX ADMIN — LIDOCAINE HYDROCHLORIDE 80 MG: 20 INJECTION, SOLUTION EPIDURAL; INFILTRATION; INTRACAUDAL; PERINEURAL at 16:17

## 2024-03-11 RX ADMIN — ATORVASTATIN CALCIUM 40 MG: 40 TABLET, FILM COATED ORAL at 21:23

## 2024-03-11 RX ADMIN — SODIUM CHLORIDE, PRESERVATIVE FREE 10 ML: 5 INJECTION INTRAVENOUS at 08:38

## 2024-03-11 RX ADMIN — POTASSIUM BICARBONATE 40 MEQ: 782 TABLET, EFFERVESCENT ORAL at 21:23

## 2024-03-11 RX ADMIN — SODIUM CHLORIDE, POTASSIUM CHLORIDE, SODIUM LACTATE AND CALCIUM CHLORIDE: 600; 310; 30; 20 INJECTION, SOLUTION INTRAVENOUS at 16:12

## 2024-03-11 RX ADMIN — PREGABALIN 75 MG: 75 CAPSULE ORAL at 21:23

## 2024-03-11 RX ADMIN — Medication 3 AMPULE: at 15:19

## 2024-03-11 RX ADMIN — FENTANYL CITRATE 25 MCG: 50 INJECTION, SOLUTION INTRAMUSCULAR; INTRAVENOUS at 16:18

## 2024-03-11 RX ADMIN — INSULIN GLARGINE 20 UNITS: 100 INJECTION, SOLUTION SUBCUTANEOUS at 21:23

## 2024-03-11 RX ADMIN — ONDANSETRON HYDROCHLORIDE 4 MG: 2 INJECTION, SOLUTION INTRAMUSCULAR; INTRAVENOUS at 17:02

## 2024-03-11 RX ADMIN — FUROSEMIDE 40 MG: 40 TABLET ORAL at 08:37

## 2024-03-11 RX ADMIN — PIPERACILLIN AND TAZOBACTAM 3375 MG: 3; .375 INJECTION, POWDER, LYOPHILIZED, FOR SOLUTION INTRAVENOUS at 02:58

## 2024-03-11 RX ADMIN — FENTANYL CITRATE 25 MCG: 50 INJECTION, SOLUTION INTRAMUSCULAR; INTRAVENOUS at 16:29

## 2024-03-11 RX ADMIN — PREGABALIN 75 MG: 75 CAPSULE ORAL at 08:37

## 2024-03-11 ASSESSMENT — PAIN SCALES - GENERAL
PAINLEVEL_OUTOF10: 0

## 2024-03-11 NOTE — PERIOP NOTE
TRANSFER - IN REPORT:    Verbal report received from Albina NGUYEN on Joseph Seymour  being received from [facility] Room 2112 for ordered procedure      Report consisted of patient's Situation, Background, Assessment and   Recommendations(SBAR).     Information from the following report(s) Nurse Handoff Report, Intake/Output, MAR, Recent Results, Med Rec Status, Cardiac Rhythm NSR, and Procedure Verification was reviewed with the receiving nurse.    Opportunity for questions and clarification was provided.      Assessment completed upon patient's arrival to unit and care assumed.

## 2024-03-11 NOTE — BRIEF OP NOTE
Brief Postoperative Note      Patient: Joseph Seymour  YOB: 1960  MRN: [id number]    Date of Procedure: 3/11/2024    Pre-Op Diagnosis Codes:     * Osteomyelitis of right foot, unspecified type (HCC) [M86.9]    Post-Op Diagnosis: Same       Procedure(s):  RIGHT FOOT DEBRIDEMENT INCISION AND DRAINAGE    Surgeon(s):  Dirk Dixon DPM    Assistant:  * No surgical staff found *    Anesthesia: MAC    Hemostasis: Ankle Tourniquet at 250 mm Hg    Estimated Blood Loss (mL): less than 100     Complications: None    Specimens:   ID Type Source Tests Collected by Time Destination   1 : Metatarsophalangeal joint Tissue Foot SURGICAL PATHOLOGY Dirk Dixon DPM 3/11/2024 1644    A : Right foot wound Swab Foot CULTURE, ANAEROBIC, CULTURE, WOUND Dirk Dixon DPM 3/11/2024 1646        Implants:  * No implants in log *      Drains: * No LDAs found *1/2 \" packing    Findings: viable bone and soft tissue margins       Electronically signed by Dirk Dixon DPM on 3/11/2024 at 5:04 PM

## 2024-03-11 NOTE — ANESTHESIA POSTPROCEDURE EVALUATION
Department of Anesthesiology  Postprocedure Note    Patient: Joseph Seymour  MRN: [id number]  YOB: 1960  Date of evaluation: 3/11/2024    Procedure Summary       Date: 03/11/24 Room / Location: [facility] MAIN OR  / [facility] MAIN OR    Anesthesia Start: 1612 Anesthesia Stop: 1714    Procedure: RIGHT FOOT DEBRIDEMENT INCISION AND DRAINAGE (Right: Foot) Diagnosis:       Osteomyelitis of right foot, unspecified type (HCC)      (Osteomyelitis of right foot, unspecified type (HCC) [M86.9])    Providers: Dirk Dixon DPM Responsible Provider: Christopher Woods MD    Anesthesia Type: MAC ASA Status: 3            Anesthesia Type: MAC    Yana Phase I: Yana Score: 10    Yana Phase II:      Anesthesia Post Evaluation    Patient location during evaluation: PACU  Patient participation: complete - patient participated  Level of consciousness: awake  Airway patency: patent  Nausea & Vomiting: no vomiting  Cardiovascular status: hemodynamically stable  Respiratory status: acceptable  Hydration status: euvolemic    No notable events documented.

## 2024-03-11 NOTE — WOUND CARE
Wound care nurse consult for right plantar diabetic foot wound.  62 y/o male admitted for right diabetic foot infection. Patient for OR today with podiatrist, Dr Dixon. Current wound is clean and covered with bandage. Will defer post op treatment of wound to Dr Dixon.            ARNAUD TOMAS RN, CWON

## 2024-03-11 NOTE — ANESTHESIA PRE PROCEDURE
Department of Anesthesiology  Preprocedure Note       Name:  Joseph Seymour   Age:  63 y.o.  :  1960                                          MRN:  [id number]         Date:  3/11/2024      Surgeon: Surgeon(s):  Dirk Dixon DPM    Procedure: Procedure(s):  RIGHT FOOT DEBRIDEMENT INCISION AND DRAINAGE    Medications prior to admission:   Prior to Admission medications    Medication Sig Start Date End Date Taking? Authorizing Provider   clopidogrel (PLAVIX) 75 MG tablet Take 1 tablet by mouth daily   Yes Alber Mullins MD   atorvastatin (LIPITOR) 40 MG tablet Take 1 tablet by mouth nightly   Yes Alber Mullins MD   tamsulosin (FLOMAX) 0.4 MG capsule Take 1 capsule by mouth daily   Yes Alber Mullins MD   spironolactone (ALDACTONE) 25 MG tablet Take 1 tablet by mouth in the morning and at bedtime   Yes Alber Mullins MD   pregabalin (LYRICA) 75 MG capsule Take 1 capsule by mouth 2 times daily. Patient takes 75mg in AM and 150mg at night Max Daily Amount: 150 mg   Yes Alber Mullins MD   furosemide (LASIX) 40 MG tablet Take 1 tablet by mouth in the morning and 1 tablet in the evening.   Yes Alber Mullins MD   NONFORMULARY Inject 2 mg as directed every 7 days Indications: Weight Loss Ozempic   Yes Alber Mullins MD   sacubitril-valsartan (ENTRESTO) 24-26 MG per tablet Take 1 tablet by mouth 2 times daily   Yes Alber Mullins MD   NONFORMULARY Inject 42 Units into the skin nightly Indications: High Blood Sugar Tresiba   Yes Alber Mullins MD   NONFORMULARY Take 12.5 mg by mouth in the morning and at bedtime Patient takes synjardy 12.5mg/1000mg twice daily.   Yes Alber Mullins MD       Current medications:    Current Facility-Administered Medications   Medication Dose Route Frequency Provider Last Rate Last Admin    vancomycin (VANCOCIN) intermittent dosing (placeholder)   Other RX Placeholder Gail Payne MD        potassium

## 2024-03-12 ENCOUNTER — APPOINTMENT (OUTPATIENT)
Facility: HOSPITAL | Age: 64
DRG: 617 | End: 2024-03-12
Attending: INTERNAL MEDICINE
Payer: COMMERCIAL

## 2024-03-12 LAB
ANION GAP SERPL CALC-SCNC: 6 MMOL/L (ref 5–15)
BASOPHILS # BLD: 0.1 K/UL (ref 0–0.1)
BASOPHILS NFR BLD: 1 % (ref 0–1)
BUN SERPL-MCNC: 18 MG/DL (ref 6–20)
BUN/CREAT SERPL: 19 (ref 12–20)
CALCIUM SERPL-MCNC: 8.3 MG/DL (ref 8.5–10.1)
CHLORIDE SERPL-SCNC: 106 MMOL/L (ref 97–108)
CO2 SERPL-SCNC: 26 MMOL/L (ref 21–32)
CREAT SERPL-MCNC: 0.96 MG/DL (ref 0.7–1.3)
DIFFERENTIAL METHOD BLD: ABNORMAL
EOSINOPHIL # BLD: 0.1 K/UL (ref 0–0.4)
EOSINOPHIL NFR BLD: 1 % (ref 0–7)
ERYTHROCYTE [DISTWIDTH] IN BLOOD BY AUTOMATED COUNT: 14.8 % (ref 11.5–14.5)
GLUCOSE BLD STRIP.AUTO-MCNC: 116 MG/DL (ref 65–117)
GLUCOSE BLD STRIP.AUTO-MCNC: 124 MG/DL (ref 65–117)
GLUCOSE BLD STRIP.AUTO-MCNC: 147 MG/DL (ref 65–117)
GLUCOSE BLD STRIP.AUTO-MCNC: 203 MG/DL (ref 65–117)
GLUCOSE SERPL-MCNC: 155 MG/DL (ref 65–100)
HCT VFR BLD AUTO: 44.5 % (ref 36.6–50.3)
HGB BLD-MCNC: 14.2 G/DL (ref 12.1–17)
IMM GRANULOCYTES # BLD AUTO: 0.1 K/UL (ref 0–0.04)
IMM GRANULOCYTES NFR BLD AUTO: 1 % (ref 0–0.5)
LYMPHOCYTES # BLD: 1.4 K/UL (ref 0.8–3.5)
LYMPHOCYTES NFR BLD: 13 % (ref 12–49)
MCH RBC QN AUTO: 28 PG (ref 26–34)
MCHC RBC AUTO-ENTMCNC: 31.9 G/DL (ref 30–36.5)
MCV RBC AUTO: 87.6 FL (ref 80–99)
MONOCYTES # BLD: 1.5 K/UL (ref 0–1)
MONOCYTES NFR BLD: 14 % (ref 5–13)
NEUTS SEG # BLD: 7.6 K/UL (ref 1.8–8)
NEUTS SEG NFR BLD: 70 % (ref 32–75)
NRBC # BLD: 0 K/UL (ref 0–0.01)
NRBC BLD-RTO: 0 PER 100 WBC
PLATELET # BLD AUTO: 177 K/UL (ref 150–400)
PMV BLD AUTO: 9.7 FL (ref 8.9–12.9)
POTASSIUM SERPL-SCNC: 3.3 MMOL/L (ref 3.5–5.1)
RBC # BLD AUTO: 5.08 M/UL (ref 4.1–5.7)
SERVICE CMNT-IMP: ABNORMAL
SERVICE CMNT-IMP: NORMAL
SODIUM SERPL-SCNC: 138 MMOL/L (ref 136–145)
VANCOMYCIN SERPL-MCNC: 3.3 UG/ML
VANCOMYCIN SERPL-MCNC: 6.4 UG/ML
WBC # BLD AUTO: 10.7 K/UL (ref 4.1–11.1)

## 2024-03-12 PROCEDURE — 6370000000 HC RX 637 (ALT 250 FOR IP): Performed by: INTERNAL MEDICINE

## 2024-03-12 PROCEDURE — 80202 ASSAY OF VANCOMYCIN: CPT

## 2024-03-12 PROCEDURE — 85025 COMPLETE CBC W/AUTO DIFF WBC: CPT

## 2024-03-12 PROCEDURE — 2580000003 HC RX 258: Performed by: INTERNAL MEDICINE

## 2024-03-12 PROCEDURE — 6370000000 HC RX 637 (ALT 250 FOR IP): Performed by: HOSPITALIST

## 2024-03-12 PROCEDURE — 6360000002 HC RX W HCPCS: Performed by: INTERNAL MEDICINE

## 2024-03-12 PROCEDURE — 80048 BASIC METABOLIC PNL TOTAL CA: CPT

## 2024-03-12 PROCEDURE — 1100000000 HC RM PRIVATE

## 2024-03-12 PROCEDURE — 73630 X-RAY EXAM OF FOOT: CPT

## 2024-03-12 PROCEDURE — 36415 COLL VENOUS BLD VENIPUNCTURE: CPT

## 2024-03-12 PROCEDURE — 82962 GLUCOSE BLOOD TEST: CPT

## 2024-03-12 RX ORDER — EMPAGLIFLOZIN, METFORMIN HYDROCHLORIDE 12.5; 1 MG/1; MG/1
1 TABLET, EXTENDED RELEASE ORAL 2 TIMES DAILY
Status: ON HOLD | COMMUNITY

## 2024-03-12 RX ORDER — CARVEDILOL 6.25 MG/1
6.25 TABLET ORAL 2 TIMES DAILY WITH MEALS
Status: ON HOLD | COMMUNITY
End: 2024-03-14 | Stop reason: HOSPADM

## 2024-03-12 RX ORDER — CLOPIDOGREL BISULFATE 75 MG/1
75 TABLET ORAL DAILY
Status: DISCONTINUED | OUTPATIENT
Start: 2024-03-12 | End: 2024-03-21 | Stop reason: HOSPADM

## 2024-03-12 RX ORDER — CETIRIZINE HYDROCHLORIDE 10 MG/1
10 TABLET ORAL DAILY
Status: ON HOLD | COMMUNITY

## 2024-03-12 RX ORDER — CYANOCOBALAMIN 1000 UG/ML
1000 INJECTION, SOLUTION INTRAMUSCULAR; SUBCUTANEOUS
Status: ON HOLD | COMMUNITY

## 2024-03-12 RX ORDER — PREGABALIN 75 MG/1
75 CAPSULE ORAL 3 TIMES DAILY
Status: DISCONTINUED | OUTPATIENT
Start: 2024-03-12 | End: 2024-03-21 | Stop reason: HOSPADM

## 2024-03-12 RX ORDER — SEMAGLUTIDE 2.68 MG/ML
2 INJECTION, SOLUTION SUBCUTANEOUS
Status: ON HOLD | COMMUNITY

## 2024-03-12 RX ORDER — INSULIN LISPRO 100 [IU]/ML
INJECTION, SOLUTION INTRAVENOUS; SUBCUTANEOUS
Status: ON HOLD | COMMUNITY

## 2024-03-12 RX ORDER — ASPIRIN 81 MG/1
81 TABLET ORAL DAILY
Status: ON HOLD | COMMUNITY

## 2024-03-12 RX ORDER — SPIRONOLACTONE 25 MG/1
25 TABLET ORAL DAILY
Status: DISCONTINUED | OUTPATIENT
Start: 2024-03-13 | End: 2024-03-21 | Stop reason: HOSPADM

## 2024-03-12 RX ORDER — OMEGA-3 FATTY ACIDS/FISH OIL 300-1000MG
1000 CAPSULE ORAL 2 TIMES DAILY
Status: ON HOLD | COMMUNITY

## 2024-03-12 RX ORDER — CARVEDILOL 6.25 MG/1
6.25 TABLET ORAL 2 TIMES DAILY WITH MEALS
Status: DISCONTINUED | OUTPATIENT
Start: 2024-03-12 | End: 2024-03-21 | Stop reason: HOSPADM

## 2024-03-12 RX ORDER — INSULIN DEGLUDEC 200 U/ML
42 INJECTION, SOLUTION SUBCUTANEOUS NIGHTLY
Status: ON HOLD | COMMUNITY

## 2024-03-12 RX ADMIN — FUROSEMIDE 40 MG: 40 TABLET ORAL at 09:16

## 2024-03-12 RX ADMIN — PREGABALIN 75 MG: 75 CAPSULE ORAL at 21:10

## 2024-03-12 RX ADMIN — ATORVASTATIN CALCIUM 40 MG: 40 TABLET, FILM COATED ORAL at 21:09

## 2024-03-12 RX ADMIN — CLOPIDOGREL BISULFATE 75 MG: 75 TABLET ORAL at 21:09

## 2024-03-12 RX ADMIN — POTASSIUM BICARBONATE 40 MEQ: 782 TABLET, EFFERVESCENT ORAL at 21:09

## 2024-03-12 RX ADMIN — SODIUM CHLORIDE: 9 INJECTION, SOLUTION INTRAVENOUS at 09:14

## 2024-03-12 RX ADMIN — SODIUM CHLORIDE, PRESERVATIVE FREE 10 ML: 5 INJECTION INTRAVENOUS at 09:18

## 2024-03-12 RX ADMIN — INSULIN LISPRO 2 UNITS: 100 INJECTION, SOLUTION INTRAVENOUS; SUBCUTANEOUS at 11:55

## 2024-03-12 RX ADMIN — VANCOMYCIN HYDROCHLORIDE 750 MG: 750 INJECTION, POWDER, LYOPHILIZED, FOR SOLUTION INTRAVENOUS at 13:22

## 2024-03-12 RX ADMIN — SODIUM CHLORIDE, PRESERVATIVE FREE 10 ML: 5 INJECTION INTRAVENOUS at 21:10

## 2024-03-12 RX ADMIN — TAMSULOSIN HYDROCHLORIDE 0.4 MG: 0.4 CAPSULE ORAL at 09:16

## 2024-03-12 RX ADMIN — FUROSEMIDE 40 MG: 40 TABLET ORAL at 16:37

## 2024-03-12 RX ADMIN — PIPERACILLIN AND TAZOBACTAM 3375 MG: 3; .375 INJECTION, POWDER, LYOPHILIZED, FOR SOLUTION INTRAVENOUS at 03:28

## 2024-03-12 RX ADMIN — PIPERACILLIN AND TAZOBACTAM 3375 MG: 3; .375 INJECTION, POWDER, LYOPHILIZED, FOR SOLUTION INTRAVENOUS at 09:15

## 2024-03-12 RX ADMIN — PREGABALIN 75 MG: 75 CAPSULE ORAL at 09:16

## 2024-03-12 RX ADMIN — VANCOMYCIN HYDROCHLORIDE 750 MG: 750 INJECTION, POWDER, LYOPHILIZED, FOR SOLUTION INTRAVENOUS at 05:27

## 2024-03-12 RX ADMIN — ASPIRIN 81 MG: 81 TABLET, CHEWABLE ORAL at 09:16

## 2024-03-12 RX ADMIN — PIPERACILLIN AND TAZOBACTAM 3375 MG: 3; .375 INJECTION, POWDER, LYOPHILIZED, FOR SOLUTION INTRAVENOUS at 18:35

## 2024-03-12 RX ADMIN — INSULIN GLARGINE 20 UNITS: 100 INJECTION, SOLUTION SUBCUTANEOUS at 21:08

## 2024-03-12 RX ADMIN — MELATONIN 6 MG: at 23:35

## 2024-03-12 RX ADMIN — SODIUM CHLORIDE 1500 MG: 9 INJECTION, SOLUTION INTRAVENOUS at 23:08

## 2024-03-12 ASSESSMENT — PAIN SCALES - GENERAL
PAINLEVEL_OUTOF10: 0

## 2024-03-13 ENCOUNTER — APPOINTMENT (OUTPATIENT)
Facility: HOSPITAL | Age: 64
DRG: 617 | End: 2024-03-13
Attending: INTERNAL MEDICINE
Payer: COMMERCIAL

## 2024-03-13 LAB
ANION GAP SERPL CALC-SCNC: 5 MMOL/L (ref 5–15)
BACTERIA SPEC CULT: ABNORMAL
BASOPHILS # BLD: 0.1 K/UL (ref 0–0.1)
BASOPHILS NFR BLD: 1 % (ref 0–1)
BUN SERPL-MCNC: 14 MG/DL (ref 6–20)
BUN/CREAT SERPL: 16 (ref 12–20)
CALCIUM SERPL-MCNC: 8.3 MG/DL (ref 8.5–10.1)
CHLORIDE SERPL-SCNC: 105 MMOL/L (ref 97–108)
CO2 SERPL-SCNC: 29 MMOL/L (ref 21–32)
CREAT SERPL-MCNC: 0.89 MG/DL (ref 0.7–1.3)
DIFFERENTIAL METHOD BLD: ABNORMAL
ECHO AO ASC DIAM: 3 CM
ECHO AO ASCENDING AORTA INDEX: 1.3 CM/M2
ECHO AO ROOT DIAM: 3.7 CM
ECHO AO ROOT INDEX: 1.61 CM/M2
ECHO AV AREA PEAK VELOCITY: 2.8 CM2
ECHO AV AREA/BSA PEAK VELOCITY: 1.2 CM2/M2
ECHO AV PEAK GRADIENT: 10 MMHG
ECHO AV PEAK VELOCITY: 1.6 M/S
ECHO AV VELOCITY RATIO: 0.56
ECHO BSA: 2.36 M2
ECHO LA DIAMETER INDEX: 1.87 CM/M2
ECHO LA DIAMETER: 4.3 CM
ECHO LA TO AORTIC ROOT RATIO: 1.16
ECHO LV E' LATERAL VELOCITY: 6 CM/S
ECHO LV E' SEPTAL VELOCITY: 5 CM/S
ECHO LV FRACTIONAL SHORTENING: 22 % (ref 28–44)
ECHO LV INTERNAL DIMENSION DIASTOLE INDEX: 2.35 CM/M2
ECHO LV INTERNAL DIMENSION DIASTOLIC: 5.4 CM (ref 4.2–5.9)
ECHO LV INTERNAL DIMENSION SYSTOLIC INDEX: 1.83 CM/M2
ECHO LV INTERNAL DIMENSION SYSTOLIC: 4.2 CM
ECHO LV IVSD: 0.8 CM (ref 0.6–1)
ECHO LV MASS 2D: 180.1 G (ref 88–224)
ECHO LV MASS INDEX 2D: 78.3 G/M2 (ref 49–115)
ECHO LV POSTERIOR WALL DIASTOLIC: 1 CM (ref 0.6–1)
ECHO LV RELATIVE WALL THICKNESS RATIO: 0.37
ECHO LVOT AREA: 5.3 CM2
ECHO LVOT DIAM: 2.6 CM
ECHO LVOT PEAK GRADIENT: 3 MMHG
ECHO LVOT PEAK VELOCITY: 0.9 M/S
ECHO MV A VELOCITY: 0.84 M/S
ECHO MV E DECELERATION TIME (DT): 160.8 MS
ECHO MV E VELOCITY: 1.04 M/S
ECHO MV E/A RATIO: 1.24
ECHO MV E/E' LATERAL: 17.33
ECHO MV E/E' RATIO (AVERAGED): 19.07
ECHO PV MAX VELOCITY: 0.7 M/S
ECHO PV PEAK GRADIENT: 2 MMHG
ECHO RV FREE WALL PEAK S': 9 CM/S
ECHO RV TAPSE: 2.1 CM (ref 1.7–?)
EOSINOPHIL # BLD: 0.2 K/UL (ref 0–0.4)
EOSINOPHIL NFR BLD: 2 % (ref 0–7)
ERYTHROCYTE [DISTWIDTH] IN BLOOD BY AUTOMATED COUNT: 14.6 % (ref 11.5–14.5)
GLUCOSE BLD STRIP.AUTO-MCNC: 159 MG/DL (ref 65–117)
GLUCOSE BLD STRIP.AUTO-MCNC: 160 MG/DL (ref 65–117)
GLUCOSE BLD STRIP.AUTO-MCNC: 186 MG/DL (ref 65–117)
GLUCOSE BLD STRIP.AUTO-MCNC: 208 MG/DL (ref 65–117)
GLUCOSE SERPL-MCNC: 134 MG/DL (ref 65–100)
GRAM STN SPEC: ABNORMAL
GRAM STN SPEC: ABNORMAL
HCT VFR BLD AUTO: 44.4 % (ref 36.6–50.3)
HGB BLD-MCNC: 13.8 G/DL (ref 12.1–17)
IMM GRANULOCYTES # BLD AUTO: 0 K/UL (ref 0–0.04)
IMM GRANULOCYTES NFR BLD AUTO: 0 % (ref 0–0.5)
LYMPHOCYTES # BLD: 1.5 K/UL (ref 0.8–3.5)
LYMPHOCYTES NFR BLD: 16 % (ref 12–49)
MCH RBC QN AUTO: 27.3 PG (ref 26–34)
MCHC RBC AUTO-ENTMCNC: 31.1 G/DL (ref 30–36.5)
MCV RBC AUTO: 87.7 FL (ref 80–99)
MONOCYTES # BLD: 1.1 K/UL (ref 0–1)
MONOCYTES NFR BLD: 11 % (ref 5–13)
NEUTS SEG # BLD: 6.5 K/UL (ref 1.8–8)
NEUTS SEG NFR BLD: 70 % (ref 32–75)
NRBC # BLD: 0 K/UL (ref 0–0.01)
NRBC BLD-RTO: 0 PER 100 WBC
PLATELET # BLD AUTO: 190 K/UL (ref 150–400)
PMV BLD AUTO: 9.1 FL (ref 8.9–12.9)
POTASSIUM SERPL-SCNC: 3 MMOL/L (ref 3.5–5.1)
RBC # BLD AUTO: 5.06 M/UL (ref 4.1–5.7)
SERVICE CMNT-IMP: ABNORMAL
SODIUM SERPL-SCNC: 139 MMOL/L (ref 136–145)
WBC # BLD AUTO: 9.4 K/UL (ref 4.1–11.1)

## 2024-03-13 PROCEDURE — 6370000000 HC RX 637 (ALT 250 FOR IP): Performed by: HOSPITALIST

## 2024-03-13 PROCEDURE — 82962 GLUCOSE BLOOD TEST: CPT

## 2024-03-13 PROCEDURE — 6370000000 HC RX 637 (ALT 250 FOR IP): Performed by: INTERNAL MEDICINE

## 2024-03-13 PROCEDURE — 6360000002 HC RX W HCPCS: Performed by: INTERNAL MEDICINE

## 2024-03-13 PROCEDURE — 2580000003 HC RX 258: Performed by: INTERNAL MEDICINE

## 2024-03-13 PROCEDURE — 1100000000 HC RM PRIVATE

## 2024-03-13 PROCEDURE — 36415 COLL VENOUS BLD VENIPUNCTURE: CPT

## 2024-03-13 PROCEDURE — 85025 COMPLETE CBC W/AUTO DIFF WBC: CPT

## 2024-03-13 PROCEDURE — 6370000000 HC RX 637 (ALT 250 FOR IP): Performed by: NURSE PRACTITIONER

## 2024-03-13 PROCEDURE — 80048 BASIC METABOLIC PNL TOTAL CA: CPT

## 2024-03-13 PROCEDURE — 99223 1ST HOSP IP/OBS HIGH 75: CPT | Performed by: INTERNAL MEDICINE

## 2024-03-13 PROCEDURE — 93306 TTE W/DOPPLER COMPLETE: CPT

## 2024-03-13 RX ADMIN — SODIUM CHLORIDE, PRESERVATIVE FREE 10 ML: 5 INJECTION INTRAVENOUS at 20:53

## 2024-03-13 RX ADMIN — PREGABALIN 75 MG: 75 CAPSULE ORAL at 08:24

## 2024-03-13 RX ADMIN — SODIUM CHLORIDE 1500 MG: 9 INJECTION, SOLUTION INTRAVENOUS at 21:59

## 2024-03-13 RX ADMIN — TAMSULOSIN HYDROCHLORIDE 0.4 MG: 0.4 CAPSULE ORAL at 08:24

## 2024-03-13 RX ADMIN — MELATONIN 6 MG: at 21:55

## 2024-03-13 RX ADMIN — SODIUM CHLORIDE, PRESERVATIVE FREE 10 ML: 5 INJECTION INTRAVENOUS at 08:25

## 2024-03-13 RX ADMIN — PIPERACILLIN AND TAZOBACTAM 3375 MG: 3; .375 INJECTION, POWDER, LYOPHILIZED, FOR SOLUTION INTRAVENOUS at 18:41

## 2024-03-13 RX ADMIN — FUROSEMIDE 40 MG: 40 TABLET ORAL at 08:24

## 2024-03-13 RX ADMIN — SODIUM CHLORIDE 1500 MG: 9 INJECTION, SOLUTION INTRAVENOUS at 09:43

## 2024-03-13 RX ADMIN — POTASSIUM BICARBONATE 40 MEQ: 782 TABLET, EFFERVESCENT ORAL at 08:24

## 2024-03-13 RX ADMIN — CLOPIDOGREL BISULFATE 75 MG: 75 TABLET ORAL at 08:24

## 2024-03-13 RX ADMIN — PIPERACILLIN AND TAZOBACTAM 3375 MG: 3; .375 INJECTION, POWDER, LYOPHILIZED, FOR SOLUTION INTRAVENOUS at 12:08

## 2024-03-13 RX ADMIN — PIPERACILLIN AND TAZOBACTAM 3375 MG: 3; .375 INJECTION, POWDER, LYOPHILIZED, FOR SOLUTION INTRAVENOUS at 03:36

## 2024-03-13 RX ADMIN — POTASSIUM BICARBONATE 40 MEQ: 782 TABLET, EFFERVESCENT ORAL at 05:41

## 2024-03-13 RX ADMIN — PREGABALIN 75 MG: 75 CAPSULE ORAL at 20:52

## 2024-03-13 RX ADMIN — ENOXAPARIN SODIUM 30 MG: 100 INJECTION SUBCUTANEOUS at 20:52

## 2024-03-13 RX ADMIN — FUROSEMIDE 40 MG: 40 TABLET ORAL at 16:45

## 2024-03-13 RX ADMIN — ATORVASTATIN CALCIUM 40 MG: 40 TABLET, FILM COATED ORAL at 20:52

## 2024-03-13 RX ADMIN — PREGABALIN 75 MG: 75 CAPSULE ORAL at 13:30

## 2024-03-13 RX ADMIN — INSULIN GLARGINE 20 UNITS: 100 INJECTION, SOLUTION SUBCUTANEOUS at 20:53

## 2024-03-13 RX ADMIN — ASPIRIN 81 MG: 81 TABLET, CHEWABLE ORAL at 08:24

## 2024-03-13 ASSESSMENT — PAIN SCALES - GENERAL
PAINLEVEL_OUTOF10: 0
PAINLEVEL_OUTOF10: 0
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
## 2024-03-13 NOTE — CARE COORDINATION
Transition of Care Plan:    RUR: 10% (low RUR)  Prior Level of Functioning: Independent  Disposition: Home with follow ups, pending clinical progress  If SNF or IPR: Date FOC offered: N/A  Date FOC received: N/A  Accepting facility: N/A  Date authorization started with reference number: N/A  Date authorization received and expires: N/A  Follow up appointments: PCP/specialists if needed.  DME needed: TBD.  Patient has a BP monitor at home.  Transportation at discharge: Spouse anticipated.  IM/IMM Medicare/ letter given: 2nd IM needed prior to discharge.  Is patient a  and connected with VA? No.   If yes, was [place] transfer form completed and VA notified? N/A  Caregiver Contact: Charlotte Seymour - spouse - [phone]   Discharge Caregiver contacted prior to discharge? Patient to contact.  Care Conference needed? No.  Barriers to discharge: podiatry clearance, CHRISTOPHER, Echo, ID to see      ROBBY AlejandraN, RN    Care Management  [phone]

## 2024-03-14 ENCOUNTER — APPOINTMENT (OUTPATIENT)
Facility: HOSPITAL | Age: 64
DRG: 617 | End: 2024-03-14
Attending: INTERNAL MEDICINE
Payer: COMMERCIAL

## 2024-03-14 ENCOUNTER — TELEPHONE (OUTPATIENT)
Age: 64
End: 2024-03-14

## 2024-03-14 PROBLEM — M86.171 ACUTE OSTEOMYELITIS OF RIGHT FOOT (HCC): Status: ACTIVE | Noted: 2024-03-14

## 2024-03-14 PROBLEM — L08.9 DIABETIC FOOT INFECTION (HCC): Status: ACTIVE | Noted: 2024-03-14

## 2024-03-14 PROBLEM — L03.115 CELLULITIS OF RIGHT LOWER EXTREMITY: Status: ACTIVE | Noted: 2024-03-14

## 2024-03-14 PROBLEM — E11.65 POORLY CONTROLLED DIABETES MELLITUS (HCC): Status: ACTIVE | Noted: 2024-03-14

## 2024-03-14 PROBLEM — R07.89 ATYPICAL CHEST PAIN: Status: ACTIVE | Noted: 2024-03-14

## 2024-03-14 PROBLEM — E66.9 OBESITY (BMI 30.0-34.9): Status: ACTIVE | Noted: 2024-03-14

## 2024-03-14 PROBLEM — I25.10 CORONARY ARTERY DISEASE DUE TO LIPID RICH PLAQUE: Status: ACTIVE | Noted: 2024-03-14

## 2024-03-14 PROBLEM — I20.0 UNSTABLE ANGINA PECTORIS (HCC): Status: ACTIVE | Noted: 2024-03-14

## 2024-03-14 PROBLEM — E66.811 OBESITY (BMI 30.0-34.9): Status: ACTIVE | Noted: 2024-03-14

## 2024-03-14 PROBLEM — I25.83 CORONARY ARTERY DISEASE DUE TO LIPID RICH PLAQUE: Status: ACTIVE | Noted: 2024-03-14

## 2024-03-14 PROBLEM — L97.514: Status: ACTIVE | Noted: 2024-03-14

## 2024-03-14 PROBLEM — A49.01 MSSA INFECTION, NON-INVASIVE: Status: ACTIVE | Noted: 2024-03-14

## 2024-03-14 PROBLEM — E11.628 DIABETIC FOOT INFECTION (HCC): Status: ACTIVE | Noted: 2024-03-14

## 2024-03-14 LAB
ANION GAP SERPL CALC-SCNC: 4 MMOL/L (ref 5–15)
BASOPHILS # BLD: 0.1 K/UL (ref 0–0.1)
BASOPHILS NFR BLD: 1 % (ref 0–1)
BUN SERPL-MCNC: 13 MG/DL (ref 6–20)
BUN/CREAT SERPL: 14 (ref 12–20)
CALCIUM SERPL-MCNC: 8.7 MG/DL (ref 8.5–10.1)
CHLORIDE SERPL-SCNC: 104 MMOL/L (ref 97–108)
CO2 SERPL-SCNC: 29 MMOL/L (ref 21–32)
CREAT SERPL-MCNC: 0.94 MG/DL (ref 0.7–1.3)
DIFFERENTIAL METHOD BLD: ABNORMAL
EOSINOPHIL # BLD: 0.3 K/UL (ref 0–0.4)
EOSINOPHIL NFR BLD: 3 % (ref 0–7)
ERYTHROCYTE [DISTWIDTH] IN BLOOD BY AUTOMATED COUNT: 14.6 % (ref 11.5–14.5)
GLUCOSE BLD STRIP.AUTO-MCNC: 127 MG/DL (ref 65–117)
GLUCOSE BLD STRIP.AUTO-MCNC: 137 MG/DL (ref 65–117)
GLUCOSE BLD STRIP.AUTO-MCNC: 152 MG/DL (ref 65–117)
GLUCOSE BLD STRIP.AUTO-MCNC: 159 MG/DL (ref 65–117)
GLUCOSE SERPL-MCNC: 179 MG/DL (ref 65–100)
HCT VFR BLD AUTO: 42.6 % (ref 36.6–50.3)
HGB BLD-MCNC: 13.5 G/DL (ref 12.1–17)
IMM GRANULOCYTES # BLD AUTO: 0.1 K/UL (ref 0–0.04)
IMM GRANULOCYTES NFR BLD AUTO: 1 % (ref 0–0.5)
LYMPHOCYTES # BLD: 1.6 K/UL (ref 0.8–3.5)
LYMPHOCYTES NFR BLD: 16 % (ref 12–49)
MCH RBC QN AUTO: 27.7 PG (ref 26–34)
MCHC RBC AUTO-ENTMCNC: 31.7 G/DL (ref 30–36.5)
MCV RBC AUTO: 87.3 FL (ref 80–99)
MONOCYTES # BLD: 0.9 K/UL (ref 0–1)
MONOCYTES NFR BLD: 9 % (ref 5–13)
NEUTS SEG # BLD: 7 K/UL (ref 1.8–8)
NEUTS SEG NFR BLD: 70 % (ref 32–75)
NRBC # BLD: 0 K/UL (ref 0–0.01)
NRBC BLD-RTO: 0 PER 100 WBC
PLATELET # BLD AUTO: 221 K/UL (ref 150–400)
PMV BLD AUTO: 9.1 FL (ref 8.9–12.9)
POTASSIUM SERPL-SCNC: 3.2 MMOL/L (ref 3.5–5.1)
RBC # BLD AUTO: 4.88 M/UL (ref 4.1–5.7)
SERVICE CMNT-IMP: ABNORMAL
SODIUM SERPL-SCNC: 137 MMOL/L (ref 136–145)
WBC # BLD AUTO: 9.9 K/UL (ref 4.1–11.1)

## 2024-03-14 PROCEDURE — 99233 SBSQ HOSP IP/OBS HIGH 50: CPT | Performed by: INTERNAL MEDICINE

## 2024-03-14 PROCEDURE — 6370000000 HC RX 637 (ALT 250 FOR IP): Performed by: HOSPITALIST

## 2024-03-14 PROCEDURE — C1751 CATH, INF, PER/CENT/MIDLINE: HCPCS

## 2024-03-14 PROCEDURE — 6360000002 HC RX W HCPCS: Performed by: NURSE PRACTITIONER

## 2024-03-14 PROCEDURE — 93922 UPR/L XTREMITY ART 2 LEVELS: CPT

## 2024-03-14 PROCEDURE — 6360000002 HC RX W HCPCS: Performed by: STUDENT IN AN ORGANIZED HEALTH CARE EDUCATION/TRAINING PROGRAM

## 2024-03-14 PROCEDURE — 36569 INSJ PICC 5 YR+ W/O IMAGING: CPT

## 2024-03-14 PROCEDURE — 6370000000 HC RX 637 (ALT 250 FOR IP): Performed by: INTERNAL MEDICINE

## 2024-03-14 PROCEDURE — 2580000003 HC RX 258: Performed by: INTERNAL MEDICINE

## 2024-03-14 PROCEDURE — 36415 COLL VENOUS BLD VENIPUNCTURE: CPT

## 2024-03-14 PROCEDURE — 6360000002 HC RX W HCPCS: Performed by: INTERNAL MEDICINE

## 2024-03-14 PROCEDURE — 76937 US GUIDE VASCULAR ACCESS: CPT

## 2024-03-14 PROCEDURE — 6370000000 HC RX 637 (ALT 250 FOR IP): Performed by: STUDENT IN AN ORGANIZED HEALTH CARE EDUCATION/TRAINING PROGRAM

## 2024-03-14 PROCEDURE — 82962 GLUCOSE BLOOD TEST: CPT

## 2024-03-14 PROCEDURE — 2580000003 HC RX 258: Performed by: STUDENT IN AN ORGANIZED HEALTH CARE EDUCATION/TRAINING PROGRAM

## 2024-03-14 PROCEDURE — 85025 COMPLETE CBC W/AUTO DIFF WBC: CPT

## 2024-03-14 PROCEDURE — 02HV33Z INSERTION OF INFUSION DEVICE INTO SUPERIOR VENA CAVA, PERCUTANEOUS APPROACH: ICD-10-PCS | Performed by: STUDENT IN AN ORGANIZED HEALTH CARE EDUCATION/TRAINING PROGRAM

## 2024-03-14 PROCEDURE — 80048 BASIC METABOLIC PNL TOTAL CA: CPT

## 2024-03-14 PROCEDURE — 1100000000 HC RM PRIVATE

## 2024-03-14 PROCEDURE — 2580000003 HC RX 258: Performed by: NURSE PRACTITIONER

## 2024-03-14 RX ORDER — POTASSIUM CHLORIDE 750 MG/1
40 TABLET, FILM COATED, EXTENDED RELEASE ORAL ONCE
Status: COMPLETED | OUTPATIENT
Start: 2024-03-14 | End: 2024-03-14

## 2024-03-14 RX ADMIN — POTASSIUM CHLORIDE 40 MEQ: 750 TABLET, EXTENDED RELEASE ORAL at 08:58

## 2024-03-14 RX ADMIN — PIPERACILLIN AND TAZOBACTAM 3375 MG: 3; .375 INJECTION, POWDER, LYOPHILIZED, FOR SOLUTION INTRAVENOUS at 21:00

## 2024-03-14 RX ADMIN — MELATONIN 6 MG: at 21:02

## 2024-03-14 RX ADMIN — PIPERACILLIN AND TAZOBACTAM 4500 MG: 4; .5 INJECTION, POWDER, LYOPHILIZED, FOR SOLUTION INTRAVENOUS at 13:15

## 2024-03-14 RX ADMIN — FUROSEMIDE 40 MG: 40 TABLET ORAL at 08:50

## 2024-03-14 RX ADMIN — FUROSEMIDE 40 MG: 40 TABLET ORAL at 16:26

## 2024-03-14 RX ADMIN — ENOXAPARIN SODIUM 30 MG: 100 INJECTION SUBCUTANEOUS at 08:50

## 2024-03-14 RX ADMIN — ATORVASTATIN CALCIUM 40 MG: 40 TABLET, FILM COATED ORAL at 21:02

## 2024-03-14 RX ADMIN — PREGABALIN 75 MG: 75 CAPSULE ORAL at 13:35

## 2024-03-14 RX ADMIN — PREGABALIN 75 MG: 75 CAPSULE ORAL at 21:02

## 2024-03-14 RX ADMIN — PIPERACILLIN AND TAZOBACTAM 3375 MG: 3; .375 INJECTION, POWDER, LYOPHILIZED, FOR SOLUTION INTRAVENOUS at 02:39

## 2024-03-14 RX ADMIN — INSULIN GLARGINE 20 UNITS: 100 INJECTION, SOLUTION SUBCUTANEOUS at 21:03

## 2024-03-14 RX ADMIN — CLOPIDOGREL BISULFATE 75 MG: 75 TABLET ORAL at 08:50

## 2024-03-14 RX ADMIN — PREGABALIN 75 MG: 75 CAPSULE ORAL at 08:50

## 2024-03-14 RX ADMIN — TAMSULOSIN HYDROCHLORIDE 0.4 MG: 0.4 CAPSULE ORAL at 08:50

## 2024-03-14 RX ADMIN — WATER 2000 MG: 1 INJECTION INTRAMUSCULAR; INTRAVENOUS; SUBCUTANEOUS at 09:38

## 2024-03-14 RX ADMIN — ENOXAPARIN SODIUM 30 MG: 100 INJECTION SUBCUTANEOUS at 21:03

## 2024-03-14 RX ADMIN — SODIUM CHLORIDE, PRESERVATIVE FREE 10 ML: 5 INJECTION INTRAVENOUS at 21:03

## 2024-03-14 RX ADMIN — SODIUM CHLORIDE, PRESERVATIVE FREE 10 ML: 5 INJECTION INTRAVENOUS at 08:51

## 2024-03-14 RX ADMIN — ASPIRIN 81 MG: 81 TABLET, CHEWABLE ORAL at 08:50

## 2024-03-14 ASSESSMENT — PAIN SCALES - GENERAL: PAINLEVEL_OUTOF10: 0

## 2024-03-14 NOTE — DISCHARGE INSTRUCTIONS
You were treated for a diabetic foot wound and cellulitis. Your symptoms improved and you are being discharged with IV antibiotics to finish for 6 weeks. Please take the medications as listed, note that your blood pressure medications have changed. Please followup with Cardiology regarding these medications. Please followup with the other doctors listed in this paperwork as well.

## 2024-03-14 NOTE — CARE COORDINATION
[id number]: Received referral from Carmen Ruano. Much appreciated. I called [employer] earlier to confirm that they would pickup the copay and spoke with Flako who ALSO confirmed they would  the copay. Call reference number for that call is [id number]. I also called [facility] registration and spoke with Carmen. Stated that [employer] was actually primary NOT Medicare A&B    1430--1515: Called [facility] and spoke with Juno. After being on the phone for over 45 minutes. He was able to tell me that BCBS is NOT primary but Medicare is primary. I also asked if [employer] would pickup the copay of $204/day after the 20 skilled days. He said it would be covered. Phone reference number [id number].      1520--1600 Called patient's wife Charlotte Seymour. She said that [place] is primary because she is still working. Will go ahead and start auth. Not sure why [place] told me that Medicare was primary. Called [facility] again. Spoke grace Mcdaniel. Said does require auth. Handled by another department (). [phone]. Call reference number [id number]. Called  department.       1600--1625: Spoke with Gideon. Apparently was wrong department. Gideon provided me with following number. [phone] [id number]. Will try again tomorrow to attempt auth.

## 2024-03-14 NOTE — CONSULTS
Covering for Destiny Mcqueen while he is away. Will be in shortly to see the patient and assess for possible second incision and drainage right foot.
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
## 2024-03-14 NOTE — PROCEDURES
PICC Education: Explained reason and rationale for PICC placement along with providing education in order to make an informed consent including nature, risks, benefits, potential complications, care and maintenance of PICC line. The opportunity for questions or concerns was given. Patient  gave written consent for PICC procedure to be done at the bedside. Patient  verbalizes understanding at this time.      Procedure:  Time out completed.  Pre procedure assessment done.  Maximum sterile barrier precautions observed throughout procedure.  Lidocaine 1% 3.0 ml sc given prior to cannulation  Cannulated Basilic vein using ultrasound guidance and modified seldinger technique.  Inserted SINGLE lumen 4 fr PICC in  RIGHT arm using, Linea Tip Location System and 3CG   Patient has sinus rhythm.  Tip Positioning System indicating tall P wave and no negative deflection before P wave which would indicate the PICC tip is properly placed in the distal SVC or the CAJ.  PICC tip was confirmed by 2 PICC nurses and 3CG printout was placed on patient’s chart.  Blood return verified and flushed with 20ml NS in each port.  Sterile CHG impregnated dressing applied with Stat loc per protocol.  Curios caps applied to each port.  Reason for access (Long term abx until 4/22/24 --6 weeks).  Complications related to insertion (none).  Patient tolerated procedure well with minimal blood loss.   PICC procedure performed by: Alessandra Manuel RN, BSN, [facility]/ Vascular Access Nurse  Assisted by: Ulises Garcia RN, BSN, [facility] / Vascular Access Nurse    RIGHT  arm circumference: 33 cm.   Catheter internal length:  42 cm  Catheter external length: 1 cm  TOTAL Length:43 cm  PICC catheter occupies 4% of vein    Brand of catheter:  BARD  Lot #[id number]   REF# [id number]    EXP 02/28/2025.    Primary nurse Azucena NGUYEN, notified PICC line may be used and to hang new infusion tubing prior to use.        Alessandra Manuel RN, BSN, [facility]  Vascular Access Team

## 2024-03-14 NOTE — WOUND CARE
Wound Care consult for \"continued wound care / Evaluate for wound care to be done at home upon discharge\".  Pt. May be going home today (or not).  [place]   Pt. Arrived with an infected foot, had podiatry surgery to debride the foot and he had \"two bones taken out\" from the plantar aspect of the foot.   Initial photos taken on admission:          Post op day 3 right plantar       Tunnelling hole with macerated skin.      Anterior right foot with purple / blue  Serosanguinous drainage / no odor      Second toe.         Recommended Treatment:  Irrigate the wound with 5 ml of Vashe solution using a syringe.  Pack the wound with plain 1/4 inch packing strip (one long piece - about 7 inhces). Leave part of it out of the wound.  Pack this straight toward the front of the foot. Cover with an Ioplex dressing and then an ABD and wrap with candace and then an Ace for security / padding.     Candida Erickson, RN, BSN, CWON

## 2024-03-15 LAB
ANION GAP SERPL CALC-SCNC: 4 MMOL/L (ref 5–15)
BACTERIA SPEC CULT: NORMAL
BACTERIA SPEC CULT: NORMAL
BASOPHILS # BLD: 0.1 K/UL (ref 0–0.1)
BASOPHILS NFR BLD: 1 % (ref 0–1)
BUN SERPL-MCNC: 12 MG/DL (ref 6–20)
BUN/CREAT SERPL: 13 (ref 12–20)
CALCIUM SERPL-MCNC: 9.2 MG/DL (ref 8.5–10.1)
CHLORIDE SERPL-SCNC: 103 MMOL/L (ref 97–108)
CO2 SERPL-SCNC: 32 MMOL/L (ref 21–32)
CREAT SERPL-MCNC: 0.93 MG/DL (ref 0.7–1.3)
DIFFERENTIAL METHOD BLD: ABNORMAL
ECHO BSA: 2.36 M2
EOSINOPHIL # BLD: 0.4 K/UL (ref 0–0.4)
EOSINOPHIL NFR BLD: 5 % (ref 0–7)
ERYTHROCYTE [DISTWIDTH] IN BLOOD BY AUTOMATED COUNT: 14.6 % (ref 11.5–14.5)
GLUCOSE BLD STRIP.AUTO-MCNC: 119 MG/DL (ref 65–117)
GLUCOSE BLD STRIP.AUTO-MCNC: 126 MG/DL (ref 65–117)
GLUCOSE BLD STRIP.AUTO-MCNC: 164 MG/DL (ref 65–117)
GLUCOSE BLD STRIP.AUTO-MCNC: 165 MG/DL (ref 65–117)
GLUCOSE BLD STRIP.AUTO-MCNC: 277 MG/DL (ref 65–117)
GLUCOSE SERPL-MCNC: 145 MG/DL (ref 65–100)
HCT VFR BLD AUTO: 44.2 % (ref 36.6–50.3)
HGB BLD-MCNC: 13.9 G/DL (ref 12.1–17)
IMM GRANULOCYTES # BLD AUTO: 0.1 K/UL (ref 0–0.04)
IMM GRANULOCYTES NFR BLD AUTO: 1 % (ref 0–0.5)
LYMPHOCYTES # BLD: 1.3 K/UL (ref 0.8–3.5)
LYMPHOCYTES NFR BLD: 15 % (ref 12–49)
MCH RBC QN AUTO: 28.1 PG (ref 26–34)
MCHC RBC AUTO-ENTMCNC: 31.4 G/DL (ref 30–36.5)
MCV RBC AUTO: 89.3 FL (ref 80–99)
MONOCYTES # BLD: 0.9 K/UL (ref 0–1)
MONOCYTES NFR BLD: 10 % (ref 5–13)
NEUTS SEG # BLD: 5.8 K/UL (ref 1.8–8)
NEUTS SEG NFR BLD: 68 % (ref 32–75)
NRBC # BLD: 0 K/UL (ref 0–0.01)
NRBC BLD-RTO: 0 PER 100 WBC
PLATELET # BLD AUTO: 248 K/UL (ref 150–400)
PMV BLD AUTO: 9.4 FL (ref 8.9–12.9)
POTASSIUM SERPL-SCNC: 3.2 MMOL/L (ref 3.5–5.1)
RBC # BLD AUTO: 4.95 M/UL (ref 4.1–5.7)
SERVICE CMNT-IMP: ABNORMAL
SERVICE CMNT-IMP: NORMAL
SERVICE CMNT-IMP: NORMAL
SODIUM SERPL-SCNC: 139 MMOL/L (ref 136–145)
VAS LEFT ABI: 1.17
VAS LEFT ARM BP: 117 MMHG
VAS LEFT DORSALIS PEDIS BP: 132 MMHG
VAS LEFT PTA BP: 137 MMHG
VAS LEFT TBI: 0.84
VAS LEFT TOE PRESSURE: 98 MMHG
VAS RIGHT ABI: 1.11
VAS RIGHT PTA BP: 130 MMHG
WBC # BLD AUTO: 8.5 K/UL (ref 4.1–11.1)

## 2024-03-15 PROCEDURE — 1100000000 HC RM PRIVATE

## 2024-03-15 PROCEDURE — 36415 COLL VENOUS BLD VENIPUNCTURE: CPT

## 2024-03-15 PROCEDURE — 80048 BASIC METABOLIC PNL TOTAL CA: CPT

## 2024-03-15 PROCEDURE — 87205 SMEAR GRAM STAIN: CPT

## 2024-03-15 PROCEDURE — 3600000002 HC SURGERY LEVEL 2 BASE: Performed by: PODIATRIST

## 2024-03-15 PROCEDURE — 3600000012 HC SURGERY LEVEL 2 ADDTL 15MIN: Performed by: PODIATRIST

## 2024-03-15 PROCEDURE — 0J9Q0ZZ DRAINAGE OF RIGHT FOOT SUBCUTANEOUS TISSUE AND FASCIA, OPEN APPROACH: ICD-10-PCS | Performed by: PODIATRIST

## 2024-03-15 PROCEDURE — 2580000003 HC RX 258: Performed by: PODIATRIST

## 2024-03-15 PROCEDURE — 6360000002 HC RX W HCPCS: Performed by: STUDENT IN AN ORGANIZED HEALTH CARE EDUCATION/TRAINING PROGRAM

## 2024-03-15 PROCEDURE — 6370000000 HC RX 637 (ALT 250 FOR IP): Performed by: INTERNAL MEDICINE

## 2024-03-15 PROCEDURE — 6370000000 HC RX 637 (ALT 250 FOR IP): Performed by: PODIATRIST

## 2024-03-15 PROCEDURE — 6360000002 HC RX W HCPCS: Performed by: INTERNAL MEDICINE

## 2024-03-15 PROCEDURE — 0QBN0ZX EXCISION OF RIGHT METATARSAL, OPEN APPROACH, DIAGNOSTIC: ICD-10-PCS | Performed by: PODIATRIST

## 2024-03-15 PROCEDURE — 87075 CULTR BACTERIA EXCEPT BLOOD: CPT

## 2024-03-15 PROCEDURE — 88307 TISSUE EXAM BY PATHOLOGIST: CPT

## 2024-03-15 PROCEDURE — 99233 SBSQ HOSP IP/OBS HIGH 50: CPT | Performed by: INTERNAL MEDICINE

## 2024-03-15 PROCEDURE — 2580000003 HC RX 258: Performed by: STUDENT IN AN ORGANIZED HEALTH CARE EDUCATION/TRAINING PROGRAM

## 2024-03-15 PROCEDURE — 6370000000 HC RX 637 (ALT 250 FOR IP): Performed by: HOSPITALIST

## 2024-03-15 PROCEDURE — 2580000003 HC RX 258: Performed by: INTERNAL MEDICINE

## 2024-03-15 PROCEDURE — 87070 CULTURE OTHR SPECIMN AEROBIC: CPT

## 2024-03-15 PROCEDURE — 88311 DECALCIFY TISSUE: CPT

## 2024-03-15 PROCEDURE — 82962 GLUCOSE BLOOD TEST: CPT

## 2024-03-15 PROCEDURE — 85025 COMPLETE CBC W/AUTO DIFF WBC: CPT

## 2024-03-15 PROCEDURE — 2709999900 HC NON-CHARGEABLE SUPPLY: Performed by: PODIATRIST

## 2024-03-15 PROCEDURE — 6370000000 HC RX 637 (ALT 250 FOR IP): Performed by: STUDENT IN AN ORGANIZED HEALTH CARE EDUCATION/TRAINING PROGRAM

## 2024-03-15 PROCEDURE — 6360000002 HC RX W HCPCS: Performed by: PODIATRIST

## 2024-03-15 RX ORDER — SODIUM CHLORIDE 9 MG/ML
INJECTION, SOLUTION INTRAVENOUS PRN
Status: CANCELLED | OUTPATIENT
Start: 2024-03-15

## 2024-03-15 RX ORDER — FENTANYL CITRATE 50 UG/ML
25 INJECTION, SOLUTION INTRAMUSCULAR; INTRAVENOUS EVERY 5 MIN PRN
Status: CANCELLED | OUTPATIENT
Start: 2024-03-15

## 2024-03-15 RX ORDER — LIDOCAINE HYDROCHLORIDE 10 MG/ML
1 INJECTION, SOLUTION EPIDURAL; INFILTRATION; INTRACAUDAL; PERINEURAL
Status: CANCELLED | OUTPATIENT
Start: 2024-03-15 | End: 2024-03-16

## 2024-03-15 RX ORDER — ONDANSETRON 2 MG/ML
4 INJECTION INTRAMUSCULAR; INTRAVENOUS
Status: CANCELLED | OUTPATIENT
Start: 2024-03-15 | End: 2024-03-16

## 2024-03-15 RX ORDER — HYDRALAZINE HYDROCHLORIDE 20 MG/ML
10 INJECTION INTRAMUSCULAR; INTRAVENOUS
Status: CANCELLED | OUTPATIENT
Start: 2024-03-15

## 2024-03-15 RX ORDER — HYDROMORPHONE HYDROCHLORIDE 1 MG/ML
0.5 INJECTION, SOLUTION INTRAMUSCULAR; INTRAVENOUS; SUBCUTANEOUS EVERY 5 MIN PRN
Status: CANCELLED | OUTPATIENT
Start: 2024-03-15

## 2024-03-15 RX ORDER — ACETAMINOPHEN 500 MG
1000 TABLET ORAL ONCE
Status: CANCELLED | OUTPATIENT
Start: 2024-03-15 | End: 2024-03-15

## 2024-03-15 RX ORDER — SODIUM CHLORIDE, SODIUM LACTATE, POTASSIUM CHLORIDE, CALCIUM CHLORIDE 600; 310; 30; 20 MG/100ML; MG/100ML; MG/100ML; MG/100ML
INJECTION, SOLUTION INTRAVENOUS CONTINUOUS
Status: DISCONTINUED | OUTPATIENT
Start: 2024-03-15 | End: 2024-03-21 | Stop reason: HOSPADM

## 2024-03-15 RX ORDER — SODIUM CHLORIDE 0.9 % (FLUSH) 0.9 %
5-40 SYRINGE (ML) INJECTION EVERY 12 HOURS SCHEDULED
Status: CANCELLED | OUTPATIENT
Start: 2024-03-15

## 2024-03-15 RX ORDER — SODIUM CHLORIDE 0.9 % (FLUSH) 0.9 %
5-40 SYRINGE (ML) INJECTION PRN
Status: CANCELLED | OUTPATIENT
Start: 2024-03-15

## 2024-03-15 RX ORDER — POTASSIUM CHLORIDE 750 MG/1
40 TABLET, FILM COATED, EXTENDED RELEASE ORAL ONCE
Status: COMPLETED | OUTPATIENT
Start: 2024-03-15 | End: 2024-03-15

## 2024-03-15 RX ORDER — SODIUM CHLORIDE, SODIUM LACTATE, POTASSIUM CHLORIDE, CALCIUM CHLORIDE 600; 310; 30; 20 MG/100ML; MG/100ML; MG/100ML; MG/100ML
INJECTION, SOLUTION INTRAVENOUS CONTINUOUS
Status: CANCELLED | OUTPATIENT
Start: 2024-03-15

## 2024-03-15 RX ORDER — NALOXONE HYDROCHLORIDE 0.4 MG/ML
INJECTION, SOLUTION INTRAMUSCULAR; INTRAVENOUS; SUBCUTANEOUS PRN
Status: CANCELLED | OUTPATIENT
Start: 2024-03-15

## 2024-03-15 RX ORDER — MIDAZOLAM HYDROCHLORIDE 1 MG/ML
2 INJECTION, SOLUTION INTRAMUSCULAR; INTRAVENOUS
Status: CANCELLED | OUTPATIENT
Start: 2024-03-15 | End: 2024-03-16

## 2024-03-15 RX ORDER — OXYCODONE HYDROCHLORIDE 5 MG/1
5 TABLET ORAL
Status: CANCELLED | OUTPATIENT
Start: 2024-03-15 | End: 2024-03-16

## 2024-03-15 RX ORDER — PROCHLORPERAZINE EDISYLATE 5 MG/ML
5 INJECTION INTRAMUSCULAR; INTRAVENOUS
Status: CANCELLED | OUTPATIENT
Start: 2024-03-15 | End: 2024-03-16

## 2024-03-15 RX ORDER — FENTANYL CITRATE 50 UG/ML
100 INJECTION, SOLUTION INTRAMUSCULAR; INTRAVENOUS
Status: CANCELLED | OUTPATIENT
Start: 2024-03-15 | End: 2024-03-16

## 2024-03-15 RX ORDER — BUPIVACAINE HYDROCHLORIDE 5 MG/ML
INJECTION, SOLUTION PERINEURAL PRN
Status: DISCONTINUED | OUTPATIENT
Start: 2024-03-15 | End: 2024-03-15 | Stop reason: ALTCHOICE

## 2024-03-15 RX ADMIN — CLOPIDOGREL BISULFATE 75 MG: 75 TABLET ORAL at 09:13

## 2024-03-15 RX ADMIN — FUROSEMIDE 40 MG: 40 TABLET ORAL at 17:28

## 2024-03-15 RX ADMIN — PIPERACILLIN AND TAZOBACTAM 3375 MG: 3; .375 INJECTION, POWDER, LYOPHILIZED, FOR SOLUTION INTRAVENOUS at 04:36

## 2024-03-15 RX ADMIN — INSULIN GLARGINE 20 UNITS: 100 INJECTION, SOLUTION SUBCUTANEOUS at 20:52

## 2024-03-15 RX ADMIN — POTASSIUM CHLORIDE 40 MEQ: 750 TABLET, EXTENDED RELEASE ORAL at 09:20

## 2024-03-15 RX ADMIN — ENOXAPARIN SODIUM 30 MG: 100 INJECTION SUBCUTANEOUS at 20:52

## 2024-03-15 RX ADMIN — ASPIRIN 81 MG: 81 TABLET, CHEWABLE ORAL at 09:13

## 2024-03-15 RX ADMIN — PIPERACILLIN AND TAZOBACTAM 3375 MG: 3; .375 INJECTION, POWDER, LYOPHILIZED, FOR SOLUTION INTRAVENOUS at 12:33

## 2024-03-15 RX ADMIN — MELATONIN 6 MG: at 20:54

## 2024-03-15 RX ADMIN — SODIUM CHLORIDE, PRESERVATIVE FREE 10 ML: 5 INJECTION INTRAVENOUS at 20:55

## 2024-03-15 RX ADMIN — Medication 3 AMPULE: at 15:16

## 2024-03-15 RX ADMIN — FUROSEMIDE 40 MG: 40 TABLET ORAL at 09:13

## 2024-03-15 RX ADMIN — ATORVASTATIN CALCIUM 40 MG: 40 TABLET, FILM COATED ORAL at 20:54

## 2024-03-15 RX ADMIN — TAMSULOSIN HYDROCHLORIDE 0.4 MG: 0.4 CAPSULE ORAL at 09:13

## 2024-03-15 RX ADMIN — PIPERACILLIN AND TAZOBACTAM 3375 MG: 3; .375 INJECTION, POWDER, LYOPHILIZED, FOR SOLUTION INTRAVENOUS at 20:51

## 2024-03-15 RX ADMIN — SODIUM CHLORIDE, POTASSIUM CHLORIDE, SODIUM LACTATE AND CALCIUM CHLORIDE: 600; 310; 30; 20 INJECTION, SOLUTION INTRAVENOUS at 15:16

## 2024-03-15 RX ADMIN — SODIUM CHLORIDE, PRESERVATIVE FREE 10 ML: 5 INJECTION INTRAVENOUS at 09:14

## 2024-03-15 RX ADMIN — PREGABALIN 75 MG: 75 CAPSULE ORAL at 12:31

## 2024-03-15 RX ADMIN — PREGABALIN 75 MG: 75 CAPSULE ORAL at 09:13

## 2024-03-15 RX ADMIN — PREGABALIN 75 MG: 75 CAPSULE ORAL at 20:53

## 2024-03-15 RX ADMIN — ENOXAPARIN SODIUM 30 MG: 100 INJECTION SUBCUTANEOUS at 09:13

## 2024-03-15 ASSESSMENT — PAIN - FUNCTIONAL ASSESSMENT: PAIN_FUNCTIONAL_ASSESSMENT: 0-10

## 2024-03-15 ASSESSMENT — PAIN SCALES - GENERAL
PAINLEVEL_OUTOF10: 0
PAINLEVEL_OUTOF10: 0

## 2024-03-15 NOTE — BRIEF OP NOTE
Brief Postoperative Note      Patient: Joseph Seymour  YOB: 1960  MRN: [id number]    Date of Procedure: 3/15/2024    Pre-Op Diagnosis Codes:     * Diabetic foot infection (HCC) [E11.628, L08.9]    Post-Op Diagnosis: Same       Procedure(s):  RIGHT FOOT DEBRIDEMENT INCISION AND DRAINAGE    Surgeon(s):  Jorgito Thorpe DPM    Assistant:  * No surgical staff found *    Anesthesia: Local    Estimated Blood Loss (mL): Minimal    Complications: None    Specimens:   ID Type Source Tests Collected by Time Destination   1 : 2nd Metatarsal Right Foot Tissue Foot SURGICAL PATHOLOGY Jorgito Thorpe DPM 3/15/2024 1628    A : Right Foot Wound Swab Foot CULTURE, ANAEROBIC, CULTURE, WOUND Jorgito Thorpe DPM 3/15/2024 1619        Implants:  None      Drains: None    Findings: Viable strong bone second metatarsal right foot      Electronically signed by Jorgito Thorpe DPM on 3/15/2024 at 4:35 PM

## 2024-03-15 NOTE — CARE COORDINATION
Transition of Care Plan:    RUR: 9% (low RUR)  Prior Level of Functioning: Independent  Disposition: [facility] Swing SNF bed/auth pending vs. Home with HH/[facility]  If SNF or IPR: Date FOC offered: N/A  Date FOC received: 3/14 patient/family requesting [facility] SNF swing bed  Accepting facility: [facility] SNF swing bed  Date authorization started with reference number: 3/15, Ref ID: [id number]   Date authorization received and expires: TBD  Follow up appointments: defer to [facility] swing SNF  DME needed: defer to [facility] swing SNF.  Patient has a BP monitor at home.  Transportation at discharge: Spouse anticipated.  IM/IMM Medicare/ letter given: 2nd IM needed prior to discharge.  Is patient a [place] and connected with VA? No.   If yes, was [place] transfer form completed and [facility] notified? N/A  Caregiver Contact: Charlotte Seymour - spouse - [phone]   Discharge Caregiver contacted prior to discharge? Patient to contact.  Care Conference needed? No.  Barriers to discharge: placement/auth, I&D, podiatry clearance    1140 - CM notified during IDRs of planned I&D today with podiatry; CM sent email and left VM for Reba at [facility] regarding GHULAM moved to Monday.    1511 - Reba with [facility] confirmed authorization is pending for patient's SNF swing bed at [facility].      Carmen Ruano, ROBBYN, RN    Care Management  [phone]

## 2024-03-15 NOTE — CARE COORDINATION
2956---0920: Called provider services line at [facility]. Spoke with Belkis. Said it would be a different department. Utilization management [phone]. Told Belkis that is the number that I called to reach her. She stated understanding and said she would transfer me before she hung up on me she provided me the call reference number [id number].     0940--1010: Called patient's insurance company again. Spoke with Elana. She was able to tell me that patient participating in network. Used CPT code 95470. Provided me number with  [phone]. She then transferred me to . On hold for multiple minutes. Then it hung up again.      1400 Called Medicare line to see if they would cover costs of skilled care IF [facility] denied. Spoke with Alejandra Corea. Said I would have to call Medicare's provider line. Called number Alejandra provided [phone]. Called that number. Need a PTAN number to be able to ask them a question which I don't have. Called Sofia in Finance and e-mailed her to see if she is aware of what the PTAN number is       1439: Called [facility] again. [phone]. Spoke with May. Said this was  dept but said it's handled by another department (post acute dept--[facility]). Direct line is [phone]. Spoke with Emeli who was very helpful. Provided her with all info. I asked Emeli if she could tell me for sure if they would cover skilled care under [facility]. She said to e-mail clinicals to [email or web address]. Pending reference number is [id number]           Reba BUSTAMANTEN RN   Case Management   [address]   [phone] (O)  [phone] (F)

## 2024-03-16 LAB
ANION GAP SERPL CALC-SCNC: 5 MMOL/L (ref 5–15)
BASOPHILS # BLD: 0.1 K/UL (ref 0–0.1)
BASOPHILS NFR BLD: 1 % (ref 0–1)
BUN SERPL-MCNC: 12 MG/DL (ref 6–20)
BUN/CREAT SERPL: 12 (ref 12–20)
CALCIUM SERPL-MCNC: 9.2 MG/DL (ref 8.5–10.1)
CHLORIDE SERPL-SCNC: 104 MMOL/L (ref 97–108)
CO2 SERPL-SCNC: 30 MMOL/L (ref 21–32)
CREAT SERPL-MCNC: 0.98 MG/DL (ref 0.7–1.3)
DIFFERENTIAL METHOD BLD: ABNORMAL
EOSINOPHIL # BLD: 0.3 K/UL (ref 0–0.4)
EOSINOPHIL NFR BLD: 3 % (ref 0–7)
ERYTHROCYTE [DISTWIDTH] IN BLOOD BY AUTOMATED COUNT: 14.6 % (ref 11.5–14.5)
GLUCOSE BLD STRIP.AUTO-MCNC: 147 MG/DL (ref 65–117)
GLUCOSE BLD STRIP.AUTO-MCNC: 200 MG/DL (ref 65–117)
GLUCOSE BLD STRIP.AUTO-MCNC: 204 MG/DL (ref 65–117)
GLUCOSE BLD STRIP.AUTO-MCNC: 261 MG/DL (ref 65–117)
GLUCOSE SERPL-MCNC: 155 MG/DL (ref 65–100)
HCT VFR BLD AUTO: 44.3 % (ref 36.6–50.3)
HGB BLD-MCNC: 14.1 G/DL (ref 12.1–17)
IMM GRANULOCYTES # BLD AUTO: 0.1 K/UL (ref 0–0.04)
IMM GRANULOCYTES NFR BLD AUTO: 1 % (ref 0–0.5)
LYMPHOCYTES # BLD: 1.4 K/UL (ref 0.8–3.5)
LYMPHOCYTES NFR BLD: 15 % (ref 12–49)
MAGNESIUM SERPL-MCNC: 2 MG/DL (ref 1.6–2.4)
MCH RBC QN AUTO: 28.1 PG (ref 26–34)
MCHC RBC AUTO-ENTMCNC: 31.8 G/DL (ref 30–36.5)
MCV RBC AUTO: 88.2 FL (ref 80–99)
MONOCYTES # BLD: 0.8 K/UL (ref 0–1)
MONOCYTES NFR BLD: 9 % (ref 5–13)
NEUTS SEG # BLD: 6.3 K/UL (ref 1.8–8)
NEUTS SEG NFR BLD: 71 % (ref 32–75)
NRBC # BLD: 0 K/UL (ref 0–0.01)
NRBC BLD-RTO: 0 PER 100 WBC
PHOSPHATE SERPL-MCNC: 3.6 MG/DL (ref 2.6–4.7)
PLATELET # BLD AUTO: 254 K/UL (ref 150–400)
PMV BLD AUTO: 9.1 FL (ref 8.9–12.9)
POTASSIUM SERPL-SCNC: 3.6 MMOL/L (ref 3.5–5.1)
RBC # BLD AUTO: 5.02 M/UL (ref 4.1–5.7)
SERVICE CMNT-IMP: ABNORMAL
SODIUM SERPL-SCNC: 139 MMOL/L (ref 136–145)
WBC # BLD AUTO: 8.9 K/UL (ref 4.1–11.1)

## 2024-03-16 PROCEDURE — 82962 GLUCOSE BLOOD TEST: CPT

## 2024-03-16 PROCEDURE — 80048 BASIC METABOLIC PNL TOTAL CA: CPT

## 2024-03-16 PROCEDURE — 2580000003 HC RX 258: Performed by: INTERNAL MEDICINE

## 2024-03-16 PROCEDURE — 36415 COLL VENOUS BLD VENIPUNCTURE: CPT

## 2024-03-16 PROCEDURE — 6370000000 HC RX 637 (ALT 250 FOR IP): Performed by: HOSPITALIST

## 2024-03-16 PROCEDURE — 83735 ASSAY OF MAGNESIUM: CPT

## 2024-03-16 PROCEDURE — 6370000000 HC RX 637 (ALT 250 FOR IP): Performed by: INTERNAL MEDICINE

## 2024-03-16 PROCEDURE — 2580000003 HC RX 258: Performed by: PODIATRIST

## 2024-03-16 PROCEDURE — 85025 COMPLETE CBC W/AUTO DIFF WBC: CPT

## 2024-03-16 PROCEDURE — 6360000002 HC RX W HCPCS: Performed by: INTERNAL MEDICINE

## 2024-03-16 PROCEDURE — 6360000002 HC RX W HCPCS: Performed by: STUDENT IN AN ORGANIZED HEALTH CARE EDUCATION/TRAINING PROGRAM

## 2024-03-16 PROCEDURE — 84100 ASSAY OF PHOSPHORUS: CPT

## 2024-03-16 PROCEDURE — 1100000000 HC RM PRIVATE

## 2024-03-16 PROCEDURE — 2580000003 HC RX 258: Performed by: STUDENT IN AN ORGANIZED HEALTH CARE EDUCATION/TRAINING PROGRAM

## 2024-03-16 RX ADMIN — INSULIN LISPRO 2 UNITS: 100 INJECTION, SOLUTION INTRAVENOUS; SUBCUTANEOUS at 12:46

## 2024-03-16 RX ADMIN — PREGABALIN 75 MG: 75 CAPSULE ORAL at 09:18

## 2024-03-16 RX ADMIN — PIPERACILLIN AND TAZOBACTAM 3375 MG: 3; .375 INJECTION, POWDER, LYOPHILIZED, FOR SOLUTION INTRAVENOUS at 12:49

## 2024-03-16 RX ADMIN — FUROSEMIDE 40 MG: 40 TABLET ORAL at 09:18

## 2024-03-16 RX ADMIN — SODIUM CHLORIDE, PRESERVATIVE FREE 10 ML: 5 INJECTION INTRAVENOUS at 21:07

## 2024-03-16 RX ADMIN — PIPERACILLIN AND TAZOBACTAM 3375 MG: 3; .375 INJECTION, POWDER, LYOPHILIZED, FOR SOLUTION INTRAVENOUS at 21:09

## 2024-03-16 RX ADMIN — FUROSEMIDE 40 MG: 40 TABLET ORAL at 17:15

## 2024-03-16 RX ADMIN — PIPERACILLIN AND TAZOBACTAM 3375 MG: 3; .375 INJECTION, POWDER, LYOPHILIZED, FOR SOLUTION INTRAVENOUS at 05:28

## 2024-03-16 RX ADMIN — ENOXAPARIN SODIUM 30 MG: 100 INJECTION SUBCUTANEOUS at 09:17

## 2024-03-16 RX ADMIN — TAMSULOSIN HYDROCHLORIDE 0.4 MG: 0.4 CAPSULE ORAL at 09:18

## 2024-03-16 RX ADMIN — PREGABALIN 75 MG: 75 CAPSULE ORAL at 21:07

## 2024-03-16 RX ADMIN — SODIUM CHLORIDE, POTASSIUM CHLORIDE, SODIUM LACTATE AND CALCIUM CHLORIDE: 600; 310; 30; 20 INJECTION, SOLUTION INTRAVENOUS at 03:00

## 2024-03-16 RX ADMIN — SODIUM CHLORIDE: 9 INJECTION, SOLUTION INTRAVENOUS at 12:48

## 2024-03-16 RX ADMIN — ATORVASTATIN CALCIUM 40 MG: 40 TABLET, FILM COATED ORAL at 21:06

## 2024-03-16 RX ADMIN — INSULIN LISPRO 4 UNITS: 100 INJECTION, SOLUTION INTRAVENOUS; SUBCUTANEOUS at 17:15

## 2024-03-16 RX ADMIN — PREGABALIN 75 MG: 75 CAPSULE ORAL at 14:21

## 2024-03-16 RX ADMIN — CLOPIDOGREL BISULFATE 75 MG: 75 TABLET ORAL at 09:18

## 2024-03-16 RX ADMIN — INSULIN GLARGINE 20 UNITS: 100 INJECTION, SOLUTION SUBCUTANEOUS at 21:06

## 2024-03-16 RX ADMIN — SODIUM CHLORIDE, PRESERVATIVE FREE 10 ML: 5 INJECTION INTRAVENOUS at 09:18

## 2024-03-16 RX ADMIN — ASPIRIN 81 MG: 81 TABLET, CHEWABLE ORAL at 09:18

## 2024-03-16 RX ADMIN — MELATONIN 6 MG: at 21:06

## 2024-03-16 RX ADMIN — ENOXAPARIN SODIUM 30 MG: 100 INJECTION SUBCUTANEOUS at 21:06

## 2024-03-16 ASSESSMENT — PAIN SCALES - GENERAL
PAINLEVEL_OUTOF10: 0

## 2024-03-16 NOTE — OP NOTE
[facility]              [address] [place]  [address]                            OPERATIVE REPORT      PATIENT NAME: KARIE KRAUSE                : 1960  MED REC NO: [id number]                       ROOM:   ACCOUNT NO: [id number]                       ADMIT DATE: 2024  PROVIDER: Dirk Dixon DPM    DATE OF SERVICE:  2024    PREOPERATIVE DIAGNOSES:  Osteomyelitis of right foot with infection on the right plantar aspect from a foreign body injury.    POSTOPERATIVE DIAGNOSES:  Osteomyelitis of right foot with infection on the right plantar aspect from a foreign body injury.    PROCEDURES PERFORMED:  Right foot incision and drainage and resection of the metatarsophalangeal joint, anterior of the metatarsal head and the base of the proximal phalanx.    SURGEON:  Dirk Dixon DPM    ASSISTANT:  None.    ANESTHESIA:  MAC.    ESTIMATED BLOOD LOSS:  Less than 100 mL.    SPECIMENS REMOVED:  Bones were sent for pathology.    INTRAOPERATIVE FINDINGS:  see below     COMPLICATIONS:  None.    IMPLANTS:  None.    INDICATIONS:  see below    DESCRIPTION OF PROCEDURE:  The patient was admitted through the ER for a plantar puncture wound which he had not noticed for a couple of days.  The patient's wife noticed and was treated outside by his physician with oral antibiotic, but since it had gotten worse and there was ascending cellulitis and infection going up the leg, he was sent to ER for admission.  The patient has been diabetic and with severe neuropathy with loss of protective sensation.  The patient has received antibiotic for diabetic foot infection.    The patient was brought into the OR and left on the patient's bed in supine position.  IV sedation performed as per CRNA.  Local infiltration with 0.5% Marcaine plain was injected for PD block and interdigital intermetatarsal block, and the tourniquet was placed with Webril padding and set at 250 
from the capsule and tendon and lifts this area up with manipulation also more proximally to distally.  No pockets of purulence were seen and normal serosanguineous drainage was found.  The proximal bone was viable in appearance as well as remaining proximal phalanx.  The area was irrigated with IrriSept, copious amounts and dorsal and plantar skin were massaged and manipulated to release any pockets, no communication proximally was seen.  Once irrigation was completed, the area was cleaned at the remaining second metatarsal and a rongeur was utilized to acquire a bone biopsy of this bone.  This was sent to pathology in fixed solution.    Next, with this being complete, the skin was reapproximated and coapted with 3-0 nylon.  A small area of approximately 0.5 cm distally, the dorsal incision was left open and packed with the Xeroform as well as the dorsal and plantar regions packed and dressed with Xeroform.  The Esmarch bandage was now removed showing immediate hyperemic flush to digits 1 through 5 on the right side with normal capillary refill.  Dressing was now completed, layered 4 x 4, ABD dressing, Kerlix, and external Ace wrap, right foot.    The patient tolerated the above procedure and was discharged with vital signs stable and vascular status intact to the right foot.      Prognosis for this patient is satisfactory.    INJECTABLES:  12 mL of 0.5% Marcaine plain, right foot.    WOUND CULTURES:  Aerobic, anaerobic of the swab, tissue wound sites, right foot.        MD BUDDY NUÑEZ/AQS  D:  03/15/2024 16:47:10  T:  03/15/2024 21:35:48  JOB #:  [id number]

## 2024-03-17 ENCOUNTER — APPOINTMENT (OUTPATIENT)
Facility: HOSPITAL | Age: 64
DRG: 617 | End: 2024-03-17
Attending: INTERNAL MEDICINE
Payer: COMMERCIAL

## 2024-03-17 LAB
ANION GAP SERPL CALC-SCNC: 8 MMOL/L (ref 5–15)
BASOPHILS # BLD: 0.1 K/UL (ref 0–0.1)
BASOPHILS NFR BLD: 1 % (ref 0–1)
BUN SERPL-MCNC: 12 MG/DL (ref 6–20)
BUN/CREAT SERPL: 12 (ref 12–20)
CALCIUM SERPL-MCNC: 8.8 MG/DL (ref 8.5–10.1)
CHLORIDE SERPL-SCNC: 104 MMOL/L (ref 97–108)
CO2 SERPL-SCNC: 25 MMOL/L (ref 21–32)
CREAT SERPL-MCNC: 1.03 MG/DL (ref 0.7–1.3)
DIFFERENTIAL METHOD BLD: ABNORMAL
EOSINOPHIL # BLD: 0.3 K/UL (ref 0–0.4)
EOSINOPHIL NFR BLD: 4 % (ref 0–7)
ERYTHROCYTE [DISTWIDTH] IN BLOOD BY AUTOMATED COUNT: 14.3 % (ref 11.5–14.5)
GLUCOSE BLD STRIP.AUTO-MCNC: 151 MG/DL (ref 65–117)
GLUCOSE BLD STRIP.AUTO-MCNC: 202 MG/DL (ref 65–117)
GLUCOSE BLD STRIP.AUTO-MCNC: 237 MG/DL (ref 65–117)
GLUCOSE BLD STRIP.AUTO-MCNC: 338 MG/DL (ref 65–117)
GLUCOSE SERPL-MCNC: 247 MG/DL (ref 65–100)
HCT VFR BLD AUTO: 45.1 % (ref 36.6–50.3)
HGB BLD-MCNC: 14.2 G/DL (ref 12.1–17)
IMM GRANULOCYTES # BLD AUTO: 0.1 K/UL (ref 0–0.04)
IMM GRANULOCYTES NFR BLD AUTO: 1 % (ref 0–0.5)
LYMPHOCYTES # BLD: 1.4 K/UL (ref 0.8–3.5)
LYMPHOCYTES NFR BLD: 17 % (ref 12–49)
MAGNESIUM SERPL-MCNC: 2 MG/DL (ref 1.6–2.4)
MCH RBC QN AUTO: 27.9 PG (ref 26–34)
MCHC RBC AUTO-ENTMCNC: 31.5 G/DL (ref 30–36.5)
MCV RBC AUTO: 88.6 FL (ref 80–99)
MONOCYTES # BLD: 0.7 K/UL (ref 0–1)
MONOCYTES NFR BLD: 8 % (ref 5–13)
NEUTS SEG # BLD: 5.8 K/UL (ref 1.8–8)
NEUTS SEG NFR BLD: 69 % (ref 32–75)
NRBC # BLD: 0 K/UL (ref 0–0.01)
NRBC BLD-RTO: 0 PER 100 WBC
PHOSPHATE SERPL-MCNC: 3.2 MG/DL (ref 2.6–4.7)
PLATELET # BLD AUTO: 254 K/UL (ref 150–400)
PMV BLD AUTO: 9 FL (ref 8.9–12.9)
POTASSIUM SERPL-SCNC: 3.4 MMOL/L (ref 3.5–5.1)
RBC # BLD AUTO: 5.09 M/UL (ref 4.1–5.7)
SERVICE CMNT-IMP: ABNORMAL
SODIUM SERPL-SCNC: 137 MMOL/L (ref 136–145)
WBC # BLD AUTO: 8.4 K/UL (ref 4.1–11.1)

## 2024-03-17 PROCEDURE — 6360000002 HC RX W HCPCS: Performed by: STUDENT IN AN ORGANIZED HEALTH CARE EDUCATION/TRAINING PROGRAM

## 2024-03-17 PROCEDURE — 73590 X-RAY EXAM OF LOWER LEG: CPT

## 2024-03-17 PROCEDURE — 36415 COLL VENOUS BLD VENIPUNCTURE: CPT

## 2024-03-17 PROCEDURE — 85025 COMPLETE CBC W/AUTO DIFF WBC: CPT

## 2024-03-17 PROCEDURE — 6370000000 HC RX 637 (ALT 250 FOR IP): Performed by: INTERNAL MEDICINE

## 2024-03-17 PROCEDURE — 6370000000 HC RX 637 (ALT 250 FOR IP): Performed by: STUDENT IN AN ORGANIZED HEALTH CARE EDUCATION/TRAINING PROGRAM

## 2024-03-17 PROCEDURE — 2580000003 HC RX 258: Performed by: STUDENT IN AN ORGANIZED HEALTH CARE EDUCATION/TRAINING PROGRAM

## 2024-03-17 PROCEDURE — 80048 BASIC METABOLIC PNL TOTAL CA: CPT

## 2024-03-17 PROCEDURE — 83735 ASSAY OF MAGNESIUM: CPT

## 2024-03-17 PROCEDURE — 82962 GLUCOSE BLOOD TEST: CPT

## 2024-03-17 PROCEDURE — 6360000002 HC RX W HCPCS: Performed by: INTERNAL MEDICINE

## 2024-03-17 PROCEDURE — 84100 ASSAY OF PHOSPHORUS: CPT

## 2024-03-17 PROCEDURE — 6370000000 HC RX 637 (ALT 250 FOR IP): Performed by: HOSPITALIST

## 2024-03-17 PROCEDURE — 1100000000 HC RM PRIVATE

## 2024-03-17 PROCEDURE — 2580000003 HC RX 258: Performed by: INTERNAL MEDICINE

## 2024-03-17 RX ORDER — POTASSIUM CHLORIDE 750 MG/1
20 TABLET, FILM COATED, EXTENDED RELEASE ORAL ONCE
Status: COMPLETED | OUTPATIENT
Start: 2024-03-17 | End: 2024-03-17

## 2024-03-17 RX ADMIN — PREGABALIN 75 MG: 75 CAPSULE ORAL at 08:41

## 2024-03-17 RX ADMIN — PREGABALIN 75 MG: 75 CAPSULE ORAL at 15:04

## 2024-03-17 RX ADMIN — INSULIN LISPRO 2 UNITS: 100 INJECTION, SOLUTION INTRAVENOUS; SUBCUTANEOUS at 12:31

## 2024-03-17 RX ADMIN — FUROSEMIDE 40 MG: 40 TABLET ORAL at 08:41

## 2024-03-17 RX ADMIN — FUROSEMIDE 40 MG: 40 TABLET ORAL at 16:42

## 2024-03-17 RX ADMIN — PIPERACILLIN AND TAZOBACTAM 3375 MG: 3; .375 INJECTION, POWDER, LYOPHILIZED, FOR SOLUTION INTRAVENOUS at 12:35

## 2024-03-17 RX ADMIN — PIPERACILLIN AND TAZOBACTAM 3375 MG: 3; .375 INJECTION, POWDER, LYOPHILIZED, FOR SOLUTION INTRAVENOUS at 04:06

## 2024-03-17 RX ADMIN — TAMSULOSIN HYDROCHLORIDE 0.4 MG: 0.4 CAPSULE ORAL at 08:40

## 2024-03-17 RX ADMIN — ATORVASTATIN CALCIUM 40 MG: 40 TABLET, FILM COATED ORAL at 20:38

## 2024-03-17 RX ADMIN — ENOXAPARIN SODIUM 30 MG: 100 INJECTION SUBCUTANEOUS at 08:40

## 2024-03-17 RX ADMIN — ASPIRIN 81 MG: 81 TABLET, CHEWABLE ORAL at 08:41

## 2024-03-17 RX ADMIN — SODIUM CHLORIDE, PRESERVATIVE FREE 10 ML: 5 INJECTION INTRAVENOUS at 20:40

## 2024-03-17 RX ADMIN — ENOXAPARIN SODIUM 30 MG: 100 INJECTION SUBCUTANEOUS at 20:38

## 2024-03-17 RX ADMIN — INSULIN LISPRO 6 UNITS: 100 INJECTION, SOLUTION INTRAVENOUS; SUBCUTANEOUS at 16:42

## 2024-03-17 RX ADMIN — INSULIN GLARGINE 20 UNITS: 100 INJECTION, SOLUTION SUBCUTANEOUS at 20:39

## 2024-03-17 RX ADMIN — POTASSIUM CHLORIDE 20 MEQ: 750 TABLET, EXTENDED RELEASE ORAL at 10:13

## 2024-03-17 RX ADMIN — MELATONIN 6 MG: at 20:44

## 2024-03-17 RX ADMIN — SODIUM CHLORIDE 20 ML: 9 INJECTION, SOLUTION INTRAVENOUS at 20:33

## 2024-03-17 RX ADMIN — PIPERACILLIN AND TAZOBACTAM 3375 MG: 3; .375 INJECTION, POWDER, LYOPHILIZED, FOR SOLUTION INTRAVENOUS at 20:33

## 2024-03-17 RX ADMIN — PREGABALIN 75 MG: 75 CAPSULE ORAL at 20:45

## 2024-03-17 RX ADMIN — SODIUM CHLORIDE, PRESERVATIVE FREE 10 ML: 5 INJECTION INTRAVENOUS at 08:40

## 2024-03-17 RX ADMIN — CLOPIDOGREL BISULFATE 75 MG: 75 TABLET ORAL at 08:40

## 2024-03-17 ASSESSMENT — PAIN SCALES - GENERAL: PAINLEVEL_OUTOF10: 0

## 2024-03-17 NOTE — CARE COORDINATION
Transition of Care Plan:    RUR: 9% (low RUR)  Prior Level of Functioning: Independent  Disposition: [facility] Sw[facility] SNF P2P pending vs. Home with HH/[facility]  If SNF or IPR: Date FOC offered: N/A  Date FOC received: 3/14 patient/family requesting [facility] SNF swing bed  Accepting facility: [facility] SNF swing bed  Date authorization started with reference number: 3/15, Ref ID: [id number]   Date authorization received and expires: TBD  Follow up appointments: defer to [facility] swing SNF  DME needed: defer to [facility] swing SNF.  Patient has a BP monitor at home.  Transportation at discharge: Spouse anticipated.  IM/IMM Medicare/ letter given: 2nd IM needed prior to discharge.  Is patient a [place] and connected with VA? No.   If yes, was  transfer form completed and VA notified? N/A  Caregiver Contact: Charlotte Seymour - spouse - [phone]   Discharge Caregiver contacted prior to discharge? Patient to contact.  Care Conference needed? No.  Barriers to discharge: placement/auth, I&D, podiatry clearance    6980 -  received VM from patient's insurance regarding P2P for SNF decision.  CM notified MD to call [phone] by 3pm to schedule P2P for SNF.    5110 - Attending called for P2P but they said they had not yet received patient's clinicals.  MAXIMUS let Reba at [facility] know regarding this.      Carmen Ruano, BSN, RN    Care Management  [phone]

## 2024-03-18 LAB
ANION GAP SERPL CALC-SCNC: 4 MMOL/L (ref 5–15)
BASOPHILS # BLD: 0.1 K/UL (ref 0–0.1)
BASOPHILS NFR BLD: 1 % (ref 0–1)
BUN SERPL-MCNC: 12 MG/DL (ref 6–20)
BUN/CREAT SERPL: 14 (ref 12–20)
CALCIUM SERPL-MCNC: 8.5 MG/DL (ref 8.5–10.1)
CHLORIDE SERPL-SCNC: 104 MMOL/L (ref 97–108)
CO2 SERPL-SCNC: 30 MMOL/L (ref 21–32)
CREAT SERPL-MCNC: 0.86 MG/DL (ref 0.7–1.3)
DIFFERENTIAL METHOD BLD: ABNORMAL
EOSINOPHIL # BLD: 0.3 K/UL (ref 0–0.4)
EOSINOPHIL NFR BLD: 4 % (ref 0–7)
ERYTHROCYTE [DISTWIDTH] IN BLOOD BY AUTOMATED COUNT: 14.3 % (ref 11.5–14.5)
GLUCOSE BLD STRIP.AUTO-MCNC: 135 MG/DL (ref 65–117)
GLUCOSE BLD STRIP.AUTO-MCNC: 159 MG/DL (ref 65–117)
GLUCOSE BLD STRIP.AUTO-MCNC: 186 MG/DL (ref 65–117)
GLUCOSE BLD STRIP.AUTO-MCNC: 330 MG/DL (ref 65–117)
GLUCOSE SERPL-MCNC: 131 MG/DL (ref 65–100)
HCT VFR BLD AUTO: 44.5 % (ref 36.6–50.3)
HGB BLD-MCNC: 14.3 G/DL (ref 12.1–17)
IMM GRANULOCYTES # BLD AUTO: 0.1 K/UL (ref 0–0.04)
IMM GRANULOCYTES NFR BLD AUTO: 1 % (ref 0–0.5)
LYMPHOCYTES # BLD: 1.5 K/UL (ref 0.8–3.5)
LYMPHOCYTES NFR BLD: 16 % (ref 12–49)
MAGNESIUM SERPL-MCNC: 1.9 MG/DL (ref 1.6–2.4)
MCH RBC QN AUTO: 28.1 PG (ref 26–34)
MCHC RBC AUTO-ENTMCNC: 32.1 G/DL (ref 30–36.5)
MCV RBC AUTO: 87.4 FL (ref 80–99)
MONOCYTES # BLD: 0.6 K/UL (ref 0–1)
MONOCYTES NFR BLD: 7 % (ref 5–13)
NEUTS SEG # BLD: 6.7 K/UL (ref 1.8–8)
NEUTS SEG NFR BLD: 71 % (ref 32–75)
NRBC # BLD: 0 K/UL (ref 0–0.01)
NRBC BLD-RTO: 0 PER 100 WBC
PHOSPHATE SERPL-MCNC: 3.1 MG/DL (ref 2.6–4.7)
PLATELET # BLD AUTO: 239 K/UL (ref 150–400)
PMV BLD AUTO: 8.8 FL (ref 8.9–12.9)
POTASSIUM SERPL-SCNC: 3.6 MMOL/L (ref 3.5–5.1)
RBC # BLD AUTO: 5.09 M/UL (ref 4.1–5.7)
SERVICE CMNT-IMP: ABNORMAL
SODIUM SERPL-SCNC: 138 MMOL/L (ref 136–145)
WBC # BLD AUTO: 9.3 K/UL (ref 4.1–11.1)

## 2024-03-18 PROCEDURE — 2580000003 HC RX 258: Performed by: STUDENT IN AN ORGANIZED HEALTH CARE EDUCATION/TRAINING PROGRAM

## 2024-03-18 PROCEDURE — 83735 ASSAY OF MAGNESIUM: CPT

## 2024-03-18 PROCEDURE — 6360000002 HC RX W HCPCS: Performed by: INTERNAL MEDICINE

## 2024-03-18 PROCEDURE — 82962 GLUCOSE BLOOD TEST: CPT

## 2024-03-18 PROCEDURE — 36415 COLL VENOUS BLD VENIPUNCTURE: CPT

## 2024-03-18 PROCEDURE — 99233 SBSQ HOSP IP/OBS HIGH 50: CPT | Performed by: INTERNAL MEDICINE

## 2024-03-18 PROCEDURE — 1100000000 HC RM PRIVATE

## 2024-03-18 PROCEDURE — 6370000000 HC RX 637 (ALT 250 FOR IP): Performed by: INTERNAL MEDICINE

## 2024-03-18 PROCEDURE — 85025 COMPLETE CBC W/AUTO DIFF WBC: CPT

## 2024-03-18 PROCEDURE — 6360000002 HC RX W HCPCS: Performed by: STUDENT IN AN ORGANIZED HEALTH CARE EDUCATION/TRAINING PROGRAM

## 2024-03-18 PROCEDURE — 2580000003 HC RX 258: Performed by: INTERNAL MEDICINE

## 2024-03-18 PROCEDURE — 84100 ASSAY OF PHOSPHORUS: CPT

## 2024-03-18 PROCEDURE — 80048 BASIC METABOLIC PNL TOTAL CA: CPT

## 2024-03-18 RX ADMIN — ENOXAPARIN SODIUM 30 MG: 100 INJECTION SUBCUTANEOUS at 09:18

## 2024-03-18 RX ADMIN — SODIUM CHLORIDE, PRESERVATIVE FREE 10 ML: 5 INJECTION INTRAVENOUS at 20:43

## 2024-03-18 RX ADMIN — ENOXAPARIN SODIUM 30 MG: 100 INJECTION SUBCUTANEOUS at 20:43

## 2024-03-18 RX ADMIN — SODIUM CHLORIDE 20 ML: 9 INJECTION, SOLUTION INTRAVENOUS at 05:03

## 2024-03-18 RX ADMIN — TAMSULOSIN HYDROCHLORIDE 0.4 MG: 0.4 CAPSULE ORAL at 09:17

## 2024-03-18 RX ADMIN — ASPIRIN 81 MG: 81 TABLET, CHEWABLE ORAL at 09:17

## 2024-03-18 RX ADMIN — FUROSEMIDE 40 MG: 40 TABLET ORAL at 16:43

## 2024-03-18 RX ADMIN — ATORVASTATIN CALCIUM 40 MG: 40 TABLET, FILM COATED ORAL at 20:43

## 2024-03-18 RX ADMIN — PIPERACILLIN AND TAZOBACTAM 3375 MG: 3; .375 INJECTION, POWDER, LYOPHILIZED, FOR SOLUTION INTRAVENOUS at 20:49

## 2024-03-18 RX ADMIN — CLOPIDOGREL BISULFATE 75 MG: 75 TABLET ORAL at 09:17

## 2024-03-18 RX ADMIN — PIPERACILLIN AND TAZOBACTAM 3375 MG: 3; .375 INJECTION, POWDER, LYOPHILIZED, FOR SOLUTION INTRAVENOUS at 05:05

## 2024-03-18 RX ADMIN — PIPERACILLIN AND TAZOBACTAM 3375 MG: 3; .375 INJECTION, POWDER, LYOPHILIZED, FOR SOLUTION INTRAVENOUS at 12:38

## 2024-03-18 RX ADMIN — PREGABALIN 75 MG: 75 CAPSULE ORAL at 09:17

## 2024-03-18 RX ADMIN — FUROSEMIDE 40 MG: 40 TABLET ORAL at 09:17

## 2024-03-18 RX ADMIN — SODIUM CHLORIDE, PRESERVATIVE FREE 10 ML: 5 INJECTION INTRAVENOUS at 09:19

## 2024-03-18 RX ADMIN — PREGABALIN 75 MG: 75 CAPSULE ORAL at 20:43

## 2024-03-18 RX ADMIN — PREGABALIN 75 MG: 75 CAPSULE ORAL at 14:18

## 2024-03-18 RX ADMIN — INSULIN LISPRO 4 UNITS: 100 INJECTION, SOLUTION INTRAVENOUS; SUBCUTANEOUS at 20:42

## 2024-03-18 RX ADMIN — INSULIN GLARGINE 20 UNITS: 100 INJECTION, SOLUTION SUBCUTANEOUS at 20:43

## 2024-03-18 ASSESSMENT — PAIN SCALES - GENERAL
PAINLEVEL_OUTOF10: 0
PAINLEVEL_OUTOF10: 0

## 2024-03-18 NOTE — CARE COORDINATION
0830: Received e-mail from Carmen (from [facility]) stating that patient stay was denied. I looked at my faxes and it said denied for wound care. I specifically told insurance company it was wound care AND IV ABX. Resent e-mail. Also, on fax they sent me it had a fax number [phone]. Referral sent via fax as well. Also received fax stating to upload clinicals via [employer]. I was locked out of [employer] d/t no log on for 90 days. Will attempt to log on to [employer]    0948: Was able to get into [employer]. It did state patient was out of network but when I talked to insurance company last week said it was covered with out of network coverage. Went ahead and sent auth via [employer] as well. Auth submitted via e-mail, fax and [employer]. Will continue to work on it.       1435: Received a fax from [employer]. Said patient approved from 3/19--3/23 (Out of Network Admission). Called [facility] and spoke with Azucena. She does see that it is out of network. Reimbursement is per luis. Asked what exactly that meant but she referred me back to Misbah. Would like to make sure this is covered and that patient will not receive a large bill. Also questionable if Medicare can pay for whatever Misbah doesn't pickup. Escalated to CM Manager.         Reba BUSTAMANTEN RN   Case Management   [address]   [phone] (O)  [phone] (F)

## 2024-03-18 NOTE — CARE COORDINATION
Transition of Care Plan:     RUR: 9% (low RUR)  Prior Level of Functioning: Independent  Disposition: [facility] Swing SNF auth pending vs. Home with HH/[facility]  If SNF or IPR: Date FOC offered: N/A  Date FOC received: 3/14 patient/family requesting [facility] SNF swing bed  Accepting facility: [facility] SN[facility] swing bed  Date authorization started with reference number: 3/15, Ref ID: [id number]   Date authorization received and expires: TBD  Follow up appointments: defer to [facility] swing SNF  DME needed: defer to [facility] swing SNF.  Patient has a BP monitor at home.  Transportation at discharge: Spouse anticipated.  IM/IMM Medicare/ letter given: 2nd IM needed prior to discharge.  Is patient a  and connected with VA? No.              If yes, was  transfer form completed and VA notified? N/A  Caregiver Contact: Charlotte Seymour - spouse - [phone]   Discharge Caregiver contacted prior to discharge? Patient to contact.  Care Conference needed? No.  Barriers to discharge: placement/auth, I&D, podiatry clearance    0834 - Reba at [facility] is continuing to work on [facility] swing bed SNF approval through patient's insurance.      Carmen Ruano, ROBBYN, RN    Care Management  [phone]   to get better

## 2024-03-19 LAB
ANION GAP SERPL CALC-SCNC: 3 MMOL/L (ref 5–15)
BACTERIA SPEC CULT: NORMAL
BACTERIA SPEC CULT: NORMAL
BASOPHILS # BLD: 0.1 K/UL (ref 0–0.1)
BASOPHILS NFR BLD: 1 % (ref 0–1)
BUN SERPL-MCNC: 12 MG/DL (ref 6–20)
BUN/CREAT SERPL: 13 (ref 12–20)
CALCIUM SERPL-MCNC: 8.5 MG/DL (ref 8.5–10.1)
CHLORIDE SERPL-SCNC: 106 MMOL/L (ref 97–108)
CO2 SERPL-SCNC: 27 MMOL/L (ref 21–32)
CREAT SERPL-MCNC: 0.94 MG/DL (ref 0.7–1.3)
DIFFERENTIAL METHOD BLD: ABNORMAL
EOSINOPHIL # BLD: 0.3 K/UL (ref 0–0.4)
EOSINOPHIL NFR BLD: 3 % (ref 0–7)
ERYTHROCYTE [DISTWIDTH] IN BLOOD BY AUTOMATED COUNT: 14.1 % (ref 11.5–14.5)
GLUCOSE BLD STRIP.AUTO-MCNC: 186 MG/DL (ref 65–117)
GLUCOSE BLD STRIP.AUTO-MCNC: 222 MG/DL (ref 65–117)
GLUCOSE BLD STRIP.AUTO-MCNC: 235 MG/DL (ref 65–117)
GLUCOSE SERPL-MCNC: 181 MG/DL (ref 65–100)
GRAM STN SPEC: NORMAL
GRAM STN SPEC: NORMAL
HCT VFR BLD AUTO: 43.1 % (ref 36.6–50.3)
HGB BLD-MCNC: 14 G/DL (ref 12.1–17)
IMM GRANULOCYTES # BLD AUTO: 0.1 K/UL (ref 0–0.04)
IMM GRANULOCYTES NFR BLD AUTO: 1 % (ref 0–0.5)
LYMPHOCYTES # BLD: 1.9 K/UL (ref 0.8–3.5)
LYMPHOCYTES NFR BLD: 20 % (ref 12–49)
MAGNESIUM SERPL-MCNC: 2.1 MG/DL (ref 1.6–2.4)
MCH RBC QN AUTO: 28.2 PG (ref 26–34)
MCHC RBC AUTO-ENTMCNC: 32.5 G/DL (ref 30–36.5)
MCV RBC AUTO: 86.7 FL (ref 80–99)
MONOCYTES # BLD: 0.8 K/UL (ref 0–1)
MONOCYTES NFR BLD: 8 % (ref 5–13)
NEUTS SEG # BLD: 6.4 K/UL (ref 1.8–8)
NEUTS SEG NFR BLD: 67 % (ref 32–75)
NRBC # BLD: 0 K/UL (ref 0–0.01)
NRBC BLD-RTO: 0 PER 100 WBC
PHOSPHATE SERPL-MCNC: 2.8 MG/DL (ref 2.6–4.7)
PLATELET # BLD AUTO: 248 K/UL (ref 150–400)
PMV BLD AUTO: 9.1 FL (ref 8.9–12.9)
POTASSIUM SERPL-SCNC: 3.3 MMOL/L (ref 3.5–5.1)
RBC # BLD AUTO: 4.97 M/UL (ref 4.1–5.7)
SERVICE CMNT-IMP: ABNORMAL
SERVICE CMNT-IMP: NORMAL
SERVICE CMNT-IMP: NORMAL
SODIUM SERPL-SCNC: 136 MMOL/L (ref 136–145)
WBC # BLD AUTO: 9.5 K/UL (ref 4.1–11.1)

## 2024-03-19 PROCEDURE — 6360000002 HC RX W HCPCS: Performed by: STUDENT IN AN ORGANIZED HEALTH CARE EDUCATION/TRAINING PROGRAM

## 2024-03-19 PROCEDURE — 36415 COLL VENOUS BLD VENIPUNCTURE: CPT

## 2024-03-19 PROCEDURE — 2580000003 HC RX 258: Performed by: STUDENT IN AN ORGANIZED HEALTH CARE EDUCATION/TRAINING PROGRAM

## 2024-03-19 PROCEDURE — 2580000003 HC RX 258: Performed by: PODIATRIST

## 2024-03-19 PROCEDURE — 97535 SELF CARE MNGMENT TRAINING: CPT | Performed by: OCCUPATIONAL THERAPIST

## 2024-03-19 PROCEDURE — 6370000000 HC RX 637 (ALT 250 FOR IP): Performed by: PODIATRIST

## 2024-03-19 PROCEDURE — 6370000000 HC RX 637 (ALT 250 FOR IP): Performed by: STUDENT IN AN ORGANIZED HEALTH CARE EDUCATION/TRAINING PROGRAM

## 2024-03-19 PROCEDURE — 85025 COMPLETE CBC W/AUTO DIFF WBC: CPT

## 2024-03-19 PROCEDURE — 6370000000 HC RX 637 (ALT 250 FOR IP): Performed by: INTERNAL MEDICINE

## 2024-03-19 PROCEDURE — 97161 PT EVAL LOW COMPLEX 20 MIN: CPT

## 2024-03-19 PROCEDURE — 82962 GLUCOSE BLOOD TEST: CPT

## 2024-03-19 PROCEDURE — 6360000002 HC RX W HCPCS: Performed by: INTERNAL MEDICINE

## 2024-03-19 PROCEDURE — 97165 OT EVAL LOW COMPLEX 30 MIN: CPT | Performed by: OCCUPATIONAL THERAPIST

## 2024-03-19 PROCEDURE — 99233 SBSQ HOSP IP/OBS HIGH 50: CPT | Performed by: INTERNAL MEDICINE

## 2024-03-19 PROCEDURE — 1100000000 HC RM PRIVATE

## 2024-03-19 PROCEDURE — 83735 ASSAY OF MAGNESIUM: CPT

## 2024-03-19 PROCEDURE — 80048 BASIC METABOLIC PNL TOTAL CA: CPT

## 2024-03-19 PROCEDURE — 84100 ASSAY OF PHOSPHORUS: CPT

## 2024-03-19 PROCEDURE — 97116 GAIT TRAINING THERAPY: CPT

## 2024-03-19 RX ORDER — POTASSIUM CHLORIDE 750 MG/1
40 TABLET, FILM COATED, EXTENDED RELEASE ORAL ONCE
Status: COMPLETED | OUTPATIENT
Start: 2024-03-19 | End: 2024-03-19

## 2024-03-19 RX ADMIN — PIPERACILLIN AND TAZOBACTAM 3375 MG: 3; .375 INJECTION, POWDER, LYOPHILIZED, FOR SOLUTION INTRAVENOUS at 03:38

## 2024-03-19 RX ADMIN — SODIUM CHLORIDE, PRESERVATIVE FREE 10 ML: 5 INJECTION INTRAVENOUS at 09:38

## 2024-03-19 RX ADMIN — POTASSIUM CHLORIDE 40 MEQ: 750 TABLET, FILM COATED, EXTENDED RELEASE ORAL at 09:36

## 2024-03-19 RX ADMIN — FUROSEMIDE 40 MG: 40 TABLET ORAL at 16:39

## 2024-03-19 RX ADMIN — INSULIN GLARGINE 20 UNITS: 100 INJECTION, SOLUTION SUBCUTANEOUS at 21:14

## 2024-03-19 RX ADMIN — FUROSEMIDE 40 MG: 40 TABLET ORAL at 09:36

## 2024-03-19 RX ADMIN — MELATONIN 6 MG: at 21:15

## 2024-03-19 RX ADMIN — PIPERACILLIN AND TAZOBACTAM 3375 MG: 3; .375 INJECTION, POWDER, LYOPHILIZED, FOR SOLUTION INTRAVENOUS at 13:38

## 2024-03-19 RX ADMIN — ATORVASTATIN CALCIUM 40 MG: 40 TABLET, FILM COATED ORAL at 20:58

## 2024-03-19 RX ADMIN — ASPIRIN 81 MG: 81 TABLET, CHEWABLE ORAL at 09:36

## 2024-03-19 RX ADMIN — INSULIN LISPRO 2 UNITS: 100 INJECTION, SOLUTION INTRAVENOUS; SUBCUTANEOUS at 16:39

## 2024-03-19 RX ADMIN — ENOXAPARIN SODIUM 30 MG: 100 INJECTION SUBCUTANEOUS at 09:36

## 2024-03-19 RX ADMIN — TAMSULOSIN HYDROCHLORIDE 0.4 MG: 0.4 CAPSULE ORAL at 09:36

## 2024-03-19 RX ADMIN — SODIUM CHLORIDE, POTASSIUM CHLORIDE, SODIUM LACTATE AND CALCIUM CHLORIDE: 600; 310; 30; 20 INJECTION, SOLUTION INTRAVENOUS at 03:38

## 2024-03-19 RX ADMIN — PIPERACILLIN AND TAZOBACTAM 3375 MG: 3; .375 INJECTION, POWDER, LYOPHILIZED, FOR SOLUTION INTRAVENOUS at 20:57

## 2024-03-19 RX ADMIN — PREGABALIN 75 MG: 75 CAPSULE ORAL at 13:56

## 2024-03-19 RX ADMIN — PREGABALIN 75 MG: 75 CAPSULE ORAL at 20:57

## 2024-03-19 RX ADMIN — CLOPIDOGREL BISULFATE 75 MG: 75 TABLET ORAL at 09:36

## 2024-03-19 RX ADMIN — PREGABALIN 75 MG: 75 CAPSULE ORAL at 09:36

## 2024-03-19 RX ADMIN — SODIUM CHLORIDE, PRESERVATIVE FREE 10 ML: 5 INJECTION INTRAVENOUS at 21:13

## 2024-03-19 RX ADMIN — ENOXAPARIN SODIUM 30 MG: 100 INJECTION SUBCUTANEOUS at 20:59

## 2024-03-19 NOTE — WOUND CARE
Wound Care consulted by nursing (WENDY Liao) on Neuro-Tele to \"repack the wound with iodoform packing\".   Dr. Thorpe was just in here today and he redressed the foot wound.  Trying to clarify if the orders for wound care team to \"evaluate and treat\" came from Dr. Dixon or Dr. Thrope.    Assessment today: the patient is alert, oriented x 4 sitting up in the recliner today and walking to the Restroom on right heel touch only using a walker as needed. He probably needs a post op boot or podus boot with safe walking instructions from PT.   The drainage from the foot is serosanguinous without odor. No purulence and the toe is actually flesh colored now. There was no packing in the wound when I removed the dressing just now. There are two holes on each side of the incision that has large enough space to pack the wound space.  Both holes are part of the same open wound space between the metatarsals.              Treatment: the wound was cleansed with NS using a syringe to irrigate the space in the hole. The wound was then packed with 1/4 inch iodoform packing strip (about 8 inches) of it. Covered with an Opticel Ag dressing in between the toes and then sandwiched between two ABD pads and wrapped with the small rolled candace, then an ACE.    Spoke with Dr. Thorpe and he agreed with the packing strips.  He will eventually write Home wound care orders but while he is in the hospital nursing will need to pack the wound daily / clean the wound well.   Plan: Wound care orders written per agreement from Dr. Thorpe. Pt. Is receiving antibiotics for the osteomyelitis and will also follow up with Dr. Brooks (April 16th) in her office (infectious disease).   Showed the wound photos to WENDY Liao today and reviewed the written orders.   Candida Erickson RN, BSN, CWON
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
## 2024-03-20 ENCOUNTER — TELEPHONE (OUTPATIENT)
Age: 64
End: 2024-03-20

## 2024-03-20 LAB
ANION GAP SERPL CALC-SCNC: 3 MMOL/L (ref 5–15)
BASOPHILS # BLD: 0.1 K/UL (ref 0–0.1)
BASOPHILS NFR BLD: 1 % (ref 0–1)
BUN SERPL-MCNC: 12 MG/DL (ref 6–20)
BUN/CREAT SERPL: 12 (ref 12–20)
CALCIUM SERPL-MCNC: 8.8 MG/DL (ref 8.5–10.1)
CHLORIDE SERPL-SCNC: 102 MMOL/L (ref 97–108)
CO2 SERPL-SCNC: 30 MMOL/L (ref 21–32)
CREAT SERPL-MCNC: 0.98 MG/DL (ref 0.7–1.3)
CRP SERPL-MCNC: 0.76 MG/DL (ref 0–0.3)
DIFFERENTIAL METHOD BLD: ABNORMAL
EOSINOPHIL # BLD: 0.3 K/UL (ref 0–0.4)
EOSINOPHIL NFR BLD: 4 % (ref 0–7)
ERYTHROCYTE [DISTWIDTH] IN BLOOD BY AUTOMATED COUNT: 14.2 % (ref 11.5–14.5)
ERYTHROCYTE [SEDIMENTATION RATE] IN BLOOD: 55 MM/HR (ref 0–20)
GLUCOSE BLD STRIP.AUTO-MCNC: 143 MG/DL (ref 65–117)
GLUCOSE BLD STRIP.AUTO-MCNC: 149 MG/DL (ref 65–117)
GLUCOSE BLD STRIP.AUTO-MCNC: 188 MG/DL (ref 65–117)
GLUCOSE BLD STRIP.AUTO-MCNC: 213 MG/DL (ref 65–117)
GLUCOSE SERPL-MCNC: 193 MG/DL (ref 65–100)
HCT VFR BLD AUTO: 43.5 % (ref 36.6–50.3)
HGB BLD-MCNC: 13.7 G/DL (ref 12.1–17)
IMM GRANULOCYTES # BLD AUTO: 0.1 K/UL (ref 0–0.04)
IMM GRANULOCYTES NFR BLD AUTO: 1 % (ref 0–0.5)
LYMPHOCYTES # BLD: 2.2 K/UL (ref 0.8–3.5)
LYMPHOCYTES NFR BLD: 24 % (ref 12–49)
MAGNESIUM SERPL-MCNC: 2 MG/DL (ref 1.6–2.4)
MCH RBC QN AUTO: 27.8 PG (ref 26–34)
MCHC RBC AUTO-ENTMCNC: 31.5 G/DL (ref 30–36.5)
MCV RBC AUTO: 88.4 FL (ref 80–99)
MONOCYTES # BLD: 0.6 K/UL (ref 0–1)
MONOCYTES NFR BLD: 7 % (ref 5–13)
NEUTS SEG # BLD: 5.7 K/UL (ref 1.8–8)
NEUTS SEG NFR BLD: 63 % (ref 32–75)
NRBC # BLD: 0 K/UL (ref 0–0.01)
NRBC BLD-RTO: 0 PER 100 WBC
PHOSPHATE SERPL-MCNC: 2.7 MG/DL (ref 2.6–4.7)
PLATELET # BLD AUTO: 256 K/UL (ref 150–400)
PMV BLD AUTO: 9 FL (ref 8.9–12.9)
POTASSIUM SERPL-SCNC: 3.3 MMOL/L (ref 3.5–5.1)
RBC # BLD AUTO: 4.92 M/UL (ref 4.1–5.7)
SERVICE CMNT-IMP: ABNORMAL
SODIUM SERPL-SCNC: 135 MMOL/L (ref 136–145)
WBC # BLD AUTO: 9 K/UL (ref 4.1–11.1)

## 2024-03-20 PROCEDURE — 6370000000 HC RX 637 (ALT 250 FOR IP): Performed by: INTERNAL MEDICINE

## 2024-03-20 PROCEDURE — 6370000000 HC RX 637 (ALT 250 FOR IP): Performed by: PODIATRIST

## 2024-03-20 PROCEDURE — 82962 GLUCOSE BLOOD TEST: CPT

## 2024-03-20 PROCEDURE — 6370000000 HC RX 637 (ALT 250 FOR IP): Performed by: STUDENT IN AN ORGANIZED HEALTH CARE EDUCATION/TRAINING PROGRAM

## 2024-03-20 PROCEDURE — 99233 SBSQ HOSP IP/OBS HIGH 50: CPT | Performed by: INTERNAL MEDICINE

## 2024-03-20 PROCEDURE — 85652 RBC SED RATE AUTOMATED: CPT

## 2024-03-20 PROCEDURE — 2580000003 HC RX 258: Performed by: INTERNAL MEDICINE

## 2024-03-20 PROCEDURE — 6360000002 HC RX W HCPCS: Performed by: INTERNAL MEDICINE

## 2024-03-20 PROCEDURE — 84100 ASSAY OF PHOSPHORUS: CPT

## 2024-03-20 PROCEDURE — 86140 C-REACTIVE PROTEIN: CPT

## 2024-03-20 PROCEDURE — 85025 COMPLETE CBC W/AUTO DIFF WBC: CPT

## 2024-03-20 PROCEDURE — 36415 COLL VENOUS BLD VENIPUNCTURE: CPT

## 2024-03-20 PROCEDURE — 1100000000 HC RM PRIVATE

## 2024-03-20 PROCEDURE — 2580000003 HC RX 258: Performed by: PODIATRIST

## 2024-03-20 PROCEDURE — 80048 BASIC METABOLIC PNL TOTAL CA: CPT

## 2024-03-20 PROCEDURE — 83735 ASSAY OF MAGNESIUM: CPT

## 2024-03-20 RX ORDER — POTASSIUM CHLORIDE 20 MEQ/1
40 TABLET, EXTENDED RELEASE ORAL ONCE
Status: COMPLETED | OUTPATIENT
Start: 2024-03-20 | End: 2024-03-20

## 2024-03-20 RX ADMIN — PREGABALIN 75 MG: 75 CAPSULE ORAL at 14:42

## 2024-03-20 RX ADMIN — ENOXAPARIN SODIUM 30 MG: 100 INJECTION SUBCUTANEOUS at 20:48

## 2024-03-20 RX ADMIN — WATER 2000 MG: 1 INJECTION INTRAMUSCULAR; INTRAVENOUS; SUBCUTANEOUS at 02:09

## 2024-03-20 RX ADMIN — FUROSEMIDE 40 MG: 40 TABLET ORAL at 16:37

## 2024-03-20 RX ADMIN — TAMSULOSIN HYDROCHLORIDE 0.4 MG: 0.4 CAPSULE ORAL at 09:19

## 2024-03-20 RX ADMIN — MELATONIN 6 MG: at 20:46

## 2024-03-20 RX ADMIN — FUROSEMIDE 40 MG: 40 TABLET ORAL at 09:19

## 2024-03-20 RX ADMIN — POTASSIUM CHLORIDE 40 MEQ: 1500 TABLET, EXTENDED RELEASE ORAL at 09:26

## 2024-03-20 RX ADMIN — ASPIRIN 81 MG: 81 TABLET, CHEWABLE ORAL at 09:19

## 2024-03-20 RX ADMIN — SODIUM CHLORIDE, PRESERVATIVE FREE 10 ML: 5 INJECTION INTRAVENOUS at 09:22

## 2024-03-20 RX ADMIN — INSULIN GLARGINE 20 UNITS: 100 INJECTION, SOLUTION SUBCUTANEOUS at 20:55

## 2024-03-20 RX ADMIN — WATER 2000 MG: 1 INJECTION INTRAMUSCULAR; INTRAVENOUS; SUBCUTANEOUS at 09:26

## 2024-03-20 RX ADMIN — PREGABALIN 75 MG: 75 CAPSULE ORAL at 20:46

## 2024-03-20 RX ADMIN — SODIUM CHLORIDE, PRESERVATIVE FREE 10 ML: 5 INJECTION INTRAVENOUS at 20:50

## 2024-03-20 RX ADMIN — ATORVASTATIN CALCIUM 40 MG: 40 TABLET, FILM COATED ORAL at 20:47

## 2024-03-20 RX ADMIN — CLOPIDOGREL BISULFATE 75 MG: 75 TABLET ORAL at 09:19

## 2024-03-20 RX ADMIN — ENOXAPARIN SODIUM 30 MG: 100 INJECTION SUBCUTANEOUS at 09:19

## 2024-03-20 RX ADMIN — PREGABALIN 75 MG: 75 CAPSULE ORAL at 09:19

## 2024-03-20 RX ADMIN — WATER 2000 MG: 1 INJECTION INTRAMUSCULAR; INTRAVENOUS; SUBCUTANEOUS at 16:30

## 2024-03-20 RX ADMIN — INSULIN LISPRO 2 UNITS: 100 INJECTION, SOLUTION INTRAVENOUS; SUBCUTANEOUS at 11:25

## 2024-03-20 NOTE — CARE COORDINATION
Transition of Care Plan:     RUR: 9% (low RUR)  Prior Level of Functioning: Independent  Disposition: [facility] SNF   If SNF or IPR: Date FOC offered: N/a  Date FOC received: 03/14 family requested [facility] swing bed  Accepting facility: [facility]   Date authorization started with reference number: 3/15, Ref ID: [id number]    Date authorization received and expires: Chart review reveals approved 03/19-03/23  Follow up appointments: defer to SNF   DME needed: defer to SNF   Transportation at discharge: Spouse to transport  IM/IMM Medicare/ letter given: 2nd given  Is patient a  and connected with VA? N/a              If yes, was  transfer form completed and VA notified? N/a  Caregiver Contact: Charlotte Seymour - spouse - [phone]    Discharge Caregiver contacted prior to discharge? Patient to contact  Care Conference needed? Not at this time  Barriers to discharge: None    0854 - Unit CM awaiting confirmation of placement at [facility], CM leadership working to coordinate with [facility] leadership. Auth is approved.     1333 - [facility] able to accept patient, wife will transport patient tomorrow morning. She will plan to be here at 1000. CM updated patient.    JOSE Lee  Care Management  [facility]  [phone]

## 2024-03-20 NOTE — WOUND CARE
Agree with Wound Care Nursing on First week of bandage changes after discharge:        wound to be cleansed with NS using a syringe to irrigate the space in the hole. The wound was then packed with 1/4 inch iodoform packing strip (about 8 inches) of it. Covered with an Opticel Ag dressing in between the toes and then sandwiched between two ABD pads and wrapped with the small rolled candace, then an ACE.    Spoke with Dr. Thorpe and he agreed with the packing strips.  Q 48 hours

## 2024-03-20 NOTE — DISCHARGE SUMMARY
No edema  GI:  Soft, Non distended, Non tender.  +Bowel sounds  Neurologic:  Alert and oriented X 3, normal speech   Psych:   Good insight. Not anxious nor agitated  Skin:  No rashes.  No jaundice    Right foot: Dressing in place with, postsurgical boot.    Reviewed most current lab test results and cultures  YES  Reviewed most current radiology test results   YES  Review and summation of old records today    NO  Reviewed patient's current orders and MAR    YES  PMH/SH reviewed - no change compared to H&P    Procedures: see electronic medical records for all procedures/Xrays and details which were not copied into this note but were reviewed prior to creation of Plan.      LABS:  I reviewed today's most current labs and imaging studies.  Pertinent labs include:  Recent Labs     03/18/24  1205 03/19/24  0238 03/20/24  0306   WBC 9.3 9.5 9.0   HGB 14.3 14.0 13.7   HCT 44.5 43.1 43.5    248 256     Recent Labs     03/18/24  0507 03/19/24  0238 03/20/24  0306    136 135*   K 3.6 3.3* 3.3*    106 102   CO2 30 27 30   GLUCOSE 131* 181* 193*   BUN 12 12 12   CREATININE 0.86 0.94 0.98   CALCIUM 8.5 8.5 8.8   MG 1.9 2.1 2.0   PHOS 3.1 2.8 2.7       Signed: Isac Silva MD

## 2024-03-20 NOTE — CARE COORDINATION
9145: Spoke with Amy (CM manager) who told me to contact patient's wife to let know about denial for skilled care because we are out of network with insurance plan. Called wife and let her know. She is going to call insurance company. She also requested fax that I had received from [facility]. Sent to Ms. Seymour.       1115: Spoke with [facility] administration this morning Cher Bonner and Steve Nelson who both have given approval from patient to admit to [facility] swing bed.  They are aware of CM's concerns about skilled care bill. Also, called Amy and made her aware; she is okay with it as long as administration is.       1138: Made CM at [facility] made aware of the above. Asked her if she can relay to wife to call Medicare to see if rest of bill will be paid (whatever Misbah doesn't pay). Also, Misbah has only approved 3/19-3/23.     1345: Spoke with CM at [facility]. Plan to admit tomorrow 3/21 as that is when wife will be ready to transport.           Reba BUSTAMANTEN RN   Case Management   [address]   [phone] (O)  [phone] (F)

## 2024-03-21 ENCOUNTER — HOSPITAL ENCOUNTER (INPATIENT)
Facility: HOSPITAL | Age: 64
LOS: 8 days | Discharge: HOME HEALTH | DRG: 638 | End: 2024-03-29
Attending: INTERNAL MEDICINE | Admitting: INTERNAL MEDICINE
Payer: COMMERCIAL

## 2024-03-21 VITALS
RESPIRATION RATE: 18 BRPM | SYSTOLIC BLOOD PRESSURE: 106 MMHG | DIASTOLIC BLOOD PRESSURE: 68 MMHG | TEMPERATURE: 97.5 F | BODY MASS INDEX: 33.21 KG/M2 | HEART RATE: 68 BPM | WEIGHT: 237.22 LBS | OXYGEN SATURATION: 94 % | HEIGHT: 71 IN

## 2024-03-21 PROBLEM — E11.622 DIABETIC ULCER OF LOWER EXTREMITY (HCC): Status: ACTIVE | Noted: 2024-03-21

## 2024-03-21 PROBLEM — G62.9 PERIPHERAL NEUROPATHY: Chronic | Status: ACTIVE | Noted: 2024-03-21

## 2024-03-21 PROBLEM — L97.909 DIABETIC ULCER OF LOWER EXTREMITY (HCC): Status: ACTIVE | Noted: 2024-03-21

## 2024-03-21 PROBLEM — M86.9 OSTEOMYELITIS OF RIGHT FOOT (HCC): Status: ACTIVE | Noted: 2024-03-09

## 2024-03-21 PROBLEM — I10 HTN (HYPERTENSION): Chronic | Status: ACTIVE | Noted: 2024-03-21

## 2024-03-21 PROBLEM — N40.0 BPH (BENIGN PROSTATIC HYPERPLASIA): Chronic | Status: ACTIVE | Noted: 2024-03-21

## 2024-03-21 LAB
ANION GAP SERPL CALC-SCNC: 5 MMOL/L (ref 5–15)
BASOPHILS # BLD: 0.1 K/UL (ref 0–0.1)
BASOPHILS NFR BLD: 1 % (ref 0–1)
BUN SERPL-MCNC: 15 MG/DL (ref 6–20)
BUN/CREAT SERPL: 16 (ref 12–20)
CALCIUM SERPL-MCNC: 9 MG/DL (ref 8.5–10.1)
CHLORIDE SERPL-SCNC: 103 MMOL/L (ref 97–108)
CO2 SERPL-SCNC: 29 MMOL/L (ref 21–32)
CREAT SERPL-MCNC: 0.94 MG/DL (ref 0.7–1.3)
DIFFERENTIAL METHOD BLD: ABNORMAL
EOSINOPHIL # BLD: 0.3 K/UL (ref 0–0.4)
EOSINOPHIL NFR BLD: 3 % (ref 0–7)
ERYTHROCYTE [DISTWIDTH] IN BLOOD BY AUTOMATED COUNT: 14.2 % (ref 11.5–14.5)
GLUCOSE BLD STRIP.AUTO-MCNC: 135 MG/DL (ref 65–117)
GLUCOSE BLD STRIP.AUTO-MCNC: 162 MG/DL (ref 65–117)
GLUCOSE BLD STRIP.AUTO-MCNC: 234 MG/DL (ref 65–117)
GLUCOSE SERPL-MCNC: 164 MG/DL (ref 65–100)
HCT VFR BLD AUTO: 43.3 % (ref 36.6–50.3)
HGB BLD-MCNC: 13.8 G/DL (ref 12.1–17)
IMM GRANULOCYTES # BLD AUTO: 0 K/UL (ref 0–0.04)
IMM GRANULOCYTES NFR BLD AUTO: 0 % (ref 0–0.5)
LYMPHOCYTES # BLD: 2.3 K/UL (ref 0.8–3.5)
LYMPHOCYTES NFR BLD: 25 % (ref 12–49)
MAGNESIUM SERPL-MCNC: 2.1 MG/DL (ref 1.6–2.4)
MCH RBC QN AUTO: 27.9 PG (ref 26–34)
MCHC RBC AUTO-ENTMCNC: 31.9 G/DL (ref 30–36.5)
MCV RBC AUTO: 87.5 FL (ref 80–99)
MONOCYTES # BLD: 0.6 K/UL (ref 0–1)
MONOCYTES NFR BLD: 6 % (ref 5–13)
NEUTS SEG # BLD: 6 K/UL (ref 1.8–8)
NEUTS SEG NFR BLD: 65 % (ref 32–75)
NRBC # BLD: 0 K/UL (ref 0–0.01)
NRBC BLD-RTO: 0 PER 100 WBC
PHOSPHATE SERPL-MCNC: 3.2 MG/DL (ref 2.6–4.7)
PLATELET # BLD AUTO: 236 K/UL (ref 150–400)
PMV BLD AUTO: 8.8 FL (ref 8.9–12.9)
POTASSIUM SERPL-SCNC: 3.4 MMOL/L (ref 3.5–5.1)
RBC # BLD AUTO: 4.95 M/UL (ref 4.1–5.7)
SERVICE CMNT-IMP: ABNORMAL
SODIUM SERPL-SCNC: 137 MMOL/L (ref 136–145)
WBC # BLD AUTO: 9.4 K/UL (ref 4.1–11.1)

## 2024-03-21 PROCEDURE — 2580000003 HC RX 258: Performed by: PODIATRIST

## 2024-03-21 PROCEDURE — 6370000000 HC RX 637 (ALT 250 FOR IP): Performed by: PODIATRIST

## 2024-03-21 PROCEDURE — 6370000000 HC RX 637 (ALT 250 FOR IP): Performed by: INTERNAL MEDICINE

## 2024-03-21 PROCEDURE — 82962 GLUCOSE BLOOD TEST: CPT

## 2024-03-21 PROCEDURE — 6370000000 HC RX 637 (ALT 250 FOR IP): Performed by: STUDENT IN AN ORGANIZED HEALTH CARE EDUCATION/TRAINING PROGRAM

## 2024-03-21 PROCEDURE — 84100 ASSAY OF PHOSPHORUS: CPT

## 2024-03-21 PROCEDURE — 6360000002 HC RX W HCPCS: Performed by: INTERNAL MEDICINE

## 2024-03-21 PROCEDURE — 83735 ASSAY OF MAGNESIUM: CPT

## 2024-03-21 PROCEDURE — 80048 BASIC METABOLIC PNL TOTAL CA: CPT

## 2024-03-21 PROCEDURE — 85025 COMPLETE CBC W/AUTO DIFF WBC: CPT

## 2024-03-21 PROCEDURE — 2580000003 HC RX 258: Performed by: INTERNAL MEDICINE

## 2024-03-21 PROCEDURE — 36415 COLL VENOUS BLD VENIPUNCTURE: CPT

## 2024-03-21 PROCEDURE — 1900000000 HC RM PRIVATE SNF

## 2024-03-21 RX ORDER — SODIUM CHLORIDE 0.9 % (FLUSH) 0.9 %
5-40 SYRINGE (ML) INJECTION EVERY 12 HOURS SCHEDULED
Status: DISCONTINUED | OUTPATIENT
Start: 2024-03-21 | End: 2024-03-29 | Stop reason: HOSPADM

## 2024-03-21 RX ORDER — ATORVASTATIN CALCIUM 40 MG/1
40 TABLET, FILM COATED ORAL NIGHTLY
Status: DISCONTINUED | OUTPATIENT
Start: 2024-03-21 | End: 2024-03-29 | Stop reason: HOSPADM

## 2024-03-21 RX ORDER — DEXTROSE MONOHYDRATE 100 MG/ML
INJECTION, SOLUTION INTRAVENOUS CONTINUOUS PRN
Status: DISCONTINUED | OUTPATIENT
Start: 2024-03-21 | End: 2024-03-29 | Stop reason: HOSPADM

## 2024-03-21 RX ORDER — POTASSIUM CHLORIDE 20 MEQ/1
40 TABLET, EXTENDED RELEASE ORAL ONCE
Status: COMPLETED | OUTPATIENT
Start: 2024-03-21 | End: 2024-03-21

## 2024-03-21 RX ORDER — TAMSULOSIN HYDROCHLORIDE 0.4 MG/1
0.4 CAPSULE ORAL DAILY
Status: DISCONTINUED | OUTPATIENT
Start: 2024-03-22 | End: 2024-03-29 | Stop reason: HOSPADM

## 2024-03-21 RX ORDER — ACETAMINOPHEN 650 MG/1
650 SUPPOSITORY RECTAL EVERY 6 HOURS PRN
Status: DISCONTINUED | OUTPATIENT
Start: 2024-03-21 | End: 2024-03-29 | Stop reason: HOSPADM

## 2024-03-21 RX ORDER — INSULIN LISPRO 100 [IU]/ML
0-4 INJECTION, SOLUTION INTRAVENOUS; SUBCUTANEOUS NIGHTLY
Status: DISCONTINUED | OUTPATIENT
Start: 2024-03-21 | End: 2024-03-23

## 2024-03-21 RX ORDER — CLOPIDOGREL BISULFATE 75 MG/1
75 TABLET ORAL DAILY
Status: DISCONTINUED | OUTPATIENT
Start: 2024-03-22 | End: 2024-03-29 | Stop reason: HOSPADM

## 2024-03-21 RX ORDER — CHOLECALCIFEROL (VITAMIN D3) 125 MCG
5 CAPSULE ORAL NIGHTLY PRN
Status: DISCONTINUED | OUTPATIENT
Start: 2024-03-21 | End: 2024-03-29 | Stop reason: HOSPADM

## 2024-03-21 RX ORDER — FUROSEMIDE 40 MG/1
40 TABLET ORAL 2 TIMES DAILY
Status: DISCONTINUED | OUTPATIENT
Start: 2024-03-21 | End: 2024-03-29 | Stop reason: HOSPADM

## 2024-03-21 RX ORDER — INSULIN GLARGINE 100 [IU]/ML
20 INJECTION, SOLUTION SUBCUTANEOUS NIGHTLY
Status: DISCONTINUED | OUTPATIENT
Start: 2024-03-21 | End: 2024-03-23

## 2024-03-21 RX ORDER — GLUCAGON 1 MG/ML
1 KIT INJECTION PRN
Status: DISCONTINUED | OUTPATIENT
Start: 2024-03-21 | End: 2024-03-29 | Stop reason: HOSPADM

## 2024-03-21 RX ORDER — ONDANSETRON 4 MG/1
4 TABLET, ORALLY DISINTEGRATING ORAL EVERY 8 HOURS PRN
Status: DISCONTINUED | OUTPATIENT
Start: 2024-03-21 | End: 2024-03-29 | Stop reason: HOSPADM

## 2024-03-21 RX ORDER — ACETAMINOPHEN 325 MG/1
650 TABLET ORAL EVERY 6 HOURS PRN
Status: DISCONTINUED | OUTPATIENT
Start: 2024-03-21 | End: 2024-03-29 | Stop reason: HOSPADM

## 2024-03-21 RX ORDER — INSULIN LISPRO 100 [IU]/ML
0-8 INJECTION, SOLUTION INTRAVENOUS; SUBCUTANEOUS
Status: DISCONTINUED | OUTPATIENT
Start: 2024-03-21 | End: 2024-03-23

## 2024-03-21 RX ORDER — ONDANSETRON 2 MG/ML
4 INJECTION INTRAMUSCULAR; INTRAVENOUS EVERY 6 HOURS PRN
Status: DISCONTINUED | OUTPATIENT
Start: 2024-03-21 | End: 2024-03-29 | Stop reason: HOSPADM

## 2024-03-21 RX ORDER — ENOXAPARIN SODIUM 100 MG/ML
30 INJECTION SUBCUTANEOUS 2 TIMES DAILY
Status: DISCONTINUED | OUTPATIENT
Start: 2024-03-21 | End: 2024-03-29 | Stop reason: HOSPADM

## 2024-03-21 RX ORDER — ASPIRIN 81 MG/1
81 TABLET, CHEWABLE ORAL DAILY
Status: DISCONTINUED | OUTPATIENT
Start: 2024-03-22 | End: 2024-03-29 | Stop reason: HOSPADM

## 2024-03-21 RX ORDER — POLYETHYLENE GLYCOL 3350 17 G/17G
17 POWDER, FOR SOLUTION ORAL DAILY PRN
Status: DISCONTINUED | OUTPATIENT
Start: 2024-03-21 | End: 2024-03-29 | Stop reason: HOSPADM

## 2024-03-21 RX ADMIN — SODIUM CHLORIDE, PRESERVATIVE FREE 10 ML: 5 INJECTION INTRAVENOUS at 07:58

## 2024-03-21 RX ADMIN — ENOXAPARIN SODIUM 30 MG: 100 INJECTION SUBCUTANEOUS at 22:28

## 2024-03-21 RX ADMIN — ATORVASTATIN CALCIUM 40 MG: 40 TABLET, FILM COATED ORAL at 22:29

## 2024-03-21 RX ADMIN — INSULIN GLARGINE 20 UNITS: 100 INJECTION, SOLUTION SUBCUTANEOUS at 22:29

## 2024-03-21 RX ADMIN — POTASSIUM CHLORIDE 40 MEQ: 1500 TABLET, EXTENDED RELEASE ORAL at 10:24

## 2024-03-21 RX ADMIN — FUROSEMIDE 40 MG: 40 TABLET ORAL at 15:58

## 2024-03-21 RX ADMIN — WATER 2000 MG: 1 INJECTION INTRAMUSCULAR; INTRAVENOUS; SUBCUTANEOUS at 07:57

## 2024-03-21 RX ADMIN — WATER 2000 MG: 1 INJECTION INTRAMUSCULAR; INTRAVENOUS; SUBCUTANEOUS at 15:55

## 2024-03-21 RX ADMIN — FUROSEMIDE 40 MG: 40 TABLET ORAL at 07:58

## 2024-03-21 RX ADMIN — WATER 2000 MG: 1 INJECTION INTRAMUSCULAR; INTRAVENOUS; SUBCUTANEOUS at 22:27

## 2024-03-21 RX ADMIN — PREGABALIN 75 MG: 50 CAPSULE ORAL at 16:05

## 2024-03-21 RX ADMIN — WATER 2000 MG: 1 INJECTION INTRAMUSCULAR; INTRAVENOUS; SUBCUTANEOUS at 01:13

## 2024-03-21 RX ADMIN — PREGABALIN 75 MG: 75 CAPSULE ORAL at 10:25

## 2024-03-21 RX ADMIN — SODIUM CHLORIDE, PRESERVATIVE FREE 10 ML: 5 INJECTION INTRAVENOUS at 22:46

## 2024-03-21 RX ADMIN — PREGABALIN 75 MG: 50 CAPSULE ORAL at 22:28

## 2024-03-21 RX ADMIN — ENOXAPARIN SODIUM 30 MG: 100 INJECTION SUBCUTANEOUS at 10:24

## 2024-03-21 RX ADMIN — TAMSULOSIN HYDROCHLORIDE 0.4 MG: 0.4 CAPSULE ORAL at 10:25

## 2024-03-21 RX ADMIN — ASPIRIN 81 MG: 81 TABLET, CHEWABLE ORAL at 10:25

## 2024-03-21 RX ADMIN — SODIUM CHLORIDE, POTASSIUM CHLORIDE, SODIUM LACTATE AND CALCIUM CHLORIDE: 600; 310; 30; 20 INJECTION, SOLUTION INTRAVENOUS at 02:29

## 2024-03-21 RX ADMIN — Medication 5 MG: at 22:28

## 2024-03-21 RX ADMIN — CLOPIDOGREL BISULFATE 75 MG: 75 TABLET ORAL at 10:25

## 2024-03-21 ASSESSMENT — PAIN SCALES - GENERAL
PAINLEVEL_OUTOF10: 0

## 2024-03-21 NOTE — PROGRESS NOTES
Hospitalist Progress Note    NAME:   Joseph Seymour   : 1960   MRN: [id number]     Date/Time: 3/12/2024 4:07 PM  Patient PCP: Adriana Tyler MD    Estimated discharge date:48hrs  Barriers: Bone biopsy, decision regarding antibiotics depending on cultures, pod clearance      Assessment / Plan:  Infected diabetic foot ulcer with surrounding cellulitis  Leukocytosis  ?  Osteomyelitis  MRI footPlantar cutaneous ulceration in the forefoot at the level of the base of the  second toe proximal phalanx. Abnormal signal shown diffusely within the proximal  phalanx of the second toe, concerning for osteomyelitis.  2. Nonspecific small effusions of hallux MTP and IP joints and second through  fourth MTP joints. Nonspecific moderate effusions of first through third  intermetatarsal bursae.  3. Diffuse subcutaneous edema-like signal and forefoot. Diffuse intrinsic muscle  edema-like signal in the forefoot. No localized collection is evident.    Continue wound care  Appreciate podiatry recs   Status post right foot I&D and resection of metatarsal joint on 3/11  Bone biopsy pending  Discussed with the dr hallman-likely will need long-term IV antibiotics.  Recommend ID consult  Dr. Brooks to see the patient tomorrow    Blood culture negative so far  WBC trending down      Atypical chest pain  History of CAD status post stent  Patient also complaining of chest pain, on admission troponin is within normal limit and EKG is nonischemic  Patient is already on aspirin and Plavix  Echo pending  Chest pain resolved     Diabetes mellitus  Patient takes Tresiba, will continue Lantus  Sliding scale insulin  Blood sugar controlled       Hypertension  Hold Entresto spironolactone blood pressure currently stable resume as tolerated  Resumed on PTA Lasix 40 twice daily    Hypokalemia repleted      Medical Decision Making:   I personally reviewed labs: CBC BMP  I personally reviewed imaging: MRI  I personally reviewed 
      Hospitalist Progress Note    NAME:   Joseph Seymour   : 1960   MRN: [id number]     Date/Time: 3/17/2024 2:07 PM  Patient PCP: Adriana Tyler MD    Estimated discharge date: 3/18  Barriers:  Podiatry clearance, cultures and bone biopsy from 3/15    Called to set up P2P 3/17, was told that they had not had clinicals sent to them yet. Asked case management to send clinicals when possible.      Assessment / Plan:  Infected diabetic foot ulcer with surrounding cellulitis  Leukocytosis  Osteomyelitis  MRI foot showed plantar cutaneous ulceration in the forefoot at the level of the base of the second toe proximal phalanx. Abnormal signal shown diffusely within the proximal phalanx of the second toe, concerning for osteomyelitis.  2. Nonspecific small effusions of hallux MTP and IP joints and second through fourth MTP joints. Nonspecific moderate effusions of first through third intermetatarsal bursae.  3. Diffuse subcutaneous edema-like signal and forefoot. Diffuse intrinsic muscle edema-like signal in the forefoot. No localized collection is evident.  Continue wound care  Status post right foot I&D and resection of metatarsal joint on 3/11  Bone biopsy pending  Dr. Dixon following, bone pathology positive for OM, no further surgical treatment needed, will need 6 weeks IV abx  - discussed with ID, patient still with significant cellulitis  - PICC line ordered  Blood culture negative so far  WBC trending down  - Podiatry reevalled wound 3/14, back to OR 3/15, underwent incision and drainage with bone biopsy of second metatarsal, right foot.   - cultures and bone biopsy from 3/15 are pending  - Zosyn resumed    Atypical chest pain  History of CAD status post stent  Patient also complaining of chest pain, on admission troponin is within normal limit and EKG is nonischemic  Patient is already on aspirin and Plavix  Echo pending  Chest pain resolved     Diabetes mellitus  Patient takes Tresiba, will 
      Hospitalist Progress Note    NAME:   Joseph Seymour   : 1960   MRN: [id number]     Date/Time: 3/18/2024 1:44 PM  Patient PCP: Adriana Tyler MD    Estimated discharge date: 3/120  Barriers:  Podiatry clearance, cultures and bone biopsy from 3/15    Called to set up P2P 3/17, was told that they had not had clinicals sent to them yet. Asked case management to send clinicals when possible.      Assessment / Plan:  Infected diabetic foot ulcer with surrounding cellulitis  Leukocytosis  Osteomyelitis  MRI foot showed plantar cutaneous ulceration in the forefoot at the level of the base of the second toe proximal phalanx. Abnormal signal shown diffusely within the proximal phalanx of the second toe, concerning for osteomyelitis.  2. Nonspecific small effusions of hallux MTP and IP joints and second through fourth MTP joints. Nonspecific moderate effusions of first through third intermetatarsal bursae.  3. Diffuse subcutaneous edema-like signal and forefoot. Diffuse intrinsic muscle edema-like signal in the forefoot. No localized collection is evident.  Continue wound care  Status post right foot I&D and resection of metatarsal joint on 3/11  Bone biopsy pending  Dr. Dixon following, bone pathology positive for OM, no further surgical treatment needed, will need 6 weeks IV abx  - discussed with ID, patient still with significant cellulitis  - PICC line ordered  Blood culture negative so far  WBC trending down  - Podiatry reevalled wound 3/14, back to OR 3/15, underwent incision and drainage with bone biopsy of second metatarsal, right foot.   - cultures and bone biopsy from 3/15 are pending  - continue Zosyn    Atypical chest pain  History of CAD status post stent  Patient also complaining of chest pain, on admission troponin is within normal limit and EKG is nonischemic  Patient is already on aspirin and Plavix  Echo shows EF 50-55, unable to assess LV wall motion. Normal RV  Chest pain resolved   
      Hospitalist Progress Note    NAME:   Joseph Seymour   : 1960   MRN: [id number]     Date/Time: 3/14/2024 5:31 PM  Patient PCP: Adriana Tyler MD    Estimated discharge date: 3/15  Barriers: Podiatry reeval of foot wound      Assessment / Plan:  Infected diabetic foot ulcer with surrounding cellulitis  Leukocytosis  Osteomyelitis  MRI foot showed plantar cutaneous ulceration in the forefoot at the level of the base of the second toe proximal phalanx. Abnormal signal shown diffusely within the proximal phalanx of the second toe, concerning for osteomyelitis.  2. Nonspecific small effusions of hallux MTP and IP joints and second through fourth MTP joints. Nonspecific moderate effusions of first through third intermetatarsal bursae.  3. Diffuse subcutaneous edema-like signal and forefoot. Diffuse intrinsic muscle edema-like signal in the forefoot. No localized collection is evident.  Continue wound care  Status post right foot I&D and resection of metatarsal joint on 3/11  Bone biopsy pending  Dr. Dixon following, bone pathology positive for OM, no further surgical treatment needed, will need 6 weeks IV abx  - discussed with ID, patient still with significant cellulitis  - plan to deescalate abx tomorrow  - PICC line ordered  Blood culture negative so far  WBC trending down  - Podiatry to reeval wound 3/14, appreciate assistance  - Zosyn resumed    Atypical chest pain  History of CAD status post stent  Patient also complaining of chest pain, on admission troponin is within normal limit and EKG is nonischemic  Patient is already on aspirin and Plavix  Echo pending  Chest pain resolved     Diabetes mellitus  Patient takes Tresiba, will continue Lantus  Sliding scale insulin  Blood sugar controlled       Hypertension  Hold Entresto spironolactone blood pressure currently stable resume as tolerated  Resumed on PTA Lasix 40 twice daily    Hypokalemia   repleted      Medical Decision Making:   I 
      Hospitalist Progress Note    NAME:   Joseph Seymour   : 1960   MRN: [id number]     Date/Time: 3/10/2024 11:53 AM  Patient PCP: Adriana Tyler MD    Estimated discharge date:  Barriers:       Assessment / Plan:  Infected diabetic foot ulcer with surrounding cellulitis  Leukocytosis  ?  Osteomyelitis  MRI footPlantar cutaneous ulceration in the forefoot at the level of the base of the  second toe proximal phalanx. Abnormal signal shown diffusely within the proximal  phalanx of the second toe, concerning for osteomyelitis.  2. Nonspecific small effusions of hallux MTP and IP joints and second through  fourth MTP joints. Nonspecific moderate effusions of first through third  intermetatarsal bursae.  3. Diffuse subcutaneous edema-like signal and forefoot. Diffuse intrinsic muscle  edema-like signal in the forefoot. No localized collection is evident.    Continue wound care  Appreciate podiatry recs plan I&D on Monday  May eventually need ID consult if bone margins positive for osteo  Blood culture which is negative  Monitor CBC      Atypical chest pain  History of CAD status post stent  Patient also complaining of chest pain, on admission troponin is within normal limit and EKG is nonischemic  Patient is already on aspirin and Plavix  Echo pending  Chest pain resolved     Diabetes mellitus  Patient takes Tresiba, will continue Lantus  Sliding scale insulin  Blood sugar controlled       Hypertension  Hold Entresto spironolactone blood pressure currently stable resume as tolerated  Resumed on PTA Lasix 40 twice daily          Medical Decision Making:   I personally reviewed labs: CBC BMP  I personally reviewed imaging: MRI  I personally reviewed EKG:  Toxic drug monitoring:   Discussed case with: Patient RN        Code Status: Full code  DVT Prophylaxis:   : Lovenox held    Subjective:     Chief Complaint / Reason for Physician Visit  \" Patient spiked fever overnight no other acute issues potassium 3.3 
-Please complete MRI History and Safety Screening Form.    - Patient cannot be scanned until this form is completed and reviewed in MRI to ensure patient is SAFE and eligible for MRI.  - CALL MRI when this has been successfully completed at [phone].  
0700 Bedside and Verbal shift change report given to Yue NGUYEN (oncoming nurse) by Keiry NGUYEN (offgoing nurse). Report included the following information Nurse Handoff Report, Index, ED Encounter Summary, Intake/Output, MAR, and Recent Results.     0824 Patient's K was 3.4 with AM Labs. Notified Mendel MD    End of Shift Note    Bedside shift change report given to Mounika NGUYEN (oncoming nurse) by Yue Garcia RN (offgoing nurse).  Report included the following information SBAR, Kardex, ED Summary, Intake/Output, MAR, and Recent Results    Shift worked:  7a to 7p     Shift summary and any significant changes:     See above    Had XR of RLE today, see results  Podiatry completed dressing change of right foot     Concerns for physician to address:        Zone phone for oncoming shift:           Activity:     Number times ambulated in hallways past shift: 0  Number of times OOB to chair past shift: 0    Cardiac:   Cardiac Monitoring: Yes           Access:  Current line(s): PIV     Genitourinary:   Urinary status: voiding    Respiratory:      Chronic home O2 use?: NO  Incentive spirometer at bedside: NO       GI:     Current diet:  ADULT DIET; Regular; 3 carb choices (45 gm/meal)  ADULT ORAL NUTRITION SUPPLEMENT; Breakfast, Dinner; Low Calorie/High Protein Oral Supplement  Passing flatus: YES  Tolerating current diet: YES       Pain Management:   Patient states pain is manageable on current regimen: YES    Skin:     Interventions: float heels, increase time out of bed, foam dressing, PT/OT consult, limit briefs, internal/external urinary devices, and nutritional support    Patient Safety:  Fall Score:    Interventions: assistive device (walker, cane. etc), gripper socks, pt to call before getting OOB, stay with me (per policy), and gait belt       Length of Stay:  Expected LOS: 9  Actual LOS: 8      Yue Garcia RN                            
0700: Bedside and Verbal shift change report given to Josephine RN (oncoming nurse) by WENDY Fraser (offgoing nurse). Report included the following information Nurse Handoff Report, Index, Intake/Output, MAR, and Recent Results.     1300: Pt. Up to chair, standby assist.    Bedside and Verbal shift change report given to RN (oncoming nurse) by Josephine Manuel RN   (offgoing nurse). Report included the following information Nurse Handoff Report, Index, ED SBAR, Intake/Output, MAR, and Recent Results.       
0700: Bedside and Verbal shift change report given to Nat RN (oncoming nurse) by WENDY Fraser (offgoing nurse). Report included the following information Nurse Handoff Report, Index, Intake/Output, MAR, and Recent Results.       I have reviewed and am in agreement with the assessment and documentation as performed by my orientee, WENDY Burton. Please refer to Josephine's progress note for update on pt's daily events.    
0700: Bedside and Verbal shift change report given to WENDY Montemayor (oncoming nurse) by WENDY Key (offgoing nurse). Report included the following information Nurse Handoff Report, Index, Intake/Output, MAR, and Recent Results.      0800: RN called pre op OR to confirm morning medications can be given with sips of water. VORB to give all meds except Asprin.     1155: Report given to Pre-Op RN    1425: Pt off floor to OR.     1745: Report received from PACU     Bedside and Verbal shift change report given to RN (oncoming nurse) by WENDY Montemayor (offgoing nurse). Report included the following information Nurse Handoff Report, Index, Intake/Output, MAR, and Recent Results.    
0730 Bedside and Verbal shift change report given to Yue NGUYEN (oncoming nurse) by Miguel NGUYEN (offgoing nurse). Report included the following information Nurse Handoff Report, Index, ED SBAR, Intake/Output, MAR, and Recent Results.     End of Shift Note    Bedside shift change report given to Keiry NGUYEN (oncoming nurse) by Yue Garcia RN (offgoing nurse).  Report included the following information SBAR, Kardex, ED Summary, Intake/Output, MAR, and Recent Results    Shift worked:  7a to 7p     Shift summary and any significant changes:     Had 1 BM today    Uneventful shift     Concerns for physician to address:        Zone phone for oncoming shift:           Activity:     Number times ambulated in hallways past shift: 3  Number of times OOB to chair past shift: 2    Cardiac:   Cardiac Monitoring: Yes           Access:  Current line(s): PICC     Genitourinary:   Urinary status: voiding    Respiratory:      Chronic home O2 use?: NO  Incentive spirometer at bedside: NO       GI:     Current diet:  ADULT DIET; Regular; 3 carb choices (45 gm/meal)  ADULT ORAL NUTRITION SUPPLEMENT; Breakfast, Dinner; Low Calorie/High Protein Oral Supplement  Passing flatus: YES  Tolerating current diet: YES       Pain Management:   Patient states pain is manageable on current regimen: YES    Skin:     Interventions: specialty bed, float heels, increase time out of bed, foam dressing, PT/OT consult, limit briefs, internal/external urinary devices, and nutritional support    Patient Safety:  Fall Score:    Interventions: bed/chair alarm, assistive device (walker, cane. etc), gripper socks, pt to call before getting OOB, stay with me (per policy), and gait belt       Length of Stay:  Expected LOS: 9  Actual LOS: 7      Yue Garcia RN                            
1530: Bedside and Verbal shift change report given to Sofi Ferguson RN (oncoming nurse) by WENDY Zeng (offgoing nurse). Report included the following information Nurse Handoff Report, Index, Intake/Output, MAR, and Recent Results.     1545: MD notified of pt and families concerns of going home with PICC line and wound care.   
3/8/24 2300:  Telephone SBAR received from Milton MORENO RN  0120:  Patient to [facility] CMSU Room 2112.  0130:  Patient assessed, wound photographed and redressed.  Dr. Christianson called.    0247:  Dr. Christianson bedside.  0300:  Patient phoned his wife, and she will be bringing a list of his home medications with her in the morning when she comes.  0346:  Blood cultures x 2 drawn and sent to lab.    0700:  Bedside SBAR given to Julisa NGUYEN.    
Bandage change today right foot, patient stable no adverse changes. States he feels no more pressure in that foot like he had felt before.    Cultures final, awaiting bone margin biopsy final.    Bandage change today cleansing dorsal and plantar incisions, and manipulating tissue showing no purulence and no flow through the drain site region. Applying silver alginate today with sterile gauze bandage right foot.  
Bedside and Verbal shift change report given to Deborah (oncoming nurse) by Albina (offgoing nurse). Report included the following information Nurse Handoff Report, Index, MAR, Recent Results, and Cardiac Rhythm NSR .       2130: Bedside and Verbal shift change report given to Bria (oncoming nurse) by Deborah (offgoing nurse). Report included the following information Nurse Handoff Report, Index, MAR, Recent Results, and Cardiac Rhythm NSR.   
Bedside and Verbal shift change report given to Deborah (oncoming nurse) by Jeancarlos (offgoing nurse). Report included the following information Nurse Handoff Report, Index, MAR, Recent Results, and Cardiac Rhythm NSR (currently not on tele) .         0600: TRANSFER - OUT REPORT:    Verbal report given to  on Joseph Seymour  being transferred to [facility] for routine progression of patient care       Report consisted of patient's Situation, Background, Assessment and   Recommendations(SBAR).     Information from the following report(s) Nurse Handoff Report, Index, ED Encounter Summary, MAR, Recent Results, and Cardiac Rhythm NSR (currently not on tele)  was reviewed with the receiving nurse.           Lines:   PICC Single Lumen 03/14/24 Right (Active)   Central Line Being Utilized Yes 03/19/24 0459   Criteria for Appropriate Use Long term IV/antibiotic administration 03/19/24 0459   Site Assessment Clean, dry & intact;Flushed well 03/19/24 0459   Phlebitis Assessment No symptoms 03/19/24 0459   Infiltration Assessment 0 03/19/24 0459   Lumen Color/Status Purple;Infusing 03/19/24 0459   External Catheter Length (cm) 1 cm 03/14/24 1000   Extremity Circumference (cm) 33 cm 03/14/24 1000   Line Care Connections checked and tightened 03/19/24 0459   Alcohol Cap Used Yes 03/19/24 0459   Date of Last Dressing Change 03/14/24 03/19/24 0459   Dressing Type Transparent w/CHG gel 03/19/24 0459   Dressing Status Clean, dry & intact 03/19/24 0459   Dressing Intervention New 03/14/24 1000        Opportunity for questions and clarification was provided.              Patient to be transported after 7am       
Bedside and Verbal shift change report given to Erin RN (oncoming nurse) by Kristine RN (offgoing nurse). Report included the following information Nurse Handoff Report, Adult Overview, Surgery Report, MAR, and Recent Results.     1425: pt. Is being transported off unit for debridement of right toe.     Bedside and Verbal shift change report given to Jermain RN (oncoming nurse) by Erin RN (offgoing nurse). Report included the following information Nurse Handoff Report, Adult Overview, Intake/Output, MAR, and Recent Results.     
Bedside shift change report given to keeley perdue (oncoming nurse) by tarsha rn (offgoing nurse). Report included the following information Nurse Handoff Report, ED Encounter Summary, Adult Overview, MAR, Recent Results, and Cardiac Rhythm nsr .      End of Shift Note    Bedside shift change report given to néstor perdue (oncoming nurse) by KEELEY FAROOQ RN (offgoing nurse).  Report included the following information SBAR, ED Summary, Procedure Summary, MAR, Recent Results, and Cardiac Rhythm nsr    Shift worked:  7p-3a     Shift summary and any significant changes:     na     Concerns for physician to address:  na     Zone phone for oncoming shift:   2215       Activity:     Number times ambulated in hallways past shift: 0  Number of times OOB to chair past shift: 0    Cardiac:   Cardiac Monitoring: No           Access:  Current line(s): PIV     Genitourinary:   Urinary status: voiding    Respiratory:      Chronic home O2 use?: NO  Incentive spirometer at bedside: N/A       GI:     Current diet:  ADULT DIET; Regular; 3 carb choices (45 gm/meal)  ADULT ORAL NUTRITION SUPPLEMENT; Breakfast, Dinner; Low Calorie/High Protein Oral Supplement  Passing flatus: YES  Tolerating current diet: YES       Pain Management:   Patient states pain is manageable on current regimen: YES    Skin:     Interventions: increase time out of bed    Patient Safety:  Fall Score:    Interventions: bed/chair alarm       Length of Stay:  Expected LOS: 11  Actual LOS: 10      KEELEY FAROOQ RN                            
Bedside shift report received from WENDY Garcia. Report included the following information Nurse Handoff Report, MAR, Telemetry status, Recent Results, and plan of care. This RN verbalized understanding of plan of care with opportunity for clarification and questions. Care of patient assumed at this time. Dual skin check performed at this time.     Bedside shift report given to  WENDY Arshad. Report included the following information Nurse Handoff Report, MAR, Telemetry status, Recent Results, and plan of care. Oncoming RN verbalized understanding of plan of care with opportunity for clarification and questions. Dual skin check performed at this time.         I have overseen Felicity Corea RN from 7am-7pm. During this time I have directly observed her patient care skills; medical knowledge and reasoning; and communication/ professionalism. I agree with the assessments, medication administration, and flow sheet charting provided by Felicity Corea RN.   
Comprehensive Nutrition Assessment    Type and Reason for Visit:  Initial, RD Nutrition Re-Screen/LOS    Nutrition Recommendations/Plan:   Continue current diet  Ensure high protein (lower kcal, higher protein supplement) BID   Please document % meals and supplements consumed in flowsheet I/O's under intake      Malnutrition Assessment:  Malnutrition Status:  Insufficient data (03/15/24 1505)        Nutrition Assessment:     Chart reviewed for LOS. Pt medically noted for infected diabetic foot ulcer with surrounding cellulitis, OM, hx CAD, DM2, HTN, hypokalemia. Pt off the floor for procedure with podiatry. MST was negative for malnutrition risk factors PTA. Fair PO intake documented earlier this week. Will add supplements to support wound healing and follow up. No weight hx in the chart PTA.     Patient Vitals for the past 120 hrs:   PO Meals Eaten (%)   03/12/24 1123 51 - 75%   03/11/24 1100 0%     Wt Readings from Last 5 Encounters:   03/13/24 111 kg (244 lb 11.4 oz)   03/08/24 111 kg (244 lb 12.8 oz)   ]    Nutrition Related Findings:    Labs: K 3.2, -165-126.   Meds: lipitor, lasix, lantus, humalog, zosyn.   Edema: 1+ RLE.   BM 3/13.   Wound Type: Diabetic Ulcer, Surgical Incision       Current Nutrition Intake & Therapies:    Average Meal Intake: 51-75%  Average Supplements Intake: None Ordered  ADULT DIET; Regular; 3 carb choices (45 gm/meal)  ADULT ORAL NUTRITION SUPPLEMENT; Breakfast, Dinner; Low Calorie/High Protein Oral Supplement    Anthropometric Measures:  Height: 180.3 cm (5' 10.98\")  Ideal Body Weight (IBW): 172 lbs (78 kg)       Current Body Weight: 111 kg (244 lb 11.4 oz), 142.3 % IBW. Weight Source: Not Specified  Current BMI (kg/m2): 34.1        Weight Adjustment For: No Adjustment                 BMI Categories: Obese Class 1 (BMI 30.0-34.9)    Estimated Daily Nutrient Needs:  Energy Requirements Based On: Formula  Weight Used for Energy Requirements: Current  Energy (kcal/day): 2090 kcals 
End of Shift Note    Bedside shift change report given to Albina NGUYEN (oncoming nurse) by Bria Lackey RN (offgoing nurse).  Report included the following information SBAR    Shift worked:  9p-7a     Shift summary and any significant changes:    Pt remained stable through the shift. Pt reports not being able to feel much in the foot still since the debridement.      Concerns for physician to address:  See above     Zone phone for oncoming shift:          Activity:     Number times ambulated in hallways past shift: 0  Number of times OOB to chair past shift: 0    Cardiac:   Cardiac Monitoring: Yes           Access:  Current line(s): PIV     Genitourinary:   Urinary status: voiding    Respiratory:      Chronic home O2 use?: NO  Incentive spirometer at bedside: YES       GI:     Current diet:  ADULT DIET; Regular; 3 carb choices (45 gm/meal)  Passing flatus: YES  Tolerating current diet: YES       Pain Management:   Patient states pain is manageable on current regimen: YES    Skin:     Interventions: float heels, increase time out of bed, and PT/OT consult    Patient Safety:  Fall Score:    Interventions: bed/chair alarm, gripper socks, and pt to call before getting OOB       Length of Stay:  Expected LOS: 5  Actual LOS: 3      Bria Lackey RN                            
End of Shift Note    Bedside shift change report given to Albina RN  (oncoming nurse) by Juana Jeffrey RN .        Shift worked: 7p - 7a   Shift summary and any significant changes:    No acute concerns     Concerns for physician to address:  None   Zone phone for oncoming shift:  [phone]       Patient Information  Joseph Seymour  64 y.o.  3/9/2024  1:28 AM by Jesus Christianson MD. 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) 03/14/2024    MSSA (methicillin susceptible Staphylococcus aureus) infection 03/14/2024    Poorly controlled diabetes mellitus ([facility]) 03/14/2024    Coronary artery disease due to lipid rich plaque 03/14/2024    Atypical chest pain 03/14/2024    Obesity (BMI 30.0-34.9) 03/14/2024    Foot infection 03/09/2024    Elevated LFTs 10/11/2018    STEMI involving left circumflex coronary artery (HCC) 06/27/2018    Dehydration 02/11/2018    Diarrhea 02/11/2018    Vomiting 02/11/2018    Acute kidney injury (HCC) 02/11/2018    Viral gastritis 02/11/2018     Past Medical History:   Diagnosis Date    CHF (congestive heart failure) ([facility])     Diabetes mellitus (HCC)     Hypertension     MI (myocardial infarction) ([facility])        Core Measures:  CVA: no  CHF: no  PNA: no    Activity:  Level of Assistance: Modified independent, requires aide device or extra time  Number times ambulated in hallways past shift: 0  Number of times OOB to chair past shift: 1      Cardiac:   Cardiac Monitoring: no    Access:   Current line(s): PICC    Respiratory:   O2 Device: None (Room air)    GI:  Last BM (including prior to admit): 03/20/24  Current diet:  ADULT DIET; 
End of Shift Note    Bedside shift change report given to Azucena (oncoming nurse) by Dena Kenyon RN (offgoing nurse).  Report included the following information SBAR, Kardex, and MAR    Shift worked:  7p-7a     Shift summary and any significant changes:     Humalog was held due to the patients blood glucose level, see MAR.  Scheduled medications were given, see MAR.  Patient was given Melatonin once, see PRN MAR.  Patient is up with the assistance of one.  IV has been flushed and is patent.  Morning lab was done.  Patient teaching and routine rounding has been done.       Concerns for physician to address:       Zone phone for oncoming shift:          Activity:     Number times ambulated in hallways past shift: 0  Number of times OOB to chair past shift: 0    Cardiac:   Cardiac Monitoring: No           Access:  Current line(s): PIV     Genitourinary:   Urinary status: voiding    Respiratory:      Chronic home O2 use?: NO  Incentive spirometer at bedside: NO       GI:     Current diet:  ADULT DIET; Regular; 3 carb choices (45 gm/meal)  Passing flatus: YES  Tolerating current diet: YES       Pain Management:   Patient states pain is manageable on current regimen: YES    Skin:     Interventions: float heels and increase time out of bed    Patient Safety:  Fall Score:    Interventions: bed/chair alarm and pt to call before getting OOB       Length of Stay:  Expected LOS: 6  Actual LOS: 5      Dena Kenyon RN                            
End of Shift Note    Bedside shift change report given to Jeancarlos NGUYEN (oncoming nurse) by Felicity Corea RN (offgoing nurse).  Report included the following information SBAR    Shift worked: [id number]     Shift summary and any significant changes:    No significant changes. Tolerated wound care by podiatry.      Concerns for physician to address:  N/A     Zone phone for oncoming shift:  [phone]       Activity:     Number times ambulated in hallways past shift: 0  Number of times OOB to chair past shift: 1    Cardiac:   Cardiac Monitoring: No           Access:  Current line(s): PICC     Genitourinary:   Urinary status: voiding    Respiratory:      Chronic home O2 use?: NO  Incentive spirometer at bedside: NO       GI:     Current diet:  ADULT DIET; Regular; 3 carb choices (45 gm/meal)  ADULT ORAL NUTRITION SUPPLEMENT; Breakfast, Dinner; Low Calorie/High Protein Oral Supplement  Passing flatus: YES  Tolerating current diet: YES       Pain Management:   Patient states pain is manageable on current regimen: N/A    Skin:     Interventions: increase time out of bed and nutritional support    Patient Safety:  Fall Score:    Interventions: gripper socks and pt to call before getting OOB       Length of Stay:  Expected LOS: 11  Actual LOS: 9      Felicity Corea RN                          
End of Shift Note    Bedside shift change report given to WENDY Arias  (oncoming nurse) by Yanni Gonsalez RN .        Shift worked: Days   Shift summary and any significant changes:    Wound care completed  ATB administered   Case management following for placement needs      Concerns for physician to address:  None   [facility] phone for oncoming shift:  [phone]     Patient Information  Joseph Seymour  64 y.o.  3/9/2024  1:28 AM by Jesus Christianson MD. Joseph Seymour was admitted from 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) 03/14/2024    MSSA (methicillin susceptible Staphylococcus aureus) infection 03/14/2024    Poorly controlled diabetes mellitus (HCC) 03/14/2024    Coronary artery disease due to lipid rich plaque 03/14/2024    Atypical chest pain 03/14/2024    Obesity (BMI 30.0-34.9) 03/14/2024    Foot infection 03/09/2024    Elevated LFTs 10/11/2018    STEMI involving left circumflex coronary artery (HCC) 06/27/2018    Dehydration 02/11/2018    Diarrhea 02/11/2018    Vomiting 02/11/2018    Acute kidney injury (HCC) 02/11/2018    Viral gastritis 02/11/2018     Past Medical History:   Diagnosis Date    CHF (congestive heart failure) ([facility])     Diabetes mellitus (HCC)     Hypertension     MI (myocardial infarction) ([facility])        Core Measures:  CVA: no  CHF: no  PNA: no    Activity:  Level of Assistance: Modified independent, requires aide device or extra time  Number times ambulated in hallways past shift: 0  Number of times OOB to chair past shift: 1      Cardiac:   Cardiac Monitoring: no    Access:   Current line(s): PICC    Respiratory:   O2 Device: None (Room air)    GI:  Last BM 
End of Shift Note    Bedside shift change report given to WENDY ELKINS (oncoming nurse) by Keiry Osorio RN (offgoing nurse).  Report included the following information SBAR, Kardex, Procedure Summary, Intake/Output, MAR, Recent Results, Cardiac Rhythm SR, and Quality Measures    Shift worked:       Shift summary and any significant changes:     NO major events last night     Concerns for physician to address:       Zone phone for oncoming shift:          Activity:     Number times ambulated in hallways past shift: 0  Number of times OOB to chair past shift: 2    Cardiac:   Cardiac Monitoring: No           Access:  Current line(s): PICC     Genitourinary:   Urinary status: voiding    Respiratory:      Chronic home O2 use?: NO  Incentive spirometer at bedside: YES       GI:     Current diet:  ADULT DIET; Regular; 3 carb choices (45 gm/meal)  ADULT ORAL NUTRITION SUPPLEMENT; Breakfast, Dinner; Low Calorie/High Protein Oral Supplement  Passing flatus: YES  Tolerating current diet: YES       Pain Management:   Patient states pain is manageable on current regimen: YES    Skin:     Interventions: turn team, float heels, limit briefs, and internal/external urinary devices    Patient Safety:  Fall Score:    Interventions: bed/chair alarm, gripper socks, and pt to call before getting OOB       Length of Stay:  Expected LOS: 9  Actual LOS: 8      Keiry Osorio RN                           
End of Shift Note    Bedside shift change report given to WENDY Farah (oncoming nurse) by Azucena Alejandro RN (offgoing nurse).  Report included the following information SBAR, Kardex, MAR, and Recent Results    Shift worked:  07:00-15:30     Shift summary and any significant changes:    Patient discharge cancelled. Discharge needs have changed. Refer to case management note. PICC line placed. Wound care performed.     Concerns for physician to address:  None at this time     Zone phone for oncoming shift:   [phone]       Activity:     Number times ambulated in hallways past shift: 0  Number of times OOB to chair past shift: 2    Cardiac:   Cardiac Monitoring: No           Access:  Current line(s): PICC     Genitourinary:   Urinary status: voiding    Respiratory:      Chronic home O2 use?: N/A  Incentive spirometer at bedside: N/A       GI:     Current diet:  ADULT DIET; Regular; 3 carb choices (45 gm/meal)  Passing flatus: YES  Tolerating current diet: YES       Pain Management:   Patient states pain is manageable on current regimen: YES    Skin:     Interventions: float heels and increase time out of bed    Patient Safety:  Fall Score:    Interventions: bed/chair alarm, pt to call before getting OOB, and stay with me (per policy)       Length of Stay:  Expected LOS: 6  Actual LOS: 5      Azucena Alejandro RN                            
End of Shift Note    Bedside shift change report given to WENDY Liao  (oncoming nurse) by Juana Jeffrey RN .        Shift worked:  7p - 7a   Shift summary and any significant changes:    No acute concerns.  Patient      Concerns for physician to address:  None   Zone phone for oncoming shift:  [phone]     Patient Information  Joseph Seymour  64 y.o.  3/9/2024  1:28 AM by Jesus Christianson MD. 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) 03/14/2024    MSSA (methicillin susceptible Staphylococcus aureus) infection 03/14/2024    Poorly controlled diabetes mellitus ([facility]) 03/14/2024    Coronary artery disease due to lipid rich plaque 03/14/2024    Atypical chest pain 03/14/2024    Obesity (BMI 30.0-34.9) 03/14/2024    Foot infection 03/09/2024    Elevated LFTs 10/11/2018    STEMI involving left circumflex coronary artery (HCC) 06/27/2018    Dehydration 02/11/2018    Diarrhea 02/11/2018    Vomiting 02/11/2018    Acute kidney injury (HCC) 02/11/2018    Viral gastritis 02/11/2018     Past Medical History:   Diagnosis Date    CHF (congestive heart failure) ([facility])     Diabetes mellitus (HCC)     Hypertension     MI (myocardial infarction) ([facility])        Core Measures:  CVA: no  CHF: no  PNA: no    Activity:  Level of Assistance: Modified independent, requires aide device or extra time  Number times ambulated in hallways past shift: 0  Number of times OOB to chair past shift: 1      Cardiac:   Cardiac Monitoring: no    Access:   Current line(s): PICC    Respiratory:   O2 Device: None (Room air)    GI:  Last BM (including prior to admit): 03/16/24  Current diet:  
ID  D/w Dr Thorpe.  Patient for antimicrobial therapy x 2 weeks.  Antimicrobial orders for discharge  -Cefazolin 2 g   IV every 8 hourly end date 3/29  -Pull line at end of therapy.    -Weekly CBC, CMP-  -Fax reports to [phone], call with critical labs  at [phone]/ [phone]  -Encourage adequate fluids, daily probiotic/yogurt  -If line malfunction occurs and home health cannot reposition  please send patient to ED immediately  -ID follow-up -no follow-up required  - If persistent side effects occur stop antibiotic and call-ID/PCP.    May DC from ID standpoint    
ID  Patients RLE and foot exam done with wound care  See wound care note and photos below.   Swelling & erythema of distal foot. 2nd toe appears unstable &  Discoloration noted.  Podiatry notified  He will see him prior to his discharge    Plan  Elevate right lower extremity  Resume Zosyn IV for now  Podiatry to see and give final recommendations.                
Infectious Disease Progress            mpression  Diabetic R/foot infection  Cellulitis of RLE & foot  Plantar ulceration  Acute OM R/foot  MRI + Abnormal signal shown diffusely within the proximal  phalanx of the second toe, concerning for osteomyelitis.  Diffuse subcutaneous edema-like signal and forefoot. Diffuse intrinsic muscle  edema-like signal in the forefoot. No localized collection is evident.  S/p R/foot I&D, resection of MTP joint, anterior of MT head & base of prox phalanx 3/11.  Intra op Culture + for MSSA.  Pathology + for acute OM  D/w podiatry, patient will require 6 weeks of antimicrobial therapy.  Negative blood cultures 3/9.  S/p I&D with bone biopsy of 2nd MT R/foot. Intra op culture- Negative.  Pathology-3/15 negative for acute OM.     Atypical chest pain  H/o CAD, stenting.     Diabetes type 2  On SSI     Hypertension stable  Continue home meds.     CHRISTOPHER-bilaterally normal  Resting  R/ TBI 0.84        Obesity  BMI 34.15    ESR-5, CRP unavailable     Plan     Change back to cefazolin IV   Will D/w Dr Thorpe duration of antibiotic therapy   Elevate right lower extremity  Adequate fluids, daily probiotic  Adequate blood sugar control.  ESR/CRP in a.m.  Final antibiotic recommendations to follow after discussion with  Podiatry      Extensive review of chart notes, labs, imaging, cultures done  Additionally review of done: Recent reports-Labs, cultures, imaging  D/w Wound Care, podiatry    Patient seen earlier today.  Resting in chair.  S/p wound evaluation  by podiatry and wound care by wound care nurse  Wound is healing favorably.  Pathology is now back & negative for OM.    Past Medical History:   Diagnosis Date    CHF (congestive heart failure) ([facility])     Diabetes mellitus ([facility])     Hypertension     MI (myocardial infarction) ([facility])        Past Surgical History:   Procedure Laterality Date    CORONARY ANGIOPLASTY WITH STENT PLACEMENT      3 stents    FOOT DEBRIDEMENT Right 3/11/2024    RIGHT FOOT 
Infectious Disease Progress     IMPRESSION:      Impression     Diabetic R/foot infection  Cellulitis of RLE & foot  Plantar ulceration  Acute OM R/foot  MRI + Abnormal signal shown diffusely within the proximal  phalanx of the second toe, concerning for osteomyelitis.  Diffuse subcutaneous edema-like signal and forefoot. Diffuse intrinsic muscle  edema-like signal in the forefoot. No localized collection is evident.  S/p R/foot I&D, resection of MTP joint, anterior of MT head & base of prox phalanx 3/11.  Intra op Culture + for MSSA.  Pathology + for acute OM  D/w podiatry, patient will require 6 weeks of antimicrobial therapy.  Negative blood cultures 3/9  Plan for another I & D today     Atypical chest pain  H/o CAD, stenting.     Diabetes type 2  On SSI  For A1c.     Hypertension stable  Continue home meds        Obesity  BMI 34.15       PLAN:   IV abx changed to zosyn on 3/14  Continue with IV zosyn  OR for another I & D  Will provide abx recommendation therapy after review the outstanding work up results  Elevate RLE  CHRISTOPHER  Adequate fluids, daily probiotic  Adequate blood sugar control    Plan of care d/w pt, pt's nurse, and Dr. [facility]     Afebrile, wbc 9.9  History of Present Illness   63 year old male with medical hx of CHF, hypertension, non rheumatic mitral valve insufficiency, hyperlipidemia, NSTEMI, CAD, s/p PTCA, type II DM, diabetic neuropathy with diabetic ulcer of right foot presented to ER on 3/8 with rapid heartbeat and chills.      In ER, afebrile, wbc 17.8, CXR revealed right hemidiaphragm remains elevated and there is linear opacity at the right lung base, right foot with no fracture or other acute osseous or articular abnormality, MRI of right foot revealed concerns of OM. Pt was evaluated by podiatrist, underwent for I & D of right foot on 3/11. Intra-op cx growing SA, sensitivity still pending. Blood cx on admission so far with no growth. Pt is currently on IV zosyn and vancomycin.     ID 
Leave bandage on clean and dry through tonight and early tomorrow.  
Nursing contacted Nocturnist/cross cover provider and notified patient lab result of k 3.0. VSS. No other reported concerns at this time. Ordered kcl 40meq po x1. Patient denies any further complaints or concerns. No acute distress reported. Nursing to notify Hospitalist for further/continued concerns. Will remain available overnight for further concerns if nursing/patient needs. Will defer further evaluation/management to the day shift primary care team.    Non-billable note.    
OCCUPATIONAL THERAPY EVALUATION/DISCHARGE  Patient: Joseph Seymour (64 y.o. male)  Date: 3/19/2024  Primary Diagnosis: Foot infection [L08.9]  Procedure(s) (LRB):  RIGHT FOOT DEBRIDEMENT INCISION AND DRAINAGE (Right) 4 Days Post-Op     Precautions: Weight Bearing Right Lower Extremity Weight Bearing:  (heel WB with post op shoe)                ASSESSMENT :  Based on the objective data below, the patient presents with overall independent mobility and ADLs.  He is aware that he should keep the weight on his heel during sit to stand <->stand to sit transition and during mobility.  Pt has good balance in standing and was able to maintain heel WB.  He does have decreased sensation in feet at baseline and was educated on having foot wear on left foot as well and the possible benefits of rocker shoe.  PT was notified of possible need for this type of shoe and they will assess mobility and deem if this is appropriate.  Good understanding of precautions and pt agrees that further OT services aren't needed.    Functional Outcome Measure:  The patient scored 95/100 on the barthel outcome measure which is indicative of limitations with stair mobility but no limitations with ADLS.      Further skilled acute occupational therapy is not indicated at this time.     PLAN :  Recommend with staff: mobilize, reinforce heel WB    Recommendation for discharge: (in order for the patient to meet his/her long term goals): Swing bed at [facility] for ABX    Other factors to consider for discharge:  Needs IV ABX and wound care for 6 weeks    IF patient discharges home will need the following DME:  no DME for the bathroom, defer device for mobility and foot wear to PT     SUBJECTIVE:   Patient stated, “I know I a suppose to keep the weight of my heel.”    OBJECTIVE DATA SUMMARY:     Past Medical History:   Diagnosis Date    CHF (congestive heart failure) (HCC)     Diabetes mellitus (HCC)     Hypertension     MI (myocardial infarction) (HCC)      Past 
PCP hospital follow-up transitional care appointment has been scheduled with Dr. Tyler on 3/20/24 at 1330. Pending patient discharge. Dejah Arshad, Care Management Assistant   
Patient is improving, patient has stabilized.    Wound margins are negative for osteomyelitis.    Can be discharged from for an ankle perspective, Will contact infectious diseases also.    Recommending home health bandage changes q.48 hours upon discharge.      As long as home health goes smoothly, will follow up with Dr. Dixon once he is back in practice from vacation Within the week.  
Patient on schedule OR tomorrow approximately 2:30 PM.  
Patient seen and examined.  No acute issues  Wife to bring home medication list and RN will text me once med rec is done    Awaiting MRI and podiatry consult  Rest of the plan per H&P  Denies any chest pain currently  
Patient seen and, also discussed bandage change with his wife today. X-rays are negative, aerobic cultures complete final.    Awaiting bone biopsy pathology.    Clinically lower leg right-sided improving with 50% decrease in erythema and swelling. No lymphatic channel tenderness right ankle.    Plantar incision stable. Dorsal incision improved, manipulated during examination today, the flap tissue dorsal right foot into the open drain space showing only serosanguineous drainage, no purulence.        Iodine reapplied with sterile bandage update right foot.    Due to the original nature of the infection, recommending postop IV antibiotics possibly, this will depend on biopsy margins and final anaerobic culture.  
Patient seen today, afebrile, feeling good he states.    Bandage stable right foot. This was redressed, second digit looks improved, dorsal incision receding and improving.Ankle within normal limits. Still some redness on the medial central shin right leg but no pain, no lymphatic channel induration right side.    Bandage change, iodine placed in the toe web space, plantar sutures right foot stable.    Recommending x-ray of tibia and fibula just to make sure and see of the bone structure is looking on that right lower leg.    Still awaiting final culture and bone biopsy.  
Patient seen today, his wife is present. He is doing well, afebrile. No complaints.    Bandage change right foot, incision stable, Xeroform gauze drain pulled from the dorsal incision distally which was left open for drainage, no purulence.    Replaced with sterile bandage and splint-type bandage for the base of the second digit. Plantar incision looks stable, erythema proceeding on the forefoot over 50% which is a good sign. Sterile little bit of redness in the lower one third of the leg, grasping the lymphatic channels does not bring induration or soreness.    Leaving this bandage in place until tomorrow, reassessing for cultures once final and the more proximal bone biopsy which was taken yesterday. Discussed with patient and patient's wife, they understand.      Major surgical shoe for heel walking but still limiting ambulation throughout the weekend right foot.  
Patient will have 2 weeks of antibiotic coverage, per infectious diseases Department, upon discharge.  
Patient's x-rays ankle and lower one third leg right side, stable with no signs of abnormality or tissue/osseous breakdown.        Waiting one set of culture reports and bone biopsy results.  
Pharmacy Antimicrobial Kinetic Dosing    Indication for Antimicrobials: SSTI     Current Regimen of Each Antimicrobial:  Vancomycin 1000 mg IV q12h; Start Date 3/9; Day 1  Zosyn 3.375 g IV q12h; Start Date 3/9; Day 1    Previous Antimicrobial Therapy:      Goal Level: Vancomycin -600    Date Dose & Interval Measured (mcg/mL) Predicted AUC                       Significant Cultures:   3/9 blood: NGTD    Labs:  Recent Labs     Units 24  1930   CREATININE MG/DL 1.10   BUN MG/DL 22*   WBC K/uL 17.8*     Temp (24hrs), Av.4 °F (36.9 °C), Min:98.2 °F (36.8 °C), Max:98.8 °F (37.1 °C)      Conditions for Dosing Consideration: None    Creatinine Clearance (mL/min): Estimated Creatinine Clearance: 85 mL/min (based on SCr of 1.1 mg/dL).       Impression/Plan:   Vancomycin 2500 mg load given, followed by 1000 mg q12h for projected AUC of 493  Vanc level 3/10 @ 0800  Zosyn adjusted to 3.375g q8h per protocol  Antimicrobial stop date 3/15     Pharmacy will follow daily and adjust medications as appropriate for renal function and/or serum levels.    Thank you,  Kevyn Parra RP    
Pharmacy Antimicrobial Kinetic Dosing    Indication for Antimicrobials: SSTI     Current Regimen of Each Antimicrobial:  Vancomycin 1000 mg IV q12h; Start Date 3/9; Day 2  Zosyn 3.375 g IV q12h; Start Date 3/9; Day 2    Previous Antimicrobial Therapy:      Goal Level: Vancomycin -600    Date Dose & Interval Measured (mcg/mL) Predicted AUC   3/10 1000 mg q12h 8.5 316                 Significant Cultures:   3/9 blood: NGTD    Labs:  Recent Labs     Units 03/10/24  0319 24  1930   CREATININE MG/DL 0.95 1.10   BUN MG/DL 17 22*   PROCAL ng/mL  --  <0.05   WBC K/uL 17.5* 17.8*     Temp (24hrs), Av.1 °F (37.3 °C), Min:98 °F (36.7 °C), Max:101.9 °F (38.8 °C)      Conditions for Dosing Consideration: None    Creatinine Clearance (mL/min): Estimated Creatinine Clearance: 98 mL/min (based on SCr of 0.95 mg/dL).       Impression/Plan:   Vancomycin level of 14.8 extrapolates to subtherapeutic AUC. Increase dose to 1000 mg q8h for projected AUC of 461  Vancomycin level 3/11 @ 0700  Zosyn adjusted to 3.375g q8h per protocol  Antimicrobial stop date 3/15     Pharmacy will follow daily and adjust medications as appropriate for renal function and/or serum levels.    Thank you,  Kevyn Parra RP      
Pharmacy Antimicrobial Kinetic Dosing    Indication for Antimicrobials: SSTI     Current Regimen of Each Antimicrobial:  Vancomycin 1000 mg IV q12h; Start Date 3/9; Day 3  Zosyn 3.375 g IV q12h; Start Date 3/9; Day 3    Previous Antimicrobial Therapy:      Goal Level: Vancomycin -600    Date Dose & Interval Measured (mcg/mL) Predicted AUC   3/10 1000 mg q12h 8.5 316   3/11  1000mg q 8  42.1 957           Significant Cultures:   3/9 blood: NGTD    Labs:  Recent Labs     Units 24  0335 03/10/24  0319 24  1930   CREATININE MG/DL 0.97 0.95 1.10   BUN MG/DL 16 17 22*   PROCAL ng/mL  --   --  <0.05   WBC K/uL 11.3* 17.5* 17.8*     Temp (24hrs), Av.4 °F (37.4 °C), Min:98.3 °F (36.8 °C), Max:100.8 °F (38.2 °C)      Conditions for Dosing Consideration: None    Creatinine Clearance (mL/min): Estimated Creatinine Clearance: 96 mL/min (based on SCr of 0.97 mg/dL).       Impression/Plan:   Vancomycin level 42  HOLD vancomycin. Scr has not worsened surprisingly, but will hold regimen for now and recheck random level with am labs 3/12 to make sure patient is not accumulating.   Zosyn adjusted to 3.375g q8h per protocol  Antimicrobial stop date 3/15     Pharmacy will follow daily and adjust medications as appropriate for renal function and/or serum levels.    Thank you,  Albina Mar [facility]        
Pharmacy Antimicrobial Kinetic Dosing    Indication for Antimicrobials: SSTI    Current Regimen of Each Antimicrobial:  Vancomycin 1000 mg IV q12h; Start Date 3/9; Day 4  Zosyn 3.375 g IV q12h; Start Date 3/9; Day 4    Previous Antimicrobial Therapy:      Goal Level: Vancomycin -600    Date Dose & Interval Measured (mcg/mL) Predicted AUC   3/10 1000 mg q12h 8.5 316   3/11  1000mg q 8  42.1 957   3/12   3.3      Significant Cultures:   3/9 blood: NGTD    Labs:  Recent Labs     Units 24  0334 24  0335 03/10/24  0319   CREATININE MG/DL 0.96 0.97 0.95   BUN MG/DL 18 16 17   WBC K/uL 10.7 11.3* 17.5*     Temp (24hrs), Av.9 °F (37.2 °C), Min:97.8 °F (36.6 °C), Max:100 °F (37.8 °C)      Conditions for Dosing Consideration: None    Creatinine Clearance (mL/min): Estimated Creatinine Clearance: 97 mL/min (based on SCr of 0.96 mg/dL).       Impression/Plan:   Vancomycin level 3.3. 1000mg q 12 was too low of a regimen, and 1000mg q 8 was  too much.   Insight rx predicting poor model fit (flattened prior)   Will try adjusting regimen to 750mg q 8. Will order level for this evening.  Zosyn adjusted to 3.375g q8h per protocol  Antimicrobial stop date 3/15     Pharmacy will follow daily and adjust medications as appropriate for renal function and/or serum levels.    Thank you,  Albina [facility]          
Pharmacy Antimicrobial Kinetic Dosing    Indication for Antimicrobials: SSTI    Current Regimen of Each Antimicrobial:  Vancomycin 1000 mg IV q12h; Start Date 3/9; Day 4  Zosyn 3.375 g IV q12h; Start Date 3/9; Day 4    Previous Antimicrobial Therapy:      Goal Level: Vancomycin -600    Date Dose & Interval Measured (mcg/mL) Predicted AUC   3/10 1000 mg q12h 8.5 316   3/11  1000mg q 8  42.1 957   3/12   3.3    3/12 2100 750mg q 8  6.4 361     Significant Cultures:   3/9 blood: NGTD    Labs:  Recent Labs     Units 24  0334 24  0335 03/10/24  0319   CREATININE MG/DL 0.96 0.97 0.95   BUN MG/DL 18 16 17   WBC K/uL 10.7 11.3* 17.5*     Temp (24hrs), Av.8 °F (37.1 °C), Min:98 °F (36.7 °C), Max:100 °F (37.8 °C)      Conditions for Dosing Consideration: None    Creatinine Clearance (mL/min): Estimated Creatinine Clearance: 97 mL/min (based on SCr of 0.96 mg/dL).       Impression/Plan:   Vancomycin level 6.4. Adjust regimen to 1500mg q 12 for projected .   Zosyn adjusted to 3.375g q8h per protocol  Antimicrobial stop date 3/15     Pharmacy will follow daily and adjust medications as appropriate for renal function and/or serum levels.    Thank you,  Albina Mar [facility]            
Pharmacy Medication Reconciliation     The patient was NOT interviewed regarding current PTA medication list, use and drug allergies, medication use. Wife provided medication list.    Allergy Update: Patient has no known allergies.    Recommendations/Findings:   The following amendments were made to the patient's active medication list on file at [facility]:   1) Additions:   Humalog  Cetirizine  Omega 3  Aspirin  Carvedilol  Vitamin B12    2) Deletions:   None    3) Changes:   Changed spironolactone from BID to daily  Changed pregabalin to 75 mg TD      Clarified PTA med list with RxQuery, wife. PTA medication list was corrected to the following:     Prior to Admission Medications   Prescriptions Last Dose Informant   Empagliflozin-metFORMIN HCl ER (SYNJARDY XR) 12.5-1000 MG TB24     Sig: Take 1 tablet by mouth in the morning and at bedtime   Insulin Degludec (TRESIBA FLEXTOUCH) 200 UNIT/ML SOPN     Sig: Inject 42 Units into the skin at bedtime   Omega 3 1000 MG CAPS     Sig: Take 1,000 mg by mouth in the morning and at bedtime   aspirin 81 MG EC tablet     Sig: Take 1 tablet by mouth daily   atorvastatin (LIPITOR) 40 MG tablet 3/7/2024 at 2100    Sig: Take 1 tablet by mouth nightly   carvedilol (COREG) 6.25 MG tablet     Sig: Take 1 tablet by mouth 2 times daily (with meals)   cetirizine (ZYRTEC) 10 MG tablet     Sig: Take 1 tablet by mouth daily   clopidogrel (PLAVIX) 75 MG tablet 3/9/2024 at 0900    Sig: Take 1 tablet by mouth daily   furosemide (LASIX) 40 MG tablet 3/8/2024 at 0900    Sig: Take 1 tablet by mouth in the morning and 1 tablet in the evening.   insulin lispro, 1 Unit Dial, (HUMALOG KWIKPEN) 100 UNIT/ML SOPN     Sig: Inject into the skin 3 times daily (before meals) Sliding scale   pregabalin (LYRICA) 75 MG capsule 3/8/2024 at 0900    Sig: Take 1 capsule by mouth 3 times daily.   sacubitril-valsartan (ENTRESTO) 24-26 MG per tablet 3/8/2024 at 0900    Sig: Take 1 tablet by mouth 2 times daily 
Received consult reviewed the chart available results and imaging studies as well as pictures  Still waiting for the MRI to be done   But even with or without the MRI, patient would need incision and drainage of the wound   Spoke to patient on the telephone discussed the findings and recommended I&D  Patient consented verbally will schedule the patient surgery on Monday afternoon   Keep patient and NPO after 9 o’clock in t he morning on Monday   Hold anticoagulants please   
Reviewed the path report positive for OM  Will need IV abx for at least 6 weeks no further Sx treatment as per Podiatry   Abx treatment as per ID  Can be discharged when medically appropriate with following instructions and recommendations    IV antibiotics for 6 weeks as per ID's choice   Discharge to home or rehab with skilled wound care   Wound care to change dressing and packing in the wound two times a week with 1/4\" iodoform packing and cover with dry gauze  Follow up in my office in 3 weeks from discharge   As tolerated weight bearing on the right foot with a post op shoe     If need further assistance from me please reach out to me perfect serve or my cell [phone]  Dr. Thorpe will be covering my office next two weeks if need to care in between call my office [phone]    
Second toe right foot improved, pink. Still needs second I & D tomorrow, right foot, discussed with patient and patients family.      NPO tonight.  
Seen patient in the room agree with Dr. Thorpe‘s plan he will be following the care from now on 
Spiritual Care Partner Volunteer visited patient at [facility] in [facility] 2 CARDIAC MEDICAL STEP DOWN on 3/14/2024   Documented by: Chaplain Danny Aylaa M.Div., [facility].   Paging Service: [phone]  
Tamikoal complete and note to follow.  Pt has no OT needs.  Pt currently has standard flat bottom post op shoe but is able to maintain heel WB with mobility and ADLS.  Good understanding of precautions. Further OT services aren't needed at this time.  Will sign off.  
Will see patient today for po eval  
03/19/24  0238 03/20/24  0306 03/21/24  0243   WBC 9.5 9.0 9.4   HGB 14.0 13.7 13.8   HCT 43.1 43.5 43.3    256 236     Recent Labs     03/19/24  0238 03/20/24  0306 03/21/24  0243    135* 137   K 3.3* 3.3* 3.4*    102 103   CO2 27 30 29   GLUCOSE 181* 193* 164*   BUN 12 12 15   CREATININE 0.94 0.98 0.94   CALCIUM 8.5 8.8 9.0   MG 2.1 2.0 2.1   PHOS 2.8 2.7 3.2       Signed: Isac Silva MD         
EKG:  Toxic drug monitoring: monitor renal function for funmi from Vanc toxicity  Discussed case with:  ID, RN CM      Code Status: Full code  DVT Prophylaxis: Lovenox       Subjective:     Chief Complaint / Reason for Physician Visit  Followup osteomyelitis. Patient feels ok this morning, no complaints.      Objective:     VITALS:   Last 24hrs VS reviewed since prior progress note. Most recent are:  Patient Vitals for the past 24 hrs:   BP Temp Temp src Pulse Resp SpO2 Height Weight   03/13/24 1127 -- 98.6 °F (37 °C) Oral -- -- -- -- --   03/13/24 1033 133/72 -- -- -- -- -- 1.803 m (5' 10.98\") 111 kg (244 lb 11.4 oz)   03/13/24 0253 133/72 98.2 °F (36.8 °C) Oral 72 -- 96 % -- --   03/12/24 2317 116/69 98.2 °F (36.8 °C) Oral 78 -- 96 % -- --   03/12/24 2019 120/77 98.3 °F (36.8 °C) Oral 82 19 96 % -- --   03/12/24 1637 119/75 -- -- -- -- -- -- --         No intake or output data in the 24 hours ending 03/13/24 1534       I had a face to face encounter and independently examined this patient on 3/13/2024, as outlined below:    PHYSICAL EXAM:  General: Alert, cooperative  EENT:  EOMI. Anicteric sclerae.  Resp:  CTA bilaterally, no wheezing or rales.  No accessory muscle use  CV:  Regular  rate,   GI:  Soft, Non distended, Non tender.  +Bowel sounds  Neurologic:  Alert and oriented X 3, normal speech,   Skin:  No rashes.  No jaundice  Right foot dressing present, right leg redness with swelling    Reviewed most current lab test results and cultures  YES  Reviewed most current radiology test results   YES  Review and summation of old records today    NO  Reviewed patient's current orders and MAR    YES  PMH/SH reviewed - no change compared to H&P    Procedures: see electronic medical records for all procedures/Xrays and details which were not copied into this note but were reviewed prior to creation of Plan.      LABS:  I reviewed today's most current labs and imaging studies.  Pertinent labs include:  Recent Labs     
controlled       Hypertension  Hold Entresto spironolactone blood pressure currently stable resume as tolerated  Resumed on PTA Lasix 40 twice daily    Hypokalemia   repleted      Medical Decision Making:   I personally reviewed labs: CBC BMP, cultures  I personally reviewed imaging:   I personally reviewed EKG:  Toxic drug monitoring: monitor platelets for thrombocytopenia from Zosyn toxicity  Discussed case with:  RN CM      Code Status: Full code  DVT Prophylaxis: Lovenox       Subjective:     Chief Complaint / Reason for Physician Visit  Followup osteomyelitis. Feels ok today.      Objective:     VITALS:   Last 24hrs VS reviewed since prior progress note. Most recent are:  Patient Vitals for the past 24 hrs:   BP Temp Temp src Pulse Resp SpO2   03/19/24 0758 106/74 97.5 °F (36.4 °C) Oral 76 18 97 %   03/19/24 0323 124/78 97.6 °F (36.4 °C) Oral 74 16 97 %   03/18/24 2250 133/74 97.9 °F (36.6 °C) Oral 62 15 97 %   03/18/24 1915 (!) 102/51 97.8 °F (36.6 °C) Oral 67 16 95 %           Intake/Output Summary (Last 24 hours) at 3/19/2024 1849  Last data filed at 3/19/2024 0205  Gross per 24 hour   Intake 912.87 ml   Output 0 ml   Net 912.87 ml          I had a face to face encounter and independently examined this patient on 3/19/2024, as outlined below:    PHYSICAL EXAM:  General: Alert, cooperative  EENT:  EOMI. Anicteric sclerae.  Resp:  CTA bilaterally, no wheezing or rales.  No accessory muscle use  CV:  Regular  rate,   GI:  Soft, Non distended, Non tender.  +Bowel sounds  Neurologic:  Alert and oriented X 3, normal speech,   Skin:  Foot wound is bandaged. No rashes.  No jaundice    Reviewed most current lab test results and cultures  YES  Reviewed most current radiology test results   YES  Review and summation of old records today    NO  Reviewed patient's current orders and MAR    YES  PMH/SH reviewed - no change compared to H&P    Procedures: see electronic medical records for all procedures/Xrays and details 
no    Access:   Current line(s): PICC    Respiratory:   O2 Device: None (Room air)    GI:  Last BM (including prior to admit): 03/16/24  Current diet:  ADULT DIET; Regular; 3 carb choices (45 gm/meal)  ADULT ORAL NUTRITION SUPPLEMENT; Breakfast, Dinner; Low Calorie/High Protein Oral Supplement  Tolerating current diet: Yes    Pain Management:   Patient states pain is manageable on current regimen: yes    Skin:  Ed Scale Score: 19  Interventions: N/A  Pressure injury: no    Patient Safety:  Fall Score: [place] Total Score: 35  Interventions: bed alarm  Self-release roll belt: No  Dexterity to release roll belt: N/A   (must document dexterity  here by stating Yes or No here, otherwise this is a restraint and must follow restraint documentation policy.)    DVT prophylaxis:  DVT prophylaxis: meds    Active Consults:  IP CONSULT TO PODIATRY  IP CONSULT TO PHARMACY  IP CONSULT TO PHARMACY  IP WOUND CARE NURSE CONSULT TO EVAL  IP CONSULT TO INFECTIOUS DISEASES  IP CONSULT TO CASE MANAGEMENT  IP CONSULT TO CASE MANAGEMENT  IP WOUND CARE NURSE CONSULT TO EVAL  IP CONSULT TO CASE MANAGEMENT  IP CONSULT TO PODIATRY  IP WOUND CARE NURSE CONSULT TO EVAL    Length of Stay:  Expected LOS: 12  Actual LOS: 10    Yanni Gonsalez RN                            
03/13/24  0340 03/14/24  0240 03/15/24  0409   WBC 9.4 9.9 8.5   HGB 13.8 13.5 13.9   HCT 44.4 42.6 44.2    221 248       Recent Labs     03/13/24  0340 03/14/24  0240 03/15/24  0409    137 139   K 3.0* 3.2* 3.2*    104 103   CO2 29 29 32   GLUCOSE 134* 179* 145*   BUN 14 13 12   CREATININE 0.89 0.94 0.93   CALCIUM 8.3* 8.7 9.2         Signed: David L Mendel, MD        
compared to H&P    Procedures: see electronic medical records for all procedures/Xrays and details which were not copied into this note but were reviewed prior to creation of Plan.      LABS:  I reviewed today's most current labs and imaging studies.  Pertinent labs include:  Recent Labs     03/14/24  0240 03/15/24  0409 03/16/24  0440   WBC 9.9 8.5 8.9   HGB 13.5 13.9 14.1   HCT 42.6 44.2 44.3    248 254       Recent Labs     03/14/24  0240 03/15/24  0409 03/16/24  0440    139 139   K 3.2* 3.2* 3.6    103 104   CO2 29 32 30   GLUCOSE 179* 145* 155*   BUN 13 12 12   CREATININE 0.94 0.93 0.98   CALCIUM 8.7 9.2 9.2   MG  --   --  2.0   PHOS  --   --  3.6         Signed: David L Mendel, MD        
most current labs and imaging studies.  Pertinent labs include:  Recent Labs     03/08/24  1930 03/10/24  0319 03/11/24  0335   WBC 17.8* 17.5* 11.3*   HGB 16.6 15.2 14.2   HCT 50.9* 46.2 44.6    146* 154       Recent Labs     03/08/24  1930 03/10/24  0319 03/11/24  0335    136 138   K 3.6 3.3* 3.1*    104 105   CO2 30 24 29   GLUCOSE 124* 107* 114*   BUN 22* 17 16   CREATININE 1.10 0.95 0.97   CALCIUM 9.6 8.7 8.4*   MG  --  1.9  --    LABALBU 4.0  --   --    BILITOT 0.9  --   --    AST 25  --   --    ALT 28  --   --          Signed: Gail Payne MD        
bursae.  3. Diffuse subcutaneous edema-like signal and forefoot. Diffuse intrinsic muscle  edema-like signal in the forefoot. No localized collection is evident.    Microbiology:     Cultures:   Results       Procedure Component Value Units Date/Time    Culture, Wound [[id number]] Collected: 03/15/24 1619    Order Status: Completed Specimen: Foot, Right Updated: 03/16/24 1504     Special Requests NO SPECIAL REQUESTS        Gram stain OCCASIONAL WBCS SEEN         NO ORGANISMS SEEN        Culture NO GROWTH THUS FAR       Culture, Anaerobic [[id number]] Collected: 03/15/24 1619    Order Status: Completed Specimen: Foot, Right Updated: 03/16/24 1505     Special Requests NO SPECIAL REQUESTS        Culture NO GROWTH THUS FAR       Culture, Blood 1 [[id number]]     Order Status: Canceled Specimen: Blood     Culture, Blood 2 [[id number]]     Order Status: Canceled Specimen: Blood     Culture, Anaerobic [[id number]]  (Abnormal)  (Susceptibility) Collected: 03/11/24 1646    Order Status: Completed Specimen: Foot, Right Updated: 03/13/24 1255     Special Requests NO SPECIAL REQUESTS        Culture       LIGHT Staphylococcus aureus            NO ANAEROBES ISOLATED       Susceptibility        Staphylococcus aureus      BACTERIAL SUSCEPTIBILITY PANEL MEGHAN      ciprofloxacin <=0.5 ug/mL Sensitive      clindamycin 0.25 ug/mL Sensitive      DAPTOmycin 0.25 ug/mL Sensitive      doxycycline <=0.5 ug/mL Sensitive      erythromycin <=0.25 ug/mL Sensitive      gentamicin <=0.5 ug/mL Sensitive      levofloxacin 0.25 ug/mL Sensitive      linezolid 2 ug/mL Sensitive      moxifloxacin <=0.25 ug/mL Sensitive      oxacillin <=0.25 ug/mL Sensitive      rifampin <=0.5 ug/mL Sensitive  [1]       tetracycline <=1 ug/mL Sensitive      trimethoprim-sulfamethoxazole <=10 ug/mL Sensitive      vancomycin <=0.5 ug/mL Sensitive                   [1]  Rifampin is not to be used for mono-therapy.                   Culture, Wound [[id number]]  (Abnormal) 
mono-therapy.                   Culture, Wound [[id number]]  (Abnormal) Collected: 03/11/24 1646    Order Status: Completed Specimen: Foot, Right Updated: 03/13/24 1256     Special Requests NO SPECIAL REQUESTS        Gram stain OCCASIONAL WBCS SEEN         NO ORGANISMS SEEN        Culture       RARE Staphylococcus aureus REFER TO [id number] FOR SENSITIVITIES          Culture, Blood 1 [[id number]] Collected: 03/09/24 0346    Order Status: Completed Specimen: Blood Updated: 03/13/24 0719     Special Requests --        LEFT  HAND       Culture NO GROWTH 4 DAYS       Culture, Blood 2 [[id number]] Collected: 03/09/24 0346    Order Status: Completed Specimen: Blood Updated: 03/13/24 0719     Special Requests --        LEFT  FOREARM       Culture NO GROWTH 4 DAYS       COVID-19 & Influenza Combo [[id number]] Collected: 03/08/24 2058    Order Status: Completed Specimen: Nasopharyngeal Updated: 03/08/24 2140     SARS-CoV-2, PCR Not detected        Comment: Not Detected results do not preclude SARS-CoV-2 infection and should not be used as the sole basis for patient management decisions.Results must be combined with clinical observations, patient history, and epidemiological information.        Rapid Influenza A By PCR Not detected        Rapid Influenza B By PCR Not detected        Comment: Testing was performed using farideh Jaycee SARS-CoV-2 and Influenza A/B nucleic acid assay.  This test is a multiplex Real-Time Reverse Transcriptase Polymerase Chain Reaction (RT-PCR) based in vitro diagnostic test intended for the qualitative detection of nucleic acids from SARS-CoV-2, Influenza A, and Influenza B in nasopharyngeal and nasal swab specimens for use under the FDA's Emergency Use Authorization (EUA) only.     Fact sheet for Patients: https://www.fda.gov/media/164295/download  Fact sheet for Healthcare Providers: https://www.fda.fov/media/783782/download         Blood Culture 2 [[id number]] Collected: 03/08/24 2030    Order 
proximal phalanx. Abnormal signal shown diffusely within the proximal phalanx of the second toe, concerning for osteomyelitis. 2. Nonspecific small effusions of hallux MTP and IP joints and second through fourth MTP joints. Nonspecific moderate effusions of first through third intermetatarsal bursae. 3. Diffuse subcutaneous edema-like signal and forefoot. Diffuse intrinsic muscle edema-like signal in the forefoot. No localized collection is evident.     XR FOOT RIGHT (MIN 3 VIEWS)    Result Date: 3/8/2024  EXAM: XR FOOT RIGHT (MIN 3 VIEWS) INDICATION: diabetic ulcer, ? osteo. COMPARISON: 1/22/2024 FINDINGS: Three views of the right foot demonstrate no fracture or other acute osseous or articular abnormality. Nonspecific plantar soft tissue swelling. Small Achilles spur.     No focally destructive process.    XR CHEST PORTABLE    Result Date: 3/8/2024  EXAM:  XR CHEST PORTABLE INDICATION: Sepsis COMPARISON: 2018 TECHNIQUE: portable chest AP view obtained portably at 1214 hours FINDINGS: The cardiac silhouette is within normal limits. The pulmonary vasculature is within normal limits. The right hemidiaphragm remains elevated and there is linear opacity at the right lung base. . The visualized bones and upper abdomen are age-appropriate.     Right basilar subsegmental atelectasis       Heaven Wilson, APRN - NP

## 2024-03-21 NOTE — PROGRESS NOTES
Hospitalist progress note:    Patient seen and examined at bedside with the family today.  He is cleared for discharge from medical point of view.  He will continue antibiotics and follow-up with podiatry upon discharge.  Patient being transferred to [facility] swing bed.

## 2024-03-21 NOTE — H&P
[facility]   Admission History & Physical        3/21/2024 3:16 PM  Patient: Joseph Seymour 1960  PCP: Adriana Tyler MD    HISTORY  Chief Complaint: Foot infection      HPI: 64 y.o. male presenting for admission to [facility] for further evaluation and treatment for Osteomyelitis of right foot (HCC).  He  has a past medical history of CHF (congestive heart failure) (HCC), Diabetes mellitus (HCC), Hypertension, and MI (myocardial infarction) (HCC).. He.    Pt presents for TCU admission for continuation of antibiotics for tx of R foot osteomyelitis.  T2DM felt to be under improved control following adjustments in Lantus and Humalog CC.  Pt advised to maintain tx till 3/29, and to supplement yogurt or probiotic.  He needs wound care and Cephalexin 2g IV q8h given. He will f/u with Dr. Dixon  when able    Wound care recommended included, change dressing q 48hr - due Friday PM      Past Medical History:  Past Medical History:   Diagnosis Date    CHF (congestive heart failure) (HCC)     Diabetes mellitus (HCC)     Hypertension     MI (myocardial infarction) (HCC)        Past Surgical History:  Past Surgical History:   Procedure Laterality Date    CORONARY ANGIOPLASTY WITH STENT PLACEMENT      3 stents    FOOT DEBRIDEMENT Right 3/11/2024    RIGHT FOOT DEBRIDEMENT INCISION AND DRAINAGE performed by Dirk Dixon DPM at [facility] MAIN OR    FOOT DEBRIDEMENT Right 3/15/2024    RIGHT FOOT DEBRIDEMENT INCISION AND DRAINAGE performed by Jorgito Thorpe DPM at [facility] MAIN OR    TONSILLECTOMY Bilateral        Medication:  Prior to Admission medications    Medication Sig Start Date End Date Taking? Authorizing Provider   semaglutide, 2 MG/DOSE, (OZEMPIC, 2 MG/DOSE,) 8 MG/3ML SOPN sc injection Inject 0.75 mLs into the skin every 7 days    ProviderAlber MD   Insulin Degludec (TRESIBA FLEXTOUCH) 200 UNIT/ML SOPN Inject 42 Units into the skin at bedtime    ProviderAlber MD

## 2024-03-21 NOTE — PLAN OF CARE
Problem: Discharge Planning  Goal: Discharge to home or other facility with appropriate resources  3/18/2024 2208 by Jeancarlos Mccullough RN  Outcome: Progressing  3/18/2024 1357 by Albina Holland RN  Outcome: Progressing     Problem: Safety - Adult  Goal: Free from fall injury  3/18/2024 2208 by Jeancarlos Mccullough RN  Outcome: Progressing  3/18/2024 1357 by Albina Holland RN  Outcome: Progressing     Problem: ABCDS Injury Assessment  Goal: Absence of physical injury  3/18/2024 2208 by Jeancarlos Mccullough RN  Outcome: Progressing  3/18/2024 1357 by Albina Holland RN  Outcome: Progressing     Problem: Skin/Tissue Integrity  Goal: Absence of new skin breakdown  Description: 1.  Monitor for areas of redness and/or skin breakdown  2.  Assess vascular access sites hourly  3.  Every 4-6 hours minimum:  Change oxygen saturation probe site  4.  Every 4-6 hours:  If on nasal continuous positive airway pressure, respiratory therapy assess nares and determine need for appliance change or resting period.  3/18/2024 2208 by Jeancarlos Mccullough RN  Outcome: Progressing  3/18/2024 1357 by Albina Holland RN  Outcome: Progressing     Problem: Pain  Goal: Verbalizes/displays adequate comfort level or baseline comfort level  3/18/2024 2208 by Jeancarlos Mccullough RN  Outcome: Progressing  3/18/2024 1357 by Albina Holland RN  Outcome: Progressing     Problem: Chronic Conditions and Co-morbidities  Goal: Patient's chronic conditions and co-morbidity symptoms are monitored and maintained or improved  3/18/2024 2208 by Jeancarlos Mccullough RN  Outcome: Progressing  3/18/2024 1357 by Albina Holland RN  Outcome: Progressing     
  Problem: Discharge Planning  Goal: Discharge to home or other facility with appropriate resources  3/21/2024 1106 by Albina Marroquin RN  Outcome: Completed  3/20/2024 2223 by Juana Jeffrey RN  Outcome: Progressing     Problem: Safety - Adult  Goal: Free from fall injury  3/21/2024 1106 by Albina Marroquin RN  Outcome: Completed  3/20/2024 2223 by Juana Jeffrey RN  Outcome: Progressing     Problem: ABCDS Injury Assessment  Goal: Absence of physical injury  3/21/2024 1106 by Albina Marroquin RN  Outcome: Completed  3/20/2024 2223 by Juana Jeffrey RN  Outcome: Progressing     Problem: Skin/Tissue Integrity  Goal: Absence of new skin breakdown  Description: 1.  Monitor for areas of redness and/or skin breakdown  2.  Assess vascular access sites hourly  3.  Every 4-6 hours minimum:  Change oxygen saturation probe site  4.  Every 4-6 hours:  If on nasal continuous positive airway pressure, respiratory therapy assess nares and determine need for appliance change or resting period.  3/21/2024 1106 by Albina Marroquin RN  Outcome: Completed  3/20/2024 2223 by Juana Jeffrey RN  Outcome: Progressing     Problem: Pain  Goal: Verbalizes/displays adequate comfort level or baseline comfort level  3/21/2024 1106 by Albina Marroquin RN  Outcome: Completed  3/20/2024 2223 by Juana Jeffrey RN  Outcome: Progressing     Problem: Chronic Conditions and Co-morbidities  Goal: Patient's chronic conditions and co-morbidity symptoms are monitored and maintained or improved  3/21/2024 1106 by Albina Marroquin RN  Outcome: Completed  3/20/2024 2223 by Juana Jeffrey RN  Outcome: Progressing     
  Problem: Discharge Planning  Goal: Discharge to home or other facility with appropriate resources  Outcome: Progressing     Problem: Safety - Adult  Goal: Free from fall injury  3/18/2024 1357 by Albina Holland RN  Outcome: Progressing  3/18/2024 0427 by Mounika Ulloa RN  Outcome: Progressing     Problem: ABCDS Injury Assessment  Goal: Absence of physical injury  3/18/2024 1357 by Albina Holland RN  Outcome: Progressing  3/18/2024 0427 by Mounika Ulloa RN  Outcome: Progressing     Problem: Skin/Tissue Integrity  Goal: Absence of new skin breakdown  Description: 1.  Monitor for areas of redness and/or skin breakdown  2.  Assess vascular access sites hourly  3.  Every 4-6 hours minimum:  Change oxygen saturation probe site  4.  Every 4-6 hours:  If on nasal continuous positive airway pressure, respiratory therapy assess nares and determine need for appliance change or resting period.  3/18/2024 1357 by Albina Holland RN  Outcome: Progressing  3/18/2024 0427 by Mounika Ulloa RN  Outcome: Progressing     Problem: Pain  Goal: Verbalizes/displays adequate comfort level or baseline comfort level  3/18/2024 1357 by Albina Holland RN  Outcome: Progressing  3/18/2024 0427 by Mounika Ulloa RN  Outcome: Progressing     Problem: Chronic Conditions and Co-morbidities  Goal: Patient's chronic conditions and co-morbidity symptoms are monitored and maintained or improved  3/18/2024 1357 by Albnia Holland RN  Outcome: Progressing  3/18/2024 0427 by Mounika Ulloa RN  Outcome: Progressing     
  Problem: Discharge Planning  Goal: Discharge to home or other facility with appropriate resources  Outcome: Progressing     Problem: Safety - Adult  Goal: Free from fall injury  Outcome: Progressing     Problem: ABCDS Injury Assessment  Goal: Absence of physical injury  Outcome: Progressing     
  Problem: Discharge Planning  Goal: Discharge to home or other facility with appropriate resources  Outcome: Progressing     Problem: Safety - Adult  Goal: Free from fall injury  Outcome: Progressing     Problem: ABCDS Injury Assessment  Goal: Absence of physical injury  Outcome: Progressing     
  Problem: Discharge Planning  Goal: Discharge to home or other facility with appropriate resources  Outcome: Progressing     Problem: Safety - Adult  Goal: Free from fall injury  Outcome: Progressing     Problem: ABCDS Injury Assessment  Goal: Absence of physical injury  Outcome: Progressing     Problem: Skin/Tissue Integrity  Goal: Absence of new skin breakdown  Description: 1.  Monitor for areas of redness and/or skin breakdown  2.  Assess vascular access sites hourly  3.  Every 4-6 hours minimum:  Change oxygen saturation probe site  4.  Every 4-6 hours:  If on nasal continuous positive airway pressure, respiratory therapy assess nares and determine need for appliance change or resting period.  Outcome: Progressing     Problem: Pain  Goal: Verbalizes/displays adequate comfort level or baseline comfort level  Outcome: Progressing     Problem: Chronic Conditions and Co-morbidities  Goal: Patient's chronic conditions and co-morbidity symptoms are monitored and maintained or improved  Outcome: Progressing     
  Problem: Discharge Planning  Goal: Discharge to home or other facility with appropriate resources  Outcome: Progressing     Problem: Safety - Adult  Goal: Free from fall injury  Outcome: Progressing     Problem: ABCDS Injury Assessment  Goal: Absence of physical injury  Outcome: Progressing     Problem: Skin/Tissue Integrity  Goal: Absence of new skin breakdown  Description: 1.  Monitor for areas of redness and/or skin breakdown  2.  Assess vascular access sites hourly  3.  Every 4-6 hours minimum:  Change oxygen saturation probe site  4.  Every 4-6 hours:  If on nasal continuous positive airway pressure, respiratory therapy assess nares and determine need for appliance change or resting period.  Outcome: Progressing     Problem: Pain  Goal: Verbalizes/displays adequate comfort level or baseline comfort level  Outcome: Progressing     Problem: Chronic Conditions and Co-morbidities  Goal: Patient's chronic conditions and co-morbidity symptoms are monitored and maintained or improved  Outcome: Progressing     
  Problem: Discharge Planning  Goal: Discharge to home or other facility with appropriate resources  Outcome: Progressing  Flowsheets (Taken 3/11/2024 2230 by Bria Lackey, RN)  Discharge to home or other facility with appropriate resources:   Identify barriers to discharge with patient and caregiver   Arrange for needed discharge resources and transportation as appropriate   Identify discharge learning needs (meds, wound care, etc)   Arrange for interpreters to assist at discharge as needed   Refer to discharge planning if patient needs post-hospital services based on physician order or complex needs related to functional status, cognitive ability or social support system     Problem: Safety - Adult  Goal: Free from fall injury  Outcome: Progressing     Problem: ABCDS Injury Assessment  Goal: Absence of physical injury  Outcome: Progressing     Problem: Skin/Tissue Integrity  Goal: Absence of new skin breakdown  Description: 1.  Monitor for areas of redness and/or skin breakdown  2.  Assess vascular access sites hourly  3.  Every 4-6 hours minimum:  Change oxygen saturation probe site  4.  Every 4-6 hours:  If on nasal continuous positive airway pressure, respiratory therapy assess nares and determine need for appliance change or resting period.  Outcome: Progressing     Problem: Pain  Goal: Verbalizes/displays adequate comfort level or baseline comfort level  Outcome: Progressing     
  Problem: Discharge Planning  Goal: Discharge to home or other facility with appropriate resources  Outcome: Progressing  Flowsheets (Taken 3/9/2024 0121 by Lacy Guerrero RN)  Discharge to home or other facility with appropriate resources: Identify barriers to discharge with patient and caregiver     
  Problem: Safety - Adult  Goal: Free from fall injury  Outcome: Progressing     Problem: ABCDS Injury Assessment  Goal: Absence of physical injury  Outcome: Progressing     Problem: Skin/Tissue Integrity  Goal: Absence of new skin breakdown  Outcome: Progressing     Problem: Pain  Goal: Verbalizes/displays adequate comfort level or baseline comfort level  Outcome: Progressing     Problem: Chronic Conditions and Co-morbidities  Goal: Patient's chronic conditions and co-morbidity symptoms are monitored and maintained or improved  Outcome: Progressing     
sitting on the side of a flat bed without using bedrails?: None  How much help is needed moving to and from a bed to a chair?: None  How much help is needed standing up from a chair using your arms?: None  How much help is needed walking in hospital room?: A Little  How much help is needed climbing 3-5 steps with a railing?: A Little    AM-PAC Inpatient Mobility Raw Score : 22  AM-PAC Inpatient T-Scale Score : 53.28     Cutoff score ?171,2,3 had higher odds of discharging home with home health or need of SNF/IPR.    1. Marissa Sanchez, Janette Colón, Shay Valadez, Tara Joyce, Sy George, Steve Sanchez.  Validity of the AM-PAC “6-Clicks” Inpatient Daily Activity and Basic Mobility Short Forms. Physical Therapy Mar 2014, 94 (3) 379-391; DOI: 10.2522/ptj.66705978  2. Jim BROWN, Cielo CARMICHAEL, Steven CARMICHAEL, Richard J. Association of AM-PAC \"6-Clicks\" Basic Mobility and Daily Activity Scores With Discharge Destination. Phys Ther. 2021 Apr 4;101(4):vrjw858. doi: 10.1093/ptj/ruuc369. PMID: 54243492.  3. Radha CARMICHAEL, Gayathri D, Summer S, Ameena K, Robert S. Activity Measure for Post-Acute Care \"6-Clicks\" Basic Mobility Scores Predict Discharge Destination After Acute Care Hospitalization in Select Patient Groups: A Retrospective, Observational Study. Arch Rehabil Res Clin Transl. 2022 Jul 16;4(3):507965. doi: 10.1016/j.arrct.2022.244802. PMID: 28727860; PMCID: UHH6073070.  4. Daniel CAREY, Lakesha S, Karel W, Orly P. AM-PAC Short Forms Manual 4.0. Revised 2/2020.                                                                                                                                                                                                                                 Pain Intervention(s):       Activity Tolerance:   Good    After treatment:   Patient left in no apparent distress sitting up in chair, Call bell within reach, and Caregiver / family present    COMMUNICATION/EDUCATION:   The patient's

## 2024-03-21 NOTE — CARE COORDINATION
[facility] Unit - Bed # Pending, call report to  [phone]. Wife to transport around 1000.     Transition of Care Plan:     RUR: 9% (low RUR)  Prior Level of Functioning: Independent  Disposition: [facility] SNF   If SNF or IPR: Date FOC offered: N/a  Date FOC received: 03/14 family requested [facility] bed  Accepting facility: [facility]   Date authorization started with reference number: 3/15, Ref ID: [id number]    Date authorization received and expires: Chart review reveals approved 03/19-03/23  Follow up appointments: defer to SNF   DME needed: defer to SNF   Transportation at discharge: Spouse to transport  IM/IMM Medicare/ letter given: 2nd given  Is patient a  and connected with [facility]? N/a              If yes, was [place] transfer form completed and [facility] notified? N/a  Caregiver Contact: Charlotte Seymour - spouse - [phone]    Discharge Caregiver contacted prior to discharge? Patient to contact  Care Conference needed? Not at this time  Barriers to discharge: None    0753 - Plan to d/c to [facility] bed this AM. Wife to transport 1000. Pending bed #.     Transition of Care Plan to SNF/Rehab    Communication to Patient/Family:  Met with patient and family and they are agreeable to the transition plan. The Plan for Transition of Care is related to the following treatment goals: [facility]     The Patient and/or patient representative was provided with a choice of provider and agrees  with the discharge plan.      Yes [x] No []    A Freedom of choice list was provided with basic dialogue that supports the patient's individualized plan of care/goals and shares the quality data associated with the providers.       Yes [x] No []    SNF/Rehab Transition:  Patient has been accepted to [facility] SNF/Rehab and meets criteria for admission.   Patient will transported by his wife and expected to leave at 1000.    Communication to SNF/Rehab:  Bedside RN has been notified to update the transition plan to

## 2024-03-22 LAB
ANION GAP SERPL CALC-SCNC: 8 MMOL/L (ref 5–15)
BASOPHILS # BLD: 0.1 K/UL (ref 0–0.1)
BASOPHILS NFR BLD: 1 % (ref 0–1)
BUN SERPL-MCNC: 15 MG/DL (ref 6–20)
BUN/CREAT SERPL: 16 (ref 12–20)
CALCIUM SERPL-MCNC: 9 MG/DL (ref 8.5–10.1)
CHLORIDE SERPL-SCNC: 102 MMOL/L (ref 97–108)
CO2 SERPL-SCNC: 29 MMOL/L (ref 21–32)
CREAT SERPL-MCNC: 0.94 MG/DL (ref 0.7–1.3)
DIFFERENTIAL METHOD BLD: NORMAL
EOSINOPHIL # BLD: 0.3 K/UL (ref 0–0.4)
EOSINOPHIL NFR BLD: 3 % (ref 0–7)
ERYTHROCYTE [DISTWIDTH] IN BLOOD BY AUTOMATED COUNT: 14.3 % (ref 11.5–14.5)
GLUCOSE BLD STRIP.AUTO-MCNC: 142 MG/DL (ref 65–117)
GLUCOSE BLD STRIP.AUTO-MCNC: 207 MG/DL (ref 65–117)
GLUCOSE BLD STRIP.AUTO-MCNC: 232 MG/DL (ref 65–117)
GLUCOSE BLD STRIP.AUTO-MCNC: 259 MG/DL (ref 65–117)
GLUCOSE SERPL-MCNC: 153 MG/DL (ref 65–100)
HCT VFR BLD AUTO: 43 % (ref 36.6–50.3)
HGB BLD-MCNC: 13.6 G/DL (ref 12.1–17)
IMM GRANULOCYTES # BLD AUTO: 0 K/UL (ref 0–0.04)
IMM GRANULOCYTES NFR BLD AUTO: 0 % (ref 0–0.5)
LYMPHOCYTES # BLD: 2.2 K/UL (ref 0.8–3.5)
LYMPHOCYTES NFR BLD: 22 % (ref 12–49)
MAGNESIUM SERPL-MCNC: 1.9 MG/DL (ref 1.6–2.4)
MCH RBC QN AUTO: 26.4 PG (ref 26–34)
MCHC RBC AUTO-ENTMCNC: 31.6 G/DL (ref 30–36.5)
MCV RBC AUTO: 83.3 FL (ref 80–99)
MONOCYTES # BLD: 0.7 K/UL (ref 0–1)
MONOCYTES NFR BLD: 7 % (ref 5–13)
NEUTS SEG # BLD: 6.8 K/UL (ref 1.8–8)
NEUTS SEG NFR BLD: 68 % (ref 32–75)
NRBC # BLD: 0 K/UL (ref 0–0.01)
NRBC BLD-RTO: 0 PER 100 WBC
PLATELET # BLD AUTO: 238 K/UL (ref 150–400)
PMV BLD AUTO: 9.1 FL (ref 8.9–12.9)
POTASSIUM SERPL-SCNC: 3.8 MMOL/L (ref 3.5–5.1)
RBC # BLD AUTO: 5.16 M/UL (ref 4.1–5.7)
SERVICE CMNT-IMP: ABNORMAL
SODIUM SERPL-SCNC: 139 MMOL/L (ref 136–145)
WBC # BLD AUTO: 10 K/UL (ref 4.1–11.1)

## 2024-03-22 PROCEDURE — 97110 THERAPEUTIC EXERCISES: CPT

## 2024-03-22 PROCEDURE — 2580000003 HC RX 258: Performed by: INTERNAL MEDICINE

## 2024-03-22 PROCEDURE — 6370000000 HC RX 637 (ALT 250 FOR IP): Performed by: STUDENT IN AN ORGANIZED HEALTH CARE EDUCATION/TRAINING PROGRAM

## 2024-03-22 PROCEDURE — 97161 PT EVAL LOW COMPLEX 20 MIN: CPT

## 2024-03-22 PROCEDURE — 36415 COLL VENOUS BLD VENIPUNCTURE: CPT

## 2024-03-22 PROCEDURE — 2580000003 HC RX 258

## 2024-03-22 PROCEDURE — 1900000000 HC RM PRIVATE SNF

## 2024-03-22 PROCEDURE — 82962 GLUCOSE BLOOD TEST: CPT

## 2024-03-22 PROCEDURE — 6370000000 HC RX 637 (ALT 250 FOR IP): Performed by: INTERNAL MEDICINE

## 2024-03-22 PROCEDURE — 80048 BASIC METABOLIC PNL TOTAL CA: CPT

## 2024-03-22 PROCEDURE — 85025 COMPLETE CBC W/AUTO DIFF WBC: CPT

## 2024-03-22 PROCEDURE — 6360000002 HC RX W HCPCS: Performed by: INTERNAL MEDICINE

## 2024-03-22 PROCEDURE — 83735 ASSAY OF MAGNESIUM: CPT

## 2024-03-22 RX ORDER — SPIRONOLACTONE 25 MG/1
25 TABLET ORAL DAILY
Status: DISCONTINUED | OUTPATIENT
Start: 2024-03-22 | End: 2024-03-29 | Stop reason: HOSPADM

## 2024-03-22 RX ORDER — CYANOCOBALAMIN 1000 UG/ML
1000 INJECTION, SOLUTION INTRAMUSCULAR; SUBCUTANEOUS ONCE
Status: COMPLETED | OUTPATIENT
Start: 2024-03-22 | End: 2024-03-22

## 2024-03-22 RX ORDER — CARVEDILOL 6.25 MG/1
6.25 TABLET ORAL 2 TIMES DAILY WITH MEALS
Status: DISCONTINUED | OUTPATIENT
Start: 2024-03-22 | End: 2024-03-29 | Stop reason: HOSPADM

## 2024-03-22 RX ORDER — PANTOPRAZOLE SODIUM 40 MG/1
40 TABLET, DELAYED RELEASE ORAL
Status: DISCONTINUED | OUTPATIENT
Start: 2024-03-22 | End: 2024-03-29 | Stop reason: HOSPADM

## 2024-03-22 RX ORDER — WATER 10 ML/10ML
INJECTION INTRAMUSCULAR; INTRAVENOUS; SUBCUTANEOUS
Status: COMPLETED
Start: 2024-03-22 | End: 2024-03-22

## 2024-03-22 RX ADMIN — CARVEDILOL 6.25 MG: 6.25 TABLET, FILM COATED ORAL at 18:07

## 2024-03-22 RX ADMIN — PREGABALIN 75 MG: 50 CAPSULE ORAL at 08:22

## 2024-03-22 RX ADMIN — PANTOPRAZOLE SODIUM 40 MG: 40 TABLET, DELAYED RELEASE ORAL at 12:24

## 2024-03-22 RX ADMIN — SPIRONOLACTONE 25 MG: 25 TABLET ORAL at 12:24

## 2024-03-22 RX ADMIN — Medication 5 MG: at 21:16

## 2024-03-22 RX ADMIN — SODIUM CHLORIDE, PRESERVATIVE FREE 20 ML: 5 INJECTION INTRAVENOUS at 08:29

## 2024-03-22 RX ADMIN — ATORVASTATIN CALCIUM 40 MG: 40 TABLET, FILM COATED ORAL at 21:17

## 2024-03-22 RX ADMIN — WATER 2000 MG: 1 INJECTION INTRAMUSCULAR; INTRAVENOUS; SUBCUTANEOUS at 06:21

## 2024-03-22 RX ADMIN — WATER 10 ML: 1 INJECTION INTRAMUSCULAR; INTRAVENOUS; SUBCUTANEOUS at 18:22

## 2024-03-22 RX ADMIN — INSULIN GLARGINE 20 UNITS: 100 INJECTION, SOLUTION SUBCUTANEOUS at 21:16

## 2024-03-22 RX ADMIN — INSULIN LISPRO 2 UNITS: 100 INJECTION, SOLUTION INTRAVENOUS; SUBCUTANEOUS at 12:25

## 2024-03-22 RX ADMIN — CLOPIDOGREL BISULFATE 75 MG: 75 TABLET ORAL at 08:22

## 2024-03-22 RX ADMIN — INSULIN LISPRO 2 UNITS: 100 INJECTION, SOLUTION INTRAVENOUS; SUBCUTANEOUS at 18:07

## 2024-03-22 RX ADMIN — ALTEPLASE 1 MG: 2.2 INJECTION, POWDER, LYOPHILIZED, FOR SOLUTION INTRAVENOUS at 16:00

## 2024-03-22 RX ADMIN — ENOXAPARIN SODIUM 30 MG: 100 INJECTION SUBCUTANEOUS at 08:23

## 2024-03-22 RX ADMIN — PREGABALIN 75 MG: 50 CAPSULE ORAL at 21:16

## 2024-03-22 RX ADMIN — TAMSULOSIN HYDROCHLORIDE 0.4 MG: 0.4 CAPSULE ORAL at 08:22

## 2024-03-22 RX ADMIN — ASPIRIN 81 MG: 81 TABLET, CHEWABLE ORAL at 08:22

## 2024-03-22 RX ADMIN — WATER 2000 MG: 1 INJECTION INTRAMUSCULAR; INTRAVENOUS; SUBCUTANEOUS at 18:05

## 2024-03-22 RX ADMIN — ALTEPLASE 1 MG: 2.2 INJECTION, POWDER, LYOPHILIZED, FOR SOLUTION INTRAVENOUS at 16:30

## 2024-03-22 RX ADMIN — PREGABALIN 75 MG: 50 CAPSULE ORAL at 18:23

## 2024-03-22 RX ADMIN — FUROSEMIDE 40 MG: 40 TABLET ORAL at 08:22

## 2024-03-22 RX ADMIN — CYANOCOBALAMIN 1000 MCG: 1000 INJECTION, SOLUTION INTRAMUSCULAR; SUBCUTANEOUS at 12:25

## 2024-03-22 RX ADMIN — CARVEDILOL 6.25 MG: 6.25 TABLET, FILM COATED ORAL at 12:24

## 2024-03-22 RX ADMIN — FUROSEMIDE 40 MG: 40 TABLET ORAL at 18:06

## 2024-03-22 RX ADMIN — SODIUM CHLORIDE, PRESERVATIVE FREE 10 ML: 5 INJECTION INTRAVENOUS at 21:22

## 2024-03-22 RX ADMIN — ENOXAPARIN SODIUM 30 MG: 100 INJECTION SUBCUTANEOUS at 21:16

## 2024-03-22 NOTE — PROGRESS NOTES
Pt comfortable, up in chair this shift since arriving. Bulky dressing with post op shoe intact to R foot. Dressing due to be changed tomorrow. Picc line with dry dressing to R arm with some scant bloody drainage under dressing. Flushes well with brisk blood return.

## 2024-03-22 NOTE — PROGRESS NOTES
Alteplase attempt unsuccessful. PICC remains without blood return. MD is aware  Peripheral IV inserted.

## 2024-03-22 NOTE — PROGRESS NOTES
PICC dressing changed by this RN using sterile technique. Cap end changed and new alcohol cap applied.   Of note, external PICC catheter measured about 3.5cm whereas original documentation shows external catheter 1cm post PICC insertion.  No blood return noted. MD made aware- awaiting new orders.

## 2024-03-23 LAB
GLUCOSE BLD STRIP.AUTO-MCNC: 152 MG/DL (ref 65–117)
GLUCOSE BLD STRIP.AUTO-MCNC: 169 MG/DL (ref 65–117)
GLUCOSE BLD STRIP.AUTO-MCNC: 265 MG/DL (ref 65–117)
GLUCOSE BLD STRIP.AUTO-MCNC: 312 MG/DL (ref 65–117)
SERVICE CMNT-IMP: ABNORMAL

## 2024-03-23 PROCEDURE — 82962 GLUCOSE BLOOD TEST: CPT

## 2024-03-23 PROCEDURE — 6370000000 HC RX 637 (ALT 250 FOR IP): Performed by: STUDENT IN AN ORGANIZED HEALTH CARE EDUCATION/TRAINING PROGRAM

## 2024-03-23 PROCEDURE — 2580000003 HC RX 258: Performed by: INTERNAL MEDICINE

## 2024-03-23 PROCEDURE — 6360000002 HC RX W HCPCS: Performed by: INTERNAL MEDICINE

## 2024-03-23 PROCEDURE — 6370000000 HC RX 637 (ALT 250 FOR IP): Performed by: INTERNAL MEDICINE

## 2024-03-23 PROCEDURE — 1900000000 HC RM PRIVATE SNF

## 2024-03-23 RX ORDER — LACTOBACILLUS RHAMNOSUS GG 10B CELL
1 CAPSULE ORAL
Status: DISCONTINUED | OUTPATIENT
Start: 2024-03-23 | End: 2024-03-29 | Stop reason: HOSPADM

## 2024-03-23 RX ORDER — INSULIN LISPRO 100 [IU]/ML
0-16 INJECTION, SOLUTION INTRAVENOUS; SUBCUTANEOUS
Status: DISCONTINUED | OUTPATIENT
Start: 2024-03-23 | End: 2024-03-29 | Stop reason: HOSPADM

## 2024-03-23 RX ORDER — INSULIN LISPRO 100 [IU]/ML
0-4 INJECTION, SOLUTION INTRAVENOUS; SUBCUTANEOUS NIGHTLY
Status: DISCONTINUED | OUTPATIENT
Start: 2024-03-23 | End: 2024-03-29 | Stop reason: HOSPADM

## 2024-03-23 RX ADMIN — CLOPIDOGREL BISULFATE 75 MG: 75 TABLET ORAL at 09:49

## 2024-03-23 RX ADMIN — INSULIN LISPRO 12 UNITS: 100 INJECTION, SOLUTION INTRAVENOUS; SUBCUTANEOUS at 17:32

## 2024-03-23 RX ADMIN — CARVEDILOL 6.25 MG: 6.25 TABLET, FILM COATED ORAL at 17:33

## 2024-03-23 RX ADMIN — FUROSEMIDE 40 MG: 40 TABLET ORAL at 17:33

## 2024-03-23 RX ADMIN — Medication 5 MG: at 20:34

## 2024-03-23 RX ADMIN — WATER 2000 MG: 1 INJECTION INTRAMUSCULAR; INTRAVENOUS; SUBCUTANEOUS at 02:30

## 2024-03-23 RX ADMIN — PREGABALIN 75 MG: 50 CAPSULE ORAL at 20:35

## 2024-03-23 RX ADMIN — Medication 1 CAPSULE: at 13:40

## 2024-03-23 RX ADMIN — ASPIRIN 81 MG: 81 TABLET, CHEWABLE ORAL at 09:49

## 2024-03-23 RX ADMIN — TAMSULOSIN HYDROCHLORIDE 0.4 MG: 0.4 CAPSULE ORAL at 09:48

## 2024-03-23 RX ADMIN — PREGABALIN 75 MG: 50 CAPSULE ORAL at 13:37

## 2024-03-23 RX ADMIN — WATER 2000 MG: 1 INJECTION INTRAMUSCULAR; INTRAVENOUS; SUBCUTANEOUS at 09:47

## 2024-03-23 RX ADMIN — PANTOPRAZOLE SODIUM 40 MG: 40 TABLET, DELAYED RELEASE ORAL at 06:13

## 2024-03-23 RX ADMIN — ATORVASTATIN CALCIUM 40 MG: 40 TABLET, FILM COATED ORAL at 20:35

## 2024-03-23 RX ADMIN — SODIUM CHLORIDE, PRESERVATIVE FREE 10 ML: 5 INJECTION INTRAVENOUS at 13:39

## 2024-03-23 RX ADMIN — SODIUM CHLORIDE, PRESERVATIVE FREE 10 ML: 5 INJECTION INTRAVENOUS at 20:34

## 2024-03-23 RX ADMIN — PREGABALIN 75 MG: 50 CAPSULE ORAL at 09:48

## 2024-03-23 RX ADMIN — ENOXAPARIN SODIUM 30 MG: 100 INJECTION SUBCUTANEOUS at 20:34

## 2024-03-23 RX ADMIN — WATER 2000 MG: 1 INJECTION INTRAMUSCULAR; INTRAVENOUS; SUBCUTANEOUS at 17:33

## 2024-03-23 RX ADMIN — ENOXAPARIN SODIUM 30 MG: 100 INJECTION SUBCUTANEOUS at 09:47

## 2024-03-23 RX ADMIN — SPIRONOLACTONE 25 MG: 25 TABLET ORAL at 09:48

## 2024-03-23 RX ADMIN — CARVEDILOL 6.25 MG: 6.25 TABLET, FILM COATED ORAL at 09:49

## 2024-03-23 RX ADMIN — FUROSEMIDE 40 MG: 40 TABLET ORAL at 09:48

## 2024-03-23 NOTE — PROGRESS NOTES
PICC line RUE flushed well patent IV meds without difficulty thru line.Sitting up in chair. No complaints of pain. Wife to bring in home meds Tresiba and Synjardy, Pharmacy aware. R forearm peripheral line flushes well. Appetite good hydration good. Dressing intact R foot no strike thru drainage for change 3/24/24.

## 2024-03-24 LAB
GLUCOSE BLD STRIP.AUTO-MCNC: 170 MG/DL (ref 65–117)
GLUCOSE BLD STRIP.AUTO-MCNC: 178 MG/DL (ref 65–117)
GLUCOSE BLD STRIP.AUTO-MCNC: 217 MG/DL (ref 65–117)
GLUCOSE BLD STRIP.AUTO-MCNC: 237 MG/DL (ref 65–117)
SERVICE CMNT-IMP: ABNORMAL

## 2024-03-24 PROCEDURE — 6360000002 HC RX W HCPCS: Performed by: INTERNAL MEDICINE

## 2024-03-24 PROCEDURE — 1900000000 HC RM PRIVATE SNF

## 2024-03-24 PROCEDURE — 6370000000 HC RX 637 (ALT 250 FOR IP): Performed by: INTERNAL MEDICINE

## 2024-03-24 PROCEDURE — 2580000003 HC RX 258: Performed by: INTERNAL MEDICINE

## 2024-03-24 PROCEDURE — 82962 GLUCOSE BLOOD TEST: CPT

## 2024-03-24 RX ADMIN — FUROSEMIDE 40 MG: 40 TABLET ORAL at 17:45

## 2024-03-24 RX ADMIN — SACUBITRIL AND VALSARTAN 1 TABLET: 24; 26 TABLET, FILM COATED ORAL at 21:29

## 2024-03-24 RX ADMIN — TAMSULOSIN HYDROCHLORIDE 0.4 MG: 0.4 CAPSULE ORAL at 08:32

## 2024-03-24 RX ADMIN — ATORVASTATIN CALCIUM 40 MG: 40 TABLET, FILM COATED ORAL at 21:31

## 2024-03-24 RX ADMIN — PREGABALIN 75 MG: 50 CAPSULE ORAL at 08:32

## 2024-03-24 RX ADMIN — ENOXAPARIN SODIUM 30 MG: 100 INJECTION SUBCUTANEOUS at 08:31

## 2024-03-24 RX ADMIN — WATER 2000 MG: 1 INJECTION INTRAMUSCULAR; INTRAVENOUS; SUBCUTANEOUS at 10:02

## 2024-03-24 RX ADMIN — PANTOPRAZOLE SODIUM 40 MG: 40 TABLET, DELAYED RELEASE ORAL at 05:56

## 2024-03-24 RX ADMIN — CARVEDILOL 6.25 MG: 6.25 TABLET, FILM COATED ORAL at 08:32

## 2024-03-24 RX ADMIN — PREGABALIN 75 MG: 50 CAPSULE ORAL at 13:45

## 2024-03-24 RX ADMIN — WATER 2000 MG: 1 INJECTION INTRAMUSCULAR; INTRAVENOUS; SUBCUTANEOUS at 01:48

## 2024-03-24 RX ADMIN — CARVEDILOL 6.25 MG: 6.25 TABLET, FILM COATED ORAL at 17:45

## 2024-03-24 RX ADMIN — INSULIN LISPRO 4 UNITS: 100 INJECTION, SOLUTION INTRAVENOUS; SUBCUTANEOUS at 17:45

## 2024-03-24 RX ADMIN — ASPIRIN 81 MG: 81 TABLET, CHEWABLE ORAL at 08:32

## 2024-03-24 RX ADMIN — SPIRONOLACTONE 25 MG: 25 TABLET ORAL at 08:32

## 2024-03-24 RX ADMIN — Medication 1 CAPSULE: at 08:32

## 2024-03-24 RX ADMIN — INSULIN LISPRO 4 UNITS: 100 INJECTION, SOLUTION INTRAVENOUS; SUBCUTANEOUS at 12:08

## 2024-03-24 RX ADMIN — SODIUM CHLORIDE, PRESERVATIVE FREE 10 ML: 5 INJECTION INTRAVENOUS at 08:37

## 2024-03-24 RX ADMIN — FUROSEMIDE 40 MG: 40 TABLET ORAL at 08:32

## 2024-03-24 RX ADMIN — WATER 2000 MG: 1 INJECTION INTRAMUSCULAR; INTRAVENOUS; SUBCUTANEOUS at 17:46

## 2024-03-24 RX ADMIN — ENOXAPARIN SODIUM 30 MG: 100 INJECTION SUBCUTANEOUS at 21:31

## 2024-03-24 RX ADMIN — PREGABALIN 75 MG: 50 CAPSULE ORAL at 21:30

## 2024-03-24 RX ADMIN — CLOPIDOGREL BISULFATE 75 MG: 75 TABLET ORAL at 08:32

## 2024-03-24 RX ADMIN — SODIUM CHLORIDE, PRESERVATIVE FREE 5 ML: 5 INJECTION INTRAVENOUS at 21:29

## 2024-03-24 NOTE — PROGRESS NOTES
R foot dressing change tunnel tract between 2nd and 3rd toe with moderate sero sang drainage sutures intact top and underside of foot.Maceration at lacne wound. R arm PICC line patent for IV ABT. Heel weight bearing only. Daily Dressing change R Foot.

## 2024-03-24 NOTE — PROGRESS NOTES
[facility]  Hospitalist Progress Note    NAME: Joseph Seymour   :  1960   MRN:  [id number]     Total duration of encounter: 3 days      Interim Hospital Summary: 64 y.o. male who presented on 3/21/2024 with Osteomyelitis of right foot (HCC). He has a past medical history of CHF (congestive heart failure) (HCC), Diabetes mellitus (HCC), Hypertension, and MI (myocardial infarction) (HCC)..       Pt presents for TCU admission for continuation of antibiotics for tx of R foot osteomyelitis.  T2DM felt to be under improved control following adjustments in Lantus and Humalog CC.  Pt advised to maintain tx till 3/29, and to supplement yogurt or probiotic.  He needs wound care and Cephalexin 2g IV q8h given. He will f/u with Dr. Dixon  when able.  Also to follow with ID Dr. Brooks - has appt for      Wound care recommended included, change dressing q 48hr - due Friday PM     Subjective:     Chief Complaint / Reason for Physician Visit  \"fine\".  Discussed with RN   Has no pain in R foot due to neuropathy  PTA has stepped on plastic retainer / nail that went through his DM Shoe from Podiatry      Review of Systems:  Symptom Y/N Comments  Symptom Y/N Comments   Fever/Chills n   Chest Pain n    Poor Appetite n   Edema n    Cough n   Abdominal Pain n    Sputum n   Joint Pain n    SOB/GONZALEZ n   Pruritis/Rash n    Nausea/vomit n   Tolerating PT/OT y   Resume Mon   Diarrhea n   Tolerating Diet y    Constipation n   Other         Current Facility-Administered Medications:     lactobacillus (CULTURELLE) capsule 1 capsule, 1 capsule, Oral, Daily with breakfast, Jose Grace MD, 1 capsule at 24 0832    insulin lispro (HUMALOG) injection vial 0-16 Units, 0-16 Units, SubCUTAneous, TID WC, Jose Grace MD, 12 Units at 24 1732    insulin lispro (HUMALOG) injection vial 0-4 Units, 0-4 Units, SubCUTAneous, Nightly, Jose Grace MD    Tresiba Insulin Degludec SOPN 30 Units (patient

## 2024-03-25 LAB
ALBUMIN SERPL-MCNC: 2.8 G/DL (ref 3.5–5)
ALBUMIN/GLOB SERPL: 0.6 (ref 1.1–2.2)
ALP SERPL-CCNC: 73 U/L (ref 45–117)
ALT SERPL-CCNC: 17 U/L (ref 12–78)
ANION GAP SERPL CALC-SCNC: 10 MMOL/L (ref 5–15)
AST SERPL-CCNC: 14 U/L (ref 15–37)
BILIRUB SERPL-MCNC: 0.6 MG/DL (ref 0.2–1)
BUN SERPL-MCNC: 18 MG/DL (ref 6–20)
BUN/CREAT SERPL: 16 (ref 12–20)
CALCIUM SERPL-MCNC: 9.3 MG/DL (ref 8.5–10.1)
CHLORIDE SERPL-SCNC: 100 MMOL/L (ref 97–108)
CO2 SERPL-SCNC: 31 MMOL/L (ref 21–32)
CREAT SERPL-MCNC: 1.16 MG/DL (ref 0.7–1.3)
CRP SERPL-MCNC: 1.94 MG/DL (ref 0–0.3)
ERYTHROCYTE [DISTWIDTH] IN BLOOD BY AUTOMATED COUNT: 14.4 % (ref 11.5–14.5)
GLOBULIN SER CALC-MCNC: 4.7 G/DL (ref 2–4)
GLUCOSE BLD STRIP.AUTO-MCNC: 165 MG/DL (ref 65–117)
GLUCOSE BLD STRIP.AUTO-MCNC: 170 MG/DL (ref 65–117)
GLUCOSE BLD STRIP.AUTO-MCNC: 191 MG/DL (ref 65–117)
GLUCOSE BLD STRIP.AUTO-MCNC: 211 MG/DL (ref 65–117)
GLUCOSE SERPL-MCNC: 153 MG/DL (ref 65–100)
HCT VFR BLD AUTO: 43.6 % (ref 36.6–50.3)
HGB BLD-MCNC: 13.8 G/DL (ref 12.1–17)
MAGNESIUM SERPL-MCNC: 1.9 MG/DL (ref 1.6–2.4)
MCH RBC QN AUTO: 26.7 PG (ref 26–34)
MCHC RBC AUTO-ENTMCNC: 31.7 G/DL (ref 30–36.5)
MCV RBC AUTO: 84.3 FL (ref 80–99)
NRBC # BLD: 0 K/UL (ref 0–0.01)
NRBC BLD-RTO: 0 PER 100 WBC
PLATELET # BLD AUTO: 245 K/UL (ref 150–400)
PMV BLD AUTO: 9.8 FL (ref 8.9–12.9)
POTASSIUM SERPL-SCNC: 3.7 MMOL/L (ref 3.5–5.1)
PROT SERPL-MCNC: 7.5 G/DL (ref 6.4–8.2)
RBC # BLD AUTO: 5.17 M/UL (ref 4.1–5.7)
SERVICE CMNT-IMP: ABNORMAL
SODIUM SERPL-SCNC: 141 MMOL/L (ref 136–145)
WBC # BLD AUTO: 10.5 K/UL (ref 4.1–11.1)

## 2024-03-25 PROCEDURE — 36415 COLL VENOUS BLD VENIPUNCTURE: CPT

## 2024-03-25 PROCEDURE — 6360000002 HC RX W HCPCS: Performed by: INTERNAL MEDICINE

## 2024-03-25 PROCEDURE — 80053 COMPREHEN METABOLIC PANEL: CPT

## 2024-03-25 PROCEDURE — 85027 COMPLETE CBC AUTOMATED: CPT

## 2024-03-25 PROCEDURE — 1900000000 HC RM PRIVATE SNF

## 2024-03-25 PROCEDURE — 86140 C-REACTIVE PROTEIN: CPT

## 2024-03-25 PROCEDURE — 6370000000 HC RX 637 (ALT 250 FOR IP): Performed by: INTERNAL MEDICINE

## 2024-03-25 PROCEDURE — 2580000003 HC RX 258: Performed by: INTERNAL MEDICINE

## 2024-03-25 PROCEDURE — 82962 GLUCOSE BLOOD TEST: CPT

## 2024-03-25 PROCEDURE — 6370000000 HC RX 637 (ALT 250 FOR IP): Performed by: STUDENT IN AN ORGANIZED HEALTH CARE EDUCATION/TRAINING PROGRAM

## 2024-03-25 PROCEDURE — 83735 ASSAY OF MAGNESIUM: CPT

## 2024-03-25 RX ORDER — INSULIN GLARGINE 100 [IU]/ML
32 INJECTION, SOLUTION SUBCUTANEOUS NIGHTLY
Status: DISCONTINUED | OUTPATIENT
Start: 2024-03-25 | End: 2024-03-26

## 2024-03-25 RX ADMIN — WATER 2000 MG: 1 INJECTION INTRAMUSCULAR; INTRAVENOUS; SUBCUTANEOUS at 09:31

## 2024-03-25 RX ADMIN — Medication 1 CAPSULE: at 08:39

## 2024-03-25 RX ADMIN — SODIUM CHLORIDE, PRESERVATIVE FREE 10 ML: 5 INJECTION INTRAVENOUS at 20:35

## 2024-03-25 RX ADMIN — FUROSEMIDE 40 MG: 40 TABLET ORAL at 16:49

## 2024-03-25 RX ADMIN — ASPIRIN 81 MG: 81 TABLET, CHEWABLE ORAL at 08:42

## 2024-03-25 RX ADMIN — CARVEDILOL 6.25 MG: 6.25 TABLET, FILM COATED ORAL at 08:50

## 2024-03-25 RX ADMIN — CARVEDILOL 6.25 MG: 6.25 TABLET, FILM COATED ORAL at 16:49

## 2024-03-25 RX ADMIN — PANTOPRAZOLE SODIUM 40 MG: 40 TABLET, DELAYED RELEASE ORAL at 06:08

## 2024-03-25 RX ADMIN — ACETAMINOPHEN 650 MG: 325 TABLET ORAL at 18:33

## 2024-03-25 RX ADMIN — TAMSULOSIN HYDROCHLORIDE 0.4 MG: 0.4 CAPSULE ORAL at 08:41

## 2024-03-25 RX ADMIN — SPIRONOLACTONE 25 MG: 25 TABLET ORAL at 08:50

## 2024-03-25 RX ADMIN — WATER 2000 MG: 1 INJECTION INTRAMUSCULAR; INTRAVENOUS; SUBCUTANEOUS at 01:32

## 2024-03-25 RX ADMIN — PREGABALIN 75 MG: 50 CAPSULE ORAL at 08:40

## 2024-03-25 RX ADMIN — PREGABALIN 75 MG: 50 CAPSULE ORAL at 14:35

## 2024-03-25 RX ADMIN — SODIUM CHLORIDE, PRESERVATIVE FREE 10 ML: 5 INJECTION INTRAVENOUS at 08:43

## 2024-03-25 RX ADMIN — CLOPIDOGREL BISULFATE 75 MG: 75 TABLET ORAL at 08:40

## 2024-03-25 RX ADMIN — ATORVASTATIN CALCIUM 40 MG: 40 TABLET, FILM COATED ORAL at 20:25

## 2024-03-25 RX ADMIN — WATER 2000 MG: 1 INJECTION INTRAMUSCULAR; INTRAVENOUS; SUBCUTANEOUS at 17:44

## 2024-03-25 RX ADMIN — FUROSEMIDE 40 MG: 40 TABLET ORAL at 08:50

## 2024-03-25 RX ADMIN — Medication 5 MG: at 20:25

## 2024-03-25 RX ADMIN — SACUBITRIL AND VALSARTAN 1 TABLET: 24; 26 TABLET, FILM COATED ORAL at 20:26

## 2024-03-25 RX ADMIN — ACETAMINOPHEN 650 MG: 325 TABLET ORAL at 08:41

## 2024-03-25 RX ADMIN — PREGABALIN 75 MG: 50 CAPSULE ORAL at 20:25

## 2024-03-25 RX ADMIN — SACUBITRIL AND VALSARTAN 1 TABLET: 24; 26 TABLET, FILM COATED ORAL at 08:55

## 2024-03-25 RX ADMIN — ENOXAPARIN SODIUM 30 MG: 100 INJECTION SUBCUTANEOUS at 08:39

## 2024-03-25 RX ADMIN — ENOXAPARIN SODIUM 30 MG: 100 INJECTION SUBCUTANEOUS at 20:25

## 2024-03-25 ASSESSMENT — PAIN DESCRIPTION - DESCRIPTORS
DESCRIPTORS: ACHING
DESCRIPTORS: ACHING

## 2024-03-25 ASSESSMENT — PAIN DESCRIPTION - LOCATION
LOCATION: BACK
LOCATION: BACK

## 2024-03-25 ASSESSMENT — PAIN DESCRIPTION - ORIENTATION
ORIENTATION: LEFT
ORIENTATION: LEFT

## 2024-03-25 ASSESSMENT — PAIN SCALES - GENERAL
PAINLEVEL_OUTOF10: 0
PAINLEVEL_OUTOF10: 3
PAINLEVEL_OUTOF10: 4

## 2024-03-25 ASSESSMENT — PAIN - FUNCTIONAL ASSESSMENT
PAIN_FUNCTIONAL_ASSESSMENT: ACTIVITIES ARE NOT PREVENTED
PAIN_FUNCTIONAL_ASSESSMENT: ACTIVITIES ARE NOT PREVENTED

## 2024-03-25 NOTE — PROGRESS NOTES
1333: Old dressing removed, wound care completed as ordered, new dressings applied with initials, date and time.

## 2024-03-26 LAB
GLUCOSE BLD STRIP.AUTO-MCNC: 151 MG/DL (ref 65–117)
GLUCOSE BLD STRIP.AUTO-MCNC: 173 MG/DL (ref 65–117)
GLUCOSE BLD STRIP.AUTO-MCNC: 192 MG/DL (ref 65–117)
GLUCOSE BLD STRIP.AUTO-MCNC: 220 MG/DL (ref 65–117)
SERVICE CMNT-IMP: ABNORMAL

## 2024-03-26 PROCEDURE — 6370000000 HC RX 637 (ALT 250 FOR IP): Performed by: INTERNAL MEDICINE

## 2024-03-26 PROCEDURE — 2580000003 HC RX 258: Performed by: INTERNAL MEDICINE

## 2024-03-26 PROCEDURE — 6360000002 HC RX W HCPCS: Performed by: INTERNAL MEDICINE

## 2024-03-26 PROCEDURE — 1900000000 HC RM PRIVATE SNF

## 2024-03-26 PROCEDURE — 82962 GLUCOSE BLOOD TEST: CPT

## 2024-03-26 RX ORDER — LIDOCAINE 4 G/G
1 PATCH TOPICAL DAILY
Status: DISCONTINUED | OUTPATIENT
Start: 2024-03-26 | End: 2024-03-29 | Stop reason: HOSPADM

## 2024-03-26 RX ADMIN — SODIUM CHLORIDE, PRESERVATIVE FREE 10 ML: 5 INJECTION INTRAVENOUS at 08:32

## 2024-03-26 RX ADMIN — WATER 2000 MG: 1 INJECTION INTRAMUSCULAR; INTRAVENOUS; SUBCUTANEOUS at 18:47

## 2024-03-26 RX ADMIN — SPIRONOLACTONE 25 MG: 25 TABLET ORAL at 08:25

## 2024-03-26 RX ADMIN — SODIUM CHLORIDE, PRESERVATIVE FREE 10 ML: 5 INJECTION INTRAVENOUS at 20:43

## 2024-03-26 RX ADMIN — PREGABALIN 75 MG: 50 CAPSULE ORAL at 20:44

## 2024-03-26 RX ADMIN — ASPIRIN 81 MG: 81 TABLET, CHEWABLE ORAL at 08:24

## 2024-03-26 RX ADMIN — ACETAMINOPHEN 650 MG: 325 TABLET ORAL at 06:43

## 2024-03-26 RX ADMIN — WATER 2000 MG: 1 INJECTION INTRAMUSCULAR; INTRAVENOUS; SUBCUTANEOUS at 02:15

## 2024-03-26 RX ADMIN — PANTOPRAZOLE SODIUM 40 MG: 40 TABLET, DELAYED RELEASE ORAL at 06:36

## 2024-03-26 RX ADMIN — CARVEDILOL 6.25 MG: 6.25 TABLET, FILM COATED ORAL at 17:42

## 2024-03-26 RX ADMIN — CLOPIDOGREL BISULFATE 75 MG: 75 TABLET ORAL at 08:25

## 2024-03-26 RX ADMIN — SACUBITRIL AND VALSARTAN 1 TABLET: 24; 26 TABLET, FILM COATED ORAL at 20:44

## 2024-03-26 RX ADMIN — Medication 1 CAPSULE: at 08:28

## 2024-03-26 RX ADMIN — PREGABALIN 75 MG: 50 CAPSULE ORAL at 08:24

## 2024-03-26 RX ADMIN — WATER 2000 MG: 1 INJECTION INTRAMUSCULAR; INTRAVENOUS; SUBCUTANEOUS at 10:21

## 2024-03-26 RX ADMIN — ATORVASTATIN CALCIUM 40 MG: 40 TABLET, FILM COATED ORAL at 20:44

## 2024-03-26 RX ADMIN — PREGABALIN 75 MG: 50 CAPSULE ORAL at 14:55

## 2024-03-26 RX ADMIN — TAMSULOSIN HYDROCHLORIDE 0.4 MG: 0.4 CAPSULE ORAL at 08:25

## 2024-03-26 RX ADMIN — ENOXAPARIN SODIUM 30 MG: 100 INJECTION SUBCUTANEOUS at 08:24

## 2024-03-26 RX ADMIN — ACETAMINOPHEN 650 MG: 325 TABLET ORAL at 14:55

## 2024-03-26 RX ADMIN — SACUBITRIL AND VALSARTAN 1 TABLET: 24; 26 TABLET, FILM COATED ORAL at 08:29

## 2024-03-26 RX ADMIN — ENOXAPARIN SODIUM 30 MG: 100 INJECTION SUBCUTANEOUS at 20:44

## 2024-03-26 RX ADMIN — CARVEDILOL 6.25 MG: 6.25 TABLET, FILM COATED ORAL at 08:25

## 2024-03-26 RX ADMIN — FUROSEMIDE 40 MG: 40 TABLET ORAL at 08:25

## 2024-03-26 RX ADMIN — FUROSEMIDE 40 MG: 40 TABLET ORAL at 17:42

## 2024-03-26 ASSESSMENT — PAIN DESCRIPTION - ORIENTATION
ORIENTATION: POSTERIOR
ORIENTATION: POSTERIOR
ORIENTATION: LOWER

## 2024-03-26 ASSESSMENT — PAIN SCALES - GENERAL
PAINLEVEL_OUTOF10: 3
PAINLEVEL_OUTOF10: 0
PAINLEVEL_OUTOF10: 0
PAINLEVEL_OUTOF10: 2
PAINLEVEL_OUTOF10: 4
PAINLEVEL_OUTOF10: 2

## 2024-03-26 ASSESSMENT — PAIN DESCRIPTION - DESCRIPTORS
DESCRIPTORS: DISCOMFORT
DESCRIPTORS: ACHING
DESCRIPTORS: DISCOMFORT

## 2024-03-26 ASSESSMENT — PAIN DESCRIPTION - LOCATION
LOCATION: BACK

## 2024-03-26 ASSESSMENT — PAIN - FUNCTIONAL ASSESSMENT: PAIN_FUNCTIONAL_ASSESSMENT: ACTIVITIES ARE NOT PREVENTED

## 2024-03-26 NOTE — PROGRESS NOTES
Called pharmacy to verify instructions for a new order that was placed for patient to take Lantus 32 units that was put in place of patient's Tresiba. Patient has brought in their own Tresiba from home being that we do not carry this medication and pharmacy was made aware. Pharmacist stated that the 32 units of Lantus was only placed because we do not carry Tresiba and was unaware that patient had supplied their own and will remove the order and the new order will reflect the patient's current dose of Tresiba 40 units nightly and that the pharmacy here will be notified of this change in the morning to verify.

## 2024-03-26 NOTE — PROGRESS NOTES
Comprehensive Nutrition Assessment    Type and Reason for Visit:  Initial    Nutrition Recommendations/Plan:   Regular 3 carb diet   Glucerna 1x day      Malnutrition Assessment:  Malnutrition Status:  Mild malnutrition (03/26/24 1447)    Context:  Acute Illness     Findings of the 6 clinical characteristics of malnutrition:  Energy Intake:  No significant decrease in energy intake  Weight Loss:  Greater than 2% over 1 week     Body Fat Loss:  No significant body fat loss     Muscle Mass Loss:  No significant muscle mass loss    Fluid Accumulation:  No significant fluid accumulation     Strength:  Not Performed    Nutrition Assessment:  Present on Admission:   Diabetic ulcer of lower extremity (HCC)   Osteomyelitis of right foot (HCC)   Coronary artery disease due to lipid rich plaque   HTN (hypertension)   BPH (benign prostatic hyperplasia)   Peripheral neuropathy    Pt admitted with above. He has good po intake, likes the food here. He has resolving edema currently RLE non-pitting and LLE trace, he is also on diuretics which could have contributed to his wt loss of 7# in 1-2 wks 2.9%. His blood sugars 151-312 and pt on insulin. He is open to continue to have glucerna for wound healing.      Nutrition Related Findings:      Wound Type: Diabetic Ulcer, Surgical Incision       Current Nutrition Intake & Therapies:    Average Meal Intake: %  Average Supplements Intake: 51-75%  ADULT ORAL NUTRITION SUPPLEMENT; Breakfast; Diabetic Oral Supplement  ADULT DIET; Regular; 3 carb choices (45 gm/meal)  Patient Vitals for the past 96 hrs:   PO Meals Eaten (%)   03/26/24 0905 76 - 100%   03/25/24 1730 76 - 100%   03/25/24 1210 76 - 100%   03/25/24 0843 76 - 100%       Anthropometric Measures:  Height: 177.8 cm (5' 10\")  Ideal Body Weight (IBW): 166 lbs (75 kg)    Current Body Weight: 107.5 kg (236 lb 15.9 oz), 142.8 % IBW.    Current BMI (kg/m2): 34  BMI Categories: Obese Class 1 (BMI 30.0-34.9)      3/21/2024

## 2024-03-26 NOTE — PROGRESS NOTES
Hospitalist Progress      Continue antibiotic through 3/29 (Ancef 2g q8h)  Labs today reviewed   -  to fax to Dr. Harry     Follow BS qid ac/hs   this AM  Will adjust hs Tresiba from 35 to 40u ( as taken PTA0  Cont Synjardy XR bid  Cont CC Humalog (high dose CC Algorithm)

## 2024-03-27 LAB
BACTERIA SPEC CULT: NORMAL
BACTERIA SPEC CULT: NORMAL
GLUCOSE BLD STRIP.AUTO-MCNC: 139 MG/DL (ref 65–117)
GLUCOSE BLD STRIP.AUTO-MCNC: 176 MG/DL (ref 65–117)
GLUCOSE BLD STRIP.AUTO-MCNC: 177 MG/DL (ref 65–117)
GLUCOSE BLD STRIP.AUTO-MCNC: 188 MG/DL (ref 65–117)
SERVICE CMNT-IMP: ABNORMAL
SERVICE CMNT-IMP: NORMAL
SERVICE CMNT-IMP: NORMAL

## 2024-03-27 PROCEDURE — 1900000000 HC RM PRIVATE SNF

## 2024-03-27 PROCEDURE — 6360000002 HC RX W HCPCS: Performed by: INTERNAL MEDICINE

## 2024-03-27 PROCEDURE — 6370000000 HC RX 637 (ALT 250 FOR IP): Performed by: STUDENT IN AN ORGANIZED HEALTH CARE EDUCATION/TRAINING PROGRAM

## 2024-03-27 PROCEDURE — 6370000000 HC RX 637 (ALT 250 FOR IP): Performed by: INTERNAL MEDICINE

## 2024-03-27 PROCEDURE — 2580000003 HC RX 258: Performed by: INTERNAL MEDICINE

## 2024-03-27 PROCEDURE — 82962 GLUCOSE BLOOD TEST: CPT

## 2024-03-27 RX ADMIN — FUROSEMIDE 40 MG: 40 TABLET ORAL at 16:17

## 2024-03-27 RX ADMIN — CARVEDILOL 6.25 MG: 6.25 TABLET, FILM COATED ORAL at 16:17

## 2024-03-27 RX ADMIN — SPIRONOLACTONE 25 MG: 25 TABLET ORAL at 09:39

## 2024-03-27 RX ADMIN — ATORVASTATIN CALCIUM 40 MG: 40 TABLET, FILM COATED ORAL at 21:37

## 2024-03-27 RX ADMIN — Medication 5 MG: at 21:37

## 2024-03-27 RX ADMIN — SACUBITRIL AND VALSARTAN 1 TABLET: 24; 26 TABLET, FILM COATED ORAL at 09:39

## 2024-03-27 RX ADMIN — ASPIRIN 81 MG: 81 TABLET, CHEWABLE ORAL at 08:43

## 2024-03-27 RX ADMIN — SODIUM CHLORIDE, PRESERVATIVE FREE 10 ML: 5 INJECTION INTRAVENOUS at 21:43

## 2024-03-27 RX ADMIN — PANTOPRAZOLE SODIUM 40 MG: 40 TABLET, DELAYED RELEASE ORAL at 06:15

## 2024-03-27 RX ADMIN — CARVEDILOL 6.25 MG: 6.25 TABLET, FILM COATED ORAL at 09:39

## 2024-03-27 RX ADMIN — ENOXAPARIN SODIUM 30 MG: 100 INJECTION SUBCUTANEOUS at 21:37

## 2024-03-27 RX ADMIN — WATER 2000 MG: 1 INJECTION INTRAMUSCULAR; INTRAVENOUS; SUBCUTANEOUS at 02:06

## 2024-03-27 RX ADMIN — PREGABALIN 75 MG: 50 CAPSULE ORAL at 14:08

## 2024-03-27 RX ADMIN — WATER 2000 MG: 1 INJECTION INTRAMUSCULAR; INTRAVENOUS; SUBCUTANEOUS at 18:31

## 2024-03-27 RX ADMIN — PREGABALIN 75 MG: 50 CAPSULE ORAL at 21:37

## 2024-03-27 RX ADMIN — PREGABALIN 75 MG: 50 CAPSULE ORAL at 08:42

## 2024-03-27 RX ADMIN — ENOXAPARIN SODIUM 30 MG: 100 INJECTION SUBCUTANEOUS at 08:42

## 2024-03-27 RX ADMIN — CLOPIDOGREL BISULFATE 75 MG: 75 TABLET ORAL at 08:43

## 2024-03-27 RX ADMIN — WATER 2000 MG: 1 INJECTION INTRAMUSCULAR; INTRAVENOUS; SUBCUTANEOUS at 08:44

## 2024-03-27 RX ADMIN — Medication 1 CAPSULE: at 08:43

## 2024-03-27 RX ADMIN — FUROSEMIDE 40 MG: 40 TABLET ORAL at 09:39

## 2024-03-27 RX ADMIN — SODIUM CHLORIDE, PRESERVATIVE FREE 7 ML: 5 INJECTION INTRAVENOUS at 09:39

## 2024-03-27 RX ADMIN — TAMSULOSIN HYDROCHLORIDE 0.4 MG: 0.4 CAPSULE ORAL at 08:44

## 2024-03-28 LAB
BASOPHILS # BLD: 0.1 K/UL (ref 0–0.1)
BASOPHILS NFR BLD: 1 % (ref 0–1)
DIFFERENTIAL METHOD BLD: NORMAL
EOSINOPHIL # BLD: 0.2 K/UL (ref 0–0.4)
EOSINOPHIL NFR BLD: 2 % (ref 0–7)
ERYTHROCYTE [DISTWIDTH] IN BLOOD BY AUTOMATED COUNT: 14.4 % (ref 11.5–14.5)
ERYTHROCYTE [SEDIMENTATION RATE] IN BLOOD: 11 MM/HR (ref 0–20)
GLUCOSE BLD STRIP.AUTO-MCNC: 155 MG/DL (ref 65–117)
GLUCOSE BLD STRIP.AUTO-MCNC: 170 MG/DL (ref 65–117)
GLUCOSE BLD STRIP.AUTO-MCNC: 197 MG/DL (ref 65–117)
GLUCOSE BLD STRIP.AUTO-MCNC: 222 MG/DL (ref 65–117)
HCT VFR BLD AUTO: 44.8 % (ref 36.6–50.3)
HGB BLD-MCNC: 14.2 G/DL (ref 12.1–17)
IMM GRANULOCYTES # BLD AUTO: 0 K/UL (ref 0–0.04)
IMM GRANULOCYTES NFR BLD AUTO: 0 % (ref 0–0.5)
LYMPHOCYTES # BLD: 2 K/UL (ref 0.8–3.5)
LYMPHOCYTES NFR BLD: 24 % (ref 12–49)
MCH RBC QN AUTO: 26.8 PG (ref 26–34)
MCHC RBC AUTO-ENTMCNC: 31.7 G/DL (ref 30–36.5)
MCV RBC AUTO: 84.7 FL (ref 80–99)
MONOCYTES # BLD: 0.6 K/UL (ref 0–1)
MONOCYTES NFR BLD: 7 % (ref 5–13)
NEUTS SEG # BLD: 5.5 K/UL (ref 1.8–8)
NEUTS SEG NFR BLD: 66 % (ref 32–75)
NRBC # BLD: 0 K/UL (ref 0–0.01)
NRBC BLD-RTO: 0 PER 100 WBC
PLATELET # BLD AUTO: 296 K/UL (ref 150–400)
PMV BLD AUTO: 9.5 FL (ref 8.9–12.9)
RBC # BLD AUTO: 5.29 M/UL (ref 4.1–5.7)
SERVICE CMNT-IMP: ABNORMAL
WBC # BLD AUTO: 8.5 K/UL (ref 4.1–11.1)

## 2024-03-28 PROCEDURE — 86140 C-REACTIVE PROTEIN: CPT

## 2024-03-28 PROCEDURE — 2580000003 HC RX 258: Performed by: INTERNAL MEDICINE

## 2024-03-28 PROCEDURE — 85652 RBC SED RATE AUTOMATED: CPT

## 2024-03-28 PROCEDURE — 1900000000 HC RM PRIVATE SNF

## 2024-03-28 PROCEDURE — 36415 COLL VENOUS BLD VENIPUNCTURE: CPT

## 2024-03-28 PROCEDURE — 82962 GLUCOSE BLOOD TEST: CPT

## 2024-03-28 PROCEDURE — 6370000000 HC RX 637 (ALT 250 FOR IP): Performed by: INTERNAL MEDICINE

## 2024-03-28 PROCEDURE — 6370000000 HC RX 637 (ALT 250 FOR IP): Performed by: STUDENT IN AN ORGANIZED HEALTH CARE EDUCATION/TRAINING PROGRAM

## 2024-03-28 PROCEDURE — 85025 COMPLETE CBC W/AUTO DIFF WBC: CPT

## 2024-03-28 PROCEDURE — 6360000002 HC RX W HCPCS: Performed by: INTERNAL MEDICINE

## 2024-03-28 RX ADMIN — CARVEDILOL 6.25 MG: 6.25 TABLET, FILM COATED ORAL at 17:00

## 2024-03-28 RX ADMIN — PREGABALIN 75 MG: 50 CAPSULE ORAL at 09:52

## 2024-03-28 RX ADMIN — ENOXAPARIN SODIUM 30 MG: 100 INJECTION SUBCUTANEOUS at 20:46

## 2024-03-28 RX ADMIN — WATER 2000 MG: 1 INJECTION INTRAMUSCULAR; INTRAVENOUS; SUBCUTANEOUS at 18:07

## 2024-03-28 RX ADMIN — Medication 1 CAPSULE: at 09:53

## 2024-03-28 RX ADMIN — SODIUM CHLORIDE, PRESERVATIVE FREE 10 ML: 5 INJECTION INTRAVENOUS at 20:52

## 2024-03-28 RX ADMIN — TAMSULOSIN HYDROCHLORIDE 0.4 MG: 0.4 CAPSULE ORAL at 09:53

## 2024-03-28 RX ADMIN — WATER 2000 MG: 1 INJECTION INTRAMUSCULAR; INTRAVENOUS; SUBCUTANEOUS at 09:54

## 2024-03-28 RX ADMIN — ATORVASTATIN CALCIUM 40 MG: 40 TABLET, FILM COATED ORAL at 20:47

## 2024-03-28 RX ADMIN — SODIUM CHLORIDE, PRESERVATIVE FREE 10 ML: 5 INJECTION INTRAVENOUS at 09:48

## 2024-03-28 RX ADMIN — WATER 2000 MG: 1 INJECTION INTRAMUSCULAR; INTRAVENOUS; SUBCUTANEOUS at 02:59

## 2024-03-28 RX ADMIN — PREGABALIN 75 MG: 50 CAPSULE ORAL at 14:20

## 2024-03-28 RX ADMIN — Medication 5 MG: at 20:46

## 2024-03-28 RX ADMIN — SACUBITRIL AND VALSARTAN 1 TABLET: 24; 26 TABLET, FILM COATED ORAL at 09:50

## 2024-03-28 RX ADMIN — PREGABALIN 75 MG: 50 CAPSULE ORAL at 20:47

## 2024-03-28 RX ADMIN — ASPIRIN 81 MG: 81 TABLET, CHEWABLE ORAL at 09:52

## 2024-03-28 RX ADMIN — SACUBITRIL AND VALSARTAN 1 TABLET: 24; 26 TABLET, FILM COATED ORAL at 20:47

## 2024-03-28 RX ADMIN — CLOPIDOGREL BISULFATE 75 MG: 75 TABLET ORAL at 09:52

## 2024-03-28 RX ADMIN — FUROSEMIDE 40 MG: 40 TABLET ORAL at 09:53

## 2024-03-28 RX ADMIN — FUROSEMIDE 40 MG: 40 TABLET ORAL at 17:00

## 2024-03-28 RX ADMIN — SPIRONOLACTONE 25 MG: 25 TABLET ORAL at 09:52

## 2024-03-28 RX ADMIN — ENOXAPARIN SODIUM 30 MG: 100 INJECTION SUBCUTANEOUS at 09:52

## 2024-03-28 RX ADMIN — CARVEDILOL 6.25 MG: 6.25 TABLET, FILM COATED ORAL at 09:54

## 2024-03-28 ASSESSMENT — PAIN SCALES - GENERAL: PAINLEVEL_OUTOF10: 0

## 2024-03-28 NOTE — PROGRESS NOTES
1406: Wound care completed as ordered. New dressing applied, initials, date and time is applied to dressing.

## 2024-03-29 VITALS
SYSTOLIC BLOOD PRESSURE: 115 MMHG | BODY MASS INDEX: 33.93 KG/M2 | OXYGEN SATURATION: 95 % | HEART RATE: 70 BPM | HEIGHT: 70 IN | DIASTOLIC BLOOD PRESSURE: 71 MMHG | WEIGHT: 237 LBS | RESPIRATION RATE: 16 BRPM | TEMPERATURE: 97.5 F

## 2024-03-29 LAB
ANION GAP SERPL CALC-SCNC: 9 MMOL/L (ref 5–15)
BASOPHILS # BLD: 0.1 K/UL (ref 0–0.1)
BASOPHILS NFR BLD: 1 % (ref 0–1)
BUN SERPL-MCNC: 20 MG/DL (ref 6–20)
BUN/CREAT SERPL: 19 (ref 12–20)
CALCIUM SERPL-MCNC: 9 MG/DL (ref 8.5–10.1)
CHLORIDE SERPL-SCNC: 100 MMOL/L (ref 97–108)
CO2 SERPL-SCNC: 30 MMOL/L (ref 21–32)
CREAT SERPL-MCNC: 1.08 MG/DL (ref 0.7–1.3)
CRP SERPL-MCNC: 1.8 MG/DL (ref 0–0.3)
DIFFERENTIAL METHOD BLD: NORMAL
EOSINOPHIL # BLD: 0.2 K/UL (ref 0–0.4)
EOSINOPHIL NFR BLD: 2 % (ref 0–7)
ERYTHROCYTE [DISTWIDTH] IN BLOOD BY AUTOMATED COUNT: 14.1 % (ref 11.5–14.5)
GLUCOSE BLD STRIP.AUTO-MCNC: 186 MG/DL (ref 65–117)
GLUCOSE BLD STRIP.AUTO-MCNC: 204 MG/DL (ref 65–117)
GLUCOSE SERPL-MCNC: 189 MG/DL (ref 65–100)
HCT VFR BLD AUTO: 42.9 % (ref 36.6–50.3)
HGB BLD-MCNC: 14 G/DL (ref 12.1–17)
IMM GRANULOCYTES # BLD AUTO: 0 K/UL (ref 0–0.04)
IMM GRANULOCYTES NFR BLD AUTO: 0 % (ref 0–0.5)
LYMPHOCYTES # BLD: 2 K/UL (ref 0.8–3.5)
LYMPHOCYTES NFR BLD: 24 % (ref 12–49)
MCH RBC QN AUTO: 27.3 PG (ref 26–34)
MCHC RBC AUTO-ENTMCNC: 32.6 G/DL (ref 30–36.5)
MCV RBC AUTO: 83.6 FL (ref 80–99)
MONOCYTES # BLD: 0.6 K/UL (ref 0–1)
MONOCYTES NFR BLD: 8 % (ref 5–13)
NEUTS SEG # BLD: 5.4 K/UL (ref 1.8–8)
NEUTS SEG NFR BLD: 65 % (ref 32–75)
NRBC # BLD: 0 K/UL (ref 0–0.01)
NRBC BLD-RTO: 0 PER 100 WBC
PLATELET # BLD AUTO: 293 K/UL (ref 150–400)
PMV BLD AUTO: 9.6 FL (ref 8.9–12.9)
POTASSIUM SERPL-SCNC: 3.5 MMOL/L (ref 3.5–5.1)
RBC # BLD AUTO: 5.13 M/UL (ref 4.1–5.7)
SERVICE CMNT-IMP: ABNORMAL
SERVICE CMNT-IMP: ABNORMAL
SODIUM SERPL-SCNC: 139 MMOL/L (ref 136–145)
WBC # BLD AUTO: 8.3 K/UL (ref 4.1–11.1)

## 2024-03-29 PROCEDURE — 80048 BASIC METABOLIC PNL TOTAL CA: CPT

## 2024-03-29 PROCEDURE — 2580000003 HC RX 258: Performed by: INTERNAL MEDICINE

## 2024-03-29 PROCEDURE — 36415 COLL VENOUS BLD VENIPUNCTURE: CPT

## 2024-03-29 PROCEDURE — 6360000002 HC RX W HCPCS: Performed by: INTERNAL MEDICINE

## 2024-03-29 PROCEDURE — 82962 GLUCOSE BLOOD TEST: CPT

## 2024-03-29 PROCEDURE — 6370000000 HC RX 637 (ALT 250 FOR IP): Performed by: INTERNAL MEDICINE

## 2024-03-29 PROCEDURE — 85025 COMPLETE CBC W/AUTO DIFF WBC: CPT

## 2024-03-29 RX ORDER — SPIRONOLACTONE 25 MG/1
25 TABLET ORAL DAILY
Qty: 30 TABLET | Refills: 0 | Status: SHIPPED | OUTPATIENT
Start: 2024-03-30

## 2024-03-29 RX ORDER — CARVEDILOL 6.25 MG/1
6.25 TABLET ORAL 2 TIMES DAILY WITH MEALS
Qty: 60 TABLET | Refills: 0 | Status: SHIPPED | OUTPATIENT
Start: 2024-03-29

## 2024-03-29 RX ADMIN — SODIUM CHLORIDE, PRESERVATIVE FREE 10 ML: 5 INJECTION INTRAVENOUS at 08:24

## 2024-03-29 RX ADMIN — ENOXAPARIN SODIUM 30 MG: 100 INJECTION SUBCUTANEOUS at 08:23

## 2024-03-29 RX ADMIN — Medication 1 CAPSULE: at 08:22

## 2024-03-29 RX ADMIN — WATER 2000 MG: 1 INJECTION INTRAMUSCULAR; INTRAVENOUS; SUBCUTANEOUS at 04:43

## 2024-03-29 RX ADMIN — SACUBITRIL AND VALSARTAN 1 TABLET: 24; 26 TABLET, FILM COATED ORAL at 08:21

## 2024-03-29 RX ADMIN — CARVEDILOL 6.25 MG: 6.25 TABLET, FILM COATED ORAL at 08:23

## 2024-03-29 RX ADMIN — ASPIRIN 81 MG: 81 TABLET, CHEWABLE ORAL at 08:23

## 2024-03-29 RX ADMIN — PREGABALIN 75 MG: 50 CAPSULE ORAL at 14:20

## 2024-03-29 RX ADMIN — TAMSULOSIN HYDROCHLORIDE 0.4 MG: 0.4 CAPSULE ORAL at 08:23

## 2024-03-29 RX ADMIN — WATER 2000 MG: 1 INJECTION INTRAMUSCULAR; INTRAVENOUS; SUBCUTANEOUS at 10:51

## 2024-03-29 RX ADMIN — FUROSEMIDE 40 MG: 40 TABLET ORAL at 08:22

## 2024-03-29 RX ADMIN — CLOPIDOGREL BISULFATE 75 MG: 75 TABLET ORAL at 08:23

## 2024-03-29 RX ADMIN — INSULIN LISPRO 4 UNITS: 100 INJECTION, SOLUTION INTRAVENOUS; SUBCUTANEOUS at 12:28

## 2024-03-29 RX ADMIN — PREGABALIN 75 MG: 50 CAPSULE ORAL at 08:23

## 2024-03-29 RX ADMIN — SPIRONOLACTONE 25 MG: 25 TABLET ORAL at 08:22

## 2024-03-29 RX ADMIN — PANTOPRAZOLE SODIUM 40 MG: 40 TABLET, DELAYED RELEASE ORAL at 06:10

## 2024-03-29 NOTE — PLAN OF CARE
Problem: Chronic Conditions and Co-morbidities  Goal: Patient's chronic conditions and co-morbidity symptoms are monitored and maintained or improved  3/26/2024 1011 by Anayeli Willett RN  Outcome: Progressing  3/26/2024 0404 by Radha Michael LPN  Outcome: Progressing     Problem: Safety - Adult  Goal: Free from fall injury  3/26/2024 1011 by Anayeli Willett RN  Outcome: Progressing  3/26/2024 0404 by Radha Michael LPN  Outcome: Progressing     Problem: ABCDS Injury Assessment  Goal: Absence of physical injury  3/26/2024 1011 by Anayeli Willett RN  Outcome: Progressing  3/26/2024 0404 by Radha Michael LPN  Outcome: Progressing     Problem: Pain  Goal: Verbalizes/displays adequate comfort level or baseline comfort level  Outcome: Progressing     
  Problem: Chronic Conditions and Co-morbidities  Goal: Patient's chronic conditions and co-morbidity symptoms are monitored and maintained or improved  Outcome: Progressing     Problem: Safety - Adult  Goal: Free from fall injury  Outcome: Progressing     
  Problem: Chronic Conditions and Co-morbidities  Goal: Patient's chronic conditions and co-morbidity symptoms are monitored and maintained or improved  Outcome: Progressing     Problem: Safety - Adult  Goal: Free from fall injury  Outcome: Progressing     Problem: ABCDS Injury Assessment  Goal: Absence of physical injury  Outcome: Progressing     
  Problem: Chronic Conditions and Co-morbidities  Goal: Patient's chronic conditions and co-morbidity symptoms are monitored and maintained or improved  Outcome: Progressing     Problem: Safety - Adult  Goal: Free from fall injury  Outcome: Progressing     Problem: ABCDS Injury Assessment  Goal: Absence of physical injury  Outcome: Progressing     
  Problem: Chronic Conditions and Co-morbidities  Goal: Patient's chronic conditions and co-morbidity symptoms are monitored and maintained or improved  Outcome: Progressing     Problem: Safety - Adult  Goal: Free from fall injury  Outcome: Progressing     Problem: ABCDS Injury Assessment  Goal: Absence of physical injury  Outcome: Progressing     Problem: Pain  Goal: Verbalizes/displays adequate comfort level or baseline comfort level  Outcome: Progressing     
Pattern: Right asymmetrical  Stance: Right decreased  Rail Use: Both  Stairs - Level of Assistance: Supervision  Number of Stairs Trained: 10                                                                                                                                                                                                                                                        Barthel Index:    Barthel Index Scale  Feeding: Independent, Able to apply any necessary device. Feeds in reasonable time  Bathing: Performs without assistance  Grooming: Washes face, bryant hair, brushes teeth, shaves (manages plug if electric razor)  Dressing: Independent, Ties shoes, fasteners, applies braces  Bowel Control: No accidents. Able to use enema or suppository if needed  Bladder Control: No accidents. Able to care for collecting device, if used  Toilet Transfers: Independent with toilet or bedpan. Handles clothes, wipes, flushes or cleans clayton  Chair/Bed Trannsfers: Independent, including locks of wheelchair and lifting footrests  Ambulation: With help for 50 yards  Stairs: Needs help or supervision  Total Barthel Index Score: 90       The Barthel ADL Index: Guidelines  1. The index should be used as a record of what a patient does, not as a record of what a patient could do.  2. The main aim is to establish degree of independence from any help, physical or verbal, however minor and for whatever reason.  3. The need for supervision renders the patient not independent.  4. A patient's performance should be established using the best available evidence. Asking the patient, friends/relatives and nurses are the usual sources, but direct observation and common sense are also important. However direct testing is not needed.  5. Usually the patient's performance over the preceding 24-48 hours is important, but occasionally longer periods will be relevant.  6. Middle categories imply that the patient supplies over 50 per cent of

## 2024-03-29 NOTE — CARE COORDINATION
03/29/24 1548   Services At/After Discharge   Transition of Care Consult (CM Consult) Home Health  ([facility])   Internal Home Health No   Reason Outside Agency Chosen Out of service area   Services At/After Discharge Home Health    Resource Information Provided? No   Mode of Transport at Discharge Other (see comment)  (Family)   Confirm Follow Up Transport Self   Condition of Participation: Discharge Planning   The Plan for Transition of Care is related to the following treatment goals: Wound care/medical monitoring--Home health   The Patient and/or Patient Representative was provided with a Choice of Provider? Patient   The Patient and/Or Patient Representative agree with the Discharge Plan? Yes   Freedom of Choice list was provided with basic dialogue that supports the patient's individualized plan of care/goals, treatment preferences, and shares the quality data associated with the providers?  Yes     Mr. Seymour is being discharged today. He is due to go home with home health through [facility]. Rom is going to contact wife about patient having to pay 20% of the cost of home health (Rom couldn't provide the cost).Also, provided patient with resources about wound care---brochure of nurse yuni who can complete wound care but it is paid out of pocket.  Mr. Seymour is aware and agrees with discharge plan.      1606: Spoke with patient's daughter. Said [facility] home health called. Couldn't see him over weekend. Requesting to see another home health that can see him sooner if possible. Told her [facility] is out till April 8th.Called [facility]. poke with [facility]. Said that they are at capacity. Unable to staff case. At capacity. Daughter made aware. Unfortunately no other hh's in the area. [facility] only one available.     Transition of Care Plan:     RUR: 14% LOW   Prior Level of Functioning: Independent   Disposition:   If SNF or IPR: Date FOC offered:   Date FOC received:   Accepting facility: 
Advance Care Planning     General Advance Care Planning (ACP) Conversation    Date of Conversation: 3/21/2024  Conducted with: Patient with Decision Making Capacity    Healthcare Decision Maker:    Primary Decision Maker: Charlotte Seymour - Spouse - [phone]  Click here to complete Healthcare Decision Makers including selection of the Healthcare Decision Maker Relationship (ie \"Primary\").   Today we documented Decision Maker(s) consistent with Legal Next of Kin hierarchy.    Content/Action Overview:  Has NO ACP documents-Information provided  Reviewed DNR/DNI and patient elects Full Code (Attempt Resuscitation)      Length of Voluntary ACP Conversation in minutes:  <16 minutes (Non-Billable)    JOSE Centeno            
Care Management Initial Assessment       RUR: N/A  Readmission? No  1st IM letter given? No  1st  letter given: No      03/21/24 1523   Service Assessment   Patient Orientation Alert and Oriented;Person;Place;Situation;Self   Cognition Alert   History Provided By Patient   Primary Caregiver Self   Support Systems Spouse/Significant Other;Children   PCP Verified by CM Yes  (Dr. Nayeli [facility])   Last Visit to PCP Within last 3 months   Prior Functional Level Independent in ADLs/IADLs   Current Functional Level Independent in ADLs/IADLs;Other (see comment)  (Normally independent, Compromised temporarily by infected foot ulcer.)   Can patient return to prior living arrangement Yes   Family able to assist with home care needs: Yes   Would you like for me to discuss the discharge plan with any other family members/significant others, and if so, who? Yes  (Spouse Charlotte Seymour)   Financial Resources Medicare   Community Resources None   Social/Functional History   Lives With Spouse   Type of Home House   Home Equipment None   Active  Yes   Discharge Planning   Type of Residence House   Current Services Prior To Admission None   Patient expects to be discharged to: House     Mr. Seymour has been admitted to our SWING bed program today. I met with Mr. Seymour and provided the SWING BED informational folder to include the Care Management introductory letter with contact information. Also the Consent for Admission to SWING BED Form was signed/received: copy to patient, copy to chart.  Mr. Seymour resides in [place] with his spouse Charlotte. He is normally independent: able to drive and self manages ADL's.   Currently, he has an infected foot ulcer and is here to receive IV antibiotics and some therapy to address present needs and increase level of function.   The [facility] plan is considered the primary payer and coverage is confirmed through 3/23/24; updates to be sent tomorrow 3/22/24, will 
Transition of Care Plan:    RUR: 13% LOW   Prior Level of Functioning: Independent   Disposition:   If SNF or IPR: Date FOC offered:   Date FOC received:   Accepting facility:   Date authorization started with reference number:   Date authorization received and expires:   Follow up appointments:   DME needed:   Transportation at discharge:   IM/IMM Medicare/ letter given:   Is patient a  and connected with VA?    If yes, was  transfer form completed and VA notified?   Caregiver Contact:   Discharge Caregiver contacted prior to discharge?   Care Conference needed?   Barriers to discharge: IV abx.       1149: Spoke with hospitalist. Said patient/family requesting to have patient stay longer due to home renovations. He is NOT working with PT/OT.  Only here for IV abx till tomorrow 3/29. If he stays past then questionable if insurance company will pay for it or not. Updates due today 3/28. Updated clinicals sent to [facility]. Likely will deny further stay since he is only here for IV abx. Uploaded clinicals to [facility] portal. Called [facility] [phone]. Spoke Candida. Asked her if we would have to deliver something similar to [employer]. His [employer] is primary. They are reimbursing per luis. Candida said if it was denied today no need to deliver anything. States he would just have to be discharged tomorrow. Confirmed with Candida that IF he stays longer than tomorrow 3/29 his insurance company will NO LONGER COVER HIS STAY. Medicare (secondary) will also NOT cover this since he is NOT here for any other reason than IV abx.       1226: Sp[facility] with [facility] He states understanding. He is okay with home health. He is aware they only come out to home 2-3 times a week. He is not sure if he is able to take care of his wound on his own. He states he could probably wrap it but not pack it. Told him personal care aides will NOT do wound care. Provided him with resource of a 
Transition of Care Plan:    RUR: 15% Moderate   Prior Level of Functioning: Independent   Disposition:   If SNF or IPR: Date FOC offered:   Date FOC received:   Accepting facility:   Date authorization started with reference number:   Date authorization received and expires:   Follow up appointments:   DME needed:   Transportation at discharge:   IM/[facility] Medicare/ letter given:   Is patient a  and connected with VA?    If yes, was [place] transfer form completed and VA notified?   Caregiver Contact:   Discharge Caregiver contacted prior to discharge?   Care Conference needed?   Barriers to discharge:       0840: Updated clinicals sent into [employer] for extension. Patient approved 3/19--3/23. CM not here during weekends, so uploaded them today. Waiting to hear about possible extension.     0923: Received fax from [employer]. Has approved patient from 3/24--3/28. [employer] fax form that was received place in chart. Reimbursement type is \"Per [place]\".   
would be and she said they couldn't state the price. Asked her if his secondary (Medicare A&B) would  costs. She said it does NOT. They only go off of primary insurance NOT secondary. Also,   [facility]--Confirmed with Kathy that it would be the same situation even if I sent referral to them. [place]--Spoke with Anahi. Also confirmed it would be same situation. MD made aware.       5955: Called patient's [facility] to make her aware of home health situation. Let her know the above. She stated \"Medicare will pick it up\". I told her per my conversation with Rom from [facility] it will NOT. I asked her if she wanted to talk to home health and she stated \"I just need to know who is going to be there tomorrow\" and again I told her that Mr. Seymour would have to pay 20% with an unknown price of what that 20% is. Called Rom from [facility] to make her aware of my conversation with Ms. Seymour. Rom is going to call her as soon as she can.

## 2024-03-29 NOTE — PROGRESS NOTES
Last dose of ancef administered. PICC line removed per Dr. Coleman, catheter tip intact. Petroleum dressing applied at site and wrapped with Coban. Pt instructed to lay back for about 30mins and leave compression dressing in place for 24hrs. Pt verbalized understanding.

## 2024-03-29 NOTE — DISCHARGE SUMMARY
Discharge Summary    Name: Joseph Seymour  [id number]  YOB: 1960 (Age: 64 y.o.)   Date of Admission: 3/21/2024  Date of Discharge: 3/29/2024  Attending Physician: Charlotte Coleman MD    Discharge Diagnosis:   Osteomyelitis of the right foot  Infected diabetic foot ulcer with surrounding cellulitis    Consultations:  IP CONSULT TO PHARMACY  IP CONSULT HOME HEALTH      Brief Admission History/Reason for Admission  Mr. Seymour is a very pleasant 64 year old male with a history of HFrEF, DM type 2, hypertension and CAD who presented to skilled rehab swing bed at [facility] for antibiotic administration.  Please see H&P for additional details.    Brief Hospital Course by Main Problems:   Infected diabetic foot ulcer with surrounding cellulitis  2.   Osteomyelitis  -MRI foot showed plantar cutaneous ulceration in the forefoot at the level of the base of the second toe proximal phalanx. Abnormal signal shown diffusely within the proximal phalanx of the second toe, concerning for osteomyelitis.  -Status post right foot I&D and resection of metatarsal joint on 3/11  -Podiatry reviewed wound 3/14, back to OR 3/15, underwent incision and drainage with bone biopsy of second metatarsal, right foot. Bone biopsy-margins negative    ID evaluated the patient and patient was advised to continue antibiotic cefazolin till 3/29  Per podiatry-patient can follow-up with  -within a week     Final ID recs  Patient for antimicrobial therapy x 2 weeks.  Antimicrobial orders for discharge  -Cefazolin 2 g   IV every 8 hourly end date 3/29  -Pull line at end of therapy.    -ID follow-up -no follow-up required     3.   Diabetes mellitus  -continue outpatient regimen  -diet control to ensure glucose within normal range     4.   Hypertension  -continue outpatient regimen     Discharge Exam:  Patient seen and examined by me on discharge day.  Pertinent Findings:  Patient Vitals for the past 24

## 2024-03-29 NOTE — PROGRESS NOTES
Discharge Summary    Joseph Seymour  :  1960  MRN:  [id number]    ADMIT DATE:  3/21/2024  DISCHARGE DATE:  3/29/2024      Discharge instruction reviewed with Patient and wife    Home med's returned Yes, Synjardy and Tresiba returned.    Personal belongings returned Yes    Patient Walked to front entrance with wife.         SIGNED:    Carmen Saldivar RN

## 2024-04-16 ENCOUNTER — TELEPHONE (OUTPATIENT)
Age: 64
End: 2024-04-16

## 2024-04-16 NOTE — TELEPHONE ENCOUNTER
Patient is a no-show today.  Labs reviewed.  3/25-WBC 10.5, hemoglobin 13.8, platelets 245   BUN/creatinine 18/1.16.

## 2024-10-08 ENCOUNTER — APPOINTMENT (OUTPATIENT)
Facility: HOSPITAL | Age: 64
End: 2024-10-08
Payer: COMMERCIAL

## 2024-10-08 ENCOUNTER — HOSPITAL ENCOUNTER (EMERGENCY)
Facility: HOSPITAL | Age: 64
Discharge: ANOTHER HOSPITAL | End: 2024-10-08
Attending: EMERGENCY MEDICINE
Payer: COMMERCIAL

## 2024-10-08 VITALS
HEART RATE: 115 BPM | TEMPERATURE: 99.6 F | OXYGEN SATURATION: 95 % | HEIGHT: 70 IN | SYSTOLIC BLOOD PRESSURE: 119 MMHG | RESPIRATION RATE: 17 BRPM | DIASTOLIC BLOOD PRESSURE: 81 MMHG | WEIGHT: 240 LBS | BODY MASS INDEX: 34.36 KG/M2

## 2024-10-08 DIAGNOSIS — M86.9 OSTEOMYELITIS OF RIGHT FOOT, UNSPECIFIED TYPE: ICD-10-CM

## 2024-10-08 DIAGNOSIS — L03.119 CELLULITIS OF FOOT: Primary | ICD-10-CM

## 2024-10-08 DIAGNOSIS — L97.516 ULCER OF RIGHT FOOT WITH BONE INVOLVEMENT WITHOUT EVIDENCE OF NECROSIS (HCC): ICD-10-CM

## 2024-10-08 LAB
ALBUMIN SERPL-MCNC: 3.7 G/DL (ref 3.5–5)
ALBUMIN/GLOB SERPL: 1.1 (ref 1.1–2.2)
ALP SERPL-CCNC: 89 U/L (ref 45–117)
ALT SERPL-CCNC: 25 U/L (ref 12–78)
ANION GAP SERPL CALC-SCNC: 11 MMOL/L (ref 2–12)
APPEARANCE UR: CLEAR
AST SERPL-CCNC: 22 U/L (ref 15–37)
BACTERIA URNS QL MICRO: NEGATIVE /HPF
BASOPHILS # BLD: 0 K/UL (ref 0–0.1)
BASOPHILS NFR BLD: 0 % (ref 0–1)
BILIRUB SERPL-MCNC: 1 MG/DL (ref 0.2–1)
BILIRUB UR QL: NEGATIVE
BUN SERPL-MCNC: 16 MG/DL (ref 6–20)
BUN/CREAT SERPL: 15 (ref 12–20)
CALCIUM SERPL-MCNC: 9.7 MG/DL (ref 8.5–10.1)
CHLORIDE SERPL-SCNC: 100 MMOL/L (ref 97–108)
CO2 SERPL-SCNC: 30 MMOL/L (ref 21–32)
COLOR UR: ABNORMAL
CREAT SERPL-MCNC: 1.04 MG/DL (ref 0.7–1.3)
DIFFERENTIAL METHOD BLD: ABNORMAL
EOSINOPHIL # BLD: 0.1 K/UL (ref 0–0.4)
EOSINOPHIL NFR BLD: 0 % (ref 0–7)
EPITH CASTS URNS QL MICRO: ABNORMAL /LPF
ERYTHROCYTE [DISTWIDTH] IN BLOOD BY AUTOMATED COUNT: 14.7 % (ref 11.5–14.5)
ERYTHROCYTE [SEDIMENTATION RATE] IN BLOOD: 5 MM/HR (ref 0–20)
GLOBULIN SER CALC-MCNC: 3.4 G/DL (ref 2–4)
GLUCOSE BLD STRIP.AUTO-MCNC: 162 MG/DL (ref 65–117)
GLUCOSE SERPL-MCNC: 185 MG/DL (ref 65–100)
GLUCOSE UR STRIP.AUTO-MCNC: 500 MG/DL
HCT VFR BLD AUTO: 49.2 % (ref 36.6–50.3)
HGB BLD-MCNC: 16.1 G/DL (ref 12.1–17)
HGB UR QL STRIP: NEGATIVE
IMM GRANULOCYTES # BLD AUTO: 0 K/UL (ref 0–0.04)
IMM GRANULOCYTES NFR BLD AUTO: 0 % (ref 0–0.5)
KETONES UR QL STRIP.AUTO: NEGATIVE MG/DL
LACTATE SERPL-SCNC: 1.3 MMOL/L (ref 0.4–2)
LEUKOCYTE ESTERASE UR QL STRIP.AUTO: NEGATIVE
LYMPHOCYTES # BLD: 1.3 K/UL (ref 0.8–3.5)
LYMPHOCYTES NFR BLD: 10 % (ref 12–49)
MAGNESIUM SERPL-MCNC: 1.7 MG/DL (ref 1.6–2.4)
MCH RBC QN AUTO: 27.9 PG (ref 26–34)
MCHC RBC AUTO-ENTMCNC: 32.7 G/DL (ref 30–36.5)
MCV RBC AUTO: 85.1 FL (ref 80–99)
MONOCYTES # BLD: 1.4 K/UL (ref 0–1)
MONOCYTES NFR BLD: 11 % (ref 5–13)
NEUTS SEG # BLD: 9.9 K/UL (ref 1.8–8)
NEUTS SEG NFR BLD: 78 % (ref 32–75)
NITRITE UR QL STRIP.AUTO: NEGATIVE
NRBC # BLD: 0 K/UL (ref 0–0.01)
NRBC BLD-RTO: 0 PER 100 WBC
PH UR STRIP: 6 (ref 5–8)
PLATELET # BLD AUTO: 193 K/UL (ref 150–400)
PMV BLD AUTO: 9.8 FL (ref 8.9–12.9)
POTASSIUM SERPL-SCNC: 3.3 MMOL/L (ref 3.5–5.1)
PROT SERPL-MCNC: 7.1 G/DL (ref 6.4–8.2)
PROT UR STRIP-MCNC: ABNORMAL MG/DL
RBC # BLD AUTO: 5.78 M/UL (ref 4.1–5.7)
RBC #/AREA URNS HPF: ABNORMAL /HPF (ref 0–5)
SERVICE CMNT-IMP: ABNORMAL
SODIUM SERPL-SCNC: 141 MMOL/L (ref 136–145)
SP GR UR REFRACTOMETRY: 1.02 (ref 1–1.03)
TROPONIN I SERPL HS-MCNC: 14 NG/L (ref 0–76)
URINE CULTURE IF INDICATED: ABNORMAL
UROBILINOGEN UR QL STRIP.AUTO: 0.2 EU/DL (ref 0.2–1)
WBC # BLD AUTO: 12.7 K/UL (ref 4.1–11.1)
WBC URNS QL MICRO: ABNORMAL /HPF (ref 0–4)

## 2024-10-08 PROCEDURE — 84145 PROCALCITONIN (PCT): CPT

## 2024-10-08 PROCEDURE — 83605 ASSAY OF LACTIC ACID: CPT

## 2024-10-08 PROCEDURE — 73630 X-RAY EXAM OF FOOT: CPT

## 2024-10-08 PROCEDURE — 86141 C-REACTIVE PROTEIN HS: CPT

## 2024-10-08 PROCEDURE — 6360000002 HC RX W HCPCS: Performed by: EMERGENCY MEDICINE

## 2024-10-08 PROCEDURE — 82962 GLUCOSE BLOOD TEST: CPT

## 2024-10-08 PROCEDURE — 99285 EMERGENCY DEPT VISIT HI MDM: CPT

## 2024-10-08 PROCEDURE — 2580000003 HC RX 258: Performed by: EMERGENCY MEDICINE

## 2024-10-08 PROCEDURE — 93005 ELECTROCARDIOGRAM TRACING: CPT | Performed by: EMERGENCY MEDICINE

## 2024-10-08 PROCEDURE — 80053 COMPREHEN METABOLIC PANEL: CPT

## 2024-10-08 PROCEDURE — 6370000000 HC RX 637 (ALT 250 FOR IP): Performed by: EMERGENCY MEDICINE

## 2024-10-08 PROCEDURE — 83735 ASSAY OF MAGNESIUM: CPT

## 2024-10-08 PROCEDURE — 85652 RBC SED RATE AUTOMATED: CPT

## 2024-10-08 PROCEDURE — 81001 URINALYSIS AUTO W/SCOPE: CPT

## 2024-10-08 PROCEDURE — 96367 TX/PROPH/DG ADDL SEQ IV INF: CPT

## 2024-10-08 PROCEDURE — 87040 BLOOD CULTURE FOR BACTERIA: CPT

## 2024-10-08 PROCEDURE — 85025 COMPLETE CBC W/AUTO DIFF WBC: CPT

## 2024-10-08 PROCEDURE — 96365 THER/PROPH/DIAG IV INF INIT: CPT

## 2024-10-08 PROCEDURE — 84484 ASSAY OF TROPONIN QUANT: CPT

## 2024-10-08 PROCEDURE — 36415 COLL VENOUS BLD VENIPUNCTURE: CPT

## 2024-10-08 RX ORDER — 0.9 % SODIUM CHLORIDE 0.9 %
1000 INTRAVENOUS SOLUTION INTRAVENOUS ONCE
Status: COMPLETED | OUTPATIENT
Start: 2024-10-08 | End: 2024-10-08

## 2024-10-08 RX ORDER — POTASSIUM CHLORIDE 750 MG/1
20 TABLET, EXTENDED RELEASE ORAL ONCE
Status: COMPLETED | OUTPATIENT
Start: 2024-10-08 | End: 2024-10-08

## 2024-10-08 RX ADMIN — SODIUM CHLORIDE 1000 ML: 9 INJECTION, SOLUTION INTRAVENOUS at 20:04

## 2024-10-08 RX ADMIN — POTASSIUM CHLORIDE 20 MEQ: 750 TABLET, EXTENDED RELEASE ORAL at 20:44

## 2024-10-08 RX ADMIN — PIPERACILLIN AND TAZOBACTAM 3375 MG: 3; .375 INJECTION, POWDER, LYOPHILIZED, FOR SOLUTION INTRAVENOUS at 20:03

## 2024-10-08 RX ADMIN — VANCOMYCIN HYDROCHLORIDE 1000 MG: 1 INJECTION, POWDER, LYOPHILIZED, FOR SOLUTION INTRAVENOUS at 20:40

## 2024-10-08 ASSESSMENT — LIFESTYLE VARIABLES
HOW OFTEN DO YOU HAVE A DRINK CONTAINING ALCOHOL: MONTHLY OR LESS
HOW MANY STANDARD DRINKS CONTAINING ALCOHOL DO YOU HAVE ON A TYPICAL DAY: 1 OR 2

## 2024-10-08 ASSESSMENT — ENCOUNTER SYMPTOMS
COLOR CHANGE: 1
SHORTNESS OF BREATH: 0
ABDOMINAL PAIN: 0

## 2024-10-08 ASSESSMENT — PAIN - FUNCTIONAL ASSESSMENT: PAIN_FUNCTIONAL_ASSESSMENT: 0-10

## 2024-10-08 ASSESSMENT — PAIN SCALES - GENERAL: PAINLEVEL_OUTOF10: 0

## 2024-10-08 ASSESSMENT — PAIN DESCRIPTION - ORIENTATION: ORIENTATION: RIGHT

## 2024-10-08 ASSESSMENT — PAIN DESCRIPTION - LOCATION: LOCATION: FOOT

## 2024-10-08 NOTE — ED PROVIDER NOTES
Monocytes % 11 5 - 13 %    Eosinophils % 0 0 - 7 %    Basophils % 0 0 - 1 %    Immature Granulocytes % 0 0 - 0.5 %    Neutrophils Absolute 9.9 (H) 1.8 - 8.0 K/UL    Lymphocytes Absolute 1.3 0.8 - 3.5 K/UL    Monocytes Absolute 1.4 (H) 0.0 - 1.0 K/UL    Eosinophils Absolute 0.1 0.0 - 0.4 K/UL    Basophils Absolute 0.0 0.0 - 0.1 K/UL    Immature Granulocytes Absolute 0.0 0.00 - 0.04 K/UL    Differential Type AUTOMATED     Comprehensive Metabolic Panel    Collection Time: 10/08/24  7:35 PM   Result Value Ref Range    Sodium 141 136 - 145 mmol/L    Potassium 3.3 (L) 3.5 - 5.1 mmol/L    Chloride 100 97 - 108 mmol/L    CO2 30 21 - 32 mmol/L    Anion Gap 11 2 - 12 mmol/L    Glucose 185 (H) 65 - 100 mg/dL    BUN 16 6 - 20 MG/DL    Creatinine 1.04 0.70 - 1.30 MG/DL    BUN/Creatinine Ratio 15 12 - 20      Est, Glom Filt Rate 79 >60 ml/min/1.73m2    Calcium 9.7 8.5 - 10.1 MG/DL    Total Bilirubin 1.0 0.2 - 1.0 MG/DL    ALT 25 12 - 78 U/L    AST 22 15 - 37 U/L    Alk Phosphatase 89 45 - 117 U/L    Total Protein 7.1 6.4 - 8.2 g/dL    Albumin 3.7 3.5 - 5.0 g/dL    Globulin 3.4 2.0 - 4.0 g/dL    Albumin/Globulin Ratio 1.1 1.1 - 2.2     Lactate, Sepsis    Collection Time: 10/08/24  7:35 PM   Result Value Ref Range    Lactic Acid, Sepsis 1.3 0.4 - 2.0 MMOL/L   Troponin    Collection Time: 10/08/24  7:35 PM   Result Value Ref Range    Troponin, High Sensitivity 14 0 - 76 ng/L   EKG 12 Lead    Collection Time: 10/08/24  8:04 PM   Result Value Ref Range    Ventricular Rate 112 BPM    Atrial Rate 112 BPM    P-R Interval 176 ms    QRS Duration 110 ms    Q-T Interval 332 ms    QTc Calculation (Bazett) 453 ms    P Axis 96 degrees    R Axis 243 degrees    T Axis 47 degrees    Diagnosis       ** Suspect arm lead reversal, interpretation assumes no reversal  Sinus tachycardia  Right superior axis deviation  Pulmonary disease pattern  Inferior-posterior infarct (cited on or before 11-FEB-2018)  Abnormal ECG  When compared with ECG of

## 2024-10-08 NOTE — ED TRIAGE NOTES
Patient came in with c/o  an ulcer on the bottom of his R foot. Patient does have diabetes and has had recent foot surgery because of his diabetic issues.

## 2024-10-09 ENCOUNTER — ANESTHESIA EVENT (OUTPATIENT)
Facility: HOSPITAL | Age: 64
End: 2024-10-09
Payer: COMMERCIAL

## 2024-10-09 ENCOUNTER — HOSPITAL ENCOUNTER (INPATIENT)
Facility: HOSPITAL | Age: 64
LOS: 7 days | Discharge: HOME HEALTH | DRG: 854 | End: 2024-10-16
Attending: STUDENT IN AN ORGANIZED HEALTH CARE EDUCATION/TRAINING PROGRAM | Admitting: STUDENT IN AN ORGANIZED HEALTH CARE EDUCATION/TRAINING PROGRAM
Payer: COMMERCIAL

## 2024-10-09 ENCOUNTER — ANESTHESIA (OUTPATIENT)
Facility: HOSPITAL | Age: 64
End: 2024-10-09
Payer: COMMERCIAL

## 2024-10-09 DIAGNOSIS — R22.41 LOCALIZED SWELLING OF RIGHT LOWER LEG: ICD-10-CM

## 2024-10-09 DIAGNOSIS — R60.0 LEG EDEMA, RIGHT: Primary | ICD-10-CM

## 2024-10-09 PROBLEM — L08.9 TYPE 2 DIABETES MELLITUS WITH RIGHT DIABETIC FOOT INFECTION (HCC): Status: ACTIVE | Noted: 2024-10-09

## 2024-10-09 PROBLEM — E11.628 TYPE 2 DIABETES MELLITUS WITH RIGHT DIABETIC FOOT INFECTION (HCC): Status: ACTIVE | Noted: 2024-10-09

## 2024-10-09 LAB
ANION GAP SERPL CALC-SCNC: 8 MMOL/L (ref 2–12)
BASOPHILS # BLD: 0.1 K/UL (ref 0–0.1)
BASOPHILS NFR BLD: 1 % (ref 0–1)
BUN SERPL-MCNC: 15 MG/DL (ref 6–20)
BUN/CREAT SERPL: 17 (ref 12–20)
CALCIUM SERPL-MCNC: 8.6 MG/DL (ref 8.5–10.1)
CHLORIDE SERPL-SCNC: 104 MMOL/L (ref 97–108)
CO2 SERPL-SCNC: 27 MMOL/L (ref 21–32)
CREAT SERPL-MCNC: 0.86 MG/DL (ref 0.7–1.3)
CRP SERPL HS-MCNC: >9.5 MG/L
DIFFERENTIAL METHOD BLD: ABNORMAL
EKG ATRIAL RATE: 112 BPM
EKG DIAGNOSIS: NORMAL
EKG P-R INTERVAL: 162 MS
EKG Q-T INTERVAL: 352 MS
EKG QRS DURATION: 118 MS
EKG QTC CALCULATION (BAZETT): 480 MS
EKG R AXIS: 251 DEGREES
EKG T AXIS: 49 DEGREES
EKG VENTRICULAR RATE: 112 BPM
EOSINOPHIL # BLD: 0.1 K/UL (ref 0–0.4)
EOSINOPHIL NFR BLD: 1 % (ref 0–7)
ERYTHROCYTE [DISTWIDTH] IN BLOOD BY AUTOMATED COUNT: 14.8 % (ref 11.5–14.5)
GLUCOSE BLD STRIP.AUTO-MCNC: 106 MG/DL (ref 65–117)
GLUCOSE BLD STRIP.AUTO-MCNC: 107 MG/DL (ref 65–117)
GLUCOSE BLD STRIP.AUTO-MCNC: 107 MG/DL (ref 65–117)
GLUCOSE BLD STRIP.AUTO-MCNC: 110 MG/DL (ref 65–117)
GLUCOSE BLD STRIP.AUTO-MCNC: 119 MG/DL (ref 65–117)
GLUCOSE BLD STRIP.AUTO-MCNC: 136 MG/DL (ref 65–117)
GLUCOSE BLD STRIP.AUTO-MCNC: 153 MG/DL (ref 65–117)
GLUCOSE SERPL-MCNC: 140 MG/DL (ref 65–100)
HCT VFR BLD AUTO: 48 % (ref 36.6–50.3)
HGB BLD-MCNC: 15 G/DL (ref 12.1–17)
IMM GRANULOCYTES # BLD AUTO: 0.1 K/UL (ref 0–0.04)
IMM GRANULOCYTES NFR BLD AUTO: 1 % (ref 0–0.5)
LYMPHOCYTES # BLD: 1.6 K/UL (ref 0.8–3.5)
LYMPHOCYTES NFR BLD: 13 % (ref 12–49)
MAGNESIUM SERPL-MCNC: 1.9 MG/DL (ref 1.6–2.4)
MCH RBC QN AUTO: 27.1 PG (ref 26–34)
MCHC RBC AUTO-ENTMCNC: 31.3 G/DL (ref 30–36.5)
MCV RBC AUTO: 86.6 FL (ref 80–99)
MONOCYTES # BLD: 1.7 K/UL (ref 0–1)
MONOCYTES NFR BLD: 13 % (ref 5–13)
NEUTS SEG # BLD: 9.5 K/UL (ref 1.8–8)
NEUTS SEG NFR BLD: 71 % (ref 32–75)
NRBC # BLD: 0 K/UL (ref 0–0.01)
NRBC BLD-RTO: 0 PER 100 WBC
PLATELET # BLD AUTO: 170 K/UL (ref 150–400)
PMV BLD AUTO: 9.7 FL (ref 8.9–12.9)
POTASSIUM SERPL-SCNC: 3.4 MMOL/L (ref 3.5–5.1)
PROCALCITONIN SERPL-MCNC: 0.05 NG/ML
RBC # BLD AUTO: 5.54 M/UL (ref 4.1–5.7)
SERVICE CMNT-IMP: ABNORMAL
SERVICE CMNT-IMP: NORMAL
SODIUM SERPL-SCNC: 139 MMOL/L (ref 136–145)
WBC # BLD AUTO: 13 K/UL (ref 4.1–11.1)

## 2024-10-09 PROCEDURE — 7100000000 HC PACU RECOVERY - FIRST 15 MIN: Performed by: PODIATRIST

## 2024-10-09 PROCEDURE — 3700000001 HC ADD 15 MINUTES (ANESTHESIA): Performed by: PODIATRIST

## 2024-10-09 PROCEDURE — 2580000003 HC RX 258: Performed by: INTERNAL MEDICINE

## 2024-10-09 PROCEDURE — 6360000002 HC RX W HCPCS: Performed by: STUDENT IN AN ORGANIZED HEALTH CARE EDUCATION/TRAINING PROGRAM

## 2024-10-09 PROCEDURE — 83735 ASSAY OF MAGNESIUM: CPT

## 2024-10-09 PROCEDURE — 3600000002 HC SURGERY LEVEL 2 BASE: Performed by: PODIATRIST

## 2024-10-09 PROCEDURE — 87186 SC STD MICRODIL/AGAR DIL: CPT

## 2024-10-09 PROCEDURE — 87077 CULTURE AEROBIC IDENTIFY: CPT

## 2024-10-09 PROCEDURE — 87205 SMEAR GRAM STAIN: CPT

## 2024-10-09 PROCEDURE — 2500000003 HC RX 250 WO HCPCS: Performed by: NURSE ANESTHETIST, CERTIFIED REGISTERED

## 2024-10-09 PROCEDURE — 80048 BASIC METABOLIC PNL TOTAL CA: CPT

## 2024-10-09 PROCEDURE — 87075 CULTR BACTERIA EXCEPT BLOOD: CPT

## 2024-10-09 PROCEDURE — 3700000000 HC ANESTHESIA ATTENDED CARE: Performed by: PODIATRIST

## 2024-10-09 PROCEDURE — 6370000000 HC RX 637 (ALT 250 FOR IP): Performed by: NURSE PRACTITIONER

## 2024-10-09 PROCEDURE — 2580000003 HC RX 258: Performed by: STUDENT IN AN ORGANIZED HEALTH CARE EDUCATION/TRAINING PROGRAM

## 2024-10-09 PROCEDURE — 36415 COLL VENOUS BLD VENIPUNCTURE: CPT

## 2024-10-09 PROCEDURE — 6360000002 HC RX W HCPCS: Performed by: NURSE ANESTHETIST, CERTIFIED REGISTERED

## 2024-10-09 PROCEDURE — 88307 TISSUE EXAM BY PATHOLOGIST: CPT

## 2024-10-09 PROCEDURE — 82962 GLUCOSE BLOOD TEST: CPT

## 2024-10-09 PROCEDURE — 88311 DECALCIFY TISSUE: CPT

## 2024-10-09 PROCEDURE — 85025 COMPLETE CBC W/AUTO DIFF WBC: CPT

## 2024-10-09 PROCEDURE — 87070 CULTURE OTHR SPECIMN AEROBIC: CPT

## 2024-10-09 PROCEDURE — 3600000012 HC SURGERY LEVEL 2 ADDTL 15MIN: Performed by: PODIATRIST

## 2024-10-09 PROCEDURE — 1100000000 HC RM PRIVATE

## 2024-10-09 PROCEDURE — 6370000000 HC RX 637 (ALT 250 FOR IP): Performed by: STUDENT IN AN ORGANIZED HEALTH CARE EDUCATION/TRAINING PROGRAM

## 2024-10-09 PROCEDURE — 2720000010 HC SURG SUPPLY STERILE: Performed by: PODIATRIST

## 2024-10-09 PROCEDURE — 2709999900 HC NON-CHARGEABLE SUPPLY: Performed by: PODIATRIST

## 2024-10-09 PROCEDURE — 2580000003 HC RX 258: Performed by: NURSE ANESTHETIST, CERTIFIED REGISTERED

## 2024-10-09 PROCEDURE — 7100000001 HC PACU RECOVERY - ADDTL 15 MIN: Performed by: PODIATRIST

## 2024-10-09 RX ORDER — MAGNESIUM SULFATE 1 G/100ML
1000 INJECTION INTRAVENOUS ONCE
Status: COMPLETED | OUTPATIENT
Start: 2024-10-09 | End: 2024-10-09

## 2024-10-09 RX ORDER — MIDAZOLAM HYDROCHLORIDE 1 MG/ML
INJECTION INTRAMUSCULAR; INTRAVENOUS
Status: DISCONTINUED | OUTPATIENT
Start: 2024-10-09 | End: 2024-10-09 | Stop reason: SDUPTHER

## 2024-10-09 RX ORDER — POTASSIUM CHLORIDE 1.5 G/1.58G
20 POWDER, FOR SOLUTION ORAL ONCE
Status: COMPLETED | OUTPATIENT
Start: 2024-10-09 | End: 2024-10-09

## 2024-10-09 RX ORDER — ONDANSETRON 4 MG/1
4 TABLET, ORALLY DISINTEGRATING ORAL EVERY 8 HOURS PRN
Status: DISCONTINUED | OUTPATIENT
Start: 2024-10-09 | End: 2024-10-16 | Stop reason: HOSPADM

## 2024-10-09 RX ORDER — NALOXONE HYDROCHLORIDE 0.4 MG/ML
INJECTION, SOLUTION INTRAMUSCULAR; INTRAVENOUS; SUBCUTANEOUS PRN
Status: DISCONTINUED | OUTPATIENT
Start: 2024-10-09 | End: 2024-10-09 | Stop reason: HOSPADM

## 2024-10-09 RX ORDER — CARVEDILOL 3.12 MG/1
6.25 TABLET ORAL 2 TIMES DAILY WITH MEALS
Status: DISCONTINUED | OUTPATIENT
Start: 2024-10-09 | End: 2024-10-16 | Stop reason: HOSPADM

## 2024-10-09 RX ORDER — CLOPIDOGREL BISULFATE 75 MG/1
75 TABLET ORAL DAILY
Status: DISCONTINUED | OUTPATIENT
Start: 2024-10-09 | End: 2024-10-09

## 2024-10-09 RX ORDER — SPIRONOLACTONE 25 MG/1
25 TABLET ORAL DAILY
Status: DISCONTINUED | OUTPATIENT
Start: 2024-10-09 | End: 2024-10-16 | Stop reason: HOSPADM

## 2024-10-09 RX ORDER — FENTANYL CITRATE 50 UG/ML
50 INJECTION, SOLUTION INTRAMUSCULAR; INTRAVENOUS EVERY 5 MIN PRN
Status: DISCONTINUED | OUTPATIENT
Start: 2024-10-09 | End: 2024-10-09 | Stop reason: HOSPADM

## 2024-10-09 RX ORDER — SODIUM CHLORIDE 9 MG/ML
INJECTION, SOLUTION INTRAVENOUS PRN
Status: CANCELLED | OUTPATIENT
Start: 2024-10-09

## 2024-10-09 RX ORDER — OXYCODONE HYDROCHLORIDE 5 MG/1
5 TABLET ORAL EVERY 4 HOURS PRN
Status: DISCONTINUED | OUTPATIENT
Start: 2024-10-09 | End: 2024-10-16 | Stop reason: HOSPADM

## 2024-10-09 RX ORDER — ASPIRIN 81 MG/1
81 TABLET ORAL DAILY
Status: DISCONTINUED | OUTPATIENT
Start: 2024-10-09 | End: 2024-10-16 | Stop reason: HOSPADM

## 2024-10-09 RX ORDER — ATORVASTATIN CALCIUM 40 MG/1
40 TABLET, FILM COATED ORAL NIGHTLY
Status: DISCONTINUED | OUTPATIENT
Start: 2024-10-09 | End: 2024-10-11

## 2024-10-09 RX ORDER — INSULIN LISPRO 100 [IU]/ML
0-8 INJECTION, SOLUTION INTRAVENOUS; SUBCUTANEOUS
Status: DISCONTINUED | OUTPATIENT
Start: 2024-10-09 | End: 2024-10-16 | Stop reason: HOSPADM

## 2024-10-09 RX ORDER — PHENYLEPHRINE HCL IN 0.9% NACL 0.4MG/10ML
SYRINGE (ML) INTRAVENOUS
Status: DISCONTINUED | OUTPATIENT
Start: 2024-10-09 | End: 2024-10-09 | Stop reason: SDUPTHER

## 2024-10-09 RX ORDER — SODIUM CHLORIDE 0.9 % (FLUSH) 0.9 %
5-40 SYRINGE (ML) INJECTION PRN
Status: CANCELLED | OUTPATIENT
Start: 2024-10-09

## 2024-10-09 RX ORDER — DEXTROSE MONOHYDRATE 100 MG/ML
INJECTION, SOLUTION INTRAVENOUS CONTINUOUS PRN
Status: DISCONTINUED | OUTPATIENT
Start: 2024-10-09 | End: 2024-10-16 | Stop reason: HOSPADM

## 2024-10-09 RX ORDER — HYDROMORPHONE HYDROCHLORIDE 1 MG/ML
0.25 INJECTION, SOLUTION INTRAMUSCULAR; INTRAVENOUS; SUBCUTANEOUS EVERY 5 MIN PRN
Status: DISCONTINUED | OUTPATIENT
Start: 2024-10-09 | End: 2024-10-09 | Stop reason: HOSPADM

## 2024-10-09 RX ORDER — SODIUM CHLORIDE 9 MG/ML
INJECTION, SOLUTION INTRAVENOUS PRN
Status: DISCONTINUED | OUTPATIENT
Start: 2024-10-09 | End: 2024-10-09 | Stop reason: HOSPADM

## 2024-10-09 RX ORDER — ACETAMINOPHEN 325 MG/1
650 TABLET ORAL EVERY 6 HOURS PRN
Status: DISCONTINUED | OUTPATIENT
Start: 2024-10-09 | End: 2024-10-16 | Stop reason: HOSPADM

## 2024-10-09 RX ORDER — POLYETHYLENE GLYCOL 3350 17 G/17G
17 POWDER, FOR SOLUTION ORAL DAILY PRN
Status: DISCONTINUED | OUTPATIENT
Start: 2024-10-09 | End: 2024-10-16 | Stop reason: HOSPADM

## 2024-10-09 RX ORDER — ACETAMINOPHEN 650 MG/1
650 SUPPOSITORY RECTAL EVERY 6 HOURS PRN
Status: DISCONTINUED | OUTPATIENT
Start: 2024-10-09 | End: 2024-10-16 | Stop reason: HOSPADM

## 2024-10-09 RX ORDER — TAMSULOSIN HYDROCHLORIDE 0.4 MG/1
0.4 CAPSULE ORAL DAILY
Status: DISCONTINUED | OUTPATIENT
Start: 2024-10-09 | End: 2024-10-16 | Stop reason: HOSPADM

## 2024-10-09 RX ORDER — PROPOFOL 10 MG/ML
INJECTION, EMULSION INTRAVENOUS
Status: DISCONTINUED | OUTPATIENT
Start: 2024-10-09 | End: 2024-10-09 | Stop reason: SDUPTHER

## 2024-10-09 RX ORDER — SODIUM CHLORIDE 9 MG/ML
INJECTION, SOLUTION INTRAVENOUS PRN
Status: DISCONTINUED | OUTPATIENT
Start: 2024-10-09 | End: 2024-10-16 | Stop reason: HOSPADM

## 2024-10-09 RX ORDER — SODIUM CHLORIDE 0.9 % (FLUSH) 0.9 %
5-40 SYRINGE (ML) INJECTION EVERY 12 HOURS SCHEDULED
Status: CANCELLED | OUTPATIENT
Start: 2024-10-09

## 2024-10-09 RX ORDER — LANOLIN ALCOHOL/MO/W.PET/CERES
3 CREAM (GRAM) TOPICAL NIGHTLY PRN
Status: DISCONTINUED | OUTPATIENT
Start: 2024-10-09 | End: 2024-10-16 | Stop reason: HOSPADM

## 2024-10-09 RX ORDER — LIDOCAINE HYDROCHLORIDE 20 MG/ML
INJECTION, SOLUTION EPIDURAL; INFILTRATION; INTRACAUDAL; PERINEURAL
Status: DISCONTINUED | OUTPATIENT
Start: 2024-10-09 | End: 2024-10-09 | Stop reason: SDUPTHER

## 2024-10-09 RX ORDER — INSULIN GLARGINE 100 [IU]/ML
10 INJECTION, SOLUTION SUBCUTANEOUS ONCE
Status: COMPLETED | OUTPATIENT
Start: 2024-10-09 | End: 2024-10-09

## 2024-10-09 RX ORDER — SODIUM CHLORIDE, SODIUM LACTATE, POTASSIUM CHLORIDE, CALCIUM CHLORIDE 600; 310; 30; 20 MG/100ML; MG/100ML; MG/100ML; MG/100ML
INJECTION, SOLUTION INTRAVENOUS
Status: DISCONTINUED | OUTPATIENT
Start: 2024-10-09 | End: 2024-10-09 | Stop reason: SDUPTHER

## 2024-10-09 RX ORDER — SODIUM CHLORIDE 0.9 % (FLUSH) 0.9 %
5-40 SYRINGE (ML) INJECTION PRN
Status: DISCONTINUED | OUTPATIENT
Start: 2024-10-09 | End: 2024-10-16 | Stop reason: HOSPADM

## 2024-10-09 RX ORDER — PROCHLORPERAZINE EDISYLATE 5 MG/ML
5 INJECTION INTRAMUSCULAR; INTRAVENOUS
Status: DISCONTINUED | OUTPATIENT
Start: 2024-10-09 | End: 2024-10-09 | Stop reason: HOSPADM

## 2024-10-09 RX ORDER — GLUCAGON 1 MG/ML
1 KIT INJECTION PRN
Status: DISCONTINUED | OUTPATIENT
Start: 2024-10-09 | End: 2024-10-16 | Stop reason: HOSPADM

## 2024-10-09 RX ORDER — INSULIN LISPRO 100 [IU]/ML
0-4 INJECTION, SOLUTION INTRAVENOUS; SUBCUTANEOUS NIGHTLY
Status: DISCONTINUED | OUTPATIENT
Start: 2024-10-09 | End: 2024-10-16 | Stop reason: HOSPADM

## 2024-10-09 RX ORDER — SODIUM CHLORIDE 0.9 % (FLUSH) 0.9 %
5-40 SYRINGE (ML) INJECTION PRN
Status: DISCONTINUED | OUTPATIENT
Start: 2024-10-09 | End: 2024-10-09 | Stop reason: HOSPADM

## 2024-10-09 RX ORDER — SODIUM CHLORIDE 9 MG/ML
INJECTION, SOLUTION INTRAVENOUS CONTINUOUS
Status: ACTIVE | OUTPATIENT
Start: 2024-10-09 | End: 2024-10-09

## 2024-10-09 RX ORDER — SODIUM CHLORIDE 0.9 % (FLUSH) 0.9 %
5-40 SYRINGE (ML) INJECTION EVERY 12 HOURS SCHEDULED
Status: DISCONTINUED | OUTPATIENT
Start: 2024-10-09 | End: 2024-10-16 | Stop reason: HOSPADM

## 2024-10-09 RX ORDER — INSULIN GLARGINE 100 [IU]/ML
32 INJECTION, SOLUTION SUBCUTANEOUS NIGHTLY
Status: DISCONTINUED | OUTPATIENT
Start: 2024-10-09 | End: 2024-10-10

## 2024-10-09 RX ORDER — SODIUM CHLORIDE 0.9 % (FLUSH) 0.9 %
5-40 SYRINGE (ML) INJECTION EVERY 12 HOURS SCHEDULED
Status: DISCONTINUED | OUTPATIENT
Start: 2024-10-09 | End: 2024-10-09 | Stop reason: HOSPADM

## 2024-10-09 RX ORDER — PREGABALIN 75 MG/1
75 CAPSULE ORAL 3 TIMES DAILY
Status: DISCONTINUED | OUTPATIENT
Start: 2024-10-09 | End: 2024-10-16 | Stop reason: HOSPADM

## 2024-10-09 RX ORDER — ONDANSETRON 2 MG/ML
4 INJECTION INTRAMUSCULAR; INTRAVENOUS EVERY 6 HOURS PRN
Status: DISCONTINUED | OUTPATIENT
Start: 2024-10-09 | End: 2024-10-16 | Stop reason: HOSPADM

## 2024-10-09 RX ADMIN — Medication 3 MG: at 21:09

## 2024-10-09 RX ADMIN — CARVEDILOL 6.25 MG: 3.12 TABLET, FILM COATED ORAL at 08:38

## 2024-10-09 RX ADMIN — PIPERACILLIN AND TAZOBACTAM 3375 MG: 3; .375 INJECTION, POWDER, FOR SOLUTION INTRAVENOUS; PARENTERAL at 11:08

## 2024-10-09 RX ADMIN — SODIUM CHLORIDE: 9 INJECTION, SOLUTION INTRAVENOUS at 12:42

## 2024-10-09 RX ADMIN — VANCOMYCIN HYDROCHLORIDE 1250 MG: 10 INJECTION, POWDER, LYOPHILIZED, FOR SOLUTION INTRAVENOUS at 21:04

## 2024-10-09 RX ADMIN — POTASSIUM CHLORIDE 20 MEQ: 1.5 POWDER, FOR SOLUTION ORAL at 06:27

## 2024-10-09 RX ADMIN — PROPOFOL 30 MG: 10 INJECTION, EMULSION INTRAVENOUS at 18:43

## 2024-10-09 RX ADMIN — PROPOFOL 75 MCG/KG/MIN: 10 INJECTION, EMULSION INTRAVENOUS at 18:42

## 2024-10-09 RX ADMIN — POTASSIUM CHLORIDE 20 MEQ: 1.5 POWDER, FOR SOLUTION ORAL at 02:25

## 2024-10-09 RX ADMIN — INSULIN GLARGINE 10 UNITS: 100 INJECTION, SOLUTION SUBCUTANEOUS at 21:41

## 2024-10-09 RX ADMIN — PREGABALIN 75 MG: 75 CAPSULE ORAL at 08:38

## 2024-10-09 RX ADMIN — TAMSULOSIN HYDROCHLORIDE 0.4 MG: 0.4 CAPSULE ORAL at 08:37

## 2024-10-09 RX ADMIN — PIPERACILLIN AND TAZOBACTAM 3375 MG: 3; .375 INJECTION, POWDER, FOR SOLUTION INTRAVENOUS; PARENTERAL at 04:14

## 2024-10-09 RX ADMIN — ASPIRIN 81 MG: 81 TABLET, COATED ORAL at 08:38

## 2024-10-09 RX ADMIN — Medication 80 MCG: at 19:16

## 2024-10-09 RX ADMIN — PREGABALIN 75 MG: 75 CAPSULE ORAL at 14:38

## 2024-10-09 RX ADMIN — VANCOMYCIN HYDROCHLORIDE 1250 MG: 10 INJECTION, POWDER, LYOPHILIZED, FOR SOLUTION INTRAVENOUS at 08:43

## 2024-10-09 RX ADMIN — SPIRONOLACTONE 25 MG: 25 TABLET ORAL at 08:38

## 2024-10-09 RX ADMIN — LIDOCAINE HYDROCHLORIDE 40 MG: 20 INJECTION, SOLUTION EPIDURAL; INFILTRATION; INTRACAUDAL; PERINEURAL at 18:39

## 2024-10-09 RX ADMIN — SACUBITRIL AND VALSARTAN 1 TABLET: 24; 26 TABLET, FILM COATED ORAL at 08:38

## 2024-10-09 RX ADMIN — MIDAZOLAM HYDROCHLORIDE 2 MG: 1 INJECTION, SOLUTION INTRAMUSCULAR; INTRAVENOUS at 18:39

## 2024-10-09 RX ADMIN — ATORVASTATIN CALCIUM 40 MG: 40 TABLET, FILM COATED ORAL at 21:09

## 2024-10-09 RX ADMIN — SACUBITRIL AND VALSARTAN 1 TABLET: 24; 26 TABLET, FILM COATED ORAL at 21:09

## 2024-10-09 RX ADMIN — SODIUM CHLORIDE, POTASSIUM CHLORIDE, SODIUM LACTATE AND CALCIUM CHLORIDE: 600; 310; 30; 20 INJECTION, SOLUTION INTRAVENOUS at 18:37

## 2024-10-09 RX ADMIN — PREGABALIN 75 MG: 75 CAPSULE ORAL at 21:09

## 2024-10-09 RX ADMIN — SODIUM CHLORIDE, PRESERVATIVE FREE 10 ML: 5 INJECTION INTRAVENOUS at 21:09

## 2024-10-09 RX ADMIN — MAGNESIUM SULFATE HEPTAHYDRATE 1000 MG: 1 INJECTION, SOLUTION INTRAVENOUS at 02:41

## 2024-10-09 RX ADMIN — PIPERACILLIN AND TAZOBACTAM 3375 MG: 3; .375 INJECTION, POWDER, FOR SOLUTION INTRAVENOUS; PARENTERAL at 21:14

## 2024-10-09 RX ADMIN — PROPOFOL 30 MG: 10 INJECTION, EMULSION INTRAVENOUS at 18:44

## 2024-10-09 ASSESSMENT — PAIN SCALES - GENERAL
PAINLEVEL_OUTOF10: 0
PAINLEVEL_OUTOF10: 0

## 2024-10-09 NOTE — CARE COORDINATION
Care Management Initial Assessment       RUR: 13%  Readmission? No  1st IM letter given? No  1st  letter given: No    CM completed assessment w/pt at bedside.  No history of HH or DME use.  Patient did state it is  difficult to find HH in his area & last time he went to [facility] for wound care. Patient is independent w/ADL to include driving.  Patient uses [facility] in [facility].  Patient may need wound care at d/c.  Wife will transport home       10/09/24 1103   Service Assessment   Patient Orientation Alert and Oriented   Cognition Alert   History Provided By Patient   Primary Caregiver Self   Support Systems Spouse/Significant Other;Children  (two son's & a daughter)   PCP Verified by CM Yes   Last Visit to PCP Within last 6 months   Prior Functional Level Independent in ADLs/IADLs   Current Functional Level Independent in ADLs/IADLs   Can patient return to prior living arrangement Yes   Family able to assist with home care needs: Yes   Would you like for me to discuss the discharge plan with any other family members/significant others, and if so, who? No   Financial Resources Medicare;Other (Comment)  ([place])   Community Resources None   Social/Functional History   Lives With Spouse   Type of Home House  (one story home w/5 SON)   Home Equipment None   Active  Yes     Melody Jernigan  Ext [phone]

## 2024-10-09 NOTE — BRIEF OP NOTE
Brief Postoperative Note      Patient: Joseph Seymour  YOB: 1960  MRN: [id number]    Date of Procedure: 10/9/2024    Pre-Op Diagnosis Codes:      * Diabetic foot ulcer with osteomyelitis (HCC) [E11.621, E11.69, L97.509, M86.9]    Post-Op Diagnosis: Same       Procedure(s):  RIGHT FOOT DEBRIDEMENT INCISION AND DRAINAGE    Surgeon(s):  Dirk Dixon DPM    Assistant:  * No surgical staff found *    Anesthesia: Monitor Anesthesia Care    Hemostasis: Ankle Tourniquet at 250 mm Hg    Estimated Blood Loss (mL): less than 50     Complications: None    Specimens:   ID Type Source Tests Collected by Time Destination   1 : Right Third  Proximal Phlanx Tissue Tissue SURGICAL PATHOLOGY Dirk Dixon DPM 10/9/2024 1914    A : Right foot wound Swab Tissue CULTURE, ANAEROBIC, CULTURE, WOUND Dirk Dixon DPM 10/9/2024 1903        Implants:  * No implants in log * none      Drains: * No LDAs found *    Findings:  Infection Present At Time Of Surgery (PATOS) (choose all levels that have infection present):  - Deep Infection (muscle/fascia) present as evidenced by purulent fluid, phlegmon, and fluid consistent with infection  Other Findings: viable bone and soft tissue margins    Electronically signed by Dirk Dixon DPM on 10/9/2024 at 7:43 PM

## 2024-10-09 NOTE — ANESTHESIA POSTPROCEDURE EVALUATION
Department of Anesthesiology  Postprocedure Note    Patient: Joseph Seymour  MRN: [id number]  YOB: 1960  Date of evaluation: 10/9/2024    Procedure Summary       Date: 10/09/24 Room / Location: [facility] MAIN OR  / [facility] MAIN OR    Anesthesia Start: 1837 Anesthesia Stop: 1942    Procedure: RIGHT FOOT DEBRIDEMENT INCISION AND DRAINAGE (Right: Foot) Diagnosis:       Diabetic foot ulcer with osteomyelitis (HCC)      (Diabetic foot ulcer with osteomyelitis (HCC) [E11.621, E11.69, L97.509, M86.9])    Providers: Dirk Dixon DPM Responsible Provider: Иван Arreguin MD    Anesthesia Type: MAC ASA Status: 3            Anesthesia Type: MAC    Yana Phase I: Yana Score: 10    Yana Phase II:      Anesthesia Post Evaluation    Patient location during evaluation: PACU  Patient participation: complete - patient participated  Level of consciousness: sleepy but conscious and responsive to verbal stimuli  Airway patency: patent  Nausea & Vomiting: no vomiting and no nausea  Cardiovascular status: blood pressure returned to baseline and hemodynamically stable  Respiratory status: acceptable  Hydration status: stable  Pain management: adequate    No notable events documented.

## 2024-10-09 NOTE — PERIOP NOTE
TRANSFER - IN REPORT:    Verbal report received from Pedro SHAW on Joseph Seymour  being received from Rm [id number]  for ordered procedure      Report consisted of patient’s Situation, Background, Assessment and   Recommendations(SBAR).     Information from the following report(s) Nurse Handoff Report, Adult Overview, Intake/Output, MAR, Recent Results, and Med Rec Status was reviewed with the receiving nurse.Pt  is  Telemetry   NSR  on  the  scope  reported  by  nurse       Opportunity for questions and clarification was provided.      Assessment  will   be   completed upon patient’s arrival to unit and care assume

## 2024-10-09 NOTE — ANESTHESIA PRE PROCEDURE
Department of Anesthesiology  Preprocedure Note       Name:  Joseph Seymour   Age:  64 y.o.  :  1960                                          MRN:  [id number]         Date:  10/9/2024      Surgeon: Surgeon(s):  Dirk Dixon DPM    Procedure: Procedure(s):  RIGHT FOOT DEBRIDEMENT INCISION AND DRAINAGE    Medications prior to admission:   Prior to Admission medications    Medication Sig Start Date End Date Taking? Authorizing Provider   carvedilol (COREG) 6.25 MG tablet Take 1 tablet by mouth 2 times daily (with meals) 3/29/24  Yes Charlotte Coleman MD   sacubitril-valsartan (ENTRESTO) 24-26 MG per tablet Take 1 tablet by mouth 2 times daily 3/29/24  Yes Charlotte Coleman MD   spironolactone (ALDACTONE) 25 MG tablet Take 1 tablet by mouth daily 3/30/24  Yes Charlotte Coleman MD   semaglutide, 2 MG/DOSE, (OZEMPIC, 2 MG/DOSE,) 8 MG/3ML SOPN sc injection Inject 2 mg into the skin every 7 days   Yes Alber Mullins MD   Insulin Degludec (TRESIBA FLEXTOUCH) 200 UNIT/ML SOPN Inject 42 Units into the skin at bedtime   Yes Alber Mullins MD   Empagliflozin-metFORMIN HCl ER (SYNJARDY XR) 12.5-1000 MG TB24 Take 1 tablet by mouth in the morning and at bedtime   Yes Alber Mullins MD   insulin lispro, 1 Unit Dial, (HUMALOG KWIKPEN) 100 UNIT/ML SOPN Inject into the skin 3 times daily (before meals) Sliding scale   Yes Alber Mullins MD   aspirin 81 MG EC tablet Take 1 tablet by mouth daily   Yes Alber Mullins MD   Omega 3 1000 MG CAPS Take 1,000 mg by mouth in the morning and at bedtime   Yes Alber Mullins MD   cyanocobalamin 1000 MCG/ML injection Inject 1 mL into the skin every 30 days   Yes Alber Mullins MD   clopidogrel (PLAVIX) 75 MG tablet Take 1 tablet by mouth daily   Yes Alber Mullins MD   atorvastatin (LIPITOR) 40 MG tablet Take 1 tablet by mouth nightly   Yes Alber Mullins MD   tamsulosin (FLOMAX) 0.4 MG capsule Take 1 capsule by mouth

## 2024-10-09 NOTE — PROGRESS NOTES
Contacted [facility] to arrange ALS transport of patient to [facility] bed 3122. Spoke with Kelsey. Discussed patient condition, need for cardiac monitoring, and possible IV infusions. ETA is [id number].

## 2024-10-09 NOTE — FLOWSHEET NOTE
10/09/24 1940   Handoff   Communication Given Periop Handoff/Relief   Handoff phase Phase I receiving   Handoff Given To Hoa PACU RN   Handoff Received From Radha OR RN/ Alex PRITCHARD   Handoff Communication Face to Face;At bedside   Time Handoff Given 1940       2003: TRANSFER - OUT REPORT:    Verbal report given to WENDY Dumont on Joseph Seymour being transferred to  for routine progression of care       Report consisted of patient’s Situation, Background, Assessment and   Recommendations(SBAR).     Opportunity for questions and clarification was provided.

## 2024-10-10 LAB
ANION GAP SERPL CALC-SCNC: 6 MMOL/L (ref 2–12)
BASOPHILS # BLD: 0.1 K/UL (ref 0–0.1)
BASOPHILS NFR BLD: 0 % (ref 0–1)
BUN SERPL-MCNC: 14 MG/DL (ref 6–20)
BUN/CREAT SERPL: 16 (ref 12–20)
CALCIUM SERPL-MCNC: 8.8 MG/DL (ref 8.5–10.1)
CHLORIDE SERPL-SCNC: 106 MMOL/L (ref 97–108)
CO2 SERPL-SCNC: 25 MMOL/L (ref 21–32)
CREAT SERPL-MCNC: 0.86 MG/DL (ref 0.7–1.3)
DIFFERENTIAL METHOD BLD: ABNORMAL
EOSINOPHIL # BLD: 0.1 K/UL (ref 0–0.4)
EOSINOPHIL NFR BLD: 1 % (ref 0–7)
ERYTHROCYTE [DISTWIDTH] IN BLOOD BY AUTOMATED COUNT: 15 % (ref 11.5–14.5)
GLUCOSE BLD STRIP.AUTO-MCNC: 106 MG/DL (ref 65–117)
GLUCOSE BLD STRIP.AUTO-MCNC: 115 MG/DL (ref 65–117)
GLUCOSE BLD STRIP.AUTO-MCNC: 144 MG/DL (ref 65–117)
GLUCOSE BLD STRIP.AUTO-MCNC: 155 MG/DL (ref 65–117)
GLUCOSE BLD STRIP.AUTO-MCNC: 166 MG/DL (ref 65–117)
GLUCOSE SERPL-MCNC: 112 MG/DL (ref 65–100)
HCT VFR BLD AUTO: 44.6 % (ref 36.6–50.3)
HGB BLD-MCNC: 14.2 G/DL (ref 12.1–17)
IMM GRANULOCYTES # BLD AUTO: 0.1 K/UL (ref 0–0.04)
IMM GRANULOCYTES NFR BLD AUTO: 1 % (ref 0–0.5)
LYMPHOCYTES # BLD: 1.2 K/UL (ref 0.8–3.5)
LYMPHOCYTES NFR BLD: 8 % (ref 12–49)
MAGNESIUM SERPL-MCNC: 2 MG/DL (ref 1.6–2.4)
MCH RBC QN AUTO: 27.6 PG (ref 26–34)
MCHC RBC AUTO-ENTMCNC: 31.8 G/DL (ref 30–36.5)
MCV RBC AUTO: 86.6 FL (ref 80–99)
MONOCYTES # BLD: 1.5 K/UL (ref 0–1)
MONOCYTES NFR BLD: 10 % (ref 5–13)
NEUTS SEG # BLD: 11.9 K/UL (ref 1.8–8)
NEUTS SEG NFR BLD: 80 % (ref 32–75)
NRBC # BLD: 0 K/UL (ref 0–0.01)
NRBC BLD-RTO: 0 PER 100 WBC
PLATELET # BLD AUTO: 190 K/UL (ref 150–400)
PMV BLD AUTO: 10 FL (ref 8.9–12.9)
POTASSIUM SERPL-SCNC: 3.3 MMOL/L (ref 3.5–5.1)
RBC # BLD AUTO: 5.15 M/UL (ref 4.1–5.7)
SERVICE CMNT-IMP: ABNORMAL
SERVICE CMNT-IMP: NORMAL
SERVICE CMNT-IMP: NORMAL
SODIUM SERPL-SCNC: 137 MMOL/L (ref 136–145)
VANCOMYCIN SERPL-MCNC: 7.7 UG/ML
WBC # BLD AUTO: 14.9 K/UL (ref 4.1–11.1)

## 2024-10-10 PROCEDURE — 82962 GLUCOSE BLOOD TEST: CPT

## 2024-10-10 PROCEDURE — 6360000002 HC RX W HCPCS: Performed by: STUDENT IN AN ORGANIZED HEALTH CARE EDUCATION/TRAINING PROGRAM

## 2024-10-10 PROCEDURE — 6370000000 HC RX 637 (ALT 250 FOR IP): Performed by: INTERNAL MEDICINE

## 2024-10-10 PROCEDURE — 80202 ASSAY OF VANCOMYCIN: CPT

## 2024-10-10 PROCEDURE — 36415 COLL VENOUS BLD VENIPUNCTURE: CPT

## 2024-10-10 PROCEDURE — 6370000000 HC RX 637 (ALT 250 FOR IP): Performed by: NURSE PRACTITIONER

## 2024-10-10 PROCEDURE — 1100000000 HC RM PRIVATE

## 2024-10-10 PROCEDURE — 83735 ASSAY OF MAGNESIUM: CPT

## 2024-10-10 PROCEDURE — 85025 COMPLETE CBC W/AUTO DIFF WBC: CPT

## 2024-10-10 PROCEDURE — 6370000000 HC RX 637 (ALT 250 FOR IP): Performed by: STUDENT IN AN ORGANIZED HEALTH CARE EDUCATION/TRAINING PROGRAM

## 2024-10-10 PROCEDURE — 80048 BASIC METABOLIC PNL TOTAL CA: CPT

## 2024-10-10 PROCEDURE — 2580000003 HC RX 258: Performed by: STUDENT IN AN ORGANIZED HEALTH CARE EDUCATION/TRAINING PROGRAM

## 2024-10-10 RX ORDER — VANCOMYCIN 1.25 G/250ML
1750 INJECTION, SOLUTION INTRAVENOUS EVERY 12 HOURS
Status: DISCONTINUED | OUTPATIENT
Start: 2024-10-11 | End: 2024-10-10 | Stop reason: SDUPTHER

## 2024-10-10 RX ORDER — POTASSIUM CHLORIDE 1500 MG/1
40 TABLET, EXTENDED RELEASE ORAL EVERY 6 HOURS
Status: COMPLETED | OUTPATIENT
Start: 2024-10-10 | End: 2024-10-10

## 2024-10-10 RX ORDER — INSULIN GLARGINE 100 [IU]/ML
16 INJECTION, SOLUTION SUBCUTANEOUS NIGHTLY
Status: DISCONTINUED | OUTPATIENT
Start: 2024-10-11 | End: 2024-10-16 | Stop reason: HOSPADM

## 2024-10-10 RX ORDER — DIPHENHYDRAMINE HCL 25 MG
25 CAPSULE ORAL NIGHTLY PRN
Status: DISCONTINUED | OUTPATIENT
Start: 2024-10-10 | End: 2024-10-16 | Stop reason: HOSPADM

## 2024-10-10 RX ADMIN — CARVEDILOL 6.25 MG: 3.12 TABLET, FILM COATED ORAL at 08:35

## 2024-10-10 RX ADMIN — SODIUM CHLORIDE, PRESERVATIVE FREE 10 ML: 5 INJECTION INTRAVENOUS at 08:36

## 2024-10-10 RX ADMIN — TAMSULOSIN HYDROCHLORIDE 0.4 MG: 0.4 CAPSULE ORAL at 08:36

## 2024-10-10 RX ADMIN — ATORVASTATIN CALCIUM 40 MG: 40 TABLET, FILM COATED ORAL at 21:01

## 2024-10-10 RX ADMIN — PIPERACILLIN AND TAZOBACTAM 3375 MG: 3; .375 INJECTION, POWDER, FOR SOLUTION INTRAVENOUS; PARENTERAL at 19:58

## 2024-10-10 RX ADMIN — PIPERACILLIN AND TAZOBACTAM 3375 MG: 3; .375 INJECTION, POWDER, FOR SOLUTION INTRAVENOUS; PARENTERAL at 11:59

## 2024-10-10 RX ADMIN — VANCOMYCIN HYDROCHLORIDE 1250 MG: 10 INJECTION, POWDER, LYOPHILIZED, FOR SOLUTION INTRAVENOUS at 20:52

## 2024-10-10 RX ADMIN — POTASSIUM CHLORIDE 40 MEQ: 1500 TABLET, EXTENDED RELEASE ORAL at 17:09

## 2024-10-10 RX ADMIN — PREGABALIN 75 MG: 75 CAPSULE ORAL at 08:51

## 2024-10-10 RX ADMIN — VANCOMYCIN HYDROCHLORIDE 1250 MG: 10 INJECTION, POWDER, LYOPHILIZED, FOR SOLUTION INTRAVENOUS at 10:05

## 2024-10-10 RX ADMIN — ACETAMINOPHEN 650 MG: 325 TABLET ORAL at 02:17

## 2024-10-10 RX ADMIN — ASPIRIN 81 MG: 81 TABLET, COATED ORAL at 08:35

## 2024-10-10 RX ADMIN — SACUBITRIL AND VALSARTAN 1 TABLET: 24; 26 TABLET, FILM COATED ORAL at 08:35

## 2024-10-10 RX ADMIN — DIPHENHYDRAMINE HYDROCHLORIDE 25 MG: 25 CAPSULE ORAL at 21:01

## 2024-10-10 RX ADMIN — SPIRONOLACTONE 25 MG: 25 TABLET ORAL at 08:36

## 2024-10-10 RX ADMIN — PIPERACILLIN AND TAZOBACTAM 3375 MG: 3; .375 INJECTION, POWDER, FOR SOLUTION INTRAVENOUS; PARENTERAL at 03:31

## 2024-10-10 RX ADMIN — POTASSIUM CHLORIDE 40 MEQ: 1500 TABLET, EXTENDED RELEASE ORAL at 10:05

## 2024-10-10 RX ADMIN — ACETAMINOPHEN 650 MG: 325 TABLET ORAL at 21:01

## 2024-10-10 RX ADMIN — PREGABALIN 75 MG: 75 CAPSULE ORAL at 14:24

## 2024-10-10 RX ADMIN — CARVEDILOL 6.25 MG: 3.12 TABLET, FILM COATED ORAL at 17:09

## 2024-10-10 RX ADMIN — SACUBITRIL AND VALSARTAN 1 TABLET: 24; 26 TABLET, FILM COATED ORAL at 21:01

## 2024-10-10 RX ADMIN — PREGABALIN 75 MG: 75 CAPSULE ORAL at 21:01

## 2024-10-10 RX ADMIN — SODIUM CHLORIDE, PRESERVATIVE FREE 10 ML: 5 INJECTION INTRAVENOUS at 21:02

## 2024-10-10 ASSESSMENT — PAIN SCALES - GENERAL
PAINLEVEL_OUTOF10: 3
PAINLEVEL_OUTOF10: 0

## 2024-10-10 ASSESSMENT — PAIN DESCRIPTION - DESCRIPTORS: DESCRIPTORS: ACHING

## 2024-10-10 ASSESSMENT — PAIN DESCRIPTION - LOCATION: LOCATION: HEAD

## 2024-10-10 NOTE — PLAN OF CARE
Problem: Discharge Planning  Goal: Discharge to home or other facility with appropriate resources  10/10/2024 1334 by AWA Golden RN  Outcome: Not Progressing  10/10/2024 0115 by Bart Boswell RN  Outcome: Progressing  Flowsheets (Taken 10/9/2024 2023)  Discharge to home or other facility with appropriate resources:   Identify barriers to discharge with patient and caregiver   Arrange for needed discharge resources and transportation as appropriate   Identify discharge learning needs (meds, wound care, etc)     Problem: Chronic Conditions and Co-morbidities  Goal: Patient's chronic conditions and co-morbidity symptoms are monitored and maintained or improved  10/10/2024 0115 by Bart Boswell RN  Outcome: Progressing  Flowsheets (Taken 10/9/2024 2023)  Care Plan - Patient's Chronic Conditions and Co-Morbidity Symptoms are Monitored and Maintained or Improved: Monitor and assess patient's chronic conditions and comorbid symptoms for stability, deterioration, or improvement     Problem: ABCDS Injury Assessment  Goal: Absence of physical injury  10/10/2024 1334 by AWA Golden RN  Outcome: Progressing  10/10/2024 0115 by Bart Boswell RN  Outcome: Progressing     Problem: Safety - Adult  Goal: Free from fall injury  10/10/2024 1334 by AWA Golden RN  Outcome: Progressing  10/10/2024 0115 by Bart Boswell RN  Outcome: Progressing     Problem: Discharge Planning  Goal: Discharge to home or other facility with appropriate resources  10/10/2024 1334 by AWA Golden RN  Outcome: Not Progressing  10/10/2024 0115 by Bart Boswell RN  Outcome: Progressing  Flowsheets (Taken 10/9/2024 2023)  Discharge to home or other facility with appropriate resources:   Identify barriers to discharge with patient and caregiver   Arrange for needed discharge resources and transportation as appropriate   Identify discharge learning needs (meds, wound care, etc)

## 2024-10-11 LAB
ANION GAP SERPL CALC-SCNC: 7 MMOL/L (ref 2–12)
BACTERIA SPEC CULT: NORMAL
BACTERIA SPEC CULT: NORMAL
BASOPHILS # BLD: 0.1 K/UL (ref 0–0.1)
BASOPHILS NFR BLD: 1 % (ref 0–1)
BUN SERPL-MCNC: 15 MG/DL (ref 6–20)
BUN/CREAT SERPL: 19 (ref 12–20)
CALCIUM SERPL-MCNC: 8.4 MG/DL (ref 8.5–10.1)
CHLORIDE SERPL-SCNC: 110 MMOL/L (ref 97–108)
CK SERPL-CCNC: 124 U/L (ref 39–308)
CO2 SERPL-SCNC: 22 MMOL/L (ref 21–32)
CREAT SERPL-MCNC: 0.78 MG/DL (ref 0.7–1.3)
CRP SERPL-MCNC: 8.34 MG/DL (ref 0–0.3)
DIFFERENTIAL METHOD BLD: ABNORMAL
EOSINOPHIL # BLD: 0.3 K/UL (ref 0–0.4)
EOSINOPHIL NFR BLD: 4 % (ref 0–7)
ERYTHROCYTE [DISTWIDTH] IN BLOOD BY AUTOMATED COUNT: 14.8 % (ref 11.5–14.5)
ERYTHROCYTE [SEDIMENTATION RATE] IN BLOOD: 43 MM/HR (ref 0–20)
GLUCOSE BLD STRIP.AUTO-MCNC: 109 MG/DL (ref 65–117)
GLUCOSE BLD STRIP.AUTO-MCNC: 116 MG/DL (ref 65–117)
GLUCOSE BLD STRIP.AUTO-MCNC: 142 MG/DL (ref 65–117)
GLUCOSE BLD STRIP.AUTO-MCNC: 163 MG/DL (ref 65–117)
GLUCOSE SERPL-MCNC: 127 MG/DL (ref 65–100)
HCT VFR BLD AUTO: 43.3 % (ref 36.6–50.3)
HGB BLD-MCNC: 14 G/DL (ref 12.1–17)
IMM GRANULOCYTES # BLD AUTO: 0 K/UL (ref 0–0.04)
IMM GRANULOCYTES NFR BLD AUTO: 0 % (ref 0–0.5)
LYMPHOCYTES # BLD: 1.4 K/UL (ref 0.8–3.5)
LYMPHOCYTES NFR BLD: 16 % (ref 12–49)
MCH RBC QN AUTO: 27.9 PG (ref 26–34)
MCHC RBC AUTO-ENTMCNC: 32.3 G/DL (ref 30–36.5)
MCV RBC AUTO: 86.3 FL (ref 80–99)
MONOCYTES # BLD: 0.9 K/UL (ref 0–1)
MONOCYTES NFR BLD: 10 % (ref 5–13)
NEUTS SEG # BLD: 6.3 K/UL (ref 1.8–8)
NEUTS SEG NFR BLD: 69 % (ref 32–75)
NRBC # BLD: 0 K/UL (ref 0–0.01)
NRBC BLD-RTO: 0 PER 100 WBC
PLATELET # BLD AUTO: 179 K/UL (ref 150–400)
PMV BLD AUTO: 9.8 FL (ref 8.9–12.9)
POTASSIUM SERPL-SCNC: 3.9 MMOL/L (ref 3.5–5.1)
RBC # BLD AUTO: 5.02 M/UL (ref 4.1–5.7)
SERVICE CMNT-IMP: ABNORMAL
SERVICE CMNT-IMP: ABNORMAL
SERVICE CMNT-IMP: NORMAL
SODIUM SERPL-SCNC: 139 MMOL/L (ref 136–145)
WBC # BLD AUTO: 8.9 K/UL (ref 4.1–11.1)

## 2024-10-11 PROCEDURE — 2580000003 HC RX 258: Performed by: STUDENT IN AN ORGANIZED HEALTH CARE EDUCATION/TRAINING PROGRAM

## 2024-10-11 PROCEDURE — 2580000003 HC RX 258: Performed by: PODIATRIST

## 2024-10-11 PROCEDURE — 82962 GLUCOSE BLOOD TEST: CPT

## 2024-10-11 PROCEDURE — 99223 1ST HOSP IP/OBS HIGH 75: CPT | Performed by: INTERNAL MEDICINE

## 2024-10-11 PROCEDURE — 1100000000 HC RM PRIVATE

## 2024-10-11 PROCEDURE — 6370000000 HC RX 637 (ALT 250 FOR IP): Performed by: NURSE PRACTITIONER

## 2024-10-11 PROCEDURE — 82550 ASSAY OF CK (CPK): CPT

## 2024-10-11 PROCEDURE — 80048 BASIC METABOLIC PNL TOTAL CA: CPT

## 2024-10-11 PROCEDURE — 36415 COLL VENOUS BLD VENIPUNCTURE: CPT

## 2024-10-11 PROCEDURE — 2580000003 HC RX 258: Performed by: INTERNAL MEDICINE

## 2024-10-11 PROCEDURE — 86140 C-REACTIVE PROTEIN: CPT

## 2024-10-11 PROCEDURE — 85025 COMPLETE CBC W/AUTO DIFF WBC: CPT

## 2024-10-11 PROCEDURE — 6370000000 HC RX 637 (ALT 250 FOR IP): Performed by: INTERNAL MEDICINE

## 2024-10-11 PROCEDURE — 85652 RBC SED RATE AUTOMATED: CPT

## 2024-10-11 PROCEDURE — 6360000002 HC RX W HCPCS: Performed by: STUDENT IN AN ORGANIZED HEALTH CARE EDUCATION/TRAINING PROGRAM

## 2024-10-11 PROCEDURE — 6360000002 HC RX W HCPCS: Performed by: INTERNAL MEDICINE

## 2024-10-11 PROCEDURE — 6370000000 HC RX 637 (ALT 250 FOR IP): Performed by: PODIATRIST

## 2024-10-11 PROCEDURE — 6370000000 HC RX 637 (ALT 250 FOR IP): Performed by: STUDENT IN AN ORGANIZED HEALTH CARE EDUCATION/TRAINING PROGRAM

## 2024-10-11 RX ORDER — LIDOCAINE HYDROCHLORIDE 10 MG/ML
50 INJECTION, SOLUTION EPIDURAL; INFILTRATION; INTRACAUDAL; PERINEURAL ONCE
Status: DISCONTINUED | OUTPATIENT
Start: 2024-10-11 | End: 2024-10-11

## 2024-10-11 RX ORDER — METRONIDAZOLE 500 MG/100ML
500 INJECTION, SOLUTION INTRAVENOUS EVERY 8 HOURS
Status: DISCONTINUED | OUTPATIENT
Start: 2024-10-11 | End: 2024-10-11

## 2024-10-11 RX ORDER — SODIUM CHLORIDE 9 MG/ML
INJECTION, SOLUTION INTRAVENOUS PRN
Status: DISCONTINUED | OUTPATIENT
Start: 2024-10-11 | End: 2024-10-16 | Stop reason: HOSPADM

## 2024-10-11 RX ORDER — LIDOCAINE HYDROCHLORIDE 20 MG/ML
50 INJECTION, SOLUTION EPIDURAL; INFILTRATION; INTRACAUDAL; PERINEURAL ONCE
Status: DISCONTINUED | OUTPATIENT
Start: 2024-10-11 | End: 2024-10-16 | Stop reason: HOSPADM

## 2024-10-11 RX ORDER — SODIUM CHLORIDE 0.9 % (FLUSH) 0.9 %
5-40 SYRINGE (ML) INJECTION EVERY 12 HOURS SCHEDULED
Status: DISCONTINUED | OUTPATIENT
Start: 2024-10-11 | End: 2024-10-16 | Stop reason: HOSPADM

## 2024-10-11 RX ORDER — SODIUM CHLORIDE 0.9 % (FLUSH) 0.9 %
5-40 SYRINGE (ML) INJECTION PRN
Status: DISCONTINUED | OUTPATIENT
Start: 2024-10-11 | End: 2024-10-16 | Stop reason: HOSPADM

## 2024-10-11 RX ORDER — LIDOCAINE HCL/PF 100 MG/5ML
100 SYRINGE (ML) INJECTION ONCE
Status: DISCONTINUED | OUTPATIENT
Start: 2024-10-11 | End: 2024-10-15

## 2024-10-11 RX ADMIN — SPIRONOLACTONE 25 MG: 25 TABLET ORAL at 09:15

## 2024-10-11 RX ADMIN — WATER 2000 MG: 1 INJECTION INTRAMUSCULAR; INTRAVENOUS; SUBCUTANEOUS at 12:16

## 2024-10-11 RX ADMIN — CARVEDILOL 6.25 MG: 3.12 TABLET, FILM COATED ORAL at 17:25

## 2024-10-11 RX ADMIN — CARVEDILOL 6.25 MG: 3.12 TABLET, FILM COATED ORAL at 09:15

## 2024-10-11 RX ADMIN — DAPTOMYCIN 700 MG: 500 INJECTION, POWDER, LYOPHILIZED, FOR SOLUTION INTRAVENOUS at 18:10

## 2024-10-11 RX ADMIN — TAMSULOSIN HYDROCHLORIDE 0.4 MG: 0.4 CAPSULE ORAL at 09:18

## 2024-10-11 RX ADMIN — PREGABALIN 75 MG: 75 CAPSULE ORAL at 20:41

## 2024-10-11 RX ADMIN — PREGABALIN 75 MG: 75 CAPSULE ORAL at 09:15

## 2024-10-11 RX ADMIN — METRONIDAZOLE 500 MG: 500 INJECTION, SOLUTION INTRAVENOUS at 13:06

## 2024-10-11 RX ADMIN — SODIUM CHLORIDE, PRESERVATIVE FREE 10 ML: 5 INJECTION INTRAVENOUS at 09:20

## 2024-10-11 RX ADMIN — VANCOMYCIN HYDROCHLORIDE 1750 MG: 10 INJECTION, POWDER, LYOPHILIZED, FOR SOLUTION INTRAVENOUS at 09:57

## 2024-10-11 RX ADMIN — SACUBITRIL AND VALSARTAN 1 TABLET: 24; 26 TABLET, FILM COATED ORAL at 20:42

## 2024-10-11 RX ADMIN — ASPIRIN 81 MG: 81 TABLET, COATED ORAL at 09:15

## 2024-10-11 RX ADMIN — PIPERACILLIN AND TAZOBACTAM 3375 MG: 3; .375 INJECTION, POWDER, FOR SOLUTION INTRAVENOUS; PARENTERAL at 02:52

## 2024-10-11 RX ADMIN — INSULIN GLARGINE 16 UNITS: 100 INJECTION, SOLUTION SUBCUTANEOUS at 20:42

## 2024-10-11 RX ADMIN — SACUBITRIL AND VALSARTAN 1 TABLET: 24; 26 TABLET, FILM COATED ORAL at 09:15

## 2024-10-11 RX ADMIN — DIPHENHYDRAMINE HYDROCHLORIDE 25 MG: 25 CAPSULE ORAL at 20:41

## 2024-10-11 RX ADMIN — PREGABALIN 75 MG: 75 CAPSULE ORAL at 14:21

## 2024-10-11 RX ADMIN — SODIUM CHLORIDE, PRESERVATIVE FREE 10 ML: 5 INJECTION INTRAVENOUS at 20:42

## 2024-10-12 LAB
ANION GAP SERPL CALC-SCNC: 5 MMOL/L (ref 2–12)
BASOPHILS # BLD: 0.1 K/UL (ref 0–0.1)
BASOPHILS NFR BLD: 1 % (ref 0–1)
BUN SERPL-MCNC: 11 MG/DL (ref 6–20)
BUN/CREAT SERPL: 14 (ref 12–20)
CALCIUM SERPL-MCNC: 8.9 MG/DL (ref 8.5–10.1)
CHLORIDE SERPL-SCNC: 109 MMOL/L (ref 97–108)
CO2 SERPL-SCNC: 25 MMOL/L (ref 21–32)
CREAT SERPL-MCNC: 0.79 MG/DL (ref 0.7–1.3)
DIFFERENTIAL METHOD BLD: ABNORMAL
EOSINOPHIL # BLD: 0.4 K/UL (ref 0–0.4)
EOSINOPHIL NFR BLD: 4 % (ref 0–7)
ERYTHROCYTE [DISTWIDTH] IN BLOOD BY AUTOMATED COUNT: 14.7 % (ref 11.5–14.5)
GLUCOSE BLD STRIP.AUTO-MCNC: 128 MG/DL (ref 65–117)
GLUCOSE BLD STRIP.AUTO-MCNC: 140 MG/DL (ref 65–117)
GLUCOSE BLD STRIP.AUTO-MCNC: 158 MG/DL (ref 65–117)
GLUCOSE BLD STRIP.AUTO-MCNC: 176 MG/DL (ref 65–117)
GLUCOSE SERPL-MCNC: 136 MG/DL (ref 65–100)
HCT VFR BLD AUTO: 43 % (ref 36.6–50.3)
HGB BLD-MCNC: 14 G/DL (ref 12.1–17)
IMM GRANULOCYTES # BLD AUTO: 0 K/UL (ref 0–0.04)
IMM GRANULOCYTES NFR BLD AUTO: 1 % (ref 0–0.5)
LYMPHOCYTES # BLD: 1.5 K/UL (ref 0.8–3.5)
LYMPHOCYTES NFR BLD: 17 % (ref 12–49)
MCH RBC QN AUTO: 27.8 PG (ref 26–34)
MCHC RBC AUTO-ENTMCNC: 32.6 G/DL (ref 30–36.5)
MCV RBC AUTO: 85.5 FL (ref 80–99)
MONOCYTES # BLD: 0.9 K/UL (ref 0–1)
MONOCYTES NFR BLD: 10 % (ref 5–13)
NEUTS SEG # BLD: 5.8 K/UL (ref 1.8–8)
NEUTS SEG NFR BLD: 67 % (ref 32–75)
NRBC # BLD: 0 K/UL (ref 0–0.01)
NRBC BLD-RTO: 0 PER 100 WBC
PLATELET # BLD AUTO: 197 K/UL (ref 150–400)
PMV BLD AUTO: 9.1 FL (ref 8.9–12.9)
POTASSIUM SERPL-SCNC: 3.7 MMOL/L (ref 3.5–5.1)
RBC # BLD AUTO: 5.03 M/UL (ref 4.1–5.7)
SERVICE CMNT-IMP: ABNORMAL
SODIUM SERPL-SCNC: 139 MMOL/L (ref 136–145)
WBC # BLD AUTO: 8.7 K/UL (ref 4.1–11.1)

## 2024-10-12 PROCEDURE — 6360000002 HC RX W HCPCS: Performed by: INTERNAL MEDICINE

## 2024-10-12 PROCEDURE — 82962 GLUCOSE BLOOD TEST: CPT

## 2024-10-12 PROCEDURE — 6370000000 HC RX 637 (ALT 250 FOR IP): Performed by: INTERNAL MEDICINE

## 2024-10-12 PROCEDURE — 2580000003 HC RX 258: Performed by: PODIATRIST

## 2024-10-12 PROCEDURE — 36415 COLL VENOUS BLD VENIPUNCTURE: CPT

## 2024-10-12 PROCEDURE — 1100000000 HC RM PRIVATE

## 2024-10-12 PROCEDURE — 2580000003 HC RX 258: Performed by: INTERNAL MEDICINE

## 2024-10-12 PROCEDURE — 85025 COMPLETE CBC W/AUTO DIFF WBC: CPT

## 2024-10-12 PROCEDURE — 80048 BASIC METABOLIC PNL TOTAL CA: CPT

## 2024-10-12 PROCEDURE — 6370000000 HC RX 637 (ALT 250 FOR IP): Performed by: NURSE PRACTITIONER

## 2024-10-12 PROCEDURE — 6370000000 HC RX 637 (ALT 250 FOR IP): Performed by: PODIATRIST

## 2024-10-12 RX ADMIN — SODIUM CHLORIDE, PRESERVATIVE FREE 10 ML: 5 INJECTION INTRAVENOUS at 09:33

## 2024-10-12 RX ADMIN — SPIRONOLACTONE 25 MG: 25 TABLET ORAL at 09:20

## 2024-10-12 RX ADMIN — CARVEDILOL 6.25 MG: 3.12 TABLET, FILM COATED ORAL at 17:06

## 2024-10-12 RX ADMIN — DIPHENHYDRAMINE HYDROCHLORIDE 25 MG: 25 CAPSULE ORAL at 20:54

## 2024-10-12 RX ADMIN — ASPIRIN 81 MG: 81 TABLET, COATED ORAL at 09:20

## 2024-10-12 RX ADMIN — DAPTOMYCIN 700 MG: 500 INJECTION, POWDER, LYOPHILIZED, FOR SOLUTION INTRAVENOUS at 17:06

## 2024-10-12 RX ADMIN — PREGABALIN 75 MG: 75 CAPSULE ORAL at 09:20

## 2024-10-12 RX ADMIN — SODIUM CHLORIDE, PRESERVATIVE FREE 10 ML: 5 INJECTION INTRAVENOUS at 20:54

## 2024-10-12 RX ADMIN — SACUBITRIL AND VALSARTAN 1 TABLET: 24; 26 TABLET, FILM COATED ORAL at 20:50

## 2024-10-12 RX ADMIN — TAMSULOSIN HYDROCHLORIDE 0.4 MG: 0.4 CAPSULE ORAL at 09:20

## 2024-10-12 RX ADMIN — SACUBITRIL AND VALSARTAN 1 TABLET: 24; 26 TABLET, FILM COATED ORAL at 09:20

## 2024-10-12 RX ADMIN — PREGABALIN 75 MG: 75 CAPSULE ORAL at 14:17

## 2024-10-12 RX ADMIN — INSULIN GLARGINE 16 UNITS: 100 INJECTION, SOLUTION SUBCUTANEOUS at 20:51

## 2024-10-12 RX ADMIN — PREGABALIN 75 MG: 75 CAPSULE ORAL at 20:50

## 2024-10-12 RX ADMIN — SODIUM CHLORIDE, PRESERVATIVE FREE 10 ML: 5 INJECTION INTRAVENOUS at 20:55

## 2024-10-12 RX ADMIN — CARVEDILOL 6.25 MG: 3.12 TABLET, FILM COATED ORAL at 09:20

## 2024-10-12 NOTE — CARE COORDINATION
Transition of Care Plan:    RUR: 11%  Prior Level of Functioning:   Disposition: Home w/HH & IV ABX  If SNF or IPR: Date FOC offered:   Date FOC received:   Accepting facility:   Date authorization started with reference number:   Date authorization received and expires:   Follow up appointments:   DME needed: n/a  Transportation at discharge: wife  IM/IMM Medicare/ letter given: 2nd IM needed  Is patient a [place] and connected with [facility]?    If yes, was [place] transfer form completed and VA notified?   Caregiver Contact: wifeCharlotte [phone]  Discharge Caregiver contacted prior to discharge?   Care Conference needed?   Barriers to discharge:     CM sent a referral to [facility] & several  providers.  Patient requested a referral sent to [facility].  CM will continue to follow.    Melody Jernigan  Ext [phone]

## 2024-10-13 LAB
ANION GAP SERPL CALC-SCNC: 5 MMOL/L (ref 2–12)
BACTERIA SPEC CULT: ABNORMAL
BACTERIA SPEC CULT: ABNORMAL
BACTERIA SPEC CULT: NORMAL
BACTERIA SPEC CULT: NORMAL
BASOPHILS # BLD: 0.1 K/UL (ref 0–0.1)
BASOPHILS NFR BLD: 1 % (ref 0–1)
BUN SERPL-MCNC: 12 MG/DL (ref 6–20)
BUN/CREAT SERPL: 15 (ref 12–20)
CALCIUM SERPL-MCNC: 8.7 MG/DL (ref 8.5–10.1)
CHLORIDE SERPL-SCNC: 108 MMOL/L (ref 97–108)
CO2 SERPL-SCNC: 25 MMOL/L (ref 21–32)
CREAT SERPL-MCNC: 0.78 MG/DL (ref 0.7–1.3)
DIFFERENTIAL METHOD BLD: ABNORMAL
EOSINOPHIL # BLD: 0.4 K/UL (ref 0–0.4)
EOSINOPHIL NFR BLD: 4 % (ref 0–7)
ERYTHROCYTE [DISTWIDTH] IN BLOOD BY AUTOMATED COUNT: 14.6 % (ref 11.5–14.5)
GLUCOSE BLD STRIP.AUTO-MCNC: 132 MG/DL (ref 65–117)
GLUCOSE BLD STRIP.AUTO-MCNC: 149 MG/DL (ref 65–117)
GLUCOSE BLD STRIP.AUTO-MCNC: 153 MG/DL (ref 65–117)
GLUCOSE BLD STRIP.AUTO-MCNC: 183 MG/DL (ref 65–117)
GLUCOSE SERPL-MCNC: 149 MG/DL (ref 65–100)
GRAM STN SPEC: ABNORMAL
GRAM STN SPEC: ABNORMAL
HCT VFR BLD AUTO: 41.8 % (ref 36.6–50.3)
HGB BLD-MCNC: 13.6 G/DL (ref 12.1–17)
IMM GRANULOCYTES # BLD AUTO: 0 K/UL (ref 0–0.04)
IMM GRANULOCYTES NFR BLD AUTO: 0 % (ref 0–0.5)
LYMPHOCYTES # BLD: 1.7 K/UL (ref 0.8–3.5)
LYMPHOCYTES NFR BLD: 20 % (ref 12–49)
MAGNESIUM SERPL-MCNC: 2 MG/DL (ref 1.6–2.4)
MCH RBC QN AUTO: 27.8 PG (ref 26–34)
MCHC RBC AUTO-ENTMCNC: 32.5 G/DL (ref 30–36.5)
MCV RBC AUTO: 85.3 FL (ref 80–99)
MONOCYTES # BLD: 0.8 K/UL (ref 0–1)
MONOCYTES NFR BLD: 10 % (ref 5–13)
NEUTS SEG # BLD: 5.2 K/UL (ref 1.8–8)
NEUTS SEG NFR BLD: 65 % (ref 32–75)
NRBC # BLD: 0 K/UL (ref 0–0.01)
NRBC BLD-RTO: 0 PER 100 WBC
PLATELET # BLD AUTO: 208 K/UL (ref 150–400)
PMV BLD AUTO: 9.2 FL (ref 8.9–12.9)
POTASSIUM SERPL-SCNC: 3.4 MMOL/L (ref 3.5–5.1)
RBC # BLD AUTO: 4.9 M/UL (ref 4.1–5.7)
SERVICE CMNT-IMP: ABNORMAL
SERVICE CMNT-IMP: NORMAL
SERVICE CMNT-IMP: NORMAL
SODIUM SERPL-SCNC: 138 MMOL/L (ref 136–145)
WBC # BLD AUTO: 8.1 K/UL (ref 4.1–11.1)

## 2024-10-13 PROCEDURE — 36415 COLL VENOUS BLD VENIPUNCTURE: CPT

## 2024-10-13 PROCEDURE — 1100000000 HC RM PRIVATE

## 2024-10-13 PROCEDURE — 82962 GLUCOSE BLOOD TEST: CPT

## 2024-10-13 PROCEDURE — 2580000003 HC RX 258: Performed by: INTERNAL MEDICINE

## 2024-10-13 PROCEDURE — 83735 ASSAY OF MAGNESIUM: CPT

## 2024-10-13 PROCEDURE — 6370000000 HC RX 637 (ALT 250 FOR IP): Performed by: PODIATRIST

## 2024-10-13 PROCEDURE — 6370000000 HC RX 637 (ALT 250 FOR IP): Performed by: INTERNAL MEDICINE

## 2024-10-13 PROCEDURE — 2580000003 HC RX 258: Performed by: PODIATRIST

## 2024-10-13 PROCEDURE — 6370000000 HC RX 637 (ALT 250 FOR IP): Performed by: NURSE PRACTITIONER

## 2024-10-13 PROCEDURE — 85025 COMPLETE CBC W/AUTO DIFF WBC: CPT

## 2024-10-13 PROCEDURE — 80048 BASIC METABOLIC PNL TOTAL CA: CPT

## 2024-10-13 PROCEDURE — 6360000002 HC RX W HCPCS: Performed by: INTERNAL MEDICINE

## 2024-10-13 RX ORDER — CLOPIDOGREL BISULFATE 75 MG/1
75 TABLET ORAL DAILY
Status: DISCONTINUED | OUTPATIENT
Start: 2024-10-14 | End: 2024-10-16 | Stop reason: HOSPADM

## 2024-10-13 RX ORDER — POTASSIUM CHLORIDE 1500 MG/1
40 TABLET, EXTENDED RELEASE ORAL EVERY 6 HOURS
Status: COMPLETED | OUTPATIENT
Start: 2024-10-13 | End: 2024-10-13

## 2024-10-13 RX ADMIN — INSULIN GLARGINE 16 UNITS: 100 INJECTION, SOLUTION SUBCUTANEOUS at 20:30

## 2024-10-13 RX ADMIN — SACUBITRIL AND VALSARTAN 1 TABLET: 24; 26 TABLET, FILM COATED ORAL at 20:18

## 2024-10-13 RX ADMIN — SACUBITRIL AND VALSARTAN 1 TABLET: 24; 26 TABLET, FILM COATED ORAL at 08:59

## 2024-10-13 RX ADMIN — PREGABALIN 75 MG: 75 CAPSULE ORAL at 20:30

## 2024-10-13 RX ADMIN — CARVEDILOL 6.25 MG: 3.12 TABLET, FILM COATED ORAL at 15:47

## 2024-10-13 RX ADMIN — SODIUM CHLORIDE, PRESERVATIVE FREE 10 ML: 5 INJECTION INTRAVENOUS at 20:21

## 2024-10-13 RX ADMIN — POTASSIUM CHLORIDE 40 MEQ: 1500 TABLET, EXTENDED RELEASE ORAL at 11:58

## 2024-10-13 RX ADMIN — CARVEDILOL 6.25 MG: 3.12 TABLET, FILM COATED ORAL at 08:52

## 2024-10-13 RX ADMIN — DAPTOMYCIN 700 MG: 500 INJECTION, POWDER, LYOPHILIZED, FOR SOLUTION INTRAVENOUS at 17:12

## 2024-10-13 RX ADMIN — DIPHENHYDRAMINE HYDROCHLORIDE 25 MG: 25 CAPSULE ORAL at 20:18

## 2024-10-13 RX ADMIN — PREGABALIN 75 MG: 75 CAPSULE ORAL at 08:49

## 2024-10-13 RX ADMIN — PREGABALIN 75 MG: 75 CAPSULE ORAL at 13:59

## 2024-10-13 RX ADMIN — ASPIRIN 81 MG: 81 TABLET, COATED ORAL at 08:50

## 2024-10-13 RX ADMIN — TAMSULOSIN HYDROCHLORIDE 0.4 MG: 0.4 CAPSULE ORAL at 08:50

## 2024-10-13 RX ADMIN — SPIRONOLACTONE 25 MG: 25 TABLET ORAL at 08:59

## 2024-10-13 RX ADMIN — SODIUM CHLORIDE, PRESERVATIVE FREE 10 ML: 5 INJECTION INTRAVENOUS at 08:50

## 2024-10-13 RX ADMIN — POTASSIUM CHLORIDE 40 MEQ: 1500 TABLET, EXTENDED RELEASE ORAL at 15:47

## 2024-10-13 ASSESSMENT — PAIN SCALES - GENERAL: PAINLEVEL_OUTOF10: 0

## 2024-10-14 ENCOUNTER — APPOINTMENT (OUTPATIENT)
Facility: HOSPITAL | Age: 64
DRG: 854 | End: 2024-10-14
Attending: STUDENT IN AN ORGANIZED HEALTH CARE EDUCATION/TRAINING PROGRAM
Payer: COMMERCIAL

## 2024-10-14 PROBLEM — E78.5 HYPERLIPIDEMIA: Status: ACTIVE | Noted: 2024-10-14

## 2024-10-14 PROBLEM — B95.7 COAGULASE-NEGATIVE STAPHYLOCOCCAL INFECTION: Status: ACTIVE | Noted: 2024-10-14

## 2024-10-14 PROBLEM — E66.9 OBESITY (BMI 30-39.9): Status: ACTIVE | Noted: 2024-10-14

## 2024-10-14 PROBLEM — E11.42 DIABETIC POLYNEUROPATHY ASSOCIATED WITH TYPE 2 DIABETES MELLITUS (HCC): Status: ACTIVE | Noted: 2024-10-14

## 2024-10-14 LAB
ANION GAP SERPL CALC-SCNC: 6 MMOL/L (ref 2–12)
BACTERIA SPEC CULT: NORMAL
BACTERIA SPEC CULT: NORMAL
BASOPHILS # BLD: 0.1 K/UL (ref 0–0.1)
BASOPHILS NFR BLD: 1 % (ref 0–1)
BUN SERPL-MCNC: 11 MG/DL (ref 6–20)
BUN/CREAT SERPL: 15 (ref 12–20)
CALCIUM SERPL-MCNC: 9.1 MG/DL (ref 8.5–10.1)
CHLORIDE SERPL-SCNC: 109 MMOL/L (ref 97–108)
CO2 SERPL-SCNC: 22 MMOL/L (ref 21–32)
CREAT SERPL-MCNC: 0.73 MG/DL (ref 0.7–1.3)
DIFFERENTIAL METHOD BLD: ABNORMAL
ECHO BSA: 2.32 M2
EOSINOPHIL # BLD: 0.3 K/UL (ref 0–0.4)
EOSINOPHIL NFR BLD: 4 % (ref 0–7)
ERYTHROCYTE [DISTWIDTH] IN BLOOD BY AUTOMATED COUNT: 14.6 % (ref 11.5–14.5)
EST. AVERAGE GLUCOSE BLD GHB EST-MCNC: 160 MG/DL
GLUCOSE BLD STRIP.AUTO-MCNC: 118 MG/DL (ref 65–117)
GLUCOSE BLD STRIP.AUTO-MCNC: 119 MG/DL (ref 65–117)
GLUCOSE BLD STRIP.AUTO-MCNC: 130 MG/DL (ref 65–117)
GLUCOSE BLD STRIP.AUTO-MCNC: 153 MG/DL (ref 65–117)
GLUCOSE SERPL-MCNC: 147 MG/DL (ref 65–100)
HBA1C MFR BLD: 7.2 % (ref 4–5.6)
HCT VFR BLD AUTO: 45.7 % (ref 36.6–50.3)
HGB BLD-MCNC: 15 G/DL (ref 12.1–17)
IMM GRANULOCYTES # BLD AUTO: 0 K/UL (ref 0–0.04)
IMM GRANULOCYTES NFR BLD AUTO: 0 % (ref 0–0.5)
LYMPHOCYTES # BLD: 2.1 K/UL (ref 0.8–3.5)
LYMPHOCYTES NFR BLD: 25 % (ref 12–49)
MCH RBC QN AUTO: 27.8 PG (ref 26–34)
MCHC RBC AUTO-ENTMCNC: 32.8 G/DL (ref 30–36.5)
MCV RBC AUTO: 84.6 FL (ref 80–99)
MONOCYTES # BLD: 0.6 K/UL (ref 0–1)
MONOCYTES NFR BLD: 7 % (ref 5–13)
NEUTS SEG # BLD: 5.1 K/UL (ref 1.8–8)
NEUTS SEG NFR BLD: 63 % (ref 32–75)
NRBC # BLD: 0 K/UL (ref 0–0.01)
NRBC BLD-RTO: 0 PER 100 WBC
PLATELET # BLD AUTO: 240 K/UL (ref 150–400)
PMV BLD AUTO: 9 FL (ref 8.9–12.9)
POTASSIUM SERPL-SCNC: 4.1 MMOL/L (ref 3.5–5.1)
RBC # BLD AUTO: 5.4 M/UL (ref 4.1–5.7)
SERVICE CMNT-IMP: ABNORMAL
SERVICE CMNT-IMP: NORMAL
SERVICE CMNT-IMP: NORMAL
SODIUM SERPL-SCNC: 137 MMOL/L (ref 136–145)
WBC # BLD AUTO: 8.2 K/UL (ref 4.1–11.1)

## 2024-10-14 PROCEDURE — 99233 SBSQ HOSP IP/OBS HIGH 50: CPT | Performed by: INTERNAL MEDICINE

## 2024-10-14 PROCEDURE — 93971 EXTREMITY STUDY: CPT

## 2024-10-14 PROCEDURE — 83036 HEMOGLOBIN GLYCOSYLATED A1C: CPT

## 2024-10-14 PROCEDURE — 82962 GLUCOSE BLOOD TEST: CPT

## 2024-10-14 PROCEDURE — 73701 CT LOWER EXTREMITY W/DYE: CPT

## 2024-10-14 PROCEDURE — 02HV33Z INSERTION OF INFUSION DEVICE INTO SUPERIOR VENA CAVA, PERCUTANEOUS APPROACH: ICD-10-PCS | Performed by: INTERNAL MEDICINE

## 2024-10-14 PROCEDURE — C1894 INTRO/SHEATH, NON-LASER: HCPCS

## 2024-10-14 PROCEDURE — 6370000000 HC RX 637 (ALT 250 FOR IP): Performed by: PODIATRIST

## 2024-10-14 PROCEDURE — 6360000004 HC RX CONTRAST MEDICATION: Performed by: INTERNAL MEDICINE

## 2024-10-14 PROCEDURE — 6370000000 HC RX 637 (ALT 250 FOR IP): Performed by: NURSE PRACTITIONER

## 2024-10-14 PROCEDURE — 2580000003 HC RX 258: Performed by: INTERNAL MEDICINE

## 2024-10-14 PROCEDURE — 2580000003 HC RX 258: Performed by: PODIATRIST

## 2024-10-14 PROCEDURE — C1751 CATH, INF, PER/CENT/MIDLINE: HCPCS

## 2024-10-14 PROCEDURE — 36569 INSJ PICC 5 YR+ W/O IMAGING: CPT

## 2024-10-14 PROCEDURE — 36415 COLL VENOUS BLD VENIPUNCTURE: CPT

## 2024-10-14 PROCEDURE — 80048 BASIC METABOLIC PNL TOTAL CA: CPT

## 2024-10-14 PROCEDURE — 76937 US GUIDE VASCULAR ACCESS: CPT

## 2024-10-14 PROCEDURE — 1100000000 HC RM PRIVATE

## 2024-10-14 PROCEDURE — 6370000000 HC RX 637 (ALT 250 FOR IP): Performed by: INTERNAL MEDICINE

## 2024-10-14 PROCEDURE — 6360000002 HC RX W HCPCS: Performed by: INTERNAL MEDICINE

## 2024-10-14 PROCEDURE — 2709999900 HC NON-CHARGEABLE SUPPLY

## 2024-10-14 PROCEDURE — 85025 COMPLETE CBC W/AUTO DIFF WBC: CPT

## 2024-10-14 RX ORDER — CEFAZOLIN SODIUM 1 G/3ML
2000 INJECTION, POWDER, FOR SOLUTION INTRAMUSCULAR; INTRAVENOUS EVERY 8 HOURS
Qty: 54 EACH | Refills: 0 | Status: SHIPPED | OUTPATIENT
Start: 2024-10-14 | End: 2024-10-16 | Stop reason: HOSPADM

## 2024-10-14 RX ORDER — LACTOBACILLUS RHAMNOSUS GG 10B CELL
1 CAPSULE ORAL
Status: DISCONTINUED | OUTPATIENT
Start: 2024-10-15 | End: 2024-10-16 | Stop reason: HOSPADM

## 2024-10-14 RX ORDER — IOPAMIDOL 755 MG/ML
100 INJECTION, SOLUTION INTRAVASCULAR
Status: COMPLETED | OUTPATIENT
Start: 2024-10-14 | End: 2024-10-14

## 2024-10-14 RX ADMIN — WATER 2000 MG: 1 INJECTION INTRAMUSCULAR; INTRAVENOUS; SUBCUTANEOUS at 18:39

## 2024-10-14 RX ADMIN — Medication 3 MG: at 21:17

## 2024-10-14 RX ADMIN — PREGABALIN 75 MG: 75 CAPSULE ORAL at 21:16

## 2024-10-14 RX ADMIN — SACUBITRIL AND VALSARTAN 1 TABLET: 24; 26 TABLET, FILM COATED ORAL at 21:16

## 2024-10-14 RX ADMIN — PREGABALIN 75 MG: 75 CAPSULE ORAL at 08:11

## 2024-10-14 RX ADMIN — TAMSULOSIN HYDROCHLORIDE 0.4 MG: 0.4 CAPSULE ORAL at 08:11

## 2024-10-14 RX ADMIN — INSULIN GLARGINE 16 UNITS: 100 INJECTION, SOLUTION SUBCUTANEOUS at 21:17

## 2024-10-14 RX ADMIN — SACUBITRIL AND VALSARTAN 1 TABLET: 24; 26 TABLET, FILM COATED ORAL at 08:11

## 2024-10-14 RX ADMIN — ASPIRIN 81 MG: 81 TABLET, COATED ORAL at 08:11

## 2024-10-14 RX ADMIN — IOPAMIDOL 100 ML: 755 INJECTION, SOLUTION INTRAVENOUS at 13:33

## 2024-10-14 RX ADMIN — DIPHENHYDRAMINE HYDROCHLORIDE 25 MG: 25 CAPSULE ORAL at 21:16

## 2024-10-14 RX ADMIN — CARVEDILOL 6.25 MG: 3.12 TABLET, FILM COATED ORAL at 17:18

## 2024-10-14 RX ADMIN — SODIUM CHLORIDE, PRESERVATIVE FREE 10 ML: 5 INJECTION INTRAVENOUS at 21:18

## 2024-10-14 RX ADMIN — PREGABALIN 75 MG: 75 CAPSULE ORAL at 14:05

## 2024-10-14 RX ADMIN — WATER 2000 MG: 1 INJECTION INTRAMUSCULAR; INTRAVENOUS; SUBCUTANEOUS at 11:34

## 2024-10-14 RX ADMIN — SODIUM CHLORIDE, PRESERVATIVE FREE 10 ML: 5 INJECTION INTRAVENOUS at 08:15

## 2024-10-14 RX ADMIN — SPIRONOLACTONE 25 MG: 25 TABLET ORAL at 08:11

## 2024-10-14 RX ADMIN — CARVEDILOL 6.25 MG: 3.12 TABLET, FILM COATED ORAL at 08:11

## 2024-10-14 RX ADMIN — CLOPIDOGREL BISULFATE 75 MG: 75 TABLET ORAL at 08:11

## 2024-10-14 NOTE — DISCHARGE INSTRUCTIONS
Antimicrobial orders for discharge  -Zosyn 3.375 g  IV every 8 hourly end date 11/20  -May administer as continuous infusion  -Pull line after ID evaluation  -Weekly CBC, CMP & ESR/CRP every other week-  -Fax reports to [phone], call with critical labs  at [phone]  -Encourage adequate fluids, daily probiotic/yogurt  -If line malfunction occurs and home health cannot reposition  please send patient to ED immediately  -ID follow-up -11/19 at 3:30 PM  - If persistent side effects occur stop antibiotic and call-ID/PCP    Wound care for the Right foot to be done every other day:  Irrigate the wound with 5-10 ml of Vashe solution or Normal saline (after the Vashe is gone) using a syringe.  Allow it to fill up and run out of the wound and wipe the wound surface. Pack the wound with a cut out strip of Hydrofera Blue Classic (dry and hard) - about 3 inches of it. Leave some hanging out of the wound. Cover with a gauze and an ABD and wrap with the small roll Holly. Then an ACE wrap. Float the heel when at rest.

## 2024-10-14 NOTE — CONSULTS
Known patient to me will see today   Reviewed the x ray results and scheduled patient this evening for I&D right foot  Keep patient NPO and hold anticoagulants please   Full note to follow  
PICC Education: Explained reason and rationale for PICC placement along with providing education in order to make an informed consent including nature, risks, benefits, potential complications, care and maintenance of PICC line. The opportunity for questions or concerns was given. Patient  gave written consent for PICC procedure to be done at the bedside. Patient  verbalizes understanding at this time.      Procedure:  Time out completed.  Pre procedure assessment done.  Maximum sterile barrier precautions observed throughout procedure.  Lidocaine 1% 3.0 ml sc given prior to cannulation  Cannulated basilic vein using ultrasound guidance and modified seldinger technique.  Inserted Single lumen 4 fr PICC in  RIGHT arm using, Web [employer] Tip Location System and 3CG   Patient has sinus rhythm.  Tip Positioning System indicating tall P wave and no negative deflection before P wave which would indicate the PICC tip is properly placed in the distal SVC or the CAJ.  PICC tip was confirmed by 2 PICC nurses and 3CG printout was placed on patient’s chart.  Blood return verified and flushed with 20ml NS in each port.  Sterile CHG impregnated dressing applied with Stat loc per protocol.  Curios caps applied to each port.  Reason for access (10 more days of Daptomycin end Date 10/23/24).  Complications related to insertion (None).  Patient tolerated procedure well with minimal blood loss.   PICC procedure performed by: Alessandra Manuel RN, BSN, [facility]/ Vascular Access Nurse  Assisted by: Eugenie Mora RN, [facility] / Vascular Access Nurse    Right  arm circumference: 33 cm.   Catheter internal length:  41 cm  Catheter external length: 5 cm  TOTAL Length:46 cm  PICC catheter occupies 8% of vein    Brand of catheter:  BARD  Lot #[id number]   REF# [id number]    EXP 10/31/2025.    Primary nurse notified PICC line may be used and to hang new infusion tubing prior to use.        Alessandra Manuel RN, BSN, [facility]  Vascular Access Team   
proximal phalanx with soft tissue swelling. Findings may  represent osteomyelitis..    Electronically signed by Marky Hopkins      XR FOOT RIGHT (MIN 3 VIEWS)    Result Date: 10/8/2024  EXAM: XR FOOT RIGHT (MIN 3 VIEWS) INDICATION: dm ulcer ? osteo. COMPARISON: None. FINDINGS: Three views of the right foot demonstrate chronic postsurgical changes of the second digit. Dislocation of the third MTP joint. There are some mild periosteal reaction around the third proximal phalanx. Soft tissue swelling of the third digit..     Dislocation of the third MTP joint. There is periosteal reaction around the third proximal phalanx with soft tissue swelling. Findings may represent osteomyelitis.. Electronically signed by Marky Brooks MD FACP

## 2024-10-14 NOTE — PLAN OF CARE
Problem: Chronic Conditions and Co-morbidities  Goal: Patient's chronic conditions and co-morbidity symptoms are monitored and maintained or improved  Outcome: Progressing     Problem: Discharge Planning  Goal: Discharge to home or other facility with appropriate resources  10/14/2024 0132 by Olga Henson RN  Outcome: Progressing  10/13/2024 1233 by Mary Alice Conde RN  Outcome: Progressing     Problem: ABCDS Injury Assessment  Goal: Absence of physical injury  Outcome: Progressing     Problem: Safety - Adult  Goal: Free from fall injury  10/14/2024 0132 by Olga Henson RN  Outcome: Progressing  10/13/2024 1233 by Mary Alice Conde RN  Outcome: Progressing

## 2024-10-14 NOTE — CARE COORDINATION
Transition of Care Plan:     RUR: 11%  Prior Level of Functioning:   Disposition: Home w/[facility] - SN & [facility] for IV ABX  If SNF or IPR: Date FOC offered:   Date FOC received:   Accepting facility:   Date authorization started with reference number:   Date authorization received and expires:   Follow up appointments:   DME needed: n/a  Transportation at discharge: wife  IM/IMM Medicare/ letter given: 2nd IM given  Is patient a  and connected with VA?               If yes, was [place] transfer form completed and VA notified?   Caregiver Contact: wifeCharlotte [phone]  Discharge Caregiver contacted prior to discharge?   Care Conference needed?   Barriers to discharge:     Patient/wife have chosen [facility].  Patient is expected to d/c today.  CM will continue to follow.    Patient is d/c home today w/HH & IV ABX.  No other needs or concerns identified.    Melody Jernigan  Ext [phone]

## 2024-10-15 PROBLEM — L02.611 ABSCESS OF RIGHT FOOT: Status: ACTIVE | Noted: 2024-10-15

## 2024-10-15 PROBLEM — M00.071: Status: ACTIVE | Noted: 2024-10-15

## 2024-10-15 LAB
ANION GAP SERPL CALC-SCNC: 5 MMOL/L (ref 2–12)
BASOPHILS # BLD: 0.1 K/UL (ref 0–0.1)
BASOPHILS NFR BLD: 1 % (ref 0–1)
BUN SERPL-MCNC: 10 MG/DL (ref 6–20)
BUN/CREAT SERPL: 12 (ref 12–20)
CALCIUM SERPL-MCNC: 9 MG/DL (ref 8.5–10.1)
CHLORIDE SERPL-SCNC: 108 MMOL/L (ref 97–108)
CO2 SERPL-SCNC: 27 MMOL/L (ref 21–32)
CREAT SERPL-MCNC: 0.86 MG/DL (ref 0.7–1.3)
DIFFERENTIAL METHOD BLD: ABNORMAL
EOSINOPHIL # BLD: 0.3 K/UL (ref 0–0.4)
EOSINOPHIL NFR BLD: 4 % (ref 0–7)
ERYTHROCYTE [DISTWIDTH] IN BLOOD BY AUTOMATED COUNT: 14.6 % (ref 11.5–14.5)
GLUCOSE BLD STRIP.AUTO-MCNC: 128 MG/DL (ref 65–117)
GLUCOSE BLD STRIP.AUTO-MCNC: 133 MG/DL (ref 65–117)
GLUCOSE BLD STRIP.AUTO-MCNC: 136 MG/DL (ref 65–117)
GLUCOSE BLD STRIP.AUTO-MCNC: 169 MG/DL (ref 65–117)
GLUCOSE SERPL-MCNC: 107 MG/DL (ref 65–100)
HCT VFR BLD AUTO: 43.4 % (ref 36.6–50.3)
HGB BLD-MCNC: 14.1 G/DL (ref 12.1–17)
IMM GRANULOCYTES # BLD AUTO: 0 K/UL (ref 0–0.04)
IMM GRANULOCYTES NFR BLD AUTO: 0 % (ref 0–0.5)
LYMPHOCYTES # BLD: 1.8 K/UL (ref 0.8–3.5)
LYMPHOCYTES NFR BLD: 24 % (ref 12–49)
MCH RBC QN AUTO: 27.2 PG (ref 26–34)
MCHC RBC AUTO-ENTMCNC: 32.5 G/DL (ref 30–36.5)
MCV RBC AUTO: 83.8 FL (ref 80–99)
MONOCYTES # BLD: 0.5 K/UL (ref 0–1)
MONOCYTES NFR BLD: 7 % (ref 5–13)
NEUTS SEG # BLD: 4.8 K/UL (ref 1.8–8)
NEUTS SEG NFR BLD: 64 % (ref 32–75)
NRBC # BLD: 0 K/UL (ref 0–0.01)
NRBC BLD-RTO: 0 PER 100 WBC
PLATELET # BLD AUTO: 200 K/UL (ref 150–400)
PMV BLD AUTO: 9 FL (ref 8.9–12.9)
POTASSIUM SERPL-SCNC: 3.8 MMOL/L (ref 3.5–5.1)
RBC # BLD AUTO: 5.18 M/UL (ref 4.1–5.7)
SERVICE CMNT-IMP: ABNORMAL
SODIUM SERPL-SCNC: 140 MMOL/L (ref 136–145)
WBC # BLD AUTO: 7.5 K/UL (ref 4.1–11.1)

## 2024-10-15 PROCEDURE — 80048 BASIC METABOLIC PNL TOTAL CA: CPT

## 2024-10-15 PROCEDURE — 1100000000 HC RM PRIVATE

## 2024-10-15 PROCEDURE — 6370000000 HC RX 637 (ALT 250 FOR IP): Performed by: PODIATRIST

## 2024-10-15 PROCEDURE — 6360000002 HC RX W HCPCS: Performed by: INTERNAL MEDICINE

## 2024-10-15 PROCEDURE — 82962 GLUCOSE BLOOD TEST: CPT

## 2024-10-15 PROCEDURE — 36415 COLL VENOUS BLD VENIPUNCTURE: CPT

## 2024-10-15 PROCEDURE — 2580000003 HC RX 258: Performed by: INTERNAL MEDICINE

## 2024-10-15 PROCEDURE — 6370000000 HC RX 637 (ALT 250 FOR IP): Performed by: NURSE PRACTITIONER

## 2024-10-15 PROCEDURE — 0QBN0ZZ EXCISION OF RIGHT METATARSAL, OPEN APPROACH: ICD-10-PCS | Performed by: PODIATRIST

## 2024-10-15 PROCEDURE — 85025 COMPLETE CBC W/AUTO DIFF WBC: CPT

## 2024-10-15 PROCEDURE — 6370000000 HC RX 637 (ALT 250 FOR IP): Performed by: INTERNAL MEDICINE

## 2024-10-15 PROCEDURE — 99233 SBSQ HOSP IP/OBS HIGH 50: CPT | Performed by: INTERNAL MEDICINE

## 2024-10-15 RX ADMIN — TAMSULOSIN HYDROCHLORIDE 0.4 MG: 0.4 CAPSULE ORAL at 09:31

## 2024-10-15 RX ADMIN — SPIRONOLACTONE 25 MG: 25 TABLET ORAL at 09:31

## 2024-10-15 RX ADMIN — Medication 1 CAPSULE: at 09:31

## 2024-10-15 RX ADMIN — Medication 3 MG: at 21:43

## 2024-10-15 RX ADMIN — CARVEDILOL 6.25 MG: 3.12 TABLET, FILM COATED ORAL at 09:31

## 2024-10-15 RX ADMIN — PIPERACILLIN AND TAZOBACTAM 3375 MG: 3; .375 INJECTION, POWDER, LYOPHILIZED, FOR SOLUTION INTRAVENOUS at 22:04

## 2024-10-15 RX ADMIN — CLOPIDOGREL BISULFATE 75 MG: 75 TABLET ORAL at 09:31

## 2024-10-15 RX ADMIN — PIPERACILLIN AND TAZOBACTAM 4500 MG: 4; .5 INJECTION, POWDER, FOR SOLUTION INTRAVENOUS at 00:40

## 2024-10-15 RX ADMIN — CARVEDILOL 6.25 MG: 3.12 TABLET, FILM COATED ORAL at 18:04

## 2024-10-15 RX ADMIN — PIPERACILLIN AND TAZOBACTAM 3375 MG: 3; .375 INJECTION, POWDER, LYOPHILIZED, FOR SOLUTION INTRAVENOUS at 15:08

## 2024-10-15 RX ADMIN — PREGABALIN 75 MG: 75 CAPSULE ORAL at 09:31

## 2024-10-15 RX ADMIN — ASPIRIN 81 MG: 81 TABLET, COATED ORAL at 09:31

## 2024-10-15 RX ADMIN — SACUBITRIL AND VALSARTAN 1 TABLET: 24; 26 TABLET, FILM COATED ORAL at 09:38

## 2024-10-15 RX ADMIN — SACUBITRIL AND VALSARTAN 1 TABLET: 24; 26 TABLET, FILM COATED ORAL at 21:38

## 2024-10-15 RX ADMIN — INSULIN GLARGINE 16 UNITS: 100 INJECTION, SOLUTION SUBCUTANEOUS at 21:40

## 2024-10-15 RX ADMIN — DIPHENHYDRAMINE HYDROCHLORIDE 25 MG: 25 CAPSULE ORAL at 21:43

## 2024-10-15 RX ADMIN — PREGABALIN 75 MG: 75 CAPSULE ORAL at 15:04

## 2024-10-15 RX ADMIN — PREGABALIN 75 MG: 75 CAPSULE ORAL at 21:38

## 2024-10-15 RX ADMIN — PIPERACILLIN AND TAZOBACTAM 3375 MG: 3; .375 INJECTION, POWDER, LYOPHILIZED, FOR SOLUTION INTRAVENOUS at 05:55

## 2024-10-15 ASSESSMENT — PAIN SCALES - GENERAL
PAINLEVEL_OUTOF10: 0
PAINLEVEL_OUTOF10: 0

## 2024-10-15 NOTE — PLAN OF CARE
Problem: Chronic Conditions and Co-morbidities  Goal: Patient's chronic conditions and co-morbidity symptoms are monitored and maintained or improved  10/14/2024 2235 by Olga Henson RN  Outcome: Progressing  10/14/2024 1042 by Rom Salcedo RN  Outcome: Progressing     Problem: Discharge Planning  Goal: Discharge to home or other facility with appropriate resources  10/14/2024 2235 by Olga Henson RN  Outcome: Progressing  10/14/2024 1042 by Rom Salcedo RN  Outcome: Progressing     Problem: ABCDS Injury Assessment  Goal: Absence of physical injury  10/14/2024 2235 by Olga Henson RN  Outcome: Progressing  10/14/2024 1042 by Rom Salcedo RN  Outcome: Progressing     Problem: Safety - Adult  Goal: Free from fall injury  10/14/2024 2235 by Olga Henson RN  Outcome: Progressing  10/14/2024 1042 by Rom Salcedo RN  Outcome: Progressing

## 2024-10-15 NOTE — CARE COORDINATION
Transition of Care Plan:     RUR: 11%  Prior Level of Functioning:   Disposition: Home w/HH - SN & [facility] for IV ABX  If SNF or IPR: Date FOC offered:   Date FOC received:   Accepting facility:   Date authorization started with reference number:   Date authorization received and expires:   Follow up appointments:   DME needed: n/a  Transportation at discharge: wife  IM/IMM Medicare/ letter given: 2nd IM given  Is patient a  and connected with [facility]?               If yes, was  transfer form completed and VA notified?   Caregiver Contact: wife, Charlotte Seymour [phone]  Discharge Caregiver contacted prior to discharge?   Care Conference needed?   Barriers to discharge:     Received a vm from [facility], explained that pt's insurance does not cover HH for IV ABX & had to decline.  CM is searching for another provider.  Patient is expected to d/c today.    Melody Jernigan  Ext [phone]

## 2024-10-16 ENCOUNTER — TELEPHONE (OUTPATIENT)
Age: 64
End: 2024-10-16

## 2024-10-16 VITALS
WEIGHT: 241.18 LBS | OXYGEN SATURATION: 99 % | DIASTOLIC BLOOD PRESSURE: 79 MMHG | TEMPERATURE: 97.5 F | BODY MASS INDEX: 34.53 KG/M2 | SYSTOLIC BLOOD PRESSURE: 122 MMHG | HEIGHT: 70 IN | RESPIRATION RATE: 18 BRPM | HEART RATE: 70 BPM

## 2024-10-16 LAB
GLUCOSE BLD STRIP.AUTO-MCNC: 101 MG/DL (ref 65–117)
GLUCOSE BLD STRIP.AUTO-MCNC: 120 MG/DL (ref 65–117)
SERVICE CMNT-IMP: ABNORMAL
SERVICE CMNT-IMP: NORMAL

## 2024-10-16 PROCEDURE — 6360000002 HC RX W HCPCS: Performed by: INTERNAL MEDICINE

## 2024-10-16 PROCEDURE — 82962 GLUCOSE BLOOD TEST: CPT

## 2024-10-16 PROCEDURE — 6370000000 HC RX 637 (ALT 250 FOR IP): Performed by: INTERNAL MEDICINE

## 2024-10-16 PROCEDURE — 6370000000 HC RX 637 (ALT 250 FOR IP): Performed by: PODIATRIST

## 2024-10-16 PROCEDURE — 99233 SBSQ HOSP IP/OBS HIGH 50: CPT | Performed by: INTERNAL MEDICINE

## 2024-10-16 PROCEDURE — 2580000003 HC RX 258: Performed by: INTERNAL MEDICINE

## 2024-10-16 RX ORDER — INSULIN DEGLUDEC 200 U/ML
16 INJECTION, SOLUTION SUBCUTANEOUS NIGHTLY
Qty: 3 ML | Refills: 0 | Status: SHIPPED
Start: 2024-10-16

## 2024-10-16 RX ORDER — ATORVASTATIN CALCIUM 40 MG/1
40 TABLET, FILM COATED ORAL NIGHTLY
Qty: 30 TABLET | Refills: 0 | Status: SHIPPED
Start: 2024-10-16

## 2024-10-16 RX ORDER — LACTOBACILLUS RHAMNOSUS GG 10B CELL
1 CAPSULE ORAL
Qty: 45 CAPSULE | Refills: 0 | Status: SHIPPED | OUTPATIENT
Start: 2024-10-17

## 2024-10-16 RX ADMIN — CLOPIDOGREL BISULFATE 75 MG: 75 TABLET ORAL at 09:32

## 2024-10-16 RX ADMIN — SACUBITRIL AND VALSARTAN 1 TABLET: 24; 26 TABLET, FILM COATED ORAL at 09:32

## 2024-10-16 RX ADMIN — CARVEDILOL 6.25 MG: 3.12 TABLET, FILM COATED ORAL at 09:31

## 2024-10-16 RX ADMIN — SPIRONOLACTONE 25 MG: 25 TABLET ORAL at 09:32

## 2024-10-16 RX ADMIN — Medication 1 CAPSULE: at 09:32

## 2024-10-16 RX ADMIN — PIPERACILLIN AND TAZOBACTAM 3375 MG: 3; .375 INJECTION, POWDER, LYOPHILIZED, FOR SOLUTION INTRAVENOUS at 15:04

## 2024-10-16 RX ADMIN — PIPERACILLIN AND TAZOBACTAM 3375 MG: 3; .375 INJECTION, POWDER, LYOPHILIZED, FOR SOLUTION INTRAVENOUS at 05:45

## 2024-10-16 RX ADMIN — PREGABALIN 75 MG: 75 CAPSULE ORAL at 15:01

## 2024-10-16 RX ADMIN — TAMSULOSIN HYDROCHLORIDE 0.4 MG: 0.4 CAPSULE ORAL at 09:31

## 2024-10-16 RX ADMIN — ASPIRIN 81 MG: 81 TABLET, COATED ORAL at 09:32

## 2024-10-16 RX ADMIN — PREGABALIN 75 MG: 75 CAPSULE ORAL at 09:32

## 2024-10-16 NOTE — WOUND CARE
Wound care consult for the Right foot wound where the tyshawn was removed and the wound was debrided.  Immediately post surgery the wound was packed with packing strips and the dressing was changed yesterday by Dr. Dixon.    Assessment and Treatment: the wound is located on the distal foot with sutures in place on the plantar surface. The wound hole is deep and fits about 2.5 - 3 inches of Hydrofera Blue Classic in the wound as a thin strip. The foot was wrapped with an ABD and candace and then an ACE wrap.         Plan: Pt. To be discharged home today and his daughter will do the wound care at home. She is trained in the wound care.   Candida Erickson, RN, BSN, CWON

## 2024-10-16 NOTE — CARE COORDINATION
Transition of Care Plan:     RUR: 12%  Prior Level of Functioning:   Disposition: Home w/[facility] - SN & [facility] for IV ABX  If SNF or IPR: Date FOC offered:   Date FOC received:   Accepting facility:   Date authorization started with reference number:   Date authorization received and expires:   Follow up appointments:   DME needed: n/a  Transportation at discharge: son   IM/IMM Medicare/ letter given: 2nd IM given  Is patient a [place] and connected with VA?               If yes, was [place] transfer form completed and VA notified?   Caregiver Contact: wife, Charlotte Seymour [phone]  Discharge Caregiver contacted prior to discharge?   Care Conference needed?   Barriers to discharge:     Patient is d/c home w/HH & IV ABX.  CM sent new IV ABX order to [facility] via [employer].  Melody mccain/Jacky will reach out to pt/wife.  CM informed HH of d/c    Melody Jernigan  Ext [phone]

## 2024-10-16 NOTE — PROGRESS NOTES
Hospitalist Progress Note    NAME:   Joseph Seymour   : 1960   MRN: [id number]     Date: 10/13/2024    Patient PCP: Adriana Tyler MD    Hospital Problem list:     Sepsis POA WBC 12.7 --> 14.9 --> 8.1, , lactate 1.3  Right diabetic foot infection with concern for osteomyelitis POA  Continue empiric vancomycin and Zosyn  Pharmacy consulted for vancomycin dosing  Change to cefepime, flagyl, vanco  OR with Dr Dixon 10/9/2024   RIGHT FOOT DEBRIDEMENT INCISION AND DRAINAGE    Deep Infection (muscle/fascia) present   evidenced by purulent fluid, phlegmon, and fluid  Other Findings: viable bone and soft tissue margins  Admit BC remain negative  Pathology and OR cultures pending   Adjust antibiotics based on results  Resume diet and DVT prophylaxis post op  IV antibiotics at discharge,. Discharge once set up  PICC line ordered, Cm to set up antibiotics     Diabetes mellitus type 2 with neuropathy, on chronic insulin POA  Lantus 10 units last PM,   Reduce to 16 units with lower BS, titrate back up as needed  Corrective coverage insulin  Accu-Cheks  , 149, 132, 183, 153  Hypoglycemia protocol in place    Hypokalemia K 3.4 POA  Resolved     CAD status post ROMULO  Heart failure with preserved ejection fraction  Essential hypertension  Hyperlipidemia  Continue PTA aspirin  Resume plavix in AM  Continue PTA atorvastatin, Coreg, Entresto, spironolactone     BPH  Bladder management protocol  Continue PTA tamsulosin    Code Status: Full  DVT Prophylaxis: SCDs  GI Prophylaxis: Not indicated  Baseline: Independent    History, assessment and plan for 10/13/2024:     Estimated discharge date: 10/14/2024    Needs to be done before discharge:  PICC line  CM to set up home antibiotics    Reason for physician visit:    \"Doing okay\".  Discussed with RN events overnight.   No CP or SOB  No HA, SOB, cough, CP, abdominal pain, N/V, diarrhea    Medical Decision Making:   I personally reviewed labs: Yes, as listed 
    Hospitalist Progress Note    NAME:   Joseph Seymour   : 1960   MRN: [id number]     Date: 10/14/2024    Patient PCP: Adriana Tyler MD    Hospital Problem list:     Sepsis POA WBC 12.7 --> 14.9 --> 8.1, , lactate 1.3  Right diabetic foot infection with concern for osteomyelitis POA  Redness right leg  Continue empiric vancomycin and Zosyn  Pharmacy consulted for vancomycin dosing  Change to cefepime, flagyl, vanco  OR with Dr Dixon 10/9/2024   RIGHT FOOT DEBRIDEMENT INCISION AND DRAINAGE    Deep Infection (muscle/fascia) present   evidenced by purulent fluid, phlegmon, and fluid  Other Findings: viable bone and soft tissue margins  Admit BC remain negative  Pathology and OR cultures pending   Adjust antibiotics based on results  Resume diet and DVT prophylaxis post op  IV antibiotics at discharge  PICC line ordered, Cm to set up antibiotics  Discussed with Dr. Brooks, she is concerned re redness and swelling   LE doppler right negative   CT right leg IMPRESSION:  Diffuse soft tissue edema compatible with cellulitis.   Interval third proximal phalanx osteotomy.   Subtle erosive changes at the third metatarsal head could  reflect postsurgical change and/or residual osteomyelitis.   Third MTP joint effusion with associated surrounding rim-enhancing fluid collectioN  Cannot exclude septic arthritis and/or periarticular abscess.  Will review CT findings with Dr Dixon in AM  Changed antibiotics to cefazolin     Diabetes mellitus type 2 with neuropathy, on chronic insulin POA  Lantus 10 units last PM,   Reduce to 16 units with lower BS, titrate back up as needed  Corrective coverage insulin  Accu-Cheks  , 119, 153, 130  Hypoglycemia protocol in place    Hypokalemia K 3.4 POA  Resolved     CAD status post ROMULO  Heart failure with preserved ejection fraction  Essential hypertension  Hyperlipidemia  Continue PTA aspirin  Resume plavix in AM  Continue PTA atorvastatin, Coreg, Entresto, 
    Hospitalist Progress Note    NAME:   Joseph Seymour   : 1960   MRN: [id number]     Date: 10/11/2024    Patient PCP: Adriana Tyler MD    Hospital Problem list:     Sepsis POA WBC 12.7 --> 14.9, , lactate 1.3  Right diabetic foot infection with concern for osteomyelitis POA  Continue empiric vancomycin and Zosyn  Pharmacy consulted for vancomycin dosing  Change to cefepime, flagyl, vanco  OR with Dr Dixon 10/9/2024   RIGHT FOOT DEBRIDEMENT INCISION AND DRAINAGE    Deep Infection (muscle/fascia) present   evidenced by purulent fluid, phlegmon, and fluid  Other Findings: viable bone and soft tissue margins  Admit BC remain negative  Pathology and OR cultures pending   Adjust antibiotics based on results  Resume diet and DVT prophylaxis post op  IV antibiotics at discharge     Diabetes mellitus type 2 with neuropathy, on chronic insulin POA  Lantus 10 units last PM,   Reduce to 16 units with lower BS, titrate back up as needed  Corrective coverage insulin  Accu-Cheks  , 155, 144, 166, 109, 127  Hypoglycemia protocol in place    Hypokalemia K 3.4 POA  Resolved     CAD status post ROMULO  Heart failure with preserved ejection fraction  Essential hypertension  Hyperlipidemia  Continue PTA aspirin  Resume plavix  Continue PTA atorvastatin, Coreg, Entresto, spironolactone     BPH  Bladder management protocol  Continue PTA tamsulosin    Code Status: Full  DVT Prophylaxis: SCDs  GI Prophylaxis: Not indicated  Baseline: Independent    History, assessment and plan for 10/11/2024:     Estimated discharge date: 10/14/2024    Needs to be done before discharge:  Cultures and pathology from OR, results  Podiatry clearance  ID consult for home IV antibiotics    Reason for physician visit:    \"Doing okay\".  Discussed with RN events overnight.   No CP or SOB  No pain in the foot  No HA, SOB, cough, CP, abdominal pain, N/V, diarrhea       Medical Decision Making:   I personally reviewed labs: Yes, as listed 
    Hospitalist Progress Note    NAME:   Joseph Seymour   : 1960   MRN: [id number]     Date: 10/15/2024    Patient PCP: Adriana Tyler MD    Hospital Problem list:     Sepsis POA WBC 12.7 --> 14.9 --> 8.1, , lactate 1.3  Right diabetic foot infection with osteomyelitis POA  Post op right leg swelling and edema 10/14  OR with Dr Dixon 10/9/2024   RIGHT FOOT DEBRIDEMENT INCISION AND DRAINAGE    Deep Infection (muscle/fascia) present   evidenced by purulent fluid, phlegmon, and fluid  Other Findings: viable bone and soft tissue margins  Cultures  Admit BC negative   OR cultures rare Staph warneri, Meth sensitive per ID  OR pathology FINAL PATHOLOGIC DIAGNOSIS        Right third proximal phalanx, resection:        Portion of bone with acute osteomyelitis and surrounding granulation  tissue.        Margin appears viable and negative for acute osteomyelitis.   Antibiotics  Empiric vancomycin and Zosyn in ED 10/8  Cefepime, flagyl, vanco at admit  Changed to daptomycin 10/11 per ID  Changed to cefazolin 10/14 once sens back per ID  Changed to zosyn 10/15 with right leg edema  PICC line ordered  Initial plan for discharge on daptomycin 10/14  DR. Brooks 10/14, wife raised concern re redness and swelling right lower calf   LE doppler right negative for DVT   CT right leg IMPRESSION:  Diffuse soft tissue edema compatible with cellulitis.   Interval third proximal phalanx osteotomy.   Subtle erosive changes at the third metatarsal head could  reflect postsurgical change and/or residual osteomyelitis.   Third MTP joint effusion with associated surrounding rim-enhancing fluid collectioN  Cannot exclude septic arthritis and/or periarticular abscess.  Communicated with Dr Brooks and Destiny today   Unclear if the rim-enhancing fluid collection is postop or possible abscess   Dr. Dixon is coming to recheck the foot this afternoon/evening   Will get final ID recs for antibiotics, ? Broadened after 
    Hospitalist Progress Note    NAME:   Joseph Seymour   : 1960   MRN: [id number]     Date: 10/9/2024    Patient PCP: Adriana Tyler MD    Hospital Problem list:     Right diabetic foot infection with concern for osteomyelitis POA  Continue empiric vancomycin and Zosyn  Pharmacy consulted for vancomycin dosing  Podiatry consulted, greatly appreciate their expertise   Planning for OR this evening  N.p.o.  Gentle IVF since NPO since midnight  Hold DVT chemoprophylaxis till post op     Diabetes mellitus type 2 with neuropathy, on chronic insulin POA  Continue PTA Tresiba  Corrective coverage insulin  Accu-Cheks  , 140, 136, 153  Hypoglycemia protocol in place    Hypokalemia K 3.4 POA  PO kCL, recheck in am     CAD status post ROMULO  Heart failure with preserved ejection fraction  Essential hypertension  Hyperlipidemia  Continue PTA aspirin  Hold PTA Plavix until evaluated by podiatry  Continue PTA atorvastatin, Coreg, Entresto, spironolactone     BPH  Bladder management protocol  Continue PTA tamsulosin    Code Status: Full  DVT Prophylaxis: SCDs  GI Prophylaxis: Not indicated  Baseline: Independent    History, assessment and plan for 10/9/2024:     Estimated discharge date: 10/14/2024    Needs to be done before discharge:  OR later today  Cultures and pathology  Podiatry clearance    Reason for physician visit:    \"My stomach is gurgling\".  Discussed with RN events overnight.   Hungry, remains NPO of OR later today  No CP or SOB  No pain in the foot  No HA, SOB, cough, CP, abdominal pain, N/V, diarrhea       Medical Decision Making:   I personally reviewed labs: Yes, as listed below  I personally reviewed imaging:   Toxic drug monitoring: Vancomycin, check trough after 3rd dose and adjust  Discussed case with: NS, patient and wife    Total NON critical care TIME:  20  Minutes    Total CRITICAL CARE TIME Spent:   Minutes non procedure based    Objective:     VITALS:   Last 24hrs VS reviewed since 
  Physician Progress Note      PATIENT:               KARIE KRAUSE  CSN #:                  [id number]  :                       1960  ADMIT DATE:       10/9/2024 12:36 AM  DISCH DATE:  RESPONDING  PROVIDER #:        Dirk Dixon DPM          QUERY TEXT:    Patient admitted with Diabetic foot ulcer with osteomyelitis. Per brief Op   note dated 10/9 documentation of debridement. To accurately reflect the   procedure performed please document if debridement was excisional or   nonexcisional and the deepest depth of tissue removed as down to and   including:    The medical record reflects the following:  Risk Factors: DM, Osteomyelitis, CAD    Clinical Indicators:  10/9/2024 Podiatry Brief Op Note- Procedure(s): RIGHT FOOT DEBRIDEMENT   INCISION AND DRAINAGE  Findings:  Infection Present At Time Of Surgery (PATOS) (choose all levels that have   infection present):  Deep Infection (muscle/fascia) present as evidenced by purulent fluid,   phlegmon, and fluid consistent with infection  Other Findings: viable bone and soft tissue margins    Treatment: receiving RIGHT FOOT DEBRIDEMENT INCISION AND DRAINAGE  Options provided:  -- Nonexcisional debridement of skin  -- Excisional debridement of skin  -- Nonexcisional debridement of subcutaneous tissue  -- Excisional debridement of subcutaneous tissue  -- Nonexcisional debridement of fascia  -- Excisional debridement of fascia  -- Nonexcisional debridement of muscle  -- Excisional debridement of muscle  -- Nonexcisional debridement of bone  -- Excisional debridement of bone  -- Other - I will add my own diagnosis  -- Disagree - Not applicable / Not valid  -- Disagree - Clinically unable to determine / Unknown  -- Refer to Clinical Documentation Reviewer    PROVIDER RESPONSE TEXT:    Excisional debridement of bone of right foot during procedure on 10/9/2024.    Query created by: Gabbie Germain on 10/14/2024 8:30 AM      Electronically signed by:  Dirk Dixon 
.End of Shift Note    Bedside shift change report given to *** (oncoming nurse) by PREMA STEEN LPN (offgoing nurse).  Report included the following information SBAR, Kardex, OR Summary, Procedure Summary, Intake/Output, MAR, and Recent Results    Shift worked:  7-7     Shift summary and any significant changes:     Post op status stable. Patient now on IV Zosyn for antibiotic therapy. Have spoke to family member today about patients plan of care and have reached out to ID per family request. Patients glucometer readings stable.      Concerns for physician to address:  Plan of care     Zone phone for oncoming shift:   [phone]       Activity:     Number times ambulated in hallways past shift: 0  Number of times OOB to chair past shift: 2    Cardiac:   Cardiac Monitoring: {YES/NO:44057}           Access:  Current line(s): {Access:91291}     Genitourinary:   Urinary status: {:01166}    Respiratory:      Chronic home O2 use?: {YES/NO/NA:13966}  Incentive spirometer at bedside: {YES/NO/NA:25467}       GI:     Current diet:  ADULT DIET; Regular; 3 carb choices (45 gm/meal)  Passing flatus: {YES/NO:27630}  Tolerating current diet: {YES/NO:36771}       Pain Management:   Patient states pain is manageable on current regimen: {YES/NO/NA:13376}    Skin:     Interventions: {radha interventions:58182}    Patient Safety:  Fall Score:    Interventions: {fall interventions:99879}       Length of Stay:  Expected LOS: 7  Actual LOS: 6      PREMA STEEN LPN                            
.End of Shift Note    Bedside shift change report given to WENDY Taveras (oncoming nurse) by PREMA STEEN LPN (offgoing nurse).  Report included the following information SBAR, Kardex, ED Summary, OR Summary, Procedure Summary, Intake/Output, MAR, and Recent Results    Shift worked:  7-7     Shift summary and any significant changes:     Patient has had wound care given to right foot  completed by Dr. Dixon Drainage from wound clear. Patient denies pain . Elevates on pillows in bed. Will ambulate for BRP , weight bearing on right heel. Continues on antibiotic therapy. PICC line in place.     Concerns for physician to address:  Monitor labs.      Zone phone for oncoming shift:   [phone]       Activity:     Number times ambulated in hallways past shift: 0  Number of times OOB to chair past shift: 1    Cardiac:   Cardiac Monitoring: No           Access:  Current line(s): PIV and PICC     Genitourinary:   Urinary status: voiding    Respiratory:      Chronic home O2 use?: NO  Incentive spirometer at bedside: YES       GI:     Current diet:  ADULT DIET; Regular; 3 carb choices (45 gm/meal)  Passing flatus: YES  Tolerating current diet: YES       Pain Management:   Patient states pain is manageable on current regimen: YES    Skin:     Interventions: float heels and nutritional support    Patient Safety:  Fall Score:    Interventions: bed/chair alarm, gripper socks, pt to call before getting OOB, and stay with me (per policy)       Length of Stay:  Expected LOS: 7  Actual LOS: 6      PREMA STEEN LPN                            
Consultant Woundcare to eval and remove packing and teach patient for Wound Care self at home
End of Shift Note    Bedside shift change report given to Bart (oncoming nurse) by AWA Golden RN (offgoing nurse).  Report included the following information SBAR, Intake/Output, and MAR    Shift worked:  9103-7743     Shift summary and any significant changes:     Cooperative patient, a/o x.4 Voiding, LBM 10/8 /. diet tolerated, Dressing, C/D/I. Hasn't c/o N/V and pain. IV abx given. Vancomycin random level @2000 today.Bed locked and in lowest position. Call bell within reach. No questions or concerns presented at this time. Encouraged patient to call for assistance PRN.       Concerns for physician to address:  TP after surgery     Zone phone for oncoming shift:             AWA Golden RN                           
End of Shift Note    Bedside shift change report given to Pedro (oncoming nurse) by Nitin Michael RN (offgoing nurse).  Report included the following information SBAR, Intake/Output, and MAR    Shift worked:  7pm to 7am     Shift summary and any significant changes:     Patient alert and oriented x4, up at adolfo and steady on feet. Patient denies pain. Wound cleaned and dressed. Patient receiving antibiotics and potassium replacement.      Concerns for physician to address:  None     Zone phone for oncoming shift:              Nitin Michael RN                            
End of Shift Note    Bedside shift change report given to WENDY Disla (oncoming nurse) by Rom Salcedo RN (offgoing nurse).  Report included the following information SBAR, Intake/Output, MAR, and Recent Results    Shift worked:  7a-730p     Shift summary and any significant changes:     Tolerating diet. No insulin coverage during this shift. Podiatry changed and applied new R foot dressing. Orders advanced from non weight bearing to partial weight bearing on R foot. Awaiting PICC placement for d/c w home IV antibiotics. Otherwise uneventful shift.       Concerns for physician to address:       Zone phone for oncoming shift:          Activity:     Number times ambulated in hallways past shift: 0  Number of times OOB to chair past shift: 0    Cardiac:   Cardiac Monitoring: Yes           Access:   Current line(s): Peripheral IV    Genitourinary:   Urinary status: Patient is voiding without difficulty.    Respiratory:      Chronic home O2 use?: NO  Incentive spirometer at bedside: YES       GI:     Current diet:  ADULT DIET; Regular; 3 carb choices (45 gm/meal)  Passing flatus: Yes  Tolerating current diet: Yes       Pain Management:   Patient states pain is manageable on current regimen: Yes    Skin:     Interventions:     Patient Safety:  Fall Score: PA Fall Risk Score: 89.47    Interventions:           Length of Stay:  Expected LOS: 5  Actual LOS: 3      Rom Salcedo RN                            
End of Shift Note    Bedside shift change report given to WENDY Disla (oncoming nurse) by Rom Salcedo RN (offgoing nurse).  Report included the following information SBAR, Procedure Summary, Intake/Output, MAR, and Recent Results    Shift worked:  7a-730p     Shift summary and any significant changes:     BM x 1. Tolerating diet. No insulin coverage during this shift. Antibiotic therapy changed per ID. Otherwise uneventful shift.       Concerns for physician to address:       Zone phone for oncoming shift:          Activity:     Number times ambulated in hallways past shift: 0  Number of times OOB to chair past shift: 0    Cardiac:   Cardiac Monitoring: Yes           Access:   Current line(s): Peripheral IV    Genitourinary:   Urinary status: Patient is voiding without difficulty.    Respiratory:      Chronic home O2 use?: NO  Incentive spirometer at bedside: YES       GI:     Current diet:  ADULT DIET; Regular; 3 carb choices (45 gm/meal)  Passing flatus: Yes  Tolerating current diet: Yes       Pain Management:   Patient states pain is manageable on current regimen: Yes    Skin:     Interventions:     Patient Safety:  Fall Score: PA Fall Risk Score: 89.77    Interventions:           Length of Stay:  Expected LOS: 5  Actual LOS: 2      Rom Salcedo RN                            
End of Shift Note    Bedside shift change report given to WENDY Espinoza (oncoming nurse) by ALLAN VIZCAINO RN (offgoing nurse).  Report included the following information SBAR, MAR, and Recent Results    Shift worked:  7a to 7p     Shift summary and any significant changes:     Probable Discharge tomorrow after PICC line placed and antibiotic teaching done.  IV re-sited today to right forearm.  To start Plavix tomorrow.     Concerns for physician to address:       Zone phone for oncoming shift:          Activity:     Number times ambulated in hallways past shift: up ad adolfo  Number of times OOB to chair past shift: up ad adolfo    Cardiac:   Cardiac Monitoring: No           Access:  Current line(s): PIV     Genitourinary:   Urinary status: voiding    Respiratory:      Chronic home O2 use?: NO  Incentive spirometer at bedside: YES       GI:     Current diet:  ADULT DIET; Regular; 3 carb choices (45 gm/meal)  Passing flatus: YES  Tolerating current diet: YES       Pain Management:   Patient states pain is manageable on current regimen: YES    Skin:     Interventions: float heels and increase time out of bed    Patient Safety:  Fall Score:    Interventions: gripper socks       Length of Stay:  Expected LOS: 5  Actual LOS: 4      ALLAN VIZCAINO, RN                            
End of Shift Note    Bedside shift change report given to WENDY Espinoza (oncoming nurse) by Rom Salcedo RN (offgoing nurse).  Report included the following information SBAR, Intake/Output, MAR, and Recent Results    Shift worked:  7a-730p     Shift summary and any significant changes:     PICC placed in RUE for @ home IV antibiotics in anticipation of D/C - likely D/C tomorrow. Antibiotic switched to Cefazolin. Pt wife concerned about unchanged redness and warmth in RLE - CT and venous doppler completed today for further assessment. Tolerating diet.      Concerns for physician to address:       Zone phone for oncoming shift:          Activity:     Number times ambulated in hallways past shift: 0  Number of times OOB to chair past shift: 1    Cardiac:   Cardiac Monitoring: YES         Access:   Current line(s): Peripheral IV, PICC    Genitourinary:   Urinary status: Patient is voiding without difficulty.    Respiratory:      Chronic home O2 use?: NO  Incentive spirometer at bedside: YES       GI:     Current diet:  ADULT DIET; Regular; 3 carb choices (45 gm/meal)  Passing flatus: Yes  Tolerating current diet: Yes       Pain Management:   Patient states pain is manageable on current regimen: Yes    Skin:     Interventions:     Patient Safety:  Fall Score: PA Fall Risk Score: 87.04    Interventions:           Length of Stay:  Expected LOS: 5  Actual LOS: 5      Rom Salcedo RN                            
End of Shift Note    Bedside shift change report given to WENDY Lyman (oncoming nurse) by Bart Boswell RN (offgoing nurse).  Report included the following information SBAR    Shift worked:  2023-0700     Shift summary and any significant changes:    Pt complained of headache, Tylenol given once  Lantus 32 units was held, 10 units given as blood sugar is 107 and pt is on NPO  On Abx  Otherwise uneventful shift     Concerns for physician to address:    Zone phone for oncoming shift:           Bart Boswell RN                            
End of Shift Note    Bedside shift change report given to WENDY Vasquez (oncoming nurse) by Bart Boswell RN (offgoing nurse).  Report included the following information SBAR    Shift worked:  [id number]     Shift summary and any significant changes:    Pt didn't complained of anything apart from requesting tylenol and benadryl.  Otherwise uneventful shift     Concerns for physician to address:      Zone phone for oncoming shift:               Bart Boswell, RN                            
End of Shift Note    Bedside shift change report given to WENDY Vasquez (oncoming nurse) by Naif Carrasco RN (offgoing nurse).  Report included the following information SBAR and MAR    Shift worked: 7pm - 7am     Shift summary and any significant changes:    No complaints. For midline catheter insertion. Dressing to right foot clean dry and intact.     Concerns for physician to address: None     Zone phone for oncoming shift:  [phone]       Activity:     Number times ambulated in hallways past shift: 0  Number of times OOB to chair past shift: 1    Cardiac:   Cardiac Monitoring: Yes           Access:  Current line(s): PIV     Genitourinary:   Urinary status: voiding    Respiratory:      Chronic home O2 use?: NO  Incentive spirometer at bedside:        GI:     Current diet:  ADULT DIET; Regular; 3 carb choices (45 gm/meal)  Passing flatus: YES  Tolerating current diet: YES       Pain Management:   Patient states pain is manageable on current regimen: YES    Skin:     Interventions:     Patient Safety:  Fall Score:    Interventions: gripper socks       Length of Stay:  Expected LOS: 5  Actual LOS: 3      Naif Carrasco RN                           
End of Shift Note    Bedside shift change report given to WENDY Vasquez (oncoming nurse) by Olga Henson RN (offgoing nurse).  Report included the following information SBAR, MAR, and Recent Results    Shift worked:  nights     Shift summary and any significant changes:    The patient is post-operative following a right foot debridement incision and drainage performed on 10/12/2024. Throughout the shift, he remained stable and reported no pain. However, his right leg has swelling, warmth, and redness. He noted that he has full sensation in the leg and is able to ambulate within the room. The patient is voiding with bathroom privileges. His last BM was on 10/13/2024, and he is on a regular diet.    He is scheduled for PICC line placement today for long-term antibiotic therapy. Blood sugar levels were within normal range, and coverage insulin of 16 units was administered.     Concerns for physician to address:       Zone phone for oncoming shift:                  
End of Shift Note    Bedside shift change report given to WENDY Victoria (oncoming nurse) by Olga Henson RN (offgoing nurse).  Report included the following information SBAR, MAR, and Recent Results    Shift worked:  nights     Shift summary and any significant changes:    The patient is post-operative following a right foot debridement incision and drainage performed on 10/12/2024. Throughout the shift, he remained stable and reported no pain. However, his right leg has swelling, warmth, and redness. He noted that he has full sensation in the leg and is able to ambulate within the room. The patient is voiding with bathroom privileges. His last BM was on 10/14/2024, and he is on a regular diet.    PICC line is in place for long-term antibiotic therapy. Zosyn was given x 2. Blood sugar levels were within normal range, and coverage insulin of 16 units was administered. Patient is scheduled to discharge today.     Concerns for physician to address:       Zone phone for oncoming shift:                  
End of Shift Note    Bedside shift change report given to WENDY Wheat (oncoming nurse) by Naif Carrasco RN (offgoing nurse).  Report included the following information SBAR and MAR    Shift worked: 7pm - 7am     Shift summary and any significant changes:    No complaints. Due meds given as ordered. Awaiting PICC placement.     Concerns for physician to address:  None     Zone phone for oncoming shift:          Activity:     Number times ambulated in hallways past shift: 0  Number of times OOB to chair past shift: 1    Cardiac:   Cardiac Monitoring: Yes           Access:  Current line(s): PIV     Genitourinary:   Urinary status: voiding    Respiratory:      Chronic home O2 use?: NO  Incentive spirometer at bedside: NO       GI:     Current diet:  ADULT DIET; Regular; 3 carb choices (45 gm/meal)  Passing flatus: YES  Tolerating current diet: YES       Pain Management:   Patient states pain is manageable on current regimen: YES    Skin:     Interventions:     Patient Safety:  Fall Score:    Interventions: gripper socks       Length of Stay:  Expected LOS: 5  Actual LOS: 4      Naif Carrasco RN                           
ID  CT right tibia/fibula report reviewed  FINDINGS: Bones: Interval third proximal phalanx osteotomy. Prior second MTP  joint arthrotomy. Subtle erosive changes at the third metatarsal head could  reflect postsurgical change and/or residual osteomyelitis. No definite acute  fractures or dislocations. No definite acute fractures or dislocations..     Joint fluid: Complex third MTP joint effusion with synovitis and surrounding  fluid collection as described below.     Articulations: Degenerative changes throughout.      Tendons: No full-thickness tendon tear.     Muscles: No intramuscular hematoma. No focal atrophy.     Soft tissues: Diffuse soft tissue edema and swelling, concerning for cellulitis.  3.7 cm x 2.7 cm x 1.8 cm amorphous rim-enhancing complex fluid collection in the  soft tissues surrounding the third MTP joint     IMPRESSION:  Diffuse soft tissue edema compatible with cellulitis. Interval third proximal  phalanx osteotomy. Subtle erosive changes at the third metatarsal head could  reflect postsurgical change and/or residual osteomyelitis. Third MTP joint  effusion with associated surrounding rim-enhancing fluid collection, cannot  exclude septic arthritis and/or periarticular abscess.    Change back to Zosyn IV  Elevate RLE  Podiatry input.  
Infectious Disease Progress      Impression    Diabetic right foot infection  Persistent lower extremity cellulitis of right   Osteomyelitis of right foot  Xray + for dislocation of 3rd MTP joint, periosteal reaction around third proximal phalanx  Soft tissue swelling, findings may represent OM  S/p right foot debridement/ 3rd proximal phalanx resection on 10/9  IntraOp cultures+ for rare Staph warneri( Ox S)  Pathology + for OM, margins clear.  CT right tibia/fibula 10/14+ for diffuse soft tissue edema and swelling  Concerning for cellulitis.  3.7 x 2.7 x 1.8 cm amorphus rim-enhancing fluid collection in soft tissue surrounding  Third MTP joint, cannot exclude septic arthritis of periarticular abscess.  Venous duplex-no DVT    Diabetes type 2  Poorly controlled A1c 7.2  Diabetic neuropathy      S/p admission in March 2024  Treated for diabetic right foot infection  Plantar ulceration  Cellulitis of right foot, OM of right foot.  Culture+ for MSSA DC on cefazolin IV    CAD  CHF with preserved EF  Stable    HTN  HLD  Continue home meds    Obesity  BMI 34.54    ESR 43  CRP 8.34      Plan  Continue Zosyn IV  Elevate right lower extremity 3 pillows  Resume statin  Adequate fluids, daily probiotic-D/w patient  Adequate blood sugar control- D/w patient  Await podiatry input  Patient may require new discharge orders  Per clinical course.  Anticipate discharge on Zosyn IV.  D/w patient at length and spouse by phone    Possible adverse effects of long term antibiotics are inclusive of but not limited to following  BM suppression, neutropenia , cytopenias , aplastic anemia hemorrhage liver & renal dysfunction/ liver , renal failure  , GI dysfunction- N, V  Diarrhea,C.difficile disease, rash , allergy , anaphylaxis.toxic epidermal necrolysis  Neuro toxicity , seizure disorder  Side effects tend to be more pronounced in the elderly. D/w patient.      Abx  Vancomycin-10/8  Zosyn-10/8       Extensive review of chart notes, labs, 
Infectious Disease Progress      Impression    Diabetic right foot infection  Persistent lower extremity cellulitis of right   Osteomyelitis of right foot  Xray + for dislocation of 3rd MTP joint, periosteal reaction around third proximal phalanx  Soft tissue swelling, findings may represent OM  S/p right foot debridement/ 3rd proximal phalanx resection on 10/9  IntraOp cultures+ for rare Staph warneri( Ox S)  Pathology + for OM, margins clear.  CT right tibia/fibula 10/14+ for diffuse soft tissue edema and swelling  Concerning for cellulitis.  3.7 x 2.7 x 1.8 cm amorphus rim-enhancing fluid collection in soft tissue surrounding  Third MTP joint, cannot exclude septic arthritis of periarticular abscess.  Venous duplex-no DVT.    Diabetes type 2  Poorly controlled A1c 7.2  Diabetic neuropathy      S/p admission in March 2024  Treated for diabetic right foot infection  Plantar ulceration  Cellulitis of right foot, OM of right foot.  Culture+ for MSSA DC on cefazolin IV    CAD  CHF with preserved EF  Stable    HTN  HLD  Continue home meds    Obesity  BMI 34.54    ESR 43  CRP 8.34      Plan  Continue Zosyn IV  Elevate right lower extremity 3 pillows  Resume statin  Adequate fluids, daily probiotic-D/w patient  Adequate blood sugar control- D/w patient  No further surgery per podiatry   Will plan for  6 weeks of antimicrobial therapy  Given findings on CT scan.    Antimicrobial orders for discharge  -Zosyn 3.375 g  IV every 8 hourly end date 11/20  -May administer as continuous infusion  -Pull line after ID evaluation  -Weekly CBC, CMP & ESR/CRP every other week-  -Fax reports to [phone], call with critical labs  at [phone]  -Encourage adequate fluids, daily probiotic/yogurt  -If line malfunction occurs and home health cannot reposition  please send patient to ED immediately  -ID follow-up -11/19 at 3:30 PM  - If persistent side effects occur stop antibiotic and call-ID/PCP    May DC from ID standpoint  Will sign off, 
MD Adams has been notified that patient  , direct admit from [facility] has arrived on unit,  
Nursing contacted Nocturnist/cross cover provider via non-urgent messaging system [employer] and notified patient asking for tylenol PM. No other concerns reported. No acute distress reported. No other information provided by nurse. VSS.     Ordered benadryl 25mg po hs prn for insomnia as tylenol pm otc and NF, nurse may give tylenol with the benadryl for tylenol pm equivalent. Will defer further evaluation/management to the day shift primary attending care team. Patient denies any further complaints or concerns.     Nursing to notify Hospitalist for further/continued concerns. Will remain available overnight for further concerns if nursing/patient needs. Please note, there are RRT systems in this hospital in place that if nursing has acute or critical patient condition change or concern, this is to help facilitate and notify that patient needs immediate bedside evaluation by a provider.     Non-billable note.   
Nursing contacted Nocturnist/cross cover provider via non-urgent messaging system [employer] and notified patient glucose accucheck 107, asymptomatic, lantus 32 units due, is npo. No other concerns reported. No acute distress reported. No other information provided by nurse. VSS.     Ordered hold lantus 32 units- defer to dayshift for resuming, lantus 10 units x1, nurse asked to recheck accucheck glucose at 0200. Will defer further evaluation/management to the day shift primary attending care team. Patient denies any further complaints or concerns.     Nursing to notify Hospitalist for further/continued concerns. Will remain available overnight for further concerns if nursing/patient needs. Please note, there are RRT systems in this hospital in place that if nursing has acute or critical patient condition change or concern, this is to help facilitate and notify that patient needs immediate bedside evaluation by a provider.     Non-billable note.   
Pharmacy Antimicrobial Kinetic Dosing    Indication for Antimicrobials: SSTI (right diabetic foot infection w/ concern for OM)     Current Regimen of Each Antimicrobial:  Vancomycin Pharmacy to Dose; Start Date 10/9; Day # 1  Zosyn 3.375g IV q8h; Start Date 10/9; Day # 1    Previous Antimicrobial Therapy:  N/A     Goal Level: Vancomycin -600    Date Dose & Interval Measured (mcg/mL) Predicted AUC 24-48 Predicted AUC 24,ss                          Significant Cultures:   10/8 Blood, paired: pending    Labs:  Recent Labs     Units 10/08/24  1935   CREATININE MG/DL 1.04   BUN MG/DL 16   WBC K/uL 12.7*     Temp (24hrs), Av.6 °F (37.6 °C), Min:99.6 °F (37.6 °C), Max:99.6 °F (37.6 °C)    Conditions for Dosing Consideration: None    Creatinine Clearance (mL/min): Estimated Creatinine Clearance: 86 mL/min (based on SCr of 1.04 mg/dL).     Impression/Plan:   Vancomycin 1000mg given at [facility] (incomplete loading dose administration)  Ordered vancomycin 1250mg IV q12h   Predicted GNV51-44 = 468, Predicted AUC24,ss = 563  BMP and CBC ordered per protocol  Antimicrobial stop date 7 days     Pharmacy will follow daily and adjust medications as appropriate for renal function and/or serum levels.    Thank you,  KELLI JOSEPH [facility]  
Pharmacy Antimicrobial Kinetic Dosing    Indication for Antimicrobials: SSTI (right diabetic foot infection w/ concern for OM)     Current Regimen of Each Antimicrobial:  Vancomycin Pharmacy to Dose; Start Date 10/9; Day # 2  Zosyn 3.375g IV q8h; Start Date 10/9; Day # 2    Previous Antimicrobial Therapy:  N/A     Goal Level: Vancomycin -600    Date Dose & Interval Measured (mcg/mL) Predicted AUC 24-48 Predicted AUC 24,ss                          Significant Cultures:   10/8 Blood, paired: pending    Labs:  Recent Labs     Units 10/10/24  0414 10/09/24  0325 10/08/24  1935   CREATININE MG/DL 0.86 0.86 1.04   BUN MG/DL 14 15 16   PROCAL ng/mL  --   --  0.05   WBC K/uL 14.9* 13.0* 12.7*     Temp (24hrs), Av.3 °F (36.8 °C), Min:97.3 °F (36.3 °C), Max:99.3 °F (37.4 °C)    Conditions for Dosing Consideration: None    Creatinine Clearance (mL/min): Estimated Creatinine Clearance: 105 mL/min (based on SCr of 0.86 mg/dL).     Impression/Plan:   Vancomycin 1000mg given at [facility] (incomplete loading dose administration)  Ordered vancomycin 1250mg IV q12h   Predicted NCR78-96 = 468, Predicted AUC24,ss = 563  Vanco level tonight prior to 2030 dose   BMP and CBC ordered per protocol  Antimicrobial stop date 7 days     Pharmacy will follow daily and adjust medications as appropriate for renal function and/or serum levels.    Thank you,  Albina Mar [facility]    
Pharmacy Antimicrobial Kinetic Dosing    Indication for Antimicrobials: SSTI (right diabetic foot infection w/ concern for OM)     Current Regimen of Each Antimicrobial:  Vancomycin Pharmacy to Dose; Start Date 10/9; Day # 2  Zosyn 3.375g IV q8h; Start Date 10/9; Day # 2    Previous Antimicrobial Therapy:  N/A     Goal Level: Vancomycin -600    Date Dose & Interval Measured (mcg/mL) Predicted AUC 24-48 Predicted AUC 24,ss   10/10 1250 mg Q12H 7.7 367 390                   Significant Cultures:   10/8 Blood, paired: pending    Labs:  Recent Labs     Units 10/10/24  0414 10/09/24  0325 10/08/24  1935   CREATININE MG/DL 0.86 0.86 1.04   BUN MG/DL 14 15 16   PROCAL ng/mL  --   --  0.05   WBC K/uL 14.9* 13.0* 12.7*     Temp (24hrs), Av.5 °F (36.9 °C), Min:97.9 °F (36.6 °C), Max:99.3 °F (37.4 °C)    Conditions for Dosing Consideration: None    Creatinine Clearance (mL/min): Estimated Creatinine Clearance: 105 mL/min (based on SCr of 0.86 mg/dL).     Impression/Plan:   Vancomycin level 7.7 on 10/10; will adjust dose to vancomycin 1750 mg IV Q12H for a Predicted RWN88-54 = 487, Predicted AUC24,ss = 540  BMP and CBC ordered per protocol  Antimicrobial stop date 7 days     Pharmacy will follow daily and adjust medications as appropriate for renal function and/or serum levels.    Thank you,  Devi Chavez RPH      
Reviewed the path report margins are clear  Can be discharged when medically appropriate with following recommendations and instructions    Discharge patient home with partial weight bearing on the right foot with post op shoe   Follow up in my office in 2 to 3 weeks  Change bandage before DC with ACE*COMM Ag. ABD, Kirlex and ace wrap  Can be changed at home once a week with dry gauze as well  Antibiotic as per ID    If need further assistance from please reach out to me on perfect serve or my cell [phone]  
Seen patient in the room resting no complaints about the foot  Postop CT showed possible abscess   Change your bandage and flushed the inside and palpate it for any abscess with a blunt tip no drainage except bloody drainage came out the wound packed with quarter inch Iodoform packing  Pictures taken for reference   No need for further surgical debridement   
TRANSFER - OUT REPORT:    Verbal report given to WENDY Crane on Joseph Seymour  being transferred to OR for ordered procedure       Report consisted of patient's Situation, Background, Assessment and   Recommendations(SBAR).     Information from the following report(s) Nurse Handoff Report, Adult Overview, Intake/Output, MAR, Recent Results, Med Rec Status, and Cardiac Rhythm NSR  was reviewed with the receiving nurse.           Lines:   Peripheral IV 10/09/24 Left Forearm (Active)   Site Assessment Clean, dry & intact 10/09/24 2005   Line Status Capped 10/09/24 2005   Line Care Connections checked and tightened 10/09/24 2005   Phlebitis Assessment No symptoms 10/09/24 2005   Infiltration Assessment 0 10/09/24 2005   Alcohol Cap Used No 10/09/24 2005   Dressing Status Clean, dry & intact 10/09/24 2005   Dressing Type Transparent 10/09/24 2005        Opportunity for questions and clarification was provided.          
Wound care done to Right foot by Wound Care nurse, Candida NGUYEN. Wound Care Instructions given to patient and teaching done with patient. Supplies needed sent home with patient. #22 PIV removed from right lower arm. PICC line intact for IV antibiotic therapy at home. Wife aware patient returning home this evening. Antibiotics at patient's home . Discharge Instructions reviewed with patient, and understanding expressed by patient.   
as listed below  I personally reviewed imaging:   Toxic drug monitoring: Vancomycin, check trough after 3rd dose and adjust  Discussed case with: NS, patient and wife, Dr Dixon    Total NON critical care TIME: 35  Minutes    Total CRITICAL CARE TIME Spent:   Minutes non procedure based    Objective:     VITALS:   Last 24hrs VS reviewed since prior progress note. Most recent are:  Patient Vitals for the past 24 hrs:   BP Temp Temp src Pulse Resp SpO2 Weight   10/12/24 1932 (!) 132/96 97.9 °F (36.6 °C) Oral 76 16 97 % --   10/12/24 1601 111/63 97.9 °F (36.6 °C) -- 66 -- 95 % --   10/12/24 1134 119/80 97.5 °F (36.4 °C) Oral 66 18 95 % --   10/12/24 0920 123/72 -- -- -- -- -- --   10/12/24 0731 123/72 97.7 °F (36.5 °C) Oral 71 18 95 % --   10/12/24 0600 -- -- -- -- -- -- 111.3 kg (245 lb 6 oz)   10/12/24 0311 120/76 97.7 °F (36.5 °C) Oral 72 16 95 % --       No intake or output data in the 24 hours ending 10/12/24 2233       I had a face to face encounter and independently examined this patient on 10/12/2024, as outlined below:    PHYSICAL EXAM:  General: Alert, cooperative  EENT:  Anicteric sclerae.  Resp:  CTA bilaterally, no wheezing or rales.  No accessory muscle use  CV:  Regular  rhythm,  No edema  GI:  Soft, Non distended, Non tender.  +Bowel sounds  Neurologic:  Alert and oriented X 3, normal speech,   Psych:   Good insight. Not anxious nor agitated  Skin:  Right leg is bandaged postop        Reviewed most current lab test results and cultures  YES  Reviewed most current radiology test results   YES  Review and summation of old records today    NO  Reviewed patient's current orders and MAR    YES  PMH/ reviewed - no change compared to H&P    ________________________________________________________________________        Comments   >50% of visit spent in counseling and coordination of care     ________________________________________________________________________  Tyler P Deonte Green, MD     Procedures: 
cough, CP, abdominal pain, N/V, diarrhea       Medical Decision Making:   I personally reviewed labs: Yes, as listed below  I personally reviewed imaging:   Toxic drug monitoring: Vancomycin, check trough after 3rd dose and adjust  Discussed case with: NS, patient and wife    Total NON critical care TIME: 38  Minutes    Total CRITICAL CARE TIME Spent:   Minutes non procedure based    Objective:     VITALS:   Last 24hrs VS reviewed since prior progress note. Most recent are:  Patient Vitals for the past 24 hrs:   BP Temp Temp src Pulse Resp SpO2   10/10/24 0747 109/76 97.9 °F (36.6 °C) Oral 84 18 94 %   10/10/24 0324 103/63 99.3 °F (37.4 °C) Oral 89 18 93 %   10/09/24 2322 119/73 98.4 °F (36.9 °C) Oral 97 22 93 %   10/09/24 2023 110/72 98.2 °F (36.8 °C) -- 80 16 94 %   10/09/24 2000 110/74 -- -- 85 18 96 %   10/09/24 1955 112/79 -- -- 86 20 96 %   10/09/24 1950 113/80 -- -- 85 18 97 %   10/09/24 1945 110/75 -- -- 86 21 97 %   10/09/24 1941 108/84 98.7 °F (37.1 °C) Oral 86 23 96 %   10/09/24 1816 124/78 98 °F (36.7 °C) Oral 91 21 96 %   10/09/24 1528 131/77 98.2 °F (36.8 °C) Oral 88 18 95 %   10/09/24 1115 119/82 97.3 °F (36.3 °C) Oral 79 18 95 %         Intake/Output Summary (Last 24 hours) at 10/10/2024 0936  Last data filed at 10/10/2024 0834  Gross per 24 hour   Intake 350 ml   Output 500 ml   Net -150 ml        I had a face to face encounter and independently examined this patient on 10/10/2024, as outlined below:    PHYSICAL EXAM:  General: Alert, cooperative  EENT:  Anicteric sclerae.  Resp:  CTA bilaterally, no wheezing or rales.  No accessory muscle use  CV:  Regular  rhythm,  No edema  GI:  Soft, Non distended, Non tender.  +Bowel sounds  Neurologic:  Alert and oriented X 3, normal speech,   Psych:   Good insight. Not anxious nor agitated  Skin:  Right leg is bandaged postop        Reviewed most current lab test results and cultures  YES  Reviewed most current radiology test results   YES  Review and 
10/8/2024 at 08:00  Yes Yes   Sig: Take 1 capsule by mouth 3 times daily.   sacubitril-valsartan (ENTRESTO) 24-26 MG per tablet 10/8/2024 at 08:00  No Yes   Sig: Take 1 tablet by mouth 2 times daily   semaglutide, 2 MG/DOSE, (OZEMPIC, 2 MG/DOSE,) 8 MG/3ML SOPN sc injection Past Week at 08:00  Yes Yes   Sig: Inject 2 mg into the skin every 7 days   spironolactone (ALDACTONE) 25 MG tablet 10/8/2024 at 12:00  No Yes   Sig: Take 1 tablet by mouth daily   tamsulosin (FLOMAX) 0.4 MG capsule 10/8/2024 at 08:00  Yes Yes   Sig: Take 1 capsule by mouth daily      Facility-Administered Medications: None             Review of Systems:     Gen: Negative for chills, fevers, weight loss, weight gain   HEENT: Negative for headache, vision changes, ear ache or discharge, tingling,  nasal discharge, swelling, lumps in neck, sores on tongue   CV:  Negative for chest pain, dyspnea on exertion, leg edema   Lungs: Negative for shortness of breath, cough, wheezing   Abdomen: Negative for abdominal pain, nausea, vomiting, diarrhea, constipation   Genitourinary: Negative for genital pain or genital discharge     Neuro: Negative for headache, numbness, tingling, extremity weakness,  syncope, seizures    Skin: Negative for rash, sores/open wounds   Musculoskeletal: Negative for joint pain, joint swelling, joint erythema    Endocrine: Negative for high or low blood sugars, heat or cold intolerance    Psych: Negative for manic behavior     Vitals:    10/14/24 0800   BP: 107/68   Pulse: 66   Resp: 16   Temp: 97.7 °F (36.5 °C)   SpO2: 96%           PHYSICAL EXAM:  General:          WD, WN. Alert, cooperative, no acute distress    EENT:              EOMI. Anicteric sclerae. MMM  Resp:               CTA bilaterally, no wheezing or rales.  No accessory muscle use  CV:                  Regular  rhythm,  No edema  GI:                   Soft, Non distended, Non tender.  +Bowel sounds  Neurologic:      Alert and oriented X 3, normal speech,   Psych:

## 2024-10-16 NOTE — OP NOTE
[facility]              [address] [place]  [address]                            OPERATIVE REPORT      PATIENT NAME: KARIE KRAUSE                : 1960  MED REC NO: [id number]                       ROOM: 3122  ACCOUNT NO: [id number]                       ADMIT DATE: 10/09/2024  PROVIDER: Dirk Dixon DPM    DATE OF SERVICE:  10/09/2024    PREOPERATIVE DIAGNOSES:  Diabetic foot ulcer with osteomyelitis, right foot, 3rd toe.    POSTOPERATIVE DIAGNOSES:  Diabetic foot ulcer with osteomyelitis, right foot, 3rd toe.    PROCEDURES PERFORMED:  Right foot debridement, incision and drainage and removal of the tyshawn in the right 3rd proximal phalangeal base.    SURGEON:  Dirk Dixon DPM    ASSISTANT:  None.    ANESTHESIA:  MAC.    ESTIMATED BLOOD LOSS:  Less than 50 mL.    SPECIMENS REMOVED:  Right 3rd proximal phalangeal base was sent for pathology and right foot deep cultures were taken.    INTRAOPERATIVE FINDINGS:  This patient has had previous history of I and D of the left 2nd toe 2nd metatarsal and metatarsal head resection.  The patient has developed a transfer lesion on sub 3rd, that resulted in a subdermal sub lesional hematoma resulting in infection and possible osteomyelitis, which was confirmed on the imaging studies and the patient was advised for a resection of the proximal phalangeal base, which was the osteomyelitic bone.  The patient was admitted through the ER and received IV antibiotics so far.  The patient was brought in the OR and left on the patient's bed in supine position.  IV sedation performed by CRNA.  Local infiltration of 0.5% Marcaine plain was injected and the tourniquet was placed with Webril padding inserted tourniquet at the ankle and the right foot was prepped and draped in the usual sterile manner.  After anesthesia check, an incision was made over the plantar aspect of the 3rd metatarsophalangeal joint extending proximal

## 2024-10-16 NOTE — DISCHARGE SUMMARY
CAD status post ROMULO  Heart failure with preserved ejection fraction  Essential hypertension  Hyperlipidemia  Continue PTA aspirin and plavix  Continue PTA atorvastatin, Coreg, Entresto, spironolactone     BPH  Continue PTA tamsulosin    Discharge Exam:  Patient seen and examined by me on discharge day.  Pertinent Findings:  Patient Vitals for the past 24 hrs:   BP Temp Temp src Pulse Resp SpO2 Weight   10/16/24 0805 107/71 97.3 °F (36.3 °C) Oral 67 17 96 % --   10/16/24 0600 -- -- -- -- -- -- 109.4 kg (241 lb 2.9 oz)   10/16/24 0304 97/72 97.3 °F (36.3 °C) -- 73 17 99 % --   10/15/24 1918 118/81 97.7 °F (36.5 °C) Oral 66 18 97 % --   10/15/24 1527 110/67 97.7 °F (36.5 °C) Oral (!) 102 20 97 % --       Gen:    Not in distress  Chest: Clear lungs  CVS:   Regular rhythm.  No edema  Abd:  Soft, not distended, not tender  Neuro: awake, moving all exts  Extremities: dressing on right lower extremity    Discharge/Recent Laboratory Results:  Recent Labs     10/15/24  0409      K 3.8      CO2 27   BUN 10   CREATININE 0.86   GLUCOSE 107*   CALCIUM 9.0     Recent Labs     10/15/24  0409   HGB 14.1   HCT 43.4   WBC 7.5          Discharge Medications:     Medication List        START taking these medications      lactobacillus capsule  Take 1 capsule by mouth daily (with breakfast)  Start taking on: October 17, 2024            CHANGE how you take these medications      atorvastatin 40 MG tablet  Commonly known as: LIPITOR  Take 1 tablet by mouth at bedtime  What changed: when to take this     Tresiba FlexTouch 200 UNIT/ML Sopn  Generic drug: Insulin Degludec  Inject 16 Units into the skin at bedtime  What changed: how much to take            CONTINUE taking these medications      aspirin 81 MG EC tablet     carvedilol 6.25 MG tablet  Commonly known as: COREG  Take 1 tablet by mouth 2 times daily (with meals)     clopidogrel 75 MG tablet  Commonly known as: PLAVIX     cyanocobalamin 1000 MCG/ML injection

## 2024-11-11 ENCOUNTER — PATIENT MESSAGE (OUTPATIENT)
Age: 64
End: 2024-11-11

## 2024-11-11 ENCOUNTER — TELEPHONE (OUTPATIENT)
Age: 64
End: 2024-11-11

## 2024-11-11 NOTE — TELEPHONE ENCOUNTER
Patient's wife called to report that patient has a new area on his foot for which she is going to send a picture on [employer] about. She is concerned it maybe residual osteomyelitis. She states it looks just like the original area on patient's foot. Did suggest that she have patient seen as soon as possible for evaluation. She states that she is taking him to the podiatrist sometime soon. Appointment is Wednesday but she is trying to re-schedule to get him in Tuesday or Thursday to see him. They wanted to consult with you if any additional labs needs to be drawn through home health for this.

## 2024-11-12 ENCOUNTER — HOSPITAL ENCOUNTER (OUTPATIENT)
Facility: HOSPITAL | Age: 64
Setting detail: SPECIMEN
Discharge: HOME | End: 2024-11-15
Payer: MEDICARE

## 2024-11-12 ENCOUNTER — TELEPHONE (OUTPATIENT)
Age: 64
End: 2024-11-12

## 2024-11-12 LAB
ALBUMIN SERPL-MCNC: 3.7 G/DL (ref 3.5–5)
ALBUMIN/GLOB SERPL: 1.1 (ref 1.1–2.2)
ALP SERPL-CCNC: 74 U/L (ref 45–117)
ALT SERPL-CCNC: 26 U/L (ref 12–78)
ANION GAP SERPL CALC-SCNC: 9 MMOL/L (ref 2–12)
AST SERPL-CCNC: 17 U/L (ref 15–37)
BASOPHILS # BLD: 0 K/UL (ref 0–0.1)
BASOPHILS NFR BLD: 1 % (ref 0–1)
BILIRUB SERPL-MCNC: 0.7 MG/DL (ref 0.2–1)
BUN SERPL-MCNC: 20 MG/DL (ref 6–20)
BUN/CREAT SERPL: 19 (ref 12–20)
CALCIUM SERPL-MCNC: 9.3 MG/DL (ref 8.5–10.1)
CHLORIDE SERPL-SCNC: 102 MMOL/L (ref 97–108)
CO2 SERPL-SCNC: 30 MMOL/L (ref 21–32)
CREAT SERPL-MCNC: 1.06 MG/DL (ref 0.7–1.3)
CRP SERPL-MCNC: <0.29 MG/DL (ref 0–0.3)
DIFFERENTIAL METHOD BLD: ABNORMAL
EOSINOPHIL # BLD: 0.2 K/UL (ref 0–0.4)
EOSINOPHIL NFR BLD: 2 % (ref 0–7)
ERYTHROCYTE [DISTWIDTH] IN BLOOD BY AUTOMATED COUNT: 14.6 % (ref 11.5–14.5)
ERYTHROCYTE [SEDIMENTATION RATE] IN BLOOD: 6 MM/HR (ref 0–20)
GLOBULIN SER CALC-MCNC: 3.4 G/DL (ref 2–4)
GLUCOSE SERPL-MCNC: 137 MG/DL (ref 65–100)
HCT VFR BLD AUTO: 47 % (ref 36.6–50.3)
HGB BLD-MCNC: 15.5 G/DL (ref 12.1–17)
IMM GRANULOCYTES # BLD AUTO: 0 K/UL (ref 0–0.04)
IMM GRANULOCYTES NFR BLD AUTO: 0 % (ref 0–0.5)
LYMPHOCYTES # BLD: 1.7 K/UL (ref 0.8–3.5)
LYMPHOCYTES NFR BLD: 21 % (ref 12–49)
MCH RBC QN AUTO: 28 PG (ref 26–34)
MCHC RBC AUTO-ENTMCNC: 33 G/DL (ref 30–36.5)
MCV RBC AUTO: 85 FL (ref 80–99)
MONOCYTES # BLD: 0.6 K/UL (ref 0–1)
MONOCYTES NFR BLD: 8 % (ref 5–13)
NEUTS SEG # BLD: 5.5 K/UL (ref 1.8–8)
NEUTS SEG NFR BLD: 68 % (ref 32–75)
NRBC # BLD: 0 K/UL (ref 0–0.01)
NRBC BLD-RTO: 0 PER 100 WBC
PLATELET # BLD AUTO: 172 K/UL (ref 150–400)
PMV BLD AUTO: 10.4 FL (ref 8.9–12.9)
POTASSIUM SERPL-SCNC: 4.5 MMOL/L (ref 3.5–5.1)
PROT SERPL-MCNC: 7.1 G/DL (ref 6.4–8.2)
RBC # BLD AUTO: 5.53 M/UL (ref 4.1–5.7)
SODIUM SERPL-SCNC: 141 MMOL/L (ref 136–145)
WBC # BLD AUTO: 8 K/UL (ref 4.1–11.1)

## 2024-11-12 PROCEDURE — 85025 COMPLETE CBC W/AUTO DIFF WBC: CPT

## 2024-11-12 PROCEDURE — 85652 RBC SED RATE AUTOMATED: CPT

## 2024-11-12 PROCEDURE — 86140 C-REACTIVE PROTEIN: CPT

## 2024-11-12 PROCEDURE — 80053 COMPREHEN METABOLIC PANEL: CPT

## 2024-11-12 PROCEDURE — 36415 COLL VENOUS BLD VENIPUNCTURE: CPT

## 2024-11-12 NOTE — TELEPHONE ENCOUNTER
Please asked nurse to draw CBC, CMP, ESR, CRP this week.   Patient needs to follow-up with podiatry ASAP or go to nearest ED.  Thanks

## 2024-11-12 NOTE — TELEPHONE ENCOUNTER
Spoke to  nurse Carmen([phone]) from [facility] who reported patient has additional area on his foot that is red and hot to touch and she was instructed to call us prior to visiting patient today to see if you needed any additional orders.

## 2024-11-19 ENCOUNTER — OFFICE VISIT (OUTPATIENT)
Age: 64
End: 2024-11-19
Payer: MEDICARE

## 2024-11-19 ENCOUNTER — TELEPHONE (OUTPATIENT)
Age: 64
End: 2024-11-19

## 2024-11-19 VITALS
SYSTOLIC BLOOD PRESSURE: 131 MMHG | DIASTOLIC BLOOD PRESSURE: 81 MMHG | OXYGEN SATURATION: 94 % | HEIGHT: 71 IN | RESPIRATION RATE: 20 BRPM | WEIGHT: 237 LBS | TEMPERATURE: 97.8 F | HEART RATE: 77 BPM | BODY MASS INDEX: 33.18 KG/M2

## 2024-11-19 DIAGNOSIS — L08.9 DIABETIC INFECTION OF RIGHT FOOT (HCC): Primary | ICD-10-CM

## 2024-11-19 DIAGNOSIS — E11.65 POORLY CONTROLLED TYPE 2 DIABETES MELLITUS (HCC): ICD-10-CM

## 2024-11-19 DIAGNOSIS — I25.10 CORONARY ARTERY DISEASE DUE TO LIPID RICH PLAQUE: ICD-10-CM

## 2024-11-19 DIAGNOSIS — E66.9 OBESITY (BMI 30-39.9): ICD-10-CM

## 2024-11-19 DIAGNOSIS — I25.83 CORONARY ARTERY DISEASE DUE TO LIPID RICH PLAQUE: ICD-10-CM

## 2024-11-19 DIAGNOSIS — L03.115 CELLULITIS OF RIGHT FOOT: ICD-10-CM

## 2024-11-19 DIAGNOSIS — I10 PRIMARY HYPERTENSION: ICD-10-CM

## 2024-11-19 DIAGNOSIS — S90.821A BLISTER OF RIGHT FOOT, INITIAL ENCOUNTER: ICD-10-CM

## 2024-11-19 DIAGNOSIS — M86.071 ACUTE HEMATOGENOUS OSTEOMYELITIS OF RIGHT FOOT: ICD-10-CM

## 2024-11-19 DIAGNOSIS — E11.42 DIABETIC POLYNEUROPATHY ASSOCIATED WITH TYPE 2 DIABETES MELLITUS (HCC): ICD-10-CM

## 2024-11-19 DIAGNOSIS — L02.611 ABSCESS OF RIGHT FOOT: ICD-10-CM

## 2024-11-19 DIAGNOSIS — I50.9 CONGESTIVE HEART FAILURE, UNSPECIFIED HF CHRONICITY, UNSPECIFIED HEART FAILURE TYPE (HCC): ICD-10-CM

## 2024-11-19 DIAGNOSIS — E78.5 HYPERLIPIDEMIA, UNSPECIFIED HYPERLIPIDEMIA TYPE: ICD-10-CM

## 2024-11-19 DIAGNOSIS — B95.7 COAGULASE-NEGATIVE STAPHYLOCOCCAL INFECTION: ICD-10-CM

## 2024-11-19 DIAGNOSIS — E11.628 DIABETIC INFECTION OF RIGHT FOOT (HCC): Primary | ICD-10-CM

## 2024-11-19 PROCEDURE — 1124F ACP DISCUSS-NO DSCNMKR DOCD: CPT | Performed by: INTERNAL MEDICINE

## 2024-11-19 PROCEDURE — G8484 FLU IMMUNIZE NO ADMIN: HCPCS | Performed by: INTERNAL MEDICINE

## 2024-11-19 PROCEDURE — 2022F DILAT RTA XM EVC RTNOPTHY: CPT | Performed by: INTERNAL MEDICINE

## 2024-11-19 PROCEDURE — 3075F SYST BP GE 130 - 139MM HG: CPT | Performed by: INTERNAL MEDICINE

## 2024-11-19 PROCEDURE — 3079F DIAST BP 80-89 MM HG: CPT | Performed by: INTERNAL MEDICINE

## 2024-11-19 PROCEDURE — 1036F TOBACCO NON-USER: CPT | Performed by: INTERNAL MEDICINE

## 2024-11-19 PROCEDURE — G8427 DOCREV CUR MEDS BY ELIG CLIN: HCPCS | Performed by: INTERNAL MEDICINE

## 2024-11-19 PROCEDURE — 3017F COLORECTAL CA SCREEN DOC REV: CPT | Performed by: INTERNAL MEDICINE

## 2024-11-19 PROCEDURE — 3051F HG A1C>EQUAL 7.0%<8.0%: CPT | Performed by: INTERNAL MEDICINE

## 2024-11-19 PROCEDURE — 1160F RVW MEDS BY RX/DR IN RCRD: CPT | Performed by: INTERNAL MEDICINE

## 2024-11-19 PROCEDURE — 1126F AMNT PAIN NOTED NONE PRSNT: CPT | Performed by: INTERNAL MEDICINE

## 2024-11-19 PROCEDURE — 99214 OFFICE O/P EST MOD 30 MIN: CPT | Performed by: INTERNAL MEDICINE

## 2024-11-19 PROCEDURE — 1159F MED LIST DOCD IN RCRD: CPT | Performed by: INTERNAL MEDICINE

## 2024-11-19 PROCEDURE — G8417 CALC BMI ABV UP PARAM F/U: HCPCS | Performed by: INTERNAL MEDICINE

## 2024-11-19 RX ORDER — FUROSEMIDE 40 MG/1
40 TABLET ORAL 2 TIMES DAILY
COMMUNITY

## 2024-11-19 NOTE — PROGRESS NOTES
Room:   Chief Complaint   Patient presents with    ID F/U     Prior to Admission medications    Medication Sig Start Date End Date Taking? Authorizing Provider   furosemide (LASIX) 40 MG tablet Take 1 tablet by mouth in the morning and 1 tablet in the evening.   Yes Alber Mullins MD   Insulin Degludec (TRESIBA FLEXTOUCH) 200 UNIT/ML SOPN Inject 16 Units into the skin at bedtime 10/16/24  Yes Dorita Caba MD   atorvastatin (LIPITOR) 40 MG tablet Take 1 tablet by mouth at bedtime 10/16/24  Yes Dorita Caba MD   carvedilol (COREG) 6.25 MG tablet Take 1 tablet by mouth 2 times daily (with meals) 3/29/24  Yes Charlotte Coleman MD   sacubitril-valsartan (ENTRESTO) 24-26 MG per tablet Take 1 tablet by mouth 2 times daily 3/29/24  Yes Charlotte Coleman MD   spironolactone (ALDACTONE) 25 MG tablet Take 1 tablet by mouth daily 3/30/24  Yes Charlotte Coleman MD   semaglutide, 2 MG/DOSE, (OZEMPIC, 2 MG/DOSE,) 8 MG/3ML SOPN sc injection Inject 2 mg into the skin every 7 days   Yes Alber Mullins MD   Empagliflozin-metFORMIN HCl ER (SYNJARDY XR) 12.5-1000 MG TB24 Take 1 tablet by mouth in the morning and at bedtime   Yes Alber Mullins MD   insulin lispro, 1 Unit Dial, (HUMALOG KWIKPEN) 100 UNIT/ML SOPN Inject into the skin 3 times daily (before meals) Sliding scale   Yes Alber Mullins MD   aspirin 81 MG EC tablet Take 1 tablet by mouth daily   Yes Alber Mullins MD   Omega 3 1000 MG CAPS Take 1,000 mg by mouth in the morning and at bedtime   Yes Alber Mullins MD   clopidogrel (PLAVIX) 75 MG tablet Take 1 tablet by mouth daily   Yes Alber Mullins MD   tamsulosin (FLOMAX) 0.4 MG capsule Take 1 capsule by mouth daily   Yes Alber Mullins MD   pregabalin (LYRICA) 75 MG capsule Take 2 capsules by mouth 2 times daily.   Yes Alber Mullins MD   lactobacillus (CULTURELLE) capsule Take 1 capsule by mouth daily (with breakfast)  Patient not taking: Reported 
24-26 MG per tablet 10/8/2024 at 08:00  No Yes   Sig: Take 1 tablet by mouth 2 times daily   semaglutide, 2 MG/DOSE, (OZEMPIC, 2 MG/DOSE,) 8 MG/3ML SOPN sc injection Past Week at 08:00  Yes Yes   Sig: Inject 2 mg into the skin every 7 days   spironolactone (ALDACTONE) 25 MG tablet 10/8/2024 at 12:00  No Yes   Sig: Take 1 tablet by mouth daily   tamsulosin (FLOMAX) 0.4 MG capsule 10/8/2024 at 08:00  Yes Yes   Sig: Take 1 capsule by mouth daily      Facility-Administered Medications: None             Review of Systems:     Gen: Negative for chills, fevers, weight loss, weight gain   HEENT: Negative for headache, vision changes, ear ache or discharge, tingling,  nasal discharge, swelling, lumps in neck, sores on tongue   CV:  Negative for chest pain, dyspnea on exertion, leg edema   Lungs: Negative for shortness of breath, cough, wheezing   Abdomen: Negative for abdominal pain, nausea, vomiting, diarrhea, constipation   Genitourinary: Negative for genital pain or genital discharge     Neuro: Negative for headache, numbness, tingling, extremity weakness,  syncope, seizures    Skin: Negative for rash, sores/open wounds   Musculoskeletal: Negative for joint pain, joint swelling, joint erythema    Endocrine: Negative for high or low blood sugars, heat or cold intolerance    Psych: Negative for manic behavior     Vitals:    11/19/24 1541   BP: 131/81   Pulse: 77   Resp: 20   Temp: 97.8 °F (36.6 °C)   SpO2: 94%           PHYSICAL EXAM:  General:          WD, WN. Alert, cooperative, no acute distress    EENT:              EOMI. Anicteric sclerae. MMM  Resp:               CTA bilaterally, no wheezing or rales.  No accessory muscle use  CV:                  Regular  rhythm,  No edema  GI:                   Soft, Non distended, Non tender.  +Bowel sounds  Neurologic:      Alert and oriented X 3, normal speech,   Psych:             Good insight. Not anxious nor agitated  Skin:                No rashes.  No jaundice.  Extremities

## 2024-11-19 NOTE — TELEPHONE ENCOUNTER
Please let home health know that antibiotic orders have been extended 2 more weeks to 12/4 pending a CT scan.  Thanks

## 2024-11-20 ENCOUNTER — HOSPITAL ENCOUNTER (OUTPATIENT)
Facility: HOSPITAL | Age: 64
Discharge: HOME | End: 2024-11-23
Attending: INTERNAL MEDICINE
Payer: COMMERCIAL

## 2024-11-20 DIAGNOSIS — E11.628 DIABETIC INFECTION OF RIGHT FOOT (HCC): ICD-10-CM

## 2024-11-20 DIAGNOSIS — L03.115 CELLULITIS OF RIGHT FOOT: ICD-10-CM

## 2024-11-20 DIAGNOSIS — L02.611 ABSCESS OF RIGHT FOOT: ICD-10-CM

## 2024-11-20 DIAGNOSIS — L08.9 DIABETIC INFECTION OF RIGHT FOOT (HCC): ICD-10-CM

## 2024-11-20 DIAGNOSIS — M86.071 ACUTE HEMATOGENOUS OSTEOMYELITIS OF RIGHT FOOT: ICD-10-CM

## 2024-11-20 PROCEDURE — 73701 CT LOWER EXTREMITY W/DYE: CPT

## 2024-11-20 PROCEDURE — 6360000004 HC RX CONTRAST MEDICATION: Performed by: INTERNAL MEDICINE

## 2024-11-20 RX ORDER — IOPAMIDOL 755 MG/ML
100 INJECTION, SOLUTION INTRAVASCULAR
Status: COMPLETED | OUTPATIENT
Start: 2024-11-20 | End: 2024-11-20

## 2024-11-20 RX ADMIN — IOPAMIDOL 100 ML: 755 INJECTION, SOLUTION INTRAVENOUS at 15:30

## 2024-11-20 NOTE — TELEPHONE ENCOUNTER
Please asked patient/spouse if podiatry is called and get them an appointment?  I spoke to Dr Dixon last evening, he said that he would call  & make an appointment for patient to be seen this week.  Thanks

## 2024-11-20 NOTE — TELEPHONE ENCOUNTER
He says he spoke to Dr. Dixon last night and his wife sent him a picture. They are waiting on the office to call for an appointment. Called and scheduled patient's appointment for PICC line with Interventional Radiology. After speaking with patient called and cancelled the appointment because patient has found an alternative with the infusion center near his home that his daughter suggested. He states that if he needs them to look at the line after that he will give us a call.

## 2024-11-26 ENCOUNTER — HOSPITAL ENCOUNTER (OUTPATIENT)
Facility: HOSPITAL | Age: 64
Setting detail: SPECIMEN
Discharge: HOME | End: 2024-11-29
Payer: MEDICARE

## 2024-11-26 LAB
ALBUMIN SERPL-MCNC: 3.8 G/DL (ref 3.5–5)
ALBUMIN/GLOB SERPL: 1 (ref 1.1–2.2)
ALP SERPL-CCNC: 79 U/L (ref 45–117)
ALT SERPL-CCNC: 22 U/L (ref 12–78)
ANION GAP SERPL CALC-SCNC: 13 MMOL/L (ref 2–12)
AST SERPL-CCNC: 21 U/L (ref 15–37)
BASOPHILS # BLD: 0.1 K/UL (ref 0–0.1)
BASOPHILS NFR BLD: 1 % (ref 0–1)
BILIRUB SERPL-MCNC: 0.5 MG/DL (ref 0.2–1)
BUN SERPL-MCNC: 20 MG/DL (ref 6–20)
BUN/CREAT SERPL: 18 (ref 12–20)
CALCIUM SERPL-MCNC: 9.3 MG/DL (ref 8.5–10.1)
CHLORIDE SERPL-SCNC: 102 MMOL/L (ref 97–108)
CO2 SERPL-SCNC: 26 MMOL/L (ref 21–32)
CREAT SERPL-MCNC: 1.1 MG/DL (ref 0.7–1.3)
CRP SERPL-MCNC: <0.29 MG/DL (ref 0–0.3)
DIFFERENTIAL METHOD BLD: ABNORMAL
EOSINOPHIL # BLD: 0.1 K/UL (ref 0–0.4)
EOSINOPHIL NFR BLD: 2 % (ref 0–7)
ERYTHROCYTE [DISTWIDTH] IN BLOOD BY AUTOMATED COUNT: 14.6 % (ref 11.5–14.5)
ERYTHROCYTE [SEDIMENTATION RATE] IN BLOOD: 2 MM/HR (ref 0–20)
GLOBULIN SER CALC-MCNC: 3.8 G/DL (ref 2–4)
GLUCOSE SERPL-MCNC: 146 MG/DL (ref 65–100)
HCT VFR BLD AUTO: 50.6 % (ref 36.6–50.3)
HGB BLD-MCNC: 16.6 G/DL (ref 12.1–17)
IMM GRANULOCYTES # BLD AUTO: 0 K/UL (ref 0–0.04)
IMM GRANULOCYTES NFR BLD AUTO: 0 % (ref 0–0.5)
LYMPHOCYTES # BLD: 1.9 K/UL (ref 0.8–3.5)
LYMPHOCYTES NFR BLD: 26 % (ref 12–49)
MCH RBC QN AUTO: 28.2 PG (ref 26–34)
MCHC RBC AUTO-ENTMCNC: 32.8 G/DL (ref 30–36.5)
MCV RBC AUTO: 85.9 FL (ref 80–99)
MONOCYTES # BLD: 0.6 K/UL (ref 0–1)
MONOCYTES NFR BLD: 8 % (ref 5–13)
NEUTS SEG # BLD: 4.9 K/UL (ref 1.8–8)
NEUTS SEG NFR BLD: 63 % (ref 32–75)
NRBC # BLD: 0 K/UL (ref 0–0.01)
NRBC BLD-RTO: 0 PER 100 WBC
PLATELET # BLD AUTO: 173 K/UL (ref 150–400)
PMV BLD AUTO: 9.9 FL (ref 8.9–12.9)
POTASSIUM SERPL-SCNC: 4.1 MMOL/L (ref 3.5–5.1)
PROT SERPL-MCNC: 7.6 G/DL (ref 6.4–8.2)
RBC # BLD AUTO: 5.89 M/UL (ref 4.1–5.7)
SODIUM SERPL-SCNC: 141 MMOL/L (ref 136–145)
WBC # BLD AUTO: 7.6 K/UL (ref 4.1–11.1)

## 2024-11-26 PROCEDURE — 80053 COMPREHEN METABOLIC PANEL: CPT

## 2024-11-26 PROCEDURE — 85652 RBC SED RATE AUTOMATED: CPT

## 2024-11-26 PROCEDURE — 85025 COMPLETE CBC W/AUTO DIFF WBC: CPT

## 2024-11-26 PROCEDURE — 86140 C-REACTIVE PROTEIN: CPT

## 2024-11-27 ENCOUNTER — TELEPHONE (OUTPATIENT)
Age: 64
End: 2024-11-27

## 2024-11-27 NOTE — TELEPHONE ENCOUNTER
Patients wife Charlotte calling stating that the patient is confused as to why he may need to come to the hospital    Wife states that they live 1.5 hours away.    Charlotte # [phone] call anytime

## 2024-11-27 NOTE — TELEPHONE ENCOUNTER
Please let patient/spouse know that CT of right foot has been read as:   fracture of 3rd Metatarsal  neck, partial destruction of 3rd metatarsal head with erosive changes, periostitis of third metatarsal shaft.  Findings likely represent acute osteomyelitis with a pathological fracture.    I am not sure if these are all postsurgical changes.  I have reached out to podiatrist & waiting on callback.  If foot is still swollen  &with the fluctuant swelling on the side of the foot, he may need to be admitted.

## 2024-11-29 ENCOUNTER — TELEPHONE (OUTPATIENT)
Age: 64
End: 2024-11-29

## 2024-11-29 NOTE — TELEPHONE ENCOUNTER
Patient's wife states that they will consult with one of the providers at [facility]. She states she does not want to keep patching it up and if the CT shows residual osteomyelitis she wants to get ahead of it. She states that her and Joseph value your opinion and if you feel he needs to come to the hospital they will do so but otherwise they will see someone at [facility].

## 2024-11-29 NOTE — TELEPHONE ENCOUNTER
Spoke to Ms. Charlotte Seymour and she wanted to know if Dr. Brooks had spoken to the podiatrist and what did he think and if the concerns from the CT scan were very urgent or could they wait until Monday to go to the hospital. She stated she feels he is doing well at home with the PICC line and antibiotics but she will do whatever Dr. Brooks's recommendation is.

## 2024-11-29 NOTE — TELEPHONE ENCOUNTER
Please let patient/spouse know that I did speak to podiatrist about the CT scan report.  I also forwarded report to him as I was not sure if this were postsurgical changes or acute infection.  He said he was going to talk to patient and spouse.  Please follow-up with him.  I am so glad to hear that he is doing well clinically.

## 2024-11-29 NOTE — TELEPHONE ENCOUNTER
Spoke to Ms. Seymour again and she states that patient does not want to go have foot looked at until Monday due to his family visiting. They will see on Monday how soon they can go to [facility]. She states the are that is there is the same as before but it has gotten darker.

## 2024-11-29 NOTE — TELEPHONE ENCOUNTER
Labs look great!  WBC, and inflammatory markers.  If foot is looking like it is improving they could wait until they see surgeon at [facility].If foot blister is still there, or any other suspicious areas are present, then go to ED.

## 2024-12-02 ENCOUNTER — TELEPHONE (OUTPATIENT)
Age: 64
End: 2024-12-02

## 2024-12-02 NOTE — TELEPHONE ENCOUNTER
Patient's wife Charlotte called about patient not wanting to see Dr. Sutton when he comes to the ER today after lunch. PICC line has malfunctioned and it is difficult  to flush. They are also coming to follow-up on the CT scan results.  Dr. Miguel A Bradford,Vascular Surgeon was referred by PCP.  She wants to know from Dr Brooks if she feels vascular surgery is a good step for them and she wants to know if you have any other recommendations.

## 2024-12-03 ENCOUNTER — APPOINTMENT (OUTPATIENT)
Facility: HOSPITAL | Age: 64
DRG: 638 | End: 2024-12-03
Payer: COMMERCIAL

## 2024-12-03 ENCOUNTER — HOSPITAL ENCOUNTER (INPATIENT)
Facility: HOSPITAL | Age: 64
LOS: 6 days | Discharge: HOME | DRG: 638 | End: 2024-12-09
Attending: EMERGENCY MEDICINE | Admitting: INTERNAL MEDICINE
Payer: COMMERCIAL

## 2024-12-03 ENCOUNTER — HOSPITAL ENCOUNTER (EMERGENCY)
Facility: HOSPITAL | Age: 64
Discharge: HOME | DRG: 638 | End: 2024-12-06
Payer: COMMERCIAL

## 2024-12-03 VITALS
HEART RATE: 75 BPM | DIASTOLIC BLOOD PRESSURE: 89 MMHG | RESPIRATION RATE: 16 BRPM | SYSTOLIC BLOOD PRESSURE: 106 MMHG | OXYGEN SATURATION: 97 %

## 2024-12-03 DIAGNOSIS — T82.898A OCCLUDED PICC LINE, INITIAL ENCOUNTER (HCC): Primary | ICD-10-CM

## 2024-12-03 DIAGNOSIS — M86.9 OSTEOMYELITIS OF RIGHT FOOT, UNSPECIFIED TYPE: ICD-10-CM

## 2024-12-03 LAB
ALBUMIN SERPL-MCNC: 3.9 G/DL (ref 3.5–5)
ALBUMIN/GLOB SERPL: 0.9 (ref 1.1–2.2)
ALP SERPL-CCNC: 73 U/L (ref 45–117)
ALT SERPL-CCNC: 26 U/L (ref 12–78)
ANION GAP SERPL CALC-SCNC: 7 MMOL/L (ref 2–12)
AST SERPL-CCNC: 19 U/L (ref 15–37)
BASOPHILS # BLD: 0.1 K/UL (ref 0–0.1)
BASOPHILS NFR BLD: 1 % (ref 0–1)
BILIRUB SERPL-MCNC: 0.5 MG/DL (ref 0.2–1)
BUN SERPL-MCNC: 20 MG/DL (ref 6–20)
BUN/CREAT SERPL: 17 (ref 12–20)
CALCIUM SERPL-MCNC: 9.4 MG/DL (ref 8.5–10.1)
CHLORIDE SERPL-SCNC: 102 MMOL/L (ref 97–108)
CO2 SERPL-SCNC: 30 MMOL/L (ref 21–32)
CREAT SERPL-MCNC: 1.15 MG/DL (ref 0.7–1.3)
CRP SERPL HS-MCNC: 1.6 MG/L
DIFFERENTIAL METHOD BLD: ABNORMAL
EOSINOPHIL # BLD: 0.1 K/UL (ref 0–0.4)
EOSINOPHIL NFR BLD: 1 % (ref 0–7)
ERYTHROCYTE [DISTWIDTH] IN BLOOD BY AUTOMATED COUNT: 15.3 % (ref 11.5–14.5)
ERYTHROCYTE [SEDIMENTATION RATE] IN BLOOD: 2 MM/HR (ref 0–20)
GLOBULIN SER CALC-MCNC: 4.2 G/DL (ref 2–4)
GLUCOSE BLD STRIP.AUTO-MCNC: 200 MG/DL (ref 65–117)
GLUCOSE SERPL-MCNC: 160 MG/DL (ref 65–100)
HCT VFR BLD AUTO: 53.7 % (ref 36.6–50.3)
HGB BLD-MCNC: 17.3 G/DL (ref 12.1–17)
IMM GRANULOCYTES # BLD AUTO: 0 K/UL (ref 0–0.04)
IMM GRANULOCYTES NFR BLD AUTO: 0 % (ref 0–0.5)
LYMPHOCYTES # BLD: 1.9 K/UL (ref 0.8–3.5)
LYMPHOCYTES NFR BLD: 22 % (ref 12–49)
MCH RBC QN AUTO: 27.9 PG (ref 26–34)
MCHC RBC AUTO-ENTMCNC: 32.2 G/DL (ref 30–36.5)
MCV RBC AUTO: 86.5 FL (ref 80–99)
MONOCYTES # BLD: 0.7 K/UL (ref 0–1)
MONOCYTES NFR BLD: 8 % (ref 5–13)
NEUTS SEG # BLD: 5.9 K/UL (ref 1.8–8)
NEUTS SEG NFR BLD: 68 % (ref 32–75)
NRBC # BLD: 0 K/UL (ref 0–0.01)
NRBC BLD-RTO: 0 PER 100 WBC
PLATELET # BLD AUTO: 203 K/UL (ref 150–400)
PMV BLD AUTO: 10.2 FL (ref 8.9–12.9)
POTASSIUM SERPL-SCNC: 3.6 MMOL/L (ref 3.5–5.1)
PROT SERPL-MCNC: 8.1 G/DL (ref 6.4–8.2)
RBC # BLD AUTO: 6.21 M/UL (ref 4.1–5.7)
SERVICE CMNT-IMP: ABNORMAL
SODIUM SERPL-SCNC: 139 MMOL/L (ref 136–145)
WBC # BLD AUTO: 8.6 K/UL (ref 4.1–11.1)

## 2024-12-03 PROCEDURE — 05PYX3Z REMOVAL OF INFUSION DEVICE FROM UPPER VEIN, EXTERNAL APPROACH: ICD-10-PCS | Performed by: RADIOLOGY

## 2024-12-03 PROCEDURE — 82962 GLUCOSE BLOOD TEST: CPT

## 2024-12-03 PROCEDURE — 99285 EMERGENCY DEPT VISIT HI MDM: CPT

## 2024-12-03 PROCEDURE — 86141 C-REACTIVE PROTEIN HS: CPT

## 2024-12-03 PROCEDURE — 2580000003 HC RX 258: Performed by: EMERGENCY MEDICINE

## 2024-12-03 PROCEDURE — 73630 X-RAY EXAM OF FOOT: CPT

## 2024-12-03 PROCEDURE — 71045 X-RAY EXAM CHEST 1 VIEW: CPT

## 2024-12-03 PROCEDURE — 6360000002 HC RX W HCPCS: Performed by: EMERGENCY MEDICINE

## 2024-12-03 PROCEDURE — 2580000003 HC RX 258: Performed by: INTERNAL MEDICINE

## 2024-12-03 PROCEDURE — 02HV33Z INSERTION OF INFUSION DEVICE INTO SUPERIOR VENA CAVA, PERCUTANEOUS APPROACH: ICD-10-PCS | Performed by: RADIOLOGY

## 2024-12-03 PROCEDURE — 6370000000 HC RX 637 (ALT 250 FOR IP): Performed by: INTERNAL MEDICINE

## 2024-12-03 PROCEDURE — 36415 COLL VENOUS BLD VENIPUNCTURE: CPT

## 2024-12-03 PROCEDURE — 85652 RBC SED RATE AUTOMATED: CPT

## 2024-12-03 PROCEDURE — 80053 COMPREHEN METABOLIC PANEL: CPT

## 2024-12-03 PROCEDURE — 77001 FLUOROGUIDE FOR VEIN DEVICE: CPT

## 2024-12-03 PROCEDURE — 6360000002 HC RX W HCPCS: Performed by: INTERNAL MEDICINE

## 2024-12-03 PROCEDURE — 87040 BLOOD CULTURE FOR BACTERIA: CPT

## 2024-12-03 PROCEDURE — 85025 COMPLETE CBC W/AUTO DIFF WBC: CPT

## 2024-12-03 PROCEDURE — 1100000000 HC RM PRIVATE

## 2024-12-03 RX ORDER — GLUCAGON 1 MG/ML
1 KIT INJECTION PRN
Status: DISCONTINUED | OUTPATIENT
Start: 2024-12-03 | End: 2024-12-09 | Stop reason: HOSPADM

## 2024-12-03 RX ORDER — SODIUM CHLORIDE 0.9 % (FLUSH) 0.9 %
5-40 SYRINGE (ML) INJECTION EVERY 12 HOURS SCHEDULED
Status: DISCONTINUED | OUTPATIENT
Start: 2024-12-03 | End: 2024-12-09 | Stop reason: HOSPADM

## 2024-12-03 RX ORDER — DEXTROSE MONOHYDRATE 100 MG/ML
INJECTION, SOLUTION INTRAVENOUS CONTINUOUS PRN
Status: DISCONTINUED | OUTPATIENT
Start: 2024-12-03 | End: 2024-12-09 | Stop reason: HOSPADM

## 2024-12-03 RX ORDER — SPIRONOLACTONE 25 MG/1
25 TABLET ORAL DAILY
Status: DISCONTINUED | OUTPATIENT
Start: 2024-12-04 | End: 2024-12-09 | Stop reason: HOSPADM

## 2024-12-03 RX ORDER — TAMSULOSIN HYDROCHLORIDE 0.4 MG/1
0.4 CAPSULE ORAL DAILY
Status: DISCONTINUED | OUTPATIENT
Start: 2024-12-04 | End: 2024-12-09 | Stop reason: HOSPADM

## 2024-12-03 RX ORDER — SODIUM CHLORIDE 0.9 % (FLUSH) 0.9 %
5-40 SYRINGE (ML) INJECTION PRN
Status: DISCONTINUED | OUTPATIENT
Start: 2024-12-03 | End: 2024-12-09 | Stop reason: HOSPADM

## 2024-12-03 RX ORDER — PREGABALIN 75 MG/1
150 CAPSULE ORAL 2 TIMES DAILY
Status: DISCONTINUED | OUTPATIENT
Start: 2024-12-03 | End: 2024-12-09 | Stop reason: HOSPADM

## 2024-12-03 RX ORDER — ASPIRIN 81 MG/1
81 TABLET ORAL DAILY
Status: DISCONTINUED | OUTPATIENT
Start: 2024-12-03 | End: 2024-12-09 | Stop reason: HOSPADM

## 2024-12-03 RX ORDER — INSULIN LISPRO 100 [IU]/ML
0-8 INJECTION, SOLUTION INTRAVENOUS; SUBCUTANEOUS
Status: DISCONTINUED | OUTPATIENT
Start: 2024-12-03 | End: 2024-12-09 | Stop reason: HOSPADM

## 2024-12-03 RX ORDER — ONDANSETRON 4 MG/1
4 TABLET, ORALLY DISINTEGRATING ORAL EVERY 8 HOURS PRN
Status: DISCONTINUED | OUTPATIENT
Start: 2024-12-03 | End: 2024-12-09 | Stop reason: HOSPADM

## 2024-12-03 RX ORDER — POLYETHYLENE GLYCOL 3350 17 G/17G
17 POWDER, FOR SOLUTION ORAL DAILY PRN
Status: DISCONTINUED | OUTPATIENT
Start: 2024-12-03 | End: 2024-12-09 | Stop reason: HOSPADM

## 2024-12-03 RX ORDER — ACETAMINOPHEN 325 MG/1
650 TABLET ORAL EVERY 6 HOURS PRN
Status: DISCONTINUED | OUTPATIENT
Start: 2024-12-03 | End: 2024-12-09 | Stop reason: HOSPADM

## 2024-12-03 RX ORDER — HEPARIN SODIUM 200 [USP'U]/100ML
200 INJECTION, SOLUTION INTRAVENOUS ONCE
Status: DISCONTINUED | OUTPATIENT
Start: 2024-12-03 | End: 2024-12-04

## 2024-12-03 RX ORDER — CARVEDILOL 6.25 MG/1
6.25 TABLET ORAL 2 TIMES DAILY WITH MEALS
Status: DISCONTINUED | OUTPATIENT
Start: 2024-12-03 | End: 2024-12-09 | Stop reason: HOSPADM

## 2024-12-03 RX ORDER — ACETAMINOPHEN 650 MG/1
650 SUPPOSITORY RECTAL EVERY 6 HOURS PRN
Status: DISCONTINUED | OUTPATIENT
Start: 2024-12-03 | End: 2024-12-09 | Stop reason: HOSPADM

## 2024-12-03 RX ORDER — DIPHENHYDRAMINE HCL 25 MG
25 CAPSULE ORAL
Status: DISCONTINUED | OUTPATIENT
Start: 2024-12-03 | End: 2024-12-09 | Stop reason: HOSPADM

## 2024-12-03 RX ORDER — INSULIN GLARGINE 100 [IU]/ML
12 INJECTION, SOLUTION SUBCUTANEOUS NIGHTLY
Status: DISCONTINUED | OUTPATIENT
Start: 2024-12-03 | End: 2024-12-09 | Stop reason: HOSPADM

## 2024-12-03 RX ORDER — VANCOMYCIN 1 G/200ML
1000 INJECTION, SOLUTION INTRAVENOUS EVERY 12 HOURS
Status: DISCONTINUED | OUTPATIENT
Start: 2024-12-04 | End: 2024-12-05

## 2024-12-03 RX ORDER — FUROSEMIDE 40 MG/1
40 TABLET ORAL 2 TIMES DAILY
Status: DISCONTINUED | OUTPATIENT
Start: 2024-12-03 | End: 2024-12-09 | Stop reason: HOSPADM

## 2024-12-03 RX ORDER — ONDANSETRON 2 MG/ML
4 INJECTION INTRAMUSCULAR; INTRAVENOUS EVERY 6 HOURS PRN
Status: DISCONTINUED | OUTPATIENT
Start: 2024-12-03 | End: 2024-12-09 | Stop reason: HOSPADM

## 2024-12-03 RX ORDER — ATORVASTATIN CALCIUM 40 MG/1
40 TABLET, FILM COATED ORAL NIGHTLY
Status: DISCONTINUED | OUTPATIENT
Start: 2024-12-03 | End: 2024-12-09 | Stop reason: HOSPADM

## 2024-12-03 RX ORDER — CLOPIDOGREL BISULFATE 75 MG/1
75 TABLET ORAL DAILY
Status: DISCONTINUED | OUTPATIENT
Start: 2024-12-03 | End: 2024-12-09 | Stop reason: HOSPADM

## 2024-12-03 RX ORDER — SODIUM CHLORIDE 9 MG/ML
INJECTION, SOLUTION INTRAVENOUS PRN
Status: DISCONTINUED | OUTPATIENT
Start: 2024-12-03 | End: 2024-12-09 | Stop reason: HOSPADM

## 2024-12-03 RX ADMIN — PIPERACILLIN AND TAZOBACTAM 3375 MG: 3; .375 INJECTION, POWDER, LYOPHILIZED, FOR SOLUTION INTRAVENOUS at 14:04

## 2024-12-03 RX ADMIN — HEPARIN SODIUM 80 UNITS: 200 INJECTION, SOLUTION INTRAVENOUS at 14:12

## 2024-12-03 RX ADMIN — PIPERACILLIN AND TAZOBACTAM 3375 MG: 3; .375 INJECTION, POWDER, LYOPHILIZED, FOR SOLUTION INTRAVENOUS at 21:13

## 2024-12-03 RX ADMIN — SODIUM CHLORIDE, PRESERVATIVE FREE 10 ML: 5 INJECTION INTRAVENOUS at 21:10

## 2024-12-03 RX ADMIN — Medication 2500 MG: at 14:35

## 2024-12-03 RX ADMIN — SODIUM CHLORIDE: 9 INJECTION, SOLUTION INTRAVENOUS at 21:12

## 2024-12-03 RX ADMIN — Medication 6 MG: at 21:09

## 2024-12-03 RX ADMIN — CLOPIDOGREL BISULFATE 75 MG: 75 TABLET ORAL at 18:40

## 2024-12-03 RX ADMIN — DIPHENHYDRAMINE HYDROCHLORIDE 25 MG: 25 CAPSULE ORAL at 21:09

## 2024-12-03 RX ADMIN — PREGABALIN 150 MG: 75 CAPSULE ORAL at 21:09

## 2024-12-03 RX ADMIN — SACUBITRIL AND VALSARTAN 1 TABLET: 24; 26 TABLET, FILM COATED ORAL at 21:09

## 2024-12-03 RX ADMIN — ATORVASTATIN CALCIUM 40 MG: 40 TABLET, FILM COATED ORAL at 21:09

## 2024-12-03 RX ADMIN — INSULIN GLARGINE 12 UNITS: 100 INJECTION, SOLUTION SUBCUTANEOUS at 21:09

## 2024-12-03 RX ADMIN — INSULIN LISPRO 2 UNITS: 100 INJECTION, SOLUTION INTRAVENOUS; SUBCUTANEOUS at 21:10

## 2024-12-03 RX ADMIN — CARVEDILOL 6.25 MG: 6.25 TABLET, FILM COATED ORAL at 18:40

## 2024-12-03 RX ADMIN — ASPIRIN 81 MG: 81 TABLET, COATED ORAL at 18:40

## 2024-12-03 RX ADMIN — FUROSEMIDE 40 MG: 40 TABLET ORAL at 18:40

## 2024-12-03 ASSESSMENT — PAIN - FUNCTIONAL ASSESSMENT
PAIN_FUNCTIONAL_ASSESSMENT: NONE - DENIES PAIN
PAIN_FUNCTIONAL_ASSESSMENT: 0-10

## 2024-12-03 ASSESSMENT — PAIN SCALES - GENERAL: PAINLEVEL_OUTOF10: 0

## 2024-12-03 NOTE — H&P
fluid: None. Articulations: There is mild first MTP osteoarthritis. Moderate osteoarthritis is present in the midfoot. There is mild tibiotalar osteoarthritis. Tendons: No full-thickness tendon tear. Muscles: No intramuscular hematoma. No focal atrophy. Soft tissue mass: No mass. There is soft tissue swelling plantar to the third MTP joint. There is a small adjacent fluid collection measuring 10 x 14 mm on series 607 image 247. This has decreased in the interval.     1. Status post second MTP arthrotomy. 2. Status post resection of the third proximal phalangeal base. There has been interval development of a fracture through the third metatarsal neck with associated partial destruction of the third metatarsal head and periosteal reaction in the third metatarsal shaft. Findings likely represent acute osteomyelitis with a pathologic fracture. There is an adjacent small fluid collection that has decreased in the interval. Electronically signed by IKER MACDONALD     _______________________________________________________________________    TOTAL TIME:  76 Minutes    Critical Care Provided     Minutes non procedure based    Signed: Luis Uribe MD    Procedures: see electronic medical records for all procedures/Xrays and details which were not copied into this note but were reviewed prior to creation of Plan.

## 2024-12-03 NOTE — ED NOTES
Bedside and Verbal shift change report given to Keira (oncoming nurse) by Courtney (offgoing nurse). Report included the following information Index, ED SBAR, Adult Overview, MAR, Recent Results, and Neuro Assessment.

## 2024-12-03 NOTE — ED PROVIDER NOTES
[facility] EMERGENCY DEPT  EMERGENCY DEPARTMENT ENCOUNTER       Pt Name: Joseph Seymour  MRN: [id number]  Birthdate 1960  Date of evaluation: 12/3/2024  Provider: Delbert Beckham MD   PCP: Adriana Tyler MD  Note Started: 12:03 PM EST 12/3/24     CHIEF COMPLAINT       Chief Complaint   Patient presents with    Vascular Access Problem     Pt has PICC Line to RUE for tx of osteomyelitis to R foot. Pt states he has had it in for 8 weeks now. Pt states he noticed over last few days he is having a hard time flushing PICC line and now it is not giving any blood return. Pt denies pain at PICC line site          HISTORY OF PRESENT ILLNESS: 1 or more elements      History From: patient, spouse, History limited by: none     Joseph Seymour is a 64 y.o. male with a complicated past medical history including peripheral neuropathy, diabetic foot infection osteomyelitis who recently completed antibiotics via PICC, diabetes who presents to the Emergency Department with a chief complaint of right foot infection.    Patient arrives with spouse.  Reports since the middle of November, has noted a lesion along the lateral aspect of right fifth foot.  Patient underwent a arthrotomy for septic arthritis, was discharged on IV antibiotics which he has been compliant with.  Patient had an outpatient CT scan with infectious disease, podiatry aware.  This showed pathologic fracture of third metatarsal with concern for underlying acute osteomyelitis.  Was referred to ED but due to holiday, wished to celebrate this with family.    Denies systemic symptoms including fevers or chills.  Does report a past 3 days has noted increasing difficulty pushing right PICC line and no blood return.    Please See MDM for Additional Details of the HPI/PMH  Nursing Notes were all reviewed and agreed with or any disagreements were addressed in the HPI.     REVIEW OF SYSTEMS        Positives and Pertinent negatives as per HPI.    PAST HISTORY     Past Medical

## 2024-12-04 PROBLEM — I50.9 CONGESTIVE HEART FAILURE (HCC): Status: ACTIVE | Noted: 2024-12-04

## 2024-12-04 PROBLEM — T82.898S: Status: ACTIVE | Noted: 2024-12-04

## 2024-12-04 LAB
ANION GAP SERPL CALC-SCNC: 7 MMOL/L (ref 2–12)
BUN SERPL-MCNC: 19 MG/DL (ref 6–20)
BUN/CREAT SERPL: 22 (ref 12–20)
CALCIUM SERPL-MCNC: 8.8 MG/DL (ref 8.5–10.1)
CHLORIDE SERPL-SCNC: 104 MMOL/L (ref 97–108)
CO2 SERPL-SCNC: 27 MMOL/L (ref 21–32)
CREAT SERPL-MCNC: 0.86 MG/DL (ref 0.7–1.3)
CRP SERPL-MCNC: <0.29 MG/DL (ref 0–0.3)
ERYTHROCYTE [DISTWIDTH] IN BLOOD BY AUTOMATED COUNT: 14.5 % (ref 11.5–14.5)
GLUCOSE BLD STRIP.AUTO-MCNC: 126 MG/DL (ref 65–117)
GLUCOSE BLD STRIP.AUTO-MCNC: 130 MG/DL (ref 65–117)
GLUCOSE BLD STRIP.AUTO-MCNC: 143 MG/DL (ref 65–117)
GLUCOSE BLD STRIP.AUTO-MCNC: 225 MG/DL (ref 65–117)
GLUCOSE SERPL-MCNC: 101 MG/DL (ref 65–100)
HCT VFR BLD AUTO: 47.1 % (ref 36.6–50.3)
HGB BLD-MCNC: 15.4 G/DL (ref 12.1–17)
MAGNESIUM SERPL-MCNC: 2.1 MG/DL (ref 1.6–2.4)
MCH RBC QN AUTO: 27.8 PG (ref 26–34)
MCHC RBC AUTO-ENTMCNC: 32.7 G/DL (ref 30–36.5)
MCV RBC AUTO: 85 FL (ref 80–99)
NRBC # BLD: 0 K/UL (ref 0–0.01)
NRBC BLD-RTO: 0 PER 100 WBC
PLATELET # BLD AUTO: 152 K/UL (ref 150–400)
PMV BLD AUTO: 9.7 FL (ref 8.9–12.9)
POTASSIUM SERPL-SCNC: 3.2 MMOL/L (ref 3.5–5.1)
RBC # BLD AUTO: 5.54 M/UL (ref 4.1–5.7)
SERVICE CMNT-IMP: ABNORMAL
SODIUM SERPL-SCNC: 138 MMOL/L (ref 136–145)
WBC # BLD AUTO: 8.2 K/UL (ref 4.1–11.1)

## 2024-12-04 PROCEDURE — 85027 COMPLETE CBC AUTOMATED: CPT

## 2024-12-04 PROCEDURE — 6370000000 HC RX 637 (ALT 250 FOR IP): Performed by: INTERNAL MEDICINE

## 2024-12-04 PROCEDURE — 2580000003 HC RX 258: Performed by: INTERNAL MEDICINE

## 2024-12-04 PROCEDURE — 99233 SBSQ HOSP IP/OBS HIGH 50: CPT | Performed by: INTERNAL MEDICINE

## 2024-12-04 PROCEDURE — 80048 BASIC METABOLIC PNL TOTAL CA: CPT

## 2024-12-04 PROCEDURE — 6360000002 HC RX W HCPCS: Performed by: INTERNAL MEDICINE

## 2024-12-04 PROCEDURE — 82962 GLUCOSE BLOOD TEST: CPT

## 2024-12-04 PROCEDURE — 1100000000 HC RM PRIVATE

## 2024-12-04 PROCEDURE — 86140 C-REACTIVE PROTEIN: CPT

## 2024-12-04 PROCEDURE — 83735 ASSAY OF MAGNESIUM: CPT

## 2024-12-04 PROCEDURE — 36415 COLL VENOUS BLD VENIPUNCTURE: CPT

## 2024-12-04 RX ADMIN — Medication 6 MG: at 20:29

## 2024-12-04 RX ADMIN — SODIUM CHLORIDE, PRESERVATIVE FREE 10 ML: 5 INJECTION INTRAVENOUS at 20:34

## 2024-12-04 RX ADMIN — PIPERACILLIN AND TAZOBACTAM 3375 MG: 3; .375 INJECTION, POWDER, LYOPHILIZED, FOR SOLUTION INTRAVENOUS at 12:12

## 2024-12-04 RX ADMIN — SACUBITRIL AND VALSARTAN 1 TABLET: 24; 26 TABLET, FILM COATED ORAL at 20:29

## 2024-12-04 RX ADMIN — FUROSEMIDE 40 MG: 40 TABLET ORAL at 16:44

## 2024-12-04 RX ADMIN — INSULIN LISPRO 2 UNITS: 100 INJECTION, SOLUTION INTRAVENOUS; SUBCUTANEOUS at 16:30

## 2024-12-04 RX ADMIN — VANCOMYCIN 1000 MG: 1 INJECTION, SOLUTION INTRAVENOUS at 16:51

## 2024-12-04 RX ADMIN — PIPERACILLIN AND TAZOBACTAM 3375 MG: 3; .375 INJECTION, POWDER, LYOPHILIZED, FOR SOLUTION INTRAVENOUS at 20:19

## 2024-12-04 RX ADMIN — ASPIRIN 81 MG: 81 TABLET, COATED ORAL at 08:49

## 2024-12-04 RX ADMIN — SODIUM CHLORIDE, PRESERVATIVE FREE 10 ML: 5 INJECTION INTRAVENOUS at 08:50

## 2024-12-04 RX ADMIN — PIPERACILLIN AND TAZOBACTAM 3375 MG: 3; .375 INJECTION, POWDER, LYOPHILIZED, FOR SOLUTION INTRAVENOUS at 04:40

## 2024-12-04 RX ADMIN — DIPHENHYDRAMINE HYDROCHLORIDE 25 MG: 25 CAPSULE ORAL at 20:29

## 2024-12-04 RX ADMIN — PREGABALIN 150 MG: 75 CAPSULE ORAL at 20:29

## 2024-12-04 RX ADMIN — INSULIN GLARGINE 12 UNITS: 100 INJECTION, SOLUTION SUBCUTANEOUS at 20:30

## 2024-12-04 RX ADMIN — PREGABALIN 150 MG: 75 CAPSULE ORAL at 08:51

## 2024-12-04 RX ADMIN — VANCOMYCIN 1000 MG: 1 INJECTION, SOLUTION INTRAVENOUS at 03:11

## 2024-12-04 RX ADMIN — POTASSIUM BICARBONATE 40 MEQ: 782 TABLET, EFFERVESCENT ORAL at 08:49

## 2024-12-04 RX ADMIN — CLOPIDOGREL BISULFATE 75 MG: 75 TABLET ORAL at 08:49

## 2024-12-04 RX ADMIN — CARVEDILOL 6.25 MG: 6.25 TABLET, FILM COATED ORAL at 16:44

## 2024-12-04 RX ADMIN — ATORVASTATIN CALCIUM 40 MG: 40 TABLET, FILM COATED ORAL at 20:29

## 2024-12-04 NOTE — CARE COORDINATION
Thank you for the consideration but this patient has been followed by Dr. Dixon extensively in the past, consult has been changed to his service.    Please re-consult if he is unable to follow.            Ernesto Berman DPM, CWSP, AACFAS    [facility] Podiat[facility]  [address] [place] [address], [place] [address] [phone]     [facility]  [address] [place] [address], [address]  [place] [address] [phone]     [facility]  [address], [facility], [address]  [place] [address] [phone]     * Available via [employer] 24/7

## 2024-12-05 LAB
ANION GAP SERPL CALC-SCNC: 6 MMOL/L (ref 2–12)
BACTERIA SPEC CULT: NORMAL
BACTERIA SPEC CULT: NORMAL
BUN SERPL-MCNC: 17 MG/DL (ref 6–20)
BUN/CREAT SERPL: 20 (ref 12–20)
CALCIUM SERPL-MCNC: 8.8 MG/DL (ref 8.5–10.1)
CHLORIDE SERPL-SCNC: 105 MMOL/L (ref 97–108)
CO2 SERPL-SCNC: 28 MMOL/L (ref 21–32)
CREAT SERPL-MCNC: 0.86 MG/DL (ref 0.7–1.3)
GLUCOSE BLD STRIP.AUTO-MCNC: 127 MG/DL (ref 65–117)
GLUCOSE BLD STRIP.AUTO-MCNC: 158 MG/DL (ref 65–117)
GLUCOSE BLD STRIP.AUTO-MCNC: 167 MG/DL (ref 65–117)
GLUCOSE BLD STRIP.AUTO-MCNC: 184 MG/DL (ref 65–117)
GLUCOSE SERPL-MCNC: 143 MG/DL (ref 65–100)
POTASSIUM SERPL-SCNC: 3.5 MMOL/L (ref 3.5–5.1)
SERVICE CMNT-IMP: ABNORMAL
SERVICE CMNT-IMP: NORMAL
SODIUM SERPL-SCNC: 139 MMOL/L (ref 136–145)
VANCOMYCIN SERPL-MCNC: 10.1 UG/ML

## 2024-12-05 PROCEDURE — 1100000000 HC RM PRIVATE

## 2024-12-05 PROCEDURE — 36415 COLL VENOUS BLD VENIPUNCTURE: CPT

## 2024-12-05 PROCEDURE — 82962 GLUCOSE BLOOD TEST: CPT

## 2024-12-05 PROCEDURE — 80048 BASIC METABOLIC PNL TOTAL CA: CPT

## 2024-12-05 PROCEDURE — 2580000003 HC RX 258: Performed by: INTERNAL MEDICINE

## 2024-12-05 PROCEDURE — 80202 ASSAY OF VANCOMYCIN: CPT

## 2024-12-05 PROCEDURE — 6360000002 HC RX W HCPCS: Performed by: INTERNAL MEDICINE

## 2024-12-05 PROCEDURE — 99232 SBSQ HOSP IP/OBS MODERATE 35: CPT | Performed by: NURSE PRACTITIONER

## 2024-12-05 PROCEDURE — 6370000000 HC RX 637 (ALT 250 FOR IP): Performed by: INTERNAL MEDICINE

## 2024-12-05 RX ADMIN — SPIRONOLACTONE 25 MG: 25 TABLET ORAL at 09:58

## 2024-12-05 RX ADMIN — ATORVASTATIN CALCIUM 40 MG: 40 TABLET, FILM COATED ORAL at 21:28

## 2024-12-05 RX ADMIN — INSULIN GLARGINE 12 UNITS: 100 INJECTION, SOLUTION SUBCUTANEOUS at 21:29

## 2024-12-05 RX ADMIN — SODIUM CHLORIDE: 9 INJECTION, SOLUTION INTRAVENOUS at 12:19

## 2024-12-05 RX ADMIN — PIPERACILLIN AND TAZOBACTAM 3375 MG: 3; .375 INJECTION, POWDER, LYOPHILIZED, FOR SOLUTION INTRAVENOUS at 12:22

## 2024-12-05 RX ADMIN — PREGABALIN 150 MG: 75 CAPSULE ORAL at 09:57

## 2024-12-05 RX ADMIN — DIPHENHYDRAMINE HYDROCHLORIDE 25 MG: 25 CAPSULE ORAL at 21:28

## 2024-12-05 RX ADMIN — FUROSEMIDE 40 MG: 40 TABLET ORAL at 17:20

## 2024-12-05 RX ADMIN — PIPERACILLIN AND TAZOBACTAM 3375 MG: 3; .375 INJECTION, POWDER, LYOPHILIZED, FOR SOLUTION INTRAVENOUS at 21:25

## 2024-12-05 RX ADMIN — VANCOMYCIN 1000 MG: 1 INJECTION, SOLUTION INTRAVENOUS at 03:35

## 2024-12-05 RX ADMIN — ASPIRIN 81 MG: 81 TABLET, COATED ORAL at 09:57

## 2024-12-05 RX ADMIN — PIPERACILLIN AND TAZOBACTAM 3375 MG: 3; .375 INJECTION, POWDER, LYOPHILIZED, FOR SOLUTION INTRAVENOUS at 04:54

## 2024-12-05 RX ADMIN — SACUBITRIL AND VALSARTAN 1 TABLET: 24; 26 TABLET, FILM COATED ORAL at 21:27

## 2024-12-05 RX ADMIN — SODIUM CHLORIDE, PRESERVATIVE FREE 10 ML: 5 INJECTION INTRAVENOUS at 09:59

## 2024-12-05 RX ADMIN — SODIUM CHLORIDE: 9 INJECTION, SOLUTION INTRAVENOUS at 03:29

## 2024-12-05 RX ADMIN — CARVEDILOL 6.25 MG: 6.25 TABLET, FILM COATED ORAL at 17:19

## 2024-12-05 RX ADMIN — Medication 6 MG: at 21:26

## 2024-12-05 RX ADMIN — SODIUM CHLORIDE, PRESERVATIVE FREE 10 ML: 5 INJECTION INTRAVENOUS at 21:31

## 2024-12-05 RX ADMIN — INSULIN LISPRO 2 UNITS: 100 INJECTION, SOLUTION INTRAVENOUS; SUBCUTANEOUS at 21:29

## 2024-12-05 RX ADMIN — TAMSULOSIN HYDROCHLORIDE 0.4 MG: 0.4 CAPSULE ORAL at 09:58

## 2024-12-05 RX ADMIN — CARVEDILOL 6.25 MG: 6.25 TABLET, FILM COATED ORAL at 10:26

## 2024-12-05 RX ADMIN — FUROSEMIDE 40 MG: 40 TABLET ORAL at 09:58

## 2024-12-05 RX ADMIN — PREGABALIN 150 MG: 75 CAPSULE ORAL at 21:26

## 2024-12-05 RX ADMIN — CLOPIDOGREL BISULFATE 75 MG: 75 TABLET ORAL at 09:57

## 2024-12-05 ASSESSMENT — PAIN SCALES - GENERAL: PAINLEVEL_OUTOF10: 0

## 2024-12-05 NOTE — CARE COORDINATION
Care Management Initial Assessment       RUR: 17% Moderate RUR  Readmission? No  1st IM letter given? Yes - 12/03 (Secondary)  1st  letter given: No     12/05/24 1322   Service Assessment   Patient Orientation Alert and Oriented;Person;Place;Situation;Self   Cognition Alert   History Provided By Patient   Primary Caregiver Self   Accompanied By/Relationship Charlotte Seymour (Spouse)  [phone]   Support Systems Children;Spouse/Significant Other   Patient's Healthcare Decision Maker is: Legal Next of Kin   PCP Verified by CM Yes   Last Visit to PCP Within last 6 months   Prior Functional Level Independent in ADLs/IADLs   Current Functional Level Independent in ADLs/IADLs   Can patient return to prior living arrangement Yes   Ability to make needs known: Good   Family able to assist with home care needs: Yes   Would you like for me to discuss the discharge plan with any other family members/significant others, and if so, who? Yes  (Charlotte Seymour (Spouse)  [phone])   Financial Resources None   Community Resources None   CM/SW Referral Disease Management Education   Social/Functional History   Lives With Spouse   Type of Home House   Home Layout One level   Home Access Stairs to enter with rails   Entrance Stairs - Number of Steps 5   Entrance Stairs - Rails Both   Home Equipment None   Active  Yes   Discharge Planning   Type of Residence House   Living Arrangements Spouse/Significant Other   Current Services Prior To Admission Home Care  ([facility])   Patient expects to be discharged to: House         The  (CM) conducted an initial meeting with the patient , formally introducing themselves and clarifying their role in discharge planning and transitional care. Demographic and insurance details were verified for accuracy. Notably, the patient has no prior history with skilled nursing facilities (SNF), or inpatient rehabilitation (IPR) but was receiving [facility].

## 2024-12-06 ENCOUNTER — APPOINTMENT (OUTPATIENT)
Facility: HOSPITAL | Age: 64
DRG: 638 | End: 2024-12-06
Payer: COMMERCIAL

## 2024-12-06 LAB
ANION GAP SERPL CALC-SCNC: 6 MMOL/L (ref 2–12)
BUN SERPL-MCNC: 14 MG/DL (ref 6–20)
BUN/CREAT SERPL: 16 (ref 12–20)
CALCIUM SERPL-MCNC: 8.7 MG/DL (ref 8.5–10.1)
CHLORIDE SERPL-SCNC: 104 MMOL/L (ref 97–108)
CO2 SERPL-SCNC: 28 MMOL/L (ref 21–32)
CREAT SERPL-MCNC: 0.87 MG/DL (ref 0.7–1.3)
GLUCOSE BLD STRIP.AUTO-MCNC: 129 MG/DL (ref 65–117)
GLUCOSE BLD STRIP.AUTO-MCNC: 163 MG/DL (ref 65–117)
GLUCOSE BLD STRIP.AUTO-MCNC: 186 MG/DL (ref 65–117)
GLUCOSE BLD STRIP.AUTO-MCNC: 189 MG/DL (ref 65–117)
GLUCOSE SERPL-MCNC: 143 MG/DL (ref 65–100)
POTASSIUM SERPL-SCNC: 3.4 MMOL/L (ref 3.5–5.1)
SERVICE CMNT-IMP: ABNORMAL
SODIUM SERPL-SCNC: 138 MMOL/L (ref 136–145)

## 2024-12-06 PROCEDURE — 99231 SBSQ HOSP IP/OBS SF/LOW 25: CPT | Performed by: NURSE PRACTITIONER

## 2024-12-06 PROCEDURE — 80048 BASIC METABOLIC PNL TOTAL CA: CPT

## 2024-12-06 PROCEDURE — 73718 MRI LOWER EXTREMITY W/O DYE: CPT

## 2024-12-06 PROCEDURE — 6360000002 HC RX W HCPCS: Performed by: INTERNAL MEDICINE

## 2024-12-06 PROCEDURE — 1100000000 HC RM PRIVATE

## 2024-12-06 PROCEDURE — 36415 COLL VENOUS BLD VENIPUNCTURE: CPT

## 2024-12-06 PROCEDURE — 2580000003 HC RX 258: Performed by: INTERNAL MEDICINE

## 2024-12-06 PROCEDURE — 6370000000 HC RX 637 (ALT 250 FOR IP): Performed by: INTERNAL MEDICINE

## 2024-12-06 PROCEDURE — 83036 HEMOGLOBIN GLYCOSYLATED A1C: CPT

## 2024-12-06 PROCEDURE — 82962 GLUCOSE BLOOD TEST: CPT

## 2024-12-06 RX ORDER — ENOXAPARIN SODIUM 100 MG/ML
40 INJECTION SUBCUTANEOUS DAILY
Status: DISCONTINUED | OUTPATIENT
Start: 2024-12-06 | End: 2024-12-07

## 2024-12-06 RX ADMIN — INSULIN LISPRO 2 UNITS: 100 INJECTION, SOLUTION INTRAVENOUS; SUBCUTANEOUS at 17:45

## 2024-12-06 RX ADMIN — SACUBITRIL AND VALSARTAN 1 TABLET: 24; 26 TABLET, FILM COATED ORAL at 09:53

## 2024-12-06 RX ADMIN — SODIUM CHLORIDE, PRESERVATIVE FREE 10 ML: 5 INJECTION INTRAVENOUS at 09:54

## 2024-12-06 RX ADMIN — ENOXAPARIN SODIUM 40 MG: 100 INJECTION SUBCUTANEOUS at 13:22

## 2024-12-06 RX ADMIN — PIPERACILLIN AND TAZOBACTAM 3375 MG: 3; .375 INJECTION, POWDER, LYOPHILIZED, FOR SOLUTION INTRAVENOUS at 13:26

## 2024-12-06 RX ADMIN — Medication 6 MG: at 21:28

## 2024-12-06 RX ADMIN — PIPERACILLIN AND TAZOBACTAM 3375 MG: 3; .375 INJECTION, POWDER, LYOPHILIZED, FOR SOLUTION INTRAVENOUS at 21:38

## 2024-12-06 RX ADMIN — INSULIN GLARGINE 12 UNITS: 100 INJECTION, SOLUTION SUBCUTANEOUS at 21:30

## 2024-12-06 RX ADMIN — FUROSEMIDE 40 MG: 40 TABLET ORAL at 17:45

## 2024-12-06 RX ADMIN — CLOPIDOGREL BISULFATE 75 MG: 75 TABLET ORAL at 09:53

## 2024-12-06 RX ADMIN — POTASSIUM BICARBONATE 40 MEQ: 782 TABLET, EFFERVESCENT ORAL at 09:54

## 2024-12-06 RX ADMIN — FUROSEMIDE 40 MG: 40 TABLET ORAL at 09:53

## 2024-12-06 RX ADMIN — ASPIRIN 81 MG: 81 TABLET, COATED ORAL at 09:53

## 2024-12-06 RX ADMIN — DIPHENHYDRAMINE HYDROCHLORIDE 25 MG: 25 CAPSULE ORAL at 21:29

## 2024-12-06 RX ADMIN — CARVEDILOL 6.25 MG: 6.25 TABLET, FILM COATED ORAL at 09:52

## 2024-12-06 RX ADMIN — PREGABALIN 150 MG: 75 CAPSULE ORAL at 21:29

## 2024-12-06 RX ADMIN — SODIUM CHLORIDE: 9 INJECTION, SOLUTION INTRAVENOUS at 21:37

## 2024-12-06 RX ADMIN — PREGABALIN 150 MG: 75 CAPSULE ORAL at 09:53

## 2024-12-06 RX ADMIN — SPIRONOLACTONE 25 MG: 25 TABLET ORAL at 09:53

## 2024-12-06 RX ADMIN — POTASSIUM BICARBONATE 40 MEQ: 782 TABLET, EFFERVESCENT ORAL at 13:23

## 2024-12-06 RX ADMIN — SODIUM CHLORIDE, PRESERVATIVE FREE 10 ML: 5 INJECTION INTRAVENOUS at 21:41

## 2024-12-06 RX ADMIN — PIPERACILLIN AND TAZOBACTAM 3375 MG: 3; .375 INJECTION, POWDER, LYOPHILIZED, FOR SOLUTION INTRAVENOUS at 05:09

## 2024-12-06 RX ADMIN — TAMSULOSIN HYDROCHLORIDE 0.4 MG: 0.4 CAPSULE ORAL at 09:53

## 2024-12-06 RX ADMIN — SODIUM CHLORIDE: 9 INJECTION, SOLUTION INTRAVENOUS at 13:26

## 2024-12-06 RX ADMIN — ATORVASTATIN CALCIUM 40 MG: 40 TABLET, FILM COATED ORAL at 21:29

## 2024-12-06 RX ADMIN — CARVEDILOL 6.25 MG: 6.25 TABLET, FILM COATED ORAL at 17:45

## 2024-12-06 NOTE — CONSULTS
ID  Patient seen and examined  Full note to follow  Patient known to ID service  Treated for right diabetic right foot infection  On Zosyn IV total 8 weeks end date 12/4  Interim CT of foot showed fracture 3rd MT neck, with associated partial destruction of third   MT head and periosteal reaction,?  Acute osteomyelitis  Patient also developed new callus/blister on lateral aspect of right foot  Patient admitted for podiatry evaluation and possible I&D  Continue vancomycin and Zosyn IV      
To patient's room in ED to assess PICC for \"sluggishness and no blood return\" per patient.  Patient presents with single lumen PICC with dressing clean, dry and intact.  PICC line was 5cm out during initial insertion.  Looks to be possibly 6cm out at this time.  Chest xray was done and PICC appears to have migrated upward.  Awaiting final radiology report however discussed findings with patient and ED physician.  Requested patient go to IR for over-wire exchange of PICC line.      Alessandra Manuel RN, BSN, [place]-BC  Vascular Access Team    
MRSA nares culture is negative  Podiatry evaluation and recommendations     ID service to follow.     Abx  Zosyn Iv x 8 weeks today  12/4  Vancomycin-12/3                Plan              Abx      Extensive review of chart notes, labs, imaging, cultures done  Additionally review of done: Recent reports-Labs, cultures, imaging  D/w -hospitalist, RN        Past Medical History:   Diagnosis Date    CHF (congestive heart failure) ([facility])     Diabetes mellitus ([facility])     Hypertension     MI (myocardial infarction) ([facility])        Past Surgical History:   Procedure Laterality Date    CORONARY ANGIOPLASTY WITH STENT PLACEMENT      3 stents    FOOT DEBRIDEMENT Right 3/11/2024    RIGHT FOOT DEBRIDEMENT INCISION AND DRAINAGE performed by Dirk Dixon DPM at [facility] MAIN OR    FOOT DEBRIDEMENT Right 3/15/2024    RIGHT FOOT DEBRIDEMENT INCISION AND DRAINAGE performed by Jorgito Thorpe DPM at [facility] MAIN OR    FOOT DEBRIDEMENT Right 10/9/2024    RIGHT FOOT DEBRIDEMENT INCISION AND DRAINAGE performed by Dirk Dixon DPM at [facility] MAIN OR    TONSILLECTOMY Bilateral        No Known Allergies    Tobacco Use: Medium Risk (11/19/2024)    Patient History     Smoking Tobacco Use: Former     Smokeless Tobacco Use: Never     Passive Exposure: Not on file       Alcohol Use: Not At Risk (10/8/2024)    AUDIT-C     Frequency of Alcohol Consumption: Monthly or less     Average Number of Drinks: 1 or 2     Frequency of Binge Drinking: Never         No family status information on file.       Prior to Admission Medications   Prescriptions Last Dose Informant Patient Reported? Taking?   Empagliflozin-metFORMIN HCl ER (SYNJARDY XR) 12.5-1000 MG TB24   Yes No   Sig: Take 1 tablet by mouth in the morning and at bedtime   Insulin Degludec (TRESIBA FLEXTOUCH) 200 UNIT/ML SOPN   No No   Sig: Inject 16 Units into the skin at bedtime   Omega 3 1000 MG CAPS   Yes No   Sig: Take 1,000 mg by mouth in the morning and at bedtime   aspirin 81 MG EC tablet   
destruction of the  third metatarsal head with erosive changes. There is periostitis involving the  third metatarsal shaft.     Joint fluid: None.     Articulations: There is mild first MTP osteoarthritis. Moderate osteoarthritis  is present in the midfoot. There is mild tibiotalar osteoarthritis.     Tendons: No full-thickness tendon tear.     Muscles: No intramuscular hematoma. No focal atrophy.     Soft tissue mass: No mass. There is soft tissue swelling plantar to the third  MTP joint. There is a small adjacent fluid collection measuring 10 x 14 mm on  series 607 image 247. This has decreased in the interval.     IMPRESSION:     1. Status post second MTP arthrotomy.  2. Status post resection of the third proximal phalangeal base. There has been  interval development of a fracture through the third metatarsal neck with  associated partial destruction of the third metatarsal head and periosteal  reaction in the third metatarsal shaft. Findings likely represent acute  osteomyelitis with a pathologic fracture. There is an adjacent small fluid  collection that has decreased in the interval.  XR Results (most recent):  XR FOOT RIGHT (MIN 3 VIEWS) 12/03/2024    Narrative  EXAM: XR FOOT RIGHT (MIN 3 VIEWS)    INDICATION: swelling: attn base 3rd metatarsal.    COMPARISON: 10/8/2024.    FINDINGS: Three views of the right foot show diffuse soft tissue swelling. There  is callus formation and remodeling around a third metatarsal neck fracture. The  third proximal interphalangeal joint is dislocated, and there is resorption of  the proximal half of the third proximal phalanx. Post resection of the second  metatarsal head and proximal phalangeal base.    Impression  1. Diffuse soft tissue swelling.  2. 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  3. Third PIP dislocation. Resorption of the proximal half of the proximal  phalanx.    Electronically signed by Rickie Perry       Assessment:   Chronic

## 2024-12-07 LAB
EST. AVERAGE GLUCOSE BLD GHB EST-MCNC: 143 MG/DL
GLUCOSE BLD STRIP.AUTO-MCNC: 142 MG/DL (ref 65–117)
GLUCOSE BLD STRIP.AUTO-MCNC: 167 MG/DL (ref 65–117)
GLUCOSE BLD STRIP.AUTO-MCNC: 179 MG/DL (ref 65–117)
GLUCOSE BLD STRIP.AUTO-MCNC: 225 MG/DL (ref 65–117)
GLUCOSE BLD STRIP.AUTO-MCNC: 226 MG/DL (ref 65–117)
HBA1C MFR BLD: 6.6 % (ref 4–5.6)
SERVICE CMNT-IMP: ABNORMAL

## 2024-12-07 PROCEDURE — 1100000000 HC RM PRIVATE

## 2024-12-07 PROCEDURE — 6360000002 HC RX W HCPCS: Performed by: INTERNAL MEDICINE

## 2024-12-07 PROCEDURE — 6370000000 HC RX 637 (ALT 250 FOR IP): Performed by: INTERNAL MEDICINE

## 2024-12-07 PROCEDURE — 82962 GLUCOSE BLOOD TEST: CPT

## 2024-12-07 PROCEDURE — 2580000003 HC RX 258: Performed by: INTERNAL MEDICINE

## 2024-12-07 RX ORDER — ENOXAPARIN SODIUM 100 MG/ML
30 INJECTION SUBCUTANEOUS 2 TIMES DAILY
Status: DISCONTINUED | OUTPATIENT
Start: 2024-12-08 | End: 2024-12-09 | Stop reason: HOSPADM

## 2024-12-07 RX ADMIN — DIPHENHYDRAMINE HYDROCHLORIDE 25 MG: 25 CAPSULE ORAL at 21:06

## 2024-12-07 RX ADMIN — INSULIN GLARGINE 12 UNITS: 100 INJECTION, SOLUTION SUBCUTANEOUS at 21:06

## 2024-12-07 RX ADMIN — PREGABALIN 150 MG: 75 CAPSULE ORAL at 21:06

## 2024-12-07 RX ADMIN — SODIUM CHLORIDE, PRESERVATIVE FREE 10 ML: 5 INJECTION INTRAVENOUS at 09:05

## 2024-12-07 RX ADMIN — SPIRONOLACTONE 25 MG: 25 TABLET ORAL at 09:04

## 2024-12-07 RX ADMIN — PREGABALIN 150 MG: 75 CAPSULE ORAL at 09:04

## 2024-12-07 RX ADMIN — ATORVASTATIN CALCIUM 40 MG: 40 TABLET, FILM COATED ORAL at 21:06

## 2024-12-07 RX ADMIN — PIPERACILLIN AND TAZOBACTAM 3375 MG: 3; .375 INJECTION, POWDER, LYOPHILIZED, FOR SOLUTION INTRAVENOUS at 06:04

## 2024-12-07 RX ADMIN — ASPIRIN 81 MG: 81 TABLET, COATED ORAL at 09:03

## 2024-12-07 RX ADMIN — FUROSEMIDE 40 MG: 40 TABLET ORAL at 17:22

## 2024-12-07 RX ADMIN — FUROSEMIDE 40 MG: 40 TABLET ORAL at 09:03

## 2024-12-07 RX ADMIN — PIPERACILLIN AND TAZOBACTAM 3375 MG: 3; .375 INJECTION, POWDER, LYOPHILIZED, FOR SOLUTION INTRAVENOUS at 21:14

## 2024-12-07 RX ADMIN — CLOPIDOGREL BISULFATE 75 MG: 75 TABLET ORAL at 09:04

## 2024-12-07 RX ADMIN — ENOXAPARIN SODIUM 40 MG: 100 INJECTION SUBCUTANEOUS at 09:04

## 2024-12-07 RX ADMIN — TAMSULOSIN HYDROCHLORIDE 0.4 MG: 0.4 CAPSULE ORAL at 09:04

## 2024-12-07 RX ADMIN — SACUBITRIL AND VALSARTAN 1 TABLET: 24; 26 TABLET, FILM COATED ORAL at 09:04

## 2024-12-07 RX ADMIN — SODIUM CHLORIDE, PRESERVATIVE FREE 10 ML: 5 INJECTION INTRAVENOUS at 21:15

## 2024-12-07 RX ADMIN — SODIUM CHLORIDE: 9 INJECTION, SOLUTION INTRAVENOUS at 21:12

## 2024-12-07 RX ADMIN — Medication 6 MG: at 21:06

## 2024-12-07 RX ADMIN — CARVEDILOL 6.25 MG: 6.25 TABLET, FILM COATED ORAL at 09:03

## 2024-12-07 RX ADMIN — PIPERACILLIN AND TAZOBACTAM 3375 MG: 3; .375 INJECTION, POWDER, LYOPHILIZED, FOR SOLUTION INTRAVENOUS at 12:38

## 2024-12-07 RX ADMIN — CARVEDILOL 6.25 MG: 6.25 TABLET, FILM COATED ORAL at 17:22

## 2024-12-07 RX ADMIN — INSULIN LISPRO 2 UNITS: 100 INJECTION, SOLUTION INTRAVENOUS; SUBCUTANEOUS at 22:05

## 2024-12-07 ASSESSMENT — PAIN SCALES - GENERAL: PAINLEVEL_OUTOF10: 0

## 2024-12-07 NOTE — CARE COORDINATION
Chart reviewed. Patient discussed with nurse. Physician may send home this weekend with IV antibiotics. No orders seen in the chart.     CM called and left a message on [employer] after hours/weekend answering service, to get an idea of when they would be able to process orders and supply patient with antibiotics. Phone is [phone].  No final orders for IV antibiotic seen in the chart.     1430 Notes in chart reviewed. Physician writing 12/9 as dc dated.    CM to follow up.    Sailaja Muñoz RN  Care Manager  x

## 2024-12-08 LAB
BACTERIA SPEC CULT: NORMAL
GLUCOSE BLD STRIP.AUTO-MCNC: 135 MG/DL (ref 65–117)
GLUCOSE BLD STRIP.AUTO-MCNC: 157 MG/DL (ref 65–117)
GLUCOSE BLD STRIP.AUTO-MCNC: 209 MG/DL (ref 65–117)
GLUCOSE BLD STRIP.AUTO-MCNC: 220 MG/DL (ref 65–117)
SERVICE CMNT-IMP: ABNORMAL
SERVICE CMNT-IMP: NORMAL

## 2024-12-08 PROCEDURE — 6360000002 HC RX W HCPCS: Performed by: INTERNAL MEDICINE

## 2024-12-08 PROCEDURE — 6370000000 HC RX 637 (ALT 250 FOR IP): Performed by: INTERNAL MEDICINE

## 2024-12-08 PROCEDURE — 2580000003 HC RX 258: Performed by: INTERNAL MEDICINE

## 2024-12-08 PROCEDURE — 82962 GLUCOSE BLOOD TEST: CPT

## 2024-12-08 PROCEDURE — 1100000000 HC RM PRIVATE

## 2024-12-08 RX ADMIN — ASPIRIN 81 MG: 81 TABLET, COATED ORAL at 09:11

## 2024-12-08 RX ADMIN — Medication 6 MG: at 21:18

## 2024-12-08 RX ADMIN — CARVEDILOL 6.25 MG: 6.25 TABLET, FILM COATED ORAL at 09:10

## 2024-12-08 RX ADMIN — DIPHENHYDRAMINE HYDROCHLORIDE 25 MG: 25 CAPSULE ORAL at 21:19

## 2024-12-08 RX ADMIN — INSULIN LISPRO 2 UNITS: 100 INJECTION, SOLUTION INTRAVENOUS; SUBCUTANEOUS at 17:59

## 2024-12-08 RX ADMIN — SACUBITRIL AND VALSARTAN 1 TABLET: 24; 26 TABLET, FILM COATED ORAL at 21:19

## 2024-12-08 RX ADMIN — PREGABALIN 150 MG: 75 CAPSULE ORAL at 09:11

## 2024-12-08 RX ADMIN — PIPERACILLIN AND TAZOBACTAM 3375 MG: 3; .375 INJECTION, POWDER, LYOPHILIZED, FOR SOLUTION INTRAVENOUS at 21:29

## 2024-12-08 RX ADMIN — PIPERACILLIN AND TAZOBACTAM 3375 MG: 3; .375 INJECTION, POWDER, LYOPHILIZED, FOR SOLUTION INTRAVENOUS at 05:20

## 2024-12-08 RX ADMIN — CARVEDILOL 6.25 MG: 6.25 TABLET, FILM COATED ORAL at 17:56

## 2024-12-08 RX ADMIN — SODIUM CHLORIDE, PRESERVATIVE FREE 10 ML: 5 INJECTION INTRAVENOUS at 09:42

## 2024-12-08 RX ADMIN — PIPERACILLIN AND TAZOBACTAM 3375 MG: 3; .375 INJECTION, POWDER, LYOPHILIZED, FOR SOLUTION INTRAVENOUS at 13:24

## 2024-12-08 RX ADMIN — ATORVASTATIN CALCIUM 40 MG: 40 TABLET, FILM COATED ORAL at 21:19

## 2024-12-08 RX ADMIN — ENOXAPARIN SODIUM 30 MG: 100 INJECTION SUBCUTANEOUS at 21:20

## 2024-12-08 RX ADMIN — CLOPIDOGREL BISULFATE 75 MG: 75 TABLET ORAL at 09:11

## 2024-12-08 RX ADMIN — SODIUM CHLORIDE: 9 INJECTION, SOLUTION INTRAVENOUS at 05:20

## 2024-12-08 RX ADMIN — TAMSULOSIN HYDROCHLORIDE 0.4 MG: 0.4 CAPSULE ORAL at 09:11

## 2024-12-08 RX ADMIN — PREGABALIN 150 MG: 75 CAPSULE ORAL at 21:18

## 2024-12-08 RX ADMIN — INSULIN GLARGINE 12 UNITS: 100 INJECTION, SOLUTION SUBCUTANEOUS at 21:20

## 2024-12-08 RX ADMIN — SODIUM CHLORIDE, PRESERVATIVE FREE 10 ML: 5 INJECTION INTRAVENOUS at 21:29

## 2024-12-08 RX ADMIN — FUROSEMIDE 40 MG: 40 TABLET ORAL at 09:10

## 2024-12-08 RX ADMIN — INSULIN LISPRO 2 UNITS: 100 INJECTION, SOLUTION INTRAVENOUS; SUBCUTANEOUS at 21:20

## 2024-12-08 RX ADMIN — FUROSEMIDE 40 MG: 40 TABLET ORAL at 17:56

## 2024-12-08 ASSESSMENT — PAIN SCALES - GENERAL
PAINLEVEL_OUTOF10: 0
PAINLEVEL_OUTOF10: 0

## 2024-12-09 VITALS
SYSTOLIC BLOOD PRESSURE: 113 MMHG | OXYGEN SATURATION: 96 % | WEIGHT: 235.89 LBS | BODY MASS INDEX: 33.02 KG/M2 | RESPIRATION RATE: 14 BRPM | HEART RATE: 68 BPM | HEIGHT: 71 IN | TEMPERATURE: 98.2 F | DIASTOLIC BLOOD PRESSURE: 78 MMHG

## 2024-12-09 DIAGNOSIS — L03.115 CELLULITIS OF RIGHT FOOT: ICD-10-CM

## 2024-12-09 DIAGNOSIS — E11.628 DIABETIC INFECTION OF RIGHT FOOT (HCC): ICD-10-CM

## 2024-12-09 DIAGNOSIS — E11.628 DIABETIC INFECTION OF RIGHT FOOT (HCC): Primary | ICD-10-CM

## 2024-12-09 DIAGNOSIS — M86.071 ACUTE HEMATOGENOUS OSTEOMYELITIS OF RIGHT FOOT: ICD-10-CM

## 2024-12-09 DIAGNOSIS — L08.9 DIABETIC INFECTION OF RIGHT FOOT (HCC): ICD-10-CM

## 2024-12-09 DIAGNOSIS — L08.9 DIABETIC INFECTION OF RIGHT FOOT (HCC): Primary | ICD-10-CM

## 2024-12-09 LAB
GLUCOSE BLD STRIP.AUTO-MCNC: 153 MG/DL (ref 65–117)
SERVICE CMNT-IMP: ABNORMAL

## 2024-12-09 PROCEDURE — 6370000000 HC RX 637 (ALT 250 FOR IP): Performed by: INTERNAL MEDICINE

## 2024-12-09 PROCEDURE — 82962 GLUCOSE BLOOD TEST: CPT

## 2024-12-09 PROCEDURE — 6360000002 HC RX W HCPCS: Performed by: INTERNAL MEDICINE

## 2024-12-09 PROCEDURE — 99233 SBSQ HOSP IP/OBS HIGH 50: CPT | Performed by: INTERNAL MEDICINE

## 2024-12-09 PROCEDURE — 2580000003 HC RX 258: Performed by: INTERNAL MEDICINE

## 2024-12-09 RX ORDER — DOXYCYCLINE HYCLATE 100 MG
100 TABLET ORAL EVERY 12 HOURS SCHEDULED
Status: DISCONTINUED | OUTPATIENT
Start: 2024-12-09 | End: 2024-12-09 | Stop reason: HOSPADM

## 2024-12-09 RX ORDER — DOXYCYCLINE HYCLATE 100 MG
100 TABLET ORAL EVERY 12 HOURS SCHEDULED
Qty: 28 TABLET | Refills: 0 | Status: SHIPPED | OUTPATIENT
Start: 2024-12-09 | End: 2024-12-23

## 2024-12-09 RX ADMIN — SODIUM CHLORIDE, PRESERVATIVE FREE 10 ML: 5 INJECTION INTRAVENOUS at 09:32

## 2024-12-09 RX ADMIN — CLOPIDOGREL BISULFATE 75 MG: 75 TABLET ORAL at 09:29

## 2024-12-09 RX ADMIN — CARVEDILOL 6.25 MG: 6.25 TABLET, FILM COATED ORAL at 09:29

## 2024-12-09 RX ADMIN — FUROSEMIDE 40 MG: 40 TABLET ORAL at 09:29

## 2024-12-09 RX ADMIN — TAMSULOSIN HYDROCHLORIDE 0.4 MG: 0.4 CAPSULE ORAL at 09:29

## 2024-12-09 RX ADMIN — SPIRONOLACTONE 25 MG: 25 TABLET ORAL at 09:29

## 2024-12-09 RX ADMIN — ENOXAPARIN SODIUM 30 MG: 100 INJECTION SUBCUTANEOUS at 09:28

## 2024-12-09 RX ADMIN — PIPERACILLIN AND TAZOBACTAM 3375 MG: 3; .375 INJECTION, POWDER, LYOPHILIZED, FOR SOLUTION INTRAVENOUS at 05:00

## 2024-12-09 RX ADMIN — ASPIRIN 81 MG: 81 TABLET, COATED ORAL at 09:29

## 2024-12-09 RX ADMIN — PREGABALIN 150 MG: 75 CAPSULE ORAL at 09:29

## 2024-12-09 NOTE — DISCHARGE SUMMARY
Discharge Summary    Name: Joseph Seymour  [id number]  YOB: 1960 (Age: 64 y.o.)   Date of Admission: 12/3/2024  Date of Discharge: 12/9/2024  Attending Physician: Dorita Caba MD    Discharge Diagnosis:   Diabetic right foot infection  Osteomyelitis of right foot  Acute hypokalemia  PICC line malfunction  Type II DM  Diabetic neuropathy  CAD s/p ROMULO  CHF  Hypertension  Hyperlipidemia    Consultations:  IP CONSULT TO VASCULAR ACCESS TEAM  IP CONSULT TO PHARMACY  IP CONSULT TO INFECTIOUS DISEASES  IP CONSULT TO PODIATRY  IP CONSULT TO CASE MANAGEMENT      Brief Admission History/Reason for Admission Per Luis Uribe MD:   Joseph Seymour is a 64 y.o.  male with PMHx significant for CAD status post ROMULO, chronic diastolic heart failure, HTN, HLD, type 2 diabetes mellitus, BPH diabetic foot infection presented to ED with a concerning of worsening of foot ulcer.  He was admitted in mid October for sepsis secondary to right diabetic foot infection with osteomyelitis, had I&D of right foot and was discharged with IV antibiotics.  Initial end date of IV antibiotics was 11/20, was seen by ID physician who had repeated CT and extended antibiotics further.  But he has noticed a ulcer on right fifth toe which made them concerned.  Also his PICC line was not working properly so they were advised to come to ED.  In the ED PICC team evaluated PICC line, and it appears that PICC had migrated upward.  PICC was successfully replaced by IR    Brief Hospital Course by Main Problems:   Diabetic right foot infection  Osteomyelitis right foot   Third metatarsal fracture, right foot.   -Last admission October 2024, I&D of Rt foot, Discharged with Iv zosyn  -Seen by ID as OP, Zosyn extended further,      -Not septic  -was treated wotj zosyn  -s/p iv  vancomycin as MRSA nares cultures is negative  -appreciate ID consult  -seen by Dr Dixon in past, But family request different podiatry  -MRI

## 2024-12-09 NOTE — DISCHARGE INSTRUCTIONS
Please return to ED if you have worsening pain, swelling on foot  or have fever or any worsening condition.  Please follow up with PCP and Podiatry    Weight Bearing  only in his walking boot to the right foot to allow the fracture to heal. Weight Bearing As tolerated to the left

## 2024-12-09 NOTE — PLAN OF CARE
Problem: Chronic Conditions and Co-morbidities  Goal: Patient's chronic conditions and co-morbidity symptoms are monitored and maintained or improved  12/4/2024 1038 by Sofi Roman RN  Outcome: Progressing  12/3/2024 2245 by Katherine Moore RN  Outcome: Progressing     Problem: Discharge Planning  Goal: Discharge to home or other facility with appropriate resources  12/4/2024 1038 by Sofi Roman RN  Outcome: Progressing  12/3/2024 2245 by Katherine Moore RN  Outcome: Progressing     Problem: ABCDS Injury Assessment  Goal: Absence of physical injury  12/4/2024 1038 by Sofi Roman RN  Outcome: Progressing  12/3/2024 2245 by Katherine Moore RN  Outcome: Progressing     Problem: Safety - Adult  Goal: Free from fall injury  12/4/2024 1038 by Sofi Roman RN  Outcome: Progressing  12/3/2024 2245 by Katherine Moore RN  Outcome: Progressing     
  Problem: Chronic Conditions and Co-morbidities  Goal: Patient's chronic conditions and co-morbidity symptoms are monitored and maintained or improved  12/4/2024 2220 by Katherine Moore RN  Outcome: Progressing  12/4/2024 1038 by Sofi Roman RN  Outcome: Progressing     Problem: Discharge Planning  Goal: Discharge to home or other facility with appropriate resources  12/4/2024 2220 by Katherine Moore RN  Outcome: Progressing  12/4/2024 1038 by Sofi Roman RN  Outcome: Progressing     Problem: ABCDS Injury Assessment  Goal: Absence of physical injury  12/4/2024 2220 by Katherine Moore RN  Outcome: Progressing  12/4/2024 1038 by Sofi Roman RN  Outcome: Progressing     Problem: Safety - Adult  Goal: Free from fall injury  12/4/2024 2220 by Katherine Moore RN  Outcome: Progressing  12/4/2024 1038 by Sofi Roman RN  Outcome: Progressing     
  Problem: Chronic Conditions and Co-morbidities  Goal: Patient's chronic conditions and co-morbidity symptoms are monitored and maintained or improved  12/5/2024 0816 by Beverley Uribe RN  Outcome: Progressing  12/4/2024 2220 by Katherine Moore RN  Outcome: Progressing     Problem: Discharge Planning  Goal: Discharge to home or other facility with appropriate resources  12/5/2024 0816 by Beverley Uribe RN  Outcome: Progressing  12/4/2024 2220 by Katherine Moore RN  Outcome: Progressing     Problem: ABCDS Injury Assessment  Goal: Absence of physical injury  12/5/2024 0816 by Beverley Uribe RN  Outcome: Progressing  12/4/2024 2220 by Katherine Moore RN  Outcome: Progressing     Problem: Safety - Adult  Goal: Free from fall injury  12/5/2024 0816 by Beverley Uribe RN  Outcome: Progressing  12/4/2024 2220 by Katherine Moore RN  Outcome: Progressing     
  Problem: Chronic Conditions and Co-morbidities  Goal: Patient's chronic conditions and co-morbidity symptoms are monitored and maintained or improved  12/6/2024 0754 by Beverley Uribe RN  Outcome: Progressing  12/5/2024 2240 by Addy Santiago RN  Outcome: Progressing     Problem: Discharge Planning  Goal: Discharge to home or other facility with appropriate resources  12/6/2024 0754 by Beverley Uribe RN  Outcome: Progressing  12/5/2024 2240 by Addy Santiago RN  Outcome: Progressing     Problem: ABCDS Injury Assessment  Goal: Absence of physical injury  12/6/2024 0754 by Beverley Uribe RN  Outcome: Progressing  12/5/2024 2240 by Addy Santiago RN  Outcome: Progressing     Problem: Safety - Adult  Goal: Free from fall injury  12/6/2024 0754 by Beverley Uribe RN  Outcome: Progressing  12/5/2024 2240 by Addy Santiago RN  Outcome: Progressing     Problem: Neurosensory - Adult  Goal: Achieves stable or improved neurological status  Outcome: Progressing  Goal: Achieves maximal functionality and self care  Outcome: Progressing     Problem: Respiratory - Adult  Goal: Achieves optimal ventilation and oxygenation  Outcome: Progressing     Problem: Cardiovascular - Adult  Goal: Maintains optimal cardiac output and hemodynamic stability  Outcome: Progressing  Goal: Absence of cardiac dysrhythmias or at baseline  Outcome: Progressing     Problem: Skin/Tissue Integrity - Adult  Goal: Skin integrity remains intact  Outcome: Progressing  Goal: Incisions, wounds, or drain sites healing without S/S of infection  Outcome: Progressing  Goal: Oral mucous membranes remain intact  Outcome: Progressing     Problem: Metabolic/Fluid and Electrolytes - Adult  Goal: Electrolytes maintained within normal limits  Outcome: Progressing  Goal: Hemodynamic stability and optimal renal function maintained  Outcome: Progressing  Goal: Glucose maintained within prescribed range  Outcome: Progressing     
  Problem: Chronic Conditions and Co-morbidities  Goal: Patient's chronic conditions and co-morbidity symptoms are monitored and maintained or improved  12/9/2024 1127 by Beverley Uribe RN  Outcome: Progressing  12/9/2024 0013 by Addy Santiago RN  Outcome: Progressing     Problem: Discharge Planning  Goal: Discharge to home or other facility with appropriate resources  12/9/2024 1127 by Beverley Uribe RN  Outcome: Progressing  12/9/2024 0013 by Addy Santiago RN  Outcome: Progressing     Problem: ABCDS Injury Assessment  Goal: Absence of physical injury  12/9/2024 1127 by Beverley Uribe RN  Outcome: Progressing  12/9/2024 0013 by Addy Santiago RN  Outcome: Progressing     Problem: Safety - Adult  Goal: Free from fall injury  12/9/2024 1127 by Beverley Uribe RN  Outcome: Progressing  12/9/2024 0013 by Addy Santiago RN  Outcome: Progressing     Problem: Neurosensory - Adult  Goal: Achieves stable or improved neurological status  12/9/2024 1127 by Beverley Uribe RN  Outcome: Progressing  12/9/2024 0013 by Addy Santiago RN  Outcome: Progressing  Goal: Achieves maximal functionality and self care  12/9/2024 1127 by Beverley Uribe RN  Outcome: Progressing  12/9/2024 0013 by Addy Santiago RN  Outcome: Progressing     Problem: Respiratory - Adult  Goal: Achieves optimal ventilation and oxygenation  12/9/2024 1127 by Beverley Uribe RN  Outcome: Progressing  12/9/2024 0013 by Addy Santiago RN  Outcome: Progressing     Problem: Cardiovascular - Adult  Goal: Maintains optimal cardiac output and hemodynamic stability  12/9/2024 1127 by Beverley Uribe RN  Outcome: Progressing  12/9/2024 0013 by Addy Santiago RN  Outcome: Progressing  Goal: Absence of cardiac dysrhythmias or at baseline  12/9/2024 1127 by Beverley Uribe RN  Outcome: Progressing  12/9/2024 0013 by Addy Santiago RN  Outcome: Progressing     Problem: Skin/Tissue Integrity - Adult  Goal: Skin integrity remains intact  12/9/2024 1127 by Rony 
  Problem: Chronic Conditions and Co-morbidities  Goal: Patient's chronic conditions and co-morbidity symptoms are monitored and maintained or improved  12/9/2024 1208 by Beverley Uribe RN  Outcome: Adequate for Discharge  12/9/2024 1127 by Beverley Uribe RN  Outcome: Progressing  12/9/2024 0013 by Addy Santiago RN  Outcome: Progressing     Problem: Discharge Planning  Goal: Discharge to home or other facility with appropriate resources  12/9/2024 1208 by Beverley Uribe RN  Outcome: Adequate for Discharge  12/9/2024 1127 by Beverley Uribe RN  Outcome: Progressing  12/9/2024 0013 by Addy Santiago RN  Outcome: Progressing     Problem: ABCDS Injury Assessment  Goal: Absence of physical injury  12/9/2024 1208 by Beverley Uribe RN  Outcome: Adequate for Discharge  12/9/2024 1127 by Beverley Uribe RN  Outcome: Progressing  12/9/2024 0013 by Addy Santiago RN  Outcome: Progressing     Problem: Safety - Adult  Goal: Free from fall injury  12/9/2024 1208 by Beverley Uribe RN  Outcome: Adequate for Discharge  12/9/2024 1127 by Beverley Uribe RN  Outcome: Progressing  12/9/2024 0013 by Addy Santiago RN  Outcome: Progressing     Problem: Neurosensory - Adult  Goal: Achieves stable or improved neurological status  12/9/2024 1208 by Beverley Uribe RN  Outcome: Adequate for Discharge  12/9/2024 1127 by Beverley Uribe RN  Outcome: Progressing  12/9/2024 0013 by Addy Santiago RN  Outcome: Progressing  Goal: Achieves maximal functionality and self care  12/9/2024 1208 by Beverley Uribe RN  Outcome: Adequate for Discharge  12/9/2024 1127 by Beverley Uribe RN  Outcome: Progressing  12/9/2024 0013 by Addy Santiago RN  Outcome: Progressing     Problem: Respiratory - Adult  Goal: Achieves optimal ventilation and oxygenation  12/9/2024 1208 by Beverley Uribe RN  Outcome: Adequate for Discharge  12/9/2024 1127 by Uribe, Ebverley, RN  Outcome: Progressing  12/9/2024 0013 by Addy Santiago RN  Outcome: Progressing     Problem: 
  Problem: Chronic Conditions and Co-morbidities  Goal: Patient's chronic conditions and co-morbidity symptoms are monitored and maintained or improved  Outcome: Progressing     Problem: Discharge Planning  Goal: Discharge to home or other facility with appropriate resources  Outcome: Progressing     Problem: ABCDS Injury Assessment  Goal: Absence of physical injury  Outcome: Progressing     Problem: Safety - Adult  Goal: Free from fall injury  Outcome: Progressing     
  Problem: Chronic Conditions and Co-morbidities  Goal: Patient's chronic conditions and co-morbidity symptoms are monitored and maintained or improved  Outcome: Progressing     Problem: Discharge Planning  Goal: Discharge to home or other facility with appropriate resources  Outcome: Progressing     Problem: ABCDS Injury Assessment  Goal: Absence of physical injury  Outcome: Progressing     Problem: Safety - Adult  Goal: Free from fall injury  Outcome: Progressing     
  Problem: Chronic Conditions and Co-morbidities  Goal: Patient's chronic conditions and co-morbidity symptoms are monitored and maintained or improved  Outcome: Progressing     Problem: Discharge Planning  Goal: Discharge to home or other facility with appropriate resources  Outcome: Progressing     Problem: ABCDS Injury Assessment  Goal: Absence of physical injury  Outcome: Progressing     Problem: Safety - Adult  Goal: Free from fall injury  Outcome: Progressing     Problem: Neurosensory - Adult  Goal: Achieves stable or improved neurological status  Outcome: Progressing  Goal: Achieves maximal functionality and self care  Outcome: Progressing     Problem: Respiratory - Adult  Goal: Achieves optimal ventilation and oxygenation  Outcome: Progressing     Problem: Cardiovascular - Adult  Goal: Maintains optimal cardiac output and hemodynamic stability  Outcome: Progressing  Goal: Absence of cardiac dysrhythmias or at baseline  Outcome: Progressing     Problem: Skin/Tissue Integrity - Adult  Goal: Skin integrity remains intact  Outcome: Progressing  Flowsheets (Taken 12/7/2024 6748)  Skin Integrity Remains Intact: Monitor for areas of redness and/or skin breakdown  Goal: Incisions, wounds, or drain sites healing without S/S of infection  Outcome: Progressing  Goal: Oral mucous membranes remain intact  Outcome: Progressing     Problem: Metabolic/Fluid and Electrolytes - Adult  Goal: Electrolytes maintained within normal limits  Outcome: Progressing  Goal: Hemodynamic stability and optimal renal function maintained  Outcome: Progressing  Goal: Glucose maintained within prescribed range  Outcome: Progressing     
  Problem: Chronic Conditions and Co-morbidities  Goal: Patient's chronic conditions and co-morbidity symptoms are monitored and maintained or improved  Outcome: Progressing     Problem: Discharge Planning  Goal: Discharge to home or other facility with appropriate resources  Outcome: Progressing     Problem: ABCDS Injury Assessment  Goal: Absence of physical injury  Outcome: Progressing     Problem: Safety - Adult  Goal: Free from fall injury  Outcome: Progressing     Problem: Neurosensory - Adult  Goal: Achieves stable or improved neurological status  Outcome: Progressing  Goal: Achieves maximal functionality and self care  Outcome: Progressing     Problem: Respiratory - Adult  Goal: Achieves optimal ventilation and oxygenation  Outcome: Progressing     Problem: Cardiovascular - Adult  Goal: Maintains optimal cardiac output and hemodynamic stability  Outcome: Progressing  Goal: Absence of cardiac dysrhythmias or at baseline  Outcome: Progressing     Problem: Skin/Tissue Integrity - Adult  Goal: Skin integrity remains intact  Outcome: Progressing  Flowsheets (Taken 12/8/2024 0822)  Skin Integrity Remains Intact: Monitor for areas of redness and/or skin breakdown  Goal: Incisions, wounds, or drain sites healing without S/S of infection  Outcome: Progressing  Goal: Oral mucous membranes remain intact  Outcome: Progressing     Problem: Metabolic/Fluid and Electrolytes - Adult  Goal: Electrolytes maintained within normal limits  Outcome: Progressing  Goal: Hemodynamic stability and optimal renal function maintained  Outcome: Progressing  Goal: Glucose maintained within prescribed range  Outcome: Progressing     
integrity remains intact  12/9/2024 0013 by Addy Santiago RN  Outcome: Progressing  Flowsheets  Taken 12/9/2024 0012  Skin Integrity Remains Intact: Monitor for areas of redness and/or skin breakdown  Taken 12/8/2024 2031  Skin Integrity Remains Intact: Monitor for areas of redness and/or skin breakdown  12/8/2024 1037 by Justin Ackerman RN  Outcome: Progressing  Flowsheets (Taken 12/8/2024 0822)  Skin Integrity Remains Intact: Monitor for areas of redness and/or skin breakdown  Goal: Incisions, wounds, or drain sites healing without S/S of infection  12/9/2024 0013 by Addy Santiago RN  Outcome: Progressing  12/8/2024 1037 by Justin Ackerman RN  Outcome: Progressing  Goal: Oral mucous membranes remain intact  12/9/2024 0013 by Addy Santiago RN  Outcome: Progressing  12/8/2024 1037 by Justin Ackerman RN  Outcome: Progressing     Problem: Metabolic/Fluid and Electrolytes - Adult  Goal: Electrolytes maintained within normal limits  12/9/2024 0013 by Addy Santiago RN  Outcome: Progressing  12/8/2024 1037 by Justin Ackerman RN  Outcome: Progressing  Goal: Hemodynamic stability and optimal renal function maintained  12/9/2024 0013 by Addy Santiago RN  Outcome: Progressing  12/8/2024 1037 by Justin Ackerman RN  Outcome: Progressing  Goal: Glucose maintained within prescribed range  12/9/2024 0013 by Addy Santiago RN  Outcome: Progressing  12/8/2024 1037 by Justin Ackerman RN  Outcome: Progressing

## 2024-12-09 NOTE — PROGRESS NOTES
Hospitalist Progress Note    NAME:   Joseph Seymour   : 1960   MRN: [id number]     Date/Time: 2024 2:41 PM  Patient PCP: Adriana Tyler MD    Estimated discharge date:24  Barriers: ID recommendations      Assessment / Plan:  Diabetic right foot infection  Osteomyelitis right foot     -Last admission 2024, I&D of Rt foot, Discharged with Iv zosyn  -Seen by ID as OP, Zosyn extended further,   Repeat CT foot 24 There has been interval development of a fracture through the third metatarsal neck with associated partial destruction of the third metatarsal head and periosteal  reaction in the third metatarsal shaft. Findings likely represent acute osteomyelitis with a pathologic fracture. There is an adjacent small fluid collection that has decreased in the interval.     -Not septic  -continue zosyn  -s/p iv  vancomycin as MRSA nares cultures is negative  -appreciate ID consult  -seen by Dr Dixon in past, But family request different podiatry  -MRI right foot done and reviewed  -patient is cleared by podiatry for discharge  -Awaiting ID final recommendations for discharge    Acute hypokalemia:  No labs today      PICC line malfunction  -Replaced/exchanged over guidewire by IR 12/3     Diabetes mellitus type 2  Diabetic neuropathy  -Continue long-acting insulin  -SSI        CAD status post ROMULO  CHF  HTN  HLD  -Continue aspirin, Plavix, atorvastatin, Coreg, Entresto, Aldactone    Medical Decision Making:   I personally reviewed labs:  I personally reviewed imaging:  I personally reviewed EKG:  Toxic drug monitoring:   Discussed case with:   Patient, RN,   Discussed with Dr. Berman and requested him to call patient's wife.     Code Status: full  DVT Prophylaxis:lovenox   GI Prophylaxis:    Subjective:     Chief Complaint / Reason for Physician Visit  No overnight event.     Objective:     VITALS:   Last 24hrs VS reviewed since prior progress note. Most recent are:  Patient 
      Hospitalist Progress Note    NAME:   Joseph Seymour   : 1960   MRN: [id number]     Date/Time: 2024 2:32 PM  Patient PCP: Adriana Tyler MD    Estimated discharge date:24  Barriers: podiatry consult and clearance      Assessment / Plan:  Diabetic right foot infection  Osteomyelitis right foot     -Last admission 2024, I&D of Rt foot, Discharged with Iv zosyn  -Seen by ID as OP, Zosyn extended further,   Repeat CT foot 24 There has been interval development of a fracture through the third metatarsal neck with associated partial destruction of the third metatarsal head and periosteal  reaction in the third metatarsal shaft. Findings likely represent acute osteomyelitis with a pathologic fracture. There is an adjacent small fluid collection that has decreased in the interval.     -Not septic  -continue vanc and zosyn  -will discontinue vancomycin if MRSA nares cultures is negative  -appreciate ID consult  -seen by Dr Dixon in past, But family request different podiatry  -Further imaging as per ID/podiatry    Acute hypokalemia:  Will replete potassium      PICC line malfunction  -Replaced/exchanged over guidewire by IR 12/3     Diabetes mellitus type 2  Diabetic neuropathy  -Continue long-acting insulin  -SSI        CAD status post ROMULO  CHF  HTN  HLD  -Continue aspirin, Plavix, atorvastatin, Coreg, Entresto, Aldactone    Medical Decision Making:   I personally reviewed labs:cbc- normal white count, Hb stable at 15.4  BMP: low potassium 3.2- will replete  I personally reviewed imaging:  I personally reviewed EKG:  Toxic drug monitoring:   Discussed case with:         Code Status: full  DVT Prophylaxis:lovenox   GI Prophylaxis:    Subjective:     Chief Complaint / Reason for Physician Visit  Patient denies any new complaints.       Objective:     VITALS:   Last 24hrs VS reviewed since prior progress note. Most recent are:  Patient Vitals for the past 24 hrs:   BP Temp Temp 
      Hospitalist Progress Note    NAME:   Joseph Seymour   : 1960   MRN: [id number]     Date/Time: 2024 3:44 PM  Patient PCP: Adriana Tyler MD    Estimated discharge date:24  Barriers: podiatry consult and clearance      Assessment / Plan:  Diabetic right foot infection  Osteomyelitis right foot     -Last admission 2024, I&D of Rt foot, Discharged with Iv zosyn  -Seen by ID as OP, Zosyn extended further,   Repeat CT foot 24 There has been interval development of a fracture through the third metatarsal neck with associated partial destruction of the third metatarsal head and periosteal  reaction in the third metatarsal shaft. Findings likely represent acute osteomyelitis with a pathologic fracture. There is an adjacent small fluid collection that has decreased in the interval.     -Not septic  -continue zosyn  -s/p iv  vancomycin as MRSA nares cultures is negative  -appreciate ID consult  -seen by Dr Dixon in past, But family request different podiatry  -MRI right foot results pending    Acute hypokalemia:  Will replete potassium      PICC line malfunction  -Replaced/exchanged over guidewire by IR 12/3     Diabetes mellitus type 2  Diabetic neuropathy  -Continue long-acting insulin  -SSI        CAD status post ROMULO  CHF  HTN  HLD  -Continue aspirin, Plavix, atorvastatin, Coreg, Entresto, Aldactone    Medical Decision Making:   I personally reviewed labs:cbc  BMP: potassium 3.4- will replete  I personally reviewed imaging:  I personally reviewed EKG:  Toxic drug monitoring:   Discussed case with:   Patient, RN, patient's wife      Code Status: full  DVT Prophylaxis:lovenox   GI Prophylaxis:    Subjective:     Chief Complaint / Reason for Physician Visit  No overnight event.  I called patient's wife and discussed about further plan, also informed her that Dr. Brooks is out of town and will be back on Monday.     Objective:     VITALS:   Last 24hrs VS reviewed since prior 
      Hospitalist Progress Note    NAME:   Joseph Seymour   : 1960   MRN: [id number]     Date/Time: 2024 12:57 PM  Patient PCP: Adriana Tyler MD    Estimated discharge date:24  Barriers: ID recommendations      Assessment / Plan:  Diabetic right foot infection  Osteomyelitis right foot     -Last admission 2024, I&D of Rt foot, Discharged with Iv zosyn  -Seen by ID as OP, Zosyn extended further,   Repeat CT foot 24 There has been interval development of a fracture through the third metatarsal neck with associated partial destruction of the third metatarsal head and periosteal  reaction in the third metatarsal shaft. Findings likely represent acute osteomyelitis with a pathologic fracture. There is an adjacent small fluid collection that has decreased in the interval.     -Not septic  -continue zosyn  -s/p iv  vancomycin as MRSA nares cultures is negative  -appreciate ID consult  -seen by Dr Dixon in past, But family request different podiatry  -MRI right foot done and reviewed  -patient is cleared by podiatry for discharge  -Awaiting ID final recommendations for discharge    Acute hypokalemia:  No labs today      PICC line malfunction  -Replaced/exchanged over guidewire by IR 12/3     Diabetes mellitus type 2  Diabetic neuropathy  -Continue long-acting insulin  -SSI        CAD status post ROMULO  CHF  HTN  HLD  -Continue aspirin, Plavix, atorvastatin, Coreg, Entresto, Aldactone    Medical Decision Making:   I personally reviewed labs:  I personally reviewed imaging:  I personally reviewed EKG:  Toxic drug monitoring:   Discussed case with:   Patient, RN, patient's wife  Discussed with Dr. Berman- states that MRI looks good, no surgery needed  Discussed with Dr. Calero- recommends to wait until Monday for Dr. Brooks for final antibiotics as patient has complicated history and Dr. Calero doesn't know patient very well.     Code Status: full  DVT Prophylaxis:lovenox   GI 
      Hospitalist Progress Note    NAME:   Joseph Seymour   : 1960   MRN: [id number]     Date/Time: 2024 2:00 PM  Patient PCP: Adriana Tyler MD    Estimated discharge date:24  Barriers: podiatry consult and clearance      Assessment / Plan:  Diabetic right foot infection  Osteomyelitis right foot     -Last admission 2024, I&D of Rt foot, Discharged with Iv zosyn  -Seen by ID as OP, Zosyn extended further,   Repeat CT foot 24 There has been interval development of a fracture through the third metatarsal neck with associated partial destruction of the third metatarsal head and periosteal  reaction in the third metatarsal shaft. Findings likely represent acute osteomyelitis with a pathologic fracture. There is an adjacent small fluid collection that has decreased in the interval.     -Not septic  -continue zosyn  -s/p iv  vancomycin as MRSA nares cultures is negative  -appreciate ID consult  -seen by Dr Dixon in past, But family request different podiatry  -Further imaging as per ID/podiatry    Acute hypokalemia:  Will replete potassium      PICC line malfunction  -Replaced/exchanged over guidewire by IR 12/3     Diabetes mellitus type 2  Diabetic neuropathy  -Continue long-acting insulin  -SSI        CAD status post ROMULO  CHF  HTN  HLD  -Continue aspirin, Plavix, atorvastatin, Coreg, Entresto, Aldactone    Medical Decision Making:   I personally reviewed labs:cbc  BMP: potassium improved from 3.2 to 3.5  I personally reviewed imaging:  I personally reviewed EKG:  Toxic drug monitoring:   Discussed case with:   Dr. Berman - consult was placed intentionally for him as patient wanted second opinion      Code Status: full  DVT Prophylaxis:lovenox   GI Prophylaxis:    Subjective:     Chief Complaint / Reason for Physician Visit  Patient denies any new complaints.   I discussed with patient that there was misunderstanding, however, I spoke to Dr. Berman today and his team 
    Progress Note    Date:2024       Room:[address] [place] [address]  Patient Name:Joseph Seymour     YOB: 1960     Age:64 y.o.    Subjective    Subjective   Pt seen at . Denies any new complaints. Per nursing, no acute overnight events      Review of Systems  A complete ROS is unremarkable outside of HPI      Objective         Vitals Last 24 Hours:  TEMPERATURE:  Temp  Av.5 °F (36.9 °C)  Min: 98.5 °F (36.9 °C)  Max: 98.5 °F (36.9 °C)  RESPIRATIONS RANGE: Resp  Av  Min: 17  Max: 17  PULSE OXIMETRY RANGE: SpO2  Av.5 %  Min: 97 %  Max: 98 %  PULSE RANGE: Pulse  Av  Min: 69  Max: 73  BLOOD PRESSURE RANGE: Systolic (24hrs), Av , Min:95 , Max:111   ; Diastolic (24hrs), Av, Min:65, Max:80    I/O (24Hr):    Intake/Output Summary (Last 24 hours) at 2024 1025  Last data filed at 2024 2106  Gross per 24 hour   Intake 200 ml   Output --   Net 200 ml     Objective  GEN: Pt is AAOx4 and in NAD. No dressings noted to the B/L LE. No family noted at NS  DERM: Wound to the right foot noted to be healed at this time.  Small hemorrhagic blister noted to the lateral right fifth digit.  Edema noted to the right foot  VASC: Pedal pulses (DP/PT) diminished to B/L LE. CFT<3sec to all digits of B/L LE.  No pedal hair growth noted to the level of the digits for B/L LE. Skin temp is warm to warm from proximal to distal for B/L LE. Neg homans/lucio signs to B/L LE. No varicosities or telangectasias noted to B/L LE.  NEURO: Protective and epicritic sensations grossly absent to B/L LE  MSK: (-) POP, No gross deformities. Good muscle tone and bulk noted to B/L LE.  PSYCH: Cooperative with normal mood and affect               Labs/Imaging/Diagnostics    Labs:  CBC:No results for input(s): \"WBC\", \"RBC\", \"HGB\", \"HCT\", \"MCV\", \"RDW\", \"PLT\" in the last 72 hours.  CHEMISTRIES:  Recent Labs     24  0502      K 3.4*      CO2 28   BUN 14   CREATININE 0.87   GLUCOSE 143*     PT/INR:No results for 
.  Discharge instruction  given to the patient, verbalize understanding, opportunities  to question given ,   Patient left the unit stable with his spouse.  No complain at shift and during discharged .  PICC line removed before discharged  Pt would like to do o/p podiatry follow up by self .   Walking boot is given to pt as per podiatry order .               
End of Shift Note     Bedside shift change report given to Beverley NGUYEN (oncoming nurse) by Katherine Moore RN (offgoing nurse).  Report included the following information SBAR REPORTS LIST: SBAR, Intake/Output, MAR, Recent Results, and Cardiac Rhythm NSR  At handoff report pt is awake up in chair without complaints.      Shift worked:   NIGHT       Shift summary and any significant changes:      -Pt had a calm night   -IV abx given as ordered.    -PICC line is patent, (+) blood return   -Labs drawn   -No complaints within my shift       Concerns for physician to address:   Pending podiatry consult      Zone phone for oncoming shift:    [phone]         Activity:  Activity: Ambulate , independent  Number times ambulated in hallways past shift: 0  Number of times OOB to chair past shift: 2     Cardiac:   Cardiac Monitoring: YES / NO: Yes          Access:  Current line(s): IV ACCESS: - Peripheral IV - site  L Basilic, insertion date: 12/3/24  - PICC - site  R Upper Arm, insertion date: 12/3     Genitourinary:   Urinary status: Urinary status: Patient is voiding without difficulty.     Respiratory:   oxygen delivery: room air  Chronic home O2 use?: YES / NO: No  Incentive spirometer at bedside: YES / NO: No        GI:  Current diet:  DIET: regular  Passing flatus: YES / NO: Yes  Tolerating current diet: YES / NO: Yes        Pain Management:   Patient states pain is manageable on current regimen: YES / NO: Yes     Skin:     Interventions: MELONIE SCALE: 21    Patient Safety:  Fall Score: FALL RISK ASSESSMENT: Not at risk for falls.  Interventions: Fall Interventions Provided: Implemented/recommended use of non-skid footwear        Length of Stay:  Expected LOS: 3  Actual LOS: 1        Katherine Moore RN  
End of Shift Note    Bedside shift change report given to  Vicente BHAT (oncoming nurse) by Beverley Uribe, WENDY (offgoing nurse).  Report included the following information SBAR, Intake/Output, and MAR    Shift worked:  7a-7p     Shift summary and any significant changes:     Pt tolerated care fairly well today.   Meds given per MAR, no PRNs given. Pt has been sitting up all day in the chair and has ambulated to and from the bathroom by himself.   No complaints of pain.   Hourly rounds complete.   LBM today in the morning   HB A1c lab is done   MRI of foot is done        Concerns for physician to address:  Regarding morning soft bp   Zone phone for oncoming shift:   [phone]       Activity:  Level of Assistance: Independent    Cardiac:   Cardiac Monitoring:  yes - Normal Sinus    Access:  Current line(s): PIV  PICC    Genitourinary:   Urinary Status: Voiding, Bathroom privileges    Respiratory:   O2 Device: None (Room air)    GI:  Current diet: ADULT DIET; Regular; 4 carb choices (60 gm/meal)  DIET ONE TIME MESSAGE;    Pain Management:   Patient states pain is manageable on current regimen: YES    Skin:  Ed Scale Score: 20  Interventions: Wound Offloading (Prevention Methods): Pillows, Repositioning  Pressure injury: no    Patient Safety:  Fall Score: [place] Total Score: 35  Fall Risk Interventions  Nursing Judgement-Fall Risk High(Add Comments): No  Toilet Every 2 Hours-In Advance of Need: No (Comment) (independent)  Hourly Visual Checks: Awake, In chair  Fall Visual Posted: Socks  Room Door Open: Deferred to promote rest  Alarm On: Other (Comment) (independent)  Patient Moved Closer to Nursing Station: No    Active Consults:  IP CONSULT TO VASCULAR ACCESS TEAM  IP CONSULT TO PHARMACY  IP CONSULT TO INFECTIOUS DISEASES  IP CONSULT TO PODIATRY  IP CONSULT TO CASE MANAGEMENT    Length of Stay:  Expected LOS: 4  Actual LOS: 3      Beverley Uribe, RN   
End of Shift Note    Bedside shift change report given to Beverley NGUYEN (oncoming nurse) by Addy Santiago RN (offgoing nurse).  Report included the following information SBAR REPORTS LIST: SBAR, Intake/Output, MAR, Cardiac Rhythm sinus rhythm, and Quality Measures    Shift worked:  [id number]     Shift summary and any significant changes:     Due medications given, provided with health education. Hourly rounding done, Seen patient sitting on chair watching tv, no complains. Ambulating, tolerated it well. Patient needs attended. No concern as of this time     Concerns for physician to address:  none     Zone phone for oncoming shift:   [phone]       Activity:  Activity: Independent  Number times ambulated in hallways past shift: 0  Number of times OOB to chair past shift: 2    Cardiac:   Cardiac Monitoring: YES / NO: Yes        Access:  Current line(s): IV ACCESS: - Peripheral IV - site  L Antecubital, insertion date: 12/03/2024  - PICC - site  Right, insertion date: 12/03/2024    Genitourinary:   Urinary status: Urinary status: Patient is voiding without difficulty.    Respiratory:   oxygen delivery: room air  Chronic home O2 use?: YES / NO: No  Incentive spirometer at bedside: YES / NO: No      GI:  Current diet:  DIET: regular  Passing flatus: YES / NO: Yes  Tolerating current diet: YES / NO: Yes      Pain Management:   Patient states pain is manageable on current regimen: YES / NO: Yes    Skin:    Interventions: MELONIE SCALE: 20    Patient Safety:  Fall Score: FALL RISK ASSESSMENT: Not at risk for falls.  Interventions: Fall Interventions Provided: advise to use call bell when help is needed, hourly rounding done      Length of Stay:  Expected LOS: 3  Actual LOS: 3      Addy Santiago RN                            
End of Shift Note    Bedside shift change report given to Beverley NGUYEN (oncoming nurse) by Addy Santiago RN (offgoing nurse).  Report included the following information SBAR REPORTS LIST: SBAR, Intake/Output, MAR, Recent Results, and Quality Measures    Shift worked:  [id number]     Shift summary and any significant changes:     Seen patient during endorsement, sitting on chair,not in distress. Due medications given with health education. No complain of pain. Hourly rounding done. Awaiting for ID recommendation for antibiotics. Patient WB only on walking boot to the right foot for 4-6 wks. WBAT to the left foot. As per podiatry. PICC line care done. Linen changed. No concern as of this time.     Concerns for physician to address:  none     Zone phone for oncoming shift:   [phone]       Activity:  Activity: independent  Number times ambulated in hallways past shift: 0  Number of times OOB to chair past shift: 2    Cardiac:   Cardiac Monitoring: YES / NO: No        Access:  Current line(s): PICC line    Genitourinary:   Urinary status: Urinary status: Patient is voiding without difficulty.    Respiratory:   oxygen delivery: room air  Chronic home O2 use?: YES / NO: No  Incentive spirometer at bedside: YES / NO: No      GI:  Current diet:  DIET: regular  Passing flatus: YES / NO: Yes  Tolerating current diet: YES / NO: Yes      Pain Management:   Patient states pain is manageable on current regimen: YES / NO: Yes    Skin:    Interventions: MELONIE SCALE: 23    Patient Safety:  Fall Score: FALL RISK ASSESSMENT: Not at risk for falls.  Interventions: Fall Interventions Provided: advised to use call bell when help is needed. Advised to use hand rails to prevent fall      Length of Stay:  Expected LOS: 6  Actual LOS: 6      Addy Santiago, RN                            
End of Shift Note    Bedside shift change report given to Sofi NGUYEN (oncoming nurse) by Katherine Moore RN (offgoing nurse).  Report included the following information SBAR REPORTS LIST: SBAR, Intake/Output, MAR, Recent Results, and Cardiac Rhythm NSR  At handoff report pt is awake in bed without complaints.      Shift worked:   NIGHT      Shift summary and any significant changes:     -Pt had a calm night   -MRSA screening (nares) & labs are collected and sent to lab.    -IV abx given as ordered.    -PICC line is patent, (+) blood return   -No complaints within my shift      Concerns for physician to address:   Pending podiatry consult     Zone phone for oncoming shift:    [phone]        Activity:  Activity: Ambulate , independent  Number times ambulated in hallways past shift: 0  Number of times OOB to chair past shift: 2    Cardiac:   Cardiac Monitoring: YES / NO: Yes        Access:  Current line(s): IV ACCESS: - Peripheral IV - site  L Basilic, insertion date: 12/3/24  - PICC - site  R Upper Arm, insertion date: 12/3    Genitourinary:   Urinary status: Urinary status: Patient is voiding without difficulty.    Respiratory:   oxygen delivery: room air  Chronic home O2 use?: YES / NO: No  Incentive spirometer at bedside: YES / NO: No      GI:  Current diet:  DIET: regular  Passing flatus: YES / NO: Yes  Tolerating current diet: YES / NO: Yes      Pain Management:   Patient states pain is manageable on current regimen: YES / NO: Yes    Skin:    Interventions: MELONIE SCALE: 21    Patient Safety:  Fall Score: FALL RISK ASSESSMENT: Not at risk for falls.  Interventions: Fall Interventions Provided: Implemented/recommended use of non-skid footwear      Length of Stay:  Expected LOS: 3  Actual LOS: 1      Ktaherine Moore RN                           
End of Shift Note    Bedside shift change report given to Vicente BHAT (oncoming nurse) by Justin Ackerman RN (offgoing nurse).  Report included the following information SBAR, Intake/Output, MAR, Recent Results, and Alarm Parameters     Shift worked:  7A-7P     Shift summary and any significant changes:     Patient alert, ambulates independently.  Awaiting ID recommendation for discharge.  No significant change noted during shift.  No complaints of pain.  Call bell within reach.  Plans of care ongoing.     Concerns for physician to address:       Zone phone for oncoming shift:   [phone]       Activity:  Level of Assistance: Independent  Number times ambulated in hallways past shift: 0  Number of times OOB to chair past shift: 3    Cardiac:   Cardiac Monitoring: No      Cardiac Rhythm: Sinus rhythm    Access:  Current line(s): PICC    Genitourinary:   Urinary Status: Voiding, Bathroom privileges    Respiratory:   O2 Device: None (Room air)  Chronic home O2 use?: NO  Incentive spirometer at bedside: NO    GI:  Last BM (including prior to admit): 12/06/24  Current diet:  ADULT DIET; Regular; 4 carb choices (60 gm/meal)  DIET ONE TIME MESSAGE;  Passing flatus: YES    Pain Management:   Patient states pain is manageable on current regimen: YES    Skin:  Ed Scale Score: 23  Interventions: Wound Offloading (Prevention Methods): Pillows, Repositioning    Patient Safety:  Fall Risk: Nursing Judgement-Fall Risk High(Add Comments): No  Fall Risk Interventions  Nursing Judgement-Fall Risk High(Add Comments): No  Toilet Every 2 Hours-In Advance of Need: No (Comment) (independent)  Hourly Visual Checks: Awake, In bed  Fall Visual Posted: Socks  Room Door Open: Deferred to promote rest  Alarm On: Other (Comment)  Patient Moved Closer to Nursing Station: No    Active Consults:   IP CONSULT TO VASCULAR ACCESS TEAM  IP CONSULT TO PHARMACY  IP CONSULT TO INFECTIOUS DISEASES  IP CONSULT TO PODIATRY  IP CONSULT TO CASE 
End of Shift Note    Bedside shift change report given to WENDY Anderson (oncoming nurse) by Carolann Arellano LPN (offgoing nurse).  Report included the following information SBAR REPORTS LIST: SBAR    Shift worked:  7p-7a     Shift summary and any significant changes:     Pt tolerated PO medications successfully. Pt ambulated around the room independently very well. No concerns or complications during my shift. No labs scheduled this shift. Hourly rounds completed. PICC line assessed, no visible issues. No pain during my shift.      Concerns for physician to address:  NONE      Zone phone for oncoming shift:   [phone]       Activity:  Activity: independent       Cardiac:   Cardiac Monitoring: YES / NO: No        Access:  Current line(s): PIV LEFT AC, PICC LINE RIGHT CEPHALIC     Genitourinary:   Urinary status: Urinary status: Patient is voiding without difficulty.    Respiratory:   oxygen delivery: room air  Chronic home O2 use?: YES / NO: No  Incentive spirometer at bedside: YES / NO: No      GI:  Current diet:  DIET: regular  Passing flatus: YES / NO: Yes  Tolerating current diet: YES / NO: Yes      Pain Management:   Patient states pain is manageable on current regimen: YES / NO: DENIED PAIN THIS SHIFT     Skin:    Interventions: MELONIE SCALE: 23    Patient Safety:  Fall Score: FALL RISK ASSESSMENT: 35  Interventions: Fall Interventions Provided: Implemented/recommended use of non-skid footwear, Implemented/recommended use of fall risk identification flag to all team members, Implemented/recommended assistive devices and encouraged their use, and Implemented/recommended resources for alarm system (personal alarm, bed alarm, call bell, etc.)       Length of Stay:  Expected LOS: 4  Actual LOS: 4      Carolann Arellano LPN                            
End of Shift Note    Bedside shift change report given to WENDY Murphy (oncoming nurse) by Beverley Uribe RN (offgoing nurse).  Report included the following information SBAR, Intake/Output, and MAR    Shift worked:  7a-7p     Shift summary and any significant changes:     Pt tolerated care fairly well today.   Meds given per MAR, no PRNs given. Pt has been sitting up all day in the chair and has ambulated to and from the bathroom by himself.   No complaints of pain.   Hourly rounds complete.   LBM today in the morning        Concerns for physician to address:  Regarding morning soft bp   Zone phone for oncoming shift:   [phone]       Activity:  Level of Assistance: Independent    Cardiac:   Cardiac Monitoring:  yes - Normal Sinus    Access:  Current line(s): PIV  PICC    Genitourinary:   Urinary Status: Voiding, Bathroom privileges    Respiratory:   O2 Device: None (Room air)    GI:  Current diet: ADULT DIET; Regular; 4 carb choices (60 gm/meal)    Pain Management:   Patient states pain is manageable on current regimen: YES    Skin:  Ed Scale Score: 20  Interventions: Wound Offloading (Prevention Methods): Pillows, Repositioning  Pressure injury: no    Patient Safety:  Fall Score: [place] Total Score: 35  Fall Risk Interventions  Nursing Judgement-Fall Risk High(Add Comments): No  Toilet Every 2 Hours-In Advance of Need: No (Comment) (independent)  Hourly Visual Checks: Awake, In chair  Fall Visual Posted: Socks  Room Door Open: Deferred to promote rest  Alarm On: Other (Comment) (independent)  Patient Moved Closer to Nursing Station: No    Active Consults:  IP CONSULT TO VASCULAR ACCESS TEAM  IP CONSULT TO PHARMACY  IP CONSULT TO INFECTIOUS DISEASES  IP CONSULT TO PODIATRY  IP CONSULT TO CASE MANAGEMENT    Length of Stay:  Expected LOS: 3  Actual LOS: 2      Beverley Uribe RN   
End of Shift Note    Bedside shift change report given to WENDY Murphy (oncoming nurse) by Igor Yao RN (offgoing nurse).  Report included the following information SBAR REPORTS LIST: SBAR, Kardex, ED Summary, Intake/Output, MAR, Recent Results, Cardiac Rhythm NS, and Quality Measures    Shift worked:  7a-7p     Shift summary and any significant changes:     Patient admitted from the E. Tolerated diet. Ambulated in room without complaint.   Patient voiced no complaint of pain.    Concerns for physician to address:  None      Zone phone for oncoming shift:   [phone]       Activity:  Activity: Out of bed and Ambulate  Number times ambulated in hallways past shift: 0  Number of times OOB to chair past shift: 1    Cardiac:   Cardiac Monitoring: YES / NO: Yes        Access:  Current line(s): IV ACCESS: - Peripheral IV - site  Left arm , insertion date: 12/03/24    Genitourinary:   Urinary status: Urinary status: Patient is voiding without difficulty.    Respiratory:   oxygen delivery: room air  Chronic home O2 use?: YES / NO: No  Incentive spirometer at bedside: YES / NO: No      GI:  Current diet:  DIET: regular  Passing flatus: YES / NO: Yes  Tolerating current diet: YES / NO: Yes      Pain Management:   Patient states pain is manageable on current regimen: YES / NO: Yes    Skin:    Interventions: MELONIE SCALE: 20    Patient Safety:  Fall Score: FALL RISK ASSESSMENT: At risk due to:  Weakness of RLE  Interventions: Fall Interventions Provided: Implemented/recommended use of non-skid footwear, Implemented/recommended environmental changes (remove hazards, lower bed, improve lighting, etc.), and Implemented/recommended increased supervision/assistance      Length of Stay:  Expected LOS: 3  Actual LOS: 0      Igor Yao RN                            
End of Shift Note    Bedside shift change report given to WENDY Murphy (oncoming nurse) by Sofi Roman RN (offgoing nurse).  Report included the following information SBAR, Intake/Output, and MAR    Shift worked:  7a-7p     Shift summary and any significant changes:     Pt tolerated care fairly well today. Meds given per MAR, no PRNs given. Pt has been sitting up all day in the chair and has ambulated to and from the bathroom by himself. No complaints of pain. Caring rounds complete.      Concerns for physician to address:       Zone phone for oncoming shift:          Activity:  Level of Assistance: Independent    Cardiac:   Cardiac Monitoring:  yes - Normal Sinus    Access:  Current line(s): PIV  PICC    Genitourinary:   Urinary Status: Voiding, Bathroom privileges    Respiratory:   O2 Device: None (Room air)    GI:  Current diet: ADULT DIET; Regular; 4 carb choices (60 gm/meal)    Pain Management:   Patient states pain is manageable on current regimen: YES    Skin:  Ed Scale Score: 19  Interventions: Wound Offloading (Prevention Methods): Turning, Repositioning, Pillows, Chair cushion  Pressure injury: no    Patient Safety:  Fall Score: [place] Total Score: 35  Fall Risk Interventions  Nursing Judgement-Fall Risk High(Add Comments): No  Toilet Every 2 Hours-In Advance of Need: Yes  Hourly Visual Checks: In chair, Awake, Quiet  Fall Visual Posted: Socks  Room Door Open: Yes  Alarm On: Other (Comment)  Patient Moved Closer to Nursing Station: No    Active Consults:  IP CONSULT TO VASCULAR ACCESS TEAM  IP CONSULT TO PHARMACY  IP CONSULT TO INFECTIOUS DISEASES  IP CONSULT TO PODIATRY    Length of Stay:  Expected LOS: 3  Actual LOS: 1      Sofi Roman RN   
Infectious Disease progress        Impression  12/5/2024  Afebrile, wbc 8.2  Blood cx (12/3) no growth   Previous wound cx from right foot (10/9) staphylococcus warneri    Diabetic right foot infection  Persistent lower extremity cellulitis of right   Resolved  Osteomyelitis of right foot  S/p right foot debridement/ 3rd proximal phalanx resection on 10/9  IntraOp cultures+ for rare Staph warneri( Ox S)  Pathology + for OM, margins clear.  CT right tibia/fibula 10/14+ for diffuse soft tissue edema and swelling  Concerning for cellulitis.  3.7 x 2.7 x 1.8 cm amorphus rim-enhancing fluid collection in soft tissue surrounding  Third MTP joint, cannot exclude septic arthritis of periarticular abscess.  Patient was discharged on Zosyn IV x 6 weeks end date 11/20  Pt developed callus and blister on lateral aspect of R/foot  Zosyn IV was extended to 8 weeks with planned end date 12/4-today.    Repeat CT 11/25+ for 3rd proximal phalangeal resection changes  Interval development of fracture [id number] MT neck, partial destruction of 3rd MT head,  Periosteal reaction in 3rd MT shaft.  Findings likely represent acute OM, pathological fracture.  Adjacent small fluid collection has decreased in the interval.  Pt advised to return to ED for admission if blister on R/foot was still present.  Pt deferred admission until after Thanksgiving    Diabetes type 2  Poorly controlled A1c 7.2  Diabetic neuropathy     PICC line  Malfunction, not flushing  S/p new PICC placement 12/3     S/p admission in March 2024  Treated for diabetic right foot infection  Plantar ulceration  Cellulitis of right foot, OM of right foot.  Culture+ for MSSA DC on cefazolin IV     CAD  CHF with preserved EF  Stable     HTN  HLD  Continue home meds     Obesity  BMI 32.9     ESR 2, previously 43  CRP <0.29, previously 8.34      Plan  Continue Zosyn IV  IV vancomycin has been d/c'd   Adequate fluids, daily probiotic  Waiting for Podiatry evaluation and 
MRI ON HOLD - TELEMETRY ORDERS AND SIGNATURES    Telemetry order must be changed to allow the patient to leave the unit without telemetry. Telemetry box cannot come to MRI with the patient.    Signatures are needed to finalize MRI screening. Please sign electronically or fax signed screening to [phone].    Please call MRI at [phone] when these are done.    If this patient needs monitored while off the unit, please call MRI at [phone] to coordinate a time for an RN to accompany the patient to MRI.    Thank You  
PCP hospital follow-up transitional care appointment has been scheduled with Dr. Adriana Carrasco on 12/11/24 1500. [facility] information on AVS for patient resource.   Pending patient discharge.     
Pharmacy Antimicrobial Kinetic Dosing    Indication for Antimicrobials: Bone and Joint Infection     Current Regimen of Each Antimicrobial:  Vancomycin - pharmacy dosing; Start Date 12/3; Day # 1  Piperacillin/tazobactam 3.375 g IV once then Q8H; Start Date 12/3; Day # 1    Previous Antimicrobial Therapy:  NA     Goal Level: Vancomycin -600    Date Dose & Interval Measured (mcg/mL) Predicted AUC 24-48 Predicted AUC 24,ss                          Significant Cultures:   12/3 - blood NGTD    Labs:  Recent Labs     Units 24  1204   CREATININE MG/DL 1.15   BUN MG/DL 20   WBC K/uL 8.6     Temp (24hrs), Av.7 °F (36.5 °C), Min:97.7 °F (36.5 °C), Max:97.7 °F (36.5 °C)    Conditions for Dosing Consideration: None    Creatinine Clearance (mL/min): Estimated Creatinine Clearance: 79 mL/min (based on SCr of 1.15 mg/dL).     Impression/Plan:   Vancomycin 2500 mg IV once then 1000 mg IV Q12H  Predicted UIB63-20 = 464, Predicted AUC24,ss = 504  Zosyn okay  BMP daily  Antimicrobial stop date TBD     Pharmacy will follow daily and adjust medications as appropriate for renal function and/or serum levels.    Thank you,  Uzair Kirk [facility]  
Pharmacy Antimicrobial Kinetic Dosing    Indication for Antimicrobials: Bone and Joint Infection   Treated for right diabetic foot infection with Zosyn IV x 8 weeks end date , now with likely acute osteo with pathologic fracture     Current Regimen of Each Antimicrobial:  Vancomycin - pharmacy dosing; Start Date 12/3; Day # 2  Piperacillin/tazobactam 3.375 g IV Q8H; Start Date 12/3; Day # 2    Goal Level: Vancomycin -600    Date Dose & Interval Measured (mcg/mL) Predicted AUC 24-48 Predicted AUC 24,ss                          Significant Cultures:   12/3 - blood NGTD  12/3 MRSA screen in process    Labs:  Recent Labs     Units 24  0303 24  1204   CREATININE MG/DL 0.86 1.15   BUN MG/DL 19 20   WBC K/uL 8.2 8.6     Temp (24hrs), Av.8 °F (36.6 °C), Min:97.5 °F (36.4 °C), Max:98.2 °F (36.8 °C)    Conditions for Dosing Consideration: None    Creatinine Clearance (mL/min): Estimated Creatinine Clearance: 105 mL/min (based on SCr of 0.86 mg/dL).     Impression/Plan:   Vancomycin 1000 mg IV Q12H for Predicted MBX77-04 = 410, Predicted AUC24,ss = 426. Will check a level tomorrow before the 0400 dose.   Zosyn okay  [facility] daily  ID service following   Podiatry consulted   Antimicrobial stop date TBD     Pharmacy will follow daily and adjust medications as appropriate for renal function and/or serum levels.    Thank you,  Flaca Randolph, [facility]    
Verbal report received from  ED RN Report included the following information SBAR, Kardex, ED Summary, MAR, and Recent Results. This RN verbalized understanding of plan of care with opportunity for clarification and questions.     
Verbal report received from  ED RN Report included the following information SBAR, Kardex, ED Summary, MAR, and Recent Results. This RN verbalized understanding of plan of care with opportunity for clarification and questions.       Attempted to call report; no answer.  
Other (Comment) (independent)  Patient Moved Closer to Nursing Station: No    Active Consults:   IP CONSULT TO VASCULAR ACCESS TEAM  IP CONSULT TO PHARMACY  IP CONSULT TO INFECTIOUS DISEASES  IP CONSULT TO PODIATRY  IP CONSULT TO CASE MANAGEMENT    Length of Stay:  Expected LOS: 6  Actual LOS: 5    Justin Ackerman RN                            
arthrotomy. 2. Status post resection of the third proximal phalangeal base. There has been interval development of a fracture through the third metatarsal neck with associated partial destruction of the third metatarsal head and periosteal reaction in the third metatarsal shaft. Findings likely represent acute osteomyelitis with a pathologic fracture. There is an adjacent small fluid collection that has decreased in the interval. Electronically signed by IKER MACDONALD    The above plan of care that has been discussed and agreed upon by Dr. Jeffrey.  A total time of 35 minutes was spent on today's encounter.  Greater than 50% of the time was spent on the following:  Preparing for visit and chart review.  Obtaining and/or reviewing separately obtained history  Performing a medically appropriate exam and/or evaluation  Counseling and educating a patient/family/caregiver as noted above  Referring and communicating with other professionals (not separately reported)  Independently interpreting results (not separately reported) and communicating results to the patient/family/caregiver  Care coordination (not separately reported) as noted above  Documenting clinical information in the electronic health records (e.g. problem list, visit note) on the day of the encounter      RADHA Garcia NP FACP              
collection measuring 10 x 14 mm on series 607 image 247. This has decreased in the interval.     1. Status post second MTP arthrotomy. 2. Status post resection of the third proximal phalangeal base. There has been interval development of a fracture through the third metatarsal neck with associated partial destruction of the third metatarsal head and periosteal reaction in the third metatarsal shaft. Findings likely represent acute osteomyelitis with a pathologic fracture. There is an adjacent small fluid collection that has decreased in the interval. Electronically signed by IKER Brooks MD FACP              
midfoot. There is mild tibiotalar osteoarthritis. Tendons: No full-thickness tendon tear. Muscles: No intramuscular hematoma. No focal atrophy. Soft tissue mass: No mass. There is soft tissue swelling plantar to the third MTP joint. There is a small adjacent fluid collection measuring 10 x 14 mm on series 607 image 247. This has decreased in the interval.     1. Status post second MTP arthrotomy. 2. Status post resection of the third proximal phalangeal base. There has been interval development of a fracture through the third metatarsal neck with associated partial destruction of the third metatarsal head and periosteal reaction in the third metatarsal shaft. Findings likely represent acute osteomyelitis with a pathologic fracture. There is an adjacent small fluid collection that has decreased in the interval. Electronically signed by IKER Brooks MD FACP

## 2024-12-09 NOTE — CARE COORDINATION
Transition of Care Plan:    RUR: 16% Moderate Risk  Prior Level of Functioning: Independent with ADL's  Disposition: Home   GHULAM: 12/9  Follow up appointments: PCP follow up  DME needed: N/A  Transportation at discharge: Spouse to transport  IM/IMM Medicare/ letter given: 2 IM Verbally given  Is patient a  and connected with [facility]? N/A  Caregiver Contact:   Charlotte Seymour (Spouse)  [phone] (Mobile)   Discharge Caregiver contacted prior to discharge? Yes by pt  Care Conference needed? N/A  Barriers to discharge:  N/A    Initial Note: Chart reviewed. Pt has discharged prior to CM being able to see. CM called pt to review 2 IM letter. Spouse transport pt for discharge. No CM needs identified.        12/09/24 1200   Services At/After Discharge   Transition of Care Consult (CM Consult) N/A   Services At/After Discharge None    Resource Information Provided? No   Mode of Transport at Discharge Other (see comment)  (Pts spouse to transport)   Confirm Follow Up Transport Family   Condition of Participation: Discharge Planning   The Plan for Transition of Care is related to the following treatment goals: Pt will discharge home with no CM needs   The Patient and/or Patient Representative was provided with a Choice of Provider? Patient   The Patient and/Or Patient Representative agree with the Discharge Plan? Yes   Freedom of Choice list was provided with basic dialogue that supports the patient's individualized plan of care/goals, treatment preferences, and shares the quality data associated with the providers?  No  (N/A)       SHAWN Ridley,  [phone]

## 2024-12-19 ENCOUNTER — TELEPHONE (OUTPATIENT)
Age: 64
End: 2024-12-19

## 2024-12-19 NOTE — TELEPHONE ENCOUNTER
Amira with [facility] called on behalf of    Dr. Michael Delgado who is requesting to speak with Dr. Cecilia Delgado cell # [phone]

## 2025-01-30 ENCOUNTER — TRANSCRIBE ORDERS (OUTPATIENT)
Facility: HOSPITAL | Age: 65
End: 2025-01-30

## 2025-01-30 DIAGNOSIS — M86.671 CHRONIC OSTEOMYELITIS OF RIGHT FOOT: Primary | ICD-10-CM

## 2025-02-10 ENCOUNTER — HOSPITAL ENCOUNTER (OUTPATIENT)
Facility: HOSPITAL | Age: 65
Discharge: HOME | End: 2025-02-13
Attending: PODIATRIST
Payer: COMMERCIAL

## 2025-02-10 DIAGNOSIS — M86.671 CHRONIC OSTEOMYELITIS OF RIGHT FOOT: ICD-10-CM

## 2025-02-10 PROCEDURE — 6360000004 HC RX CONTRAST MEDICATION: Performed by: PODIATRIST

## 2025-02-10 PROCEDURE — A9579 GAD-BASE MR CONTRAST NOS,1ML: HCPCS | Performed by: PODIATRIST

## 2025-02-10 PROCEDURE — 73720 MRI LWR EXTREMITY W/O&W/DYE: CPT

## 2025-02-10 RX ADMIN — GADOTERIDOL 20 ML: 279.3 INJECTION, SOLUTION INTRAVENOUS at 14:34

## 2025-08-08 ENCOUNTER — HOSPITAL ENCOUNTER (OUTPATIENT)
Facility: HOSPITAL | Age: 65
Discharge: HOME | End: 2025-08-11
Attending: INTERNAL MEDICINE
Payer: COMMERCIAL

## 2025-08-08 ENCOUNTER — TRANSCRIBE ORDERS (OUTPATIENT)
Facility: HOSPITAL | Age: 65
End: 2025-08-08

## 2025-08-08 DIAGNOSIS — R07.81 PLEURITIC CHEST PAIN: ICD-10-CM

## 2025-08-08 DIAGNOSIS — R07.81 PLEURITIC CHEST PAIN: Primary | ICD-10-CM

## 2025-08-08 PROCEDURE — 71250 CT THORAX DX C-: CPT

## (undated) DEVICE — SUTURE ETHLN SZ 2-0 L30IN NONABSORBABLE BLK L36MM PSLX 3/8 1697H

## (undated) DEVICE — GLOVE ORANGE PI 7   MSG9070

## (undated) DEVICE — 450 ML BOTTLE OF 0.05% CHLORHEXIDINE GLUCONATE IN 99.95% STERILE WATER FOR IRRIGATION, USP AND APPLICATOR.: Brand: IRRISEPT ANTIMICROBIAL WOUND LAVAGE

## (undated) DEVICE — SOLUTION IRRIG 1000ML 0.9% SOD CHL USP POUR PLAS BTL

## (undated) DEVICE — GAUZE,PACKING STRIP,IODOFORM,1/2"X5YD,ST: Brand: CURAD

## (undated) DEVICE — DRESSING STERILE PETRO W3XL8IN N ADH OIL EMUL GZ CURAD

## (undated) DEVICE — BANDAGE,GAUZE,BULKEE II,4.5"X4.1YD,STRL: Brand: MEDLINE

## (undated) DEVICE — PRECISION THIN, OFFSET (5.5 X 0.38 X 25.0MM)

## (undated) DEVICE — GLOVE ORANGE PI 7 1/2   MSG9075

## (undated) DEVICE — DISPOSABLE TOURNIQUET CUFF SINGLE BLADDER, DUAL PORT AND QUICK CONNECT CONNECTOR: Brand: COLOR CUFF

## (undated) DEVICE — TUBE SET SONICONE SHARPVAC DISP (MIN ORDER 5)

## (undated) DEVICE — EXTREMITY-MRMC: Brand: MEDLINE INDUSTRIES, INC.

## (undated) DEVICE — BANDAGE,ELASTIC,ESMARK,STERILE,4"X9',LF: Brand: MEDLINE

## (undated) DEVICE — PAD,ABDOMINAL,5"X9",ST,LF,25/BX: Brand: MEDLINE INDUSTRIES, INC.

## (undated) DEVICE — SWAB CULT LIQ STUART AGR AERB MOD IN BRK SGL RAYON TIP PLAS

## (undated) DEVICE — BLADE,CARBON-STEEL,15,STRL,DISPOSABLE,TB: Brand: MEDLINE

## (undated) DEVICE — DRESSING PETRO W3XL8IN OIL EMUL N ADH GZ KNIT IMPREG CELOS

## (undated) DEVICE — SHEET,DRAPE,UNDERBUTTOCK,GRAD POUCH,PORT: Brand: MEDLINE